# Patient Record
Sex: FEMALE | Race: WHITE | NOT HISPANIC OR LATINO | Employment: FULL TIME | URBAN - METROPOLITAN AREA
[De-identification: names, ages, dates, MRNs, and addresses within clinical notes are randomized per-mention and may not be internally consistent; named-entity substitution may affect disease eponyms.]

---

## 2017-01-03 ENCOUNTER — HOSPITAL ENCOUNTER (OUTPATIENT)
Dept: RADIOLOGY | Facility: HOSPITAL | Age: 62
Discharge: HOME | End: 2017-01-03
Attending: INTERNAL MEDICINE
Payer: COMMERCIAL

## 2017-01-03 DIAGNOSIS — R42 DIZZINESS: ICD-10-CM

## 2017-01-03 DIAGNOSIS — N95.1 POSTMENOPAUSAL DISORDER: ICD-10-CM

## 2017-01-03 PROCEDURE — 93880 EXTRACRANIAL BILAT STUDY: CPT | Mod: TC

## 2017-01-03 PROCEDURE — 76856 US EXAM PELVIC COMPLETE: CPT | Mod: 26,GC,, | Performed by: RADIOLOGY

## 2017-01-03 PROCEDURE — 76856 US EXAM PELVIC COMPLETE: CPT | Mod: TC

## 2017-01-03 PROCEDURE — 93880 EXTRACRANIAL BILAT STUDY: CPT | Mod: 26,,, | Performed by: RADIOLOGY

## 2017-01-03 PROCEDURE — 76830 TRANSVAGINAL US NON-OB: CPT | Mod: 26,GC,, | Performed by: RADIOLOGY

## 2017-01-04 ENCOUNTER — PATIENT MESSAGE (OUTPATIENT)
Dept: INTERNAL MEDICINE | Facility: CLINIC | Age: 62
End: 2017-01-04

## 2017-01-04 DIAGNOSIS — E78.49 OTHER HYPERLIPIDEMIA: Primary | ICD-10-CM

## 2017-01-04 RX ORDER — PRAVASTATIN SODIUM 20 MG/1
20 TABLET ORAL DAILY
Qty: 30 TABLET | Refills: 11 | Status: SHIPPED | OUTPATIENT
Start: 2017-01-04 | End: 2017-12-15 | Stop reason: SDUPTHER

## 2017-02-04 RX ORDER — ESTRADIOL 0.1 MG/G
CREAM VAGINAL
Qty: 42.5 G | Refills: 4 | Status: SHIPPED | OUTPATIENT
Start: 2017-02-04 | End: 2017-03-06 | Stop reason: SDUPTHER

## 2017-02-07 ENCOUNTER — PATIENT MESSAGE (OUTPATIENT)
Dept: INTERNAL MEDICINE | Facility: CLINIC | Age: 62
End: 2017-02-07

## 2017-02-09 ENCOUNTER — PATIENT MESSAGE (OUTPATIENT)
Dept: INTERNAL MEDICINE | Facility: CLINIC | Age: 62
End: 2017-02-09

## 2017-02-10 ENCOUNTER — OFFICE VISIT (OUTPATIENT)
Dept: INTERNAL MEDICINE | Facility: CLINIC | Age: 62
End: 2017-02-10
Payer: COMMERCIAL

## 2017-02-10 ENCOUNTER — PATIENT MESSAGE (OUTPATIENT)
Dept: INTERNAL MEDICINE | Facility: CLINIC | Age: 62
End: 2017-02-10

## 2017-02-10 VITALS
DIASTOLIC BLOOD PRESSURE: 74 MMHG | BODY MASS INDEX: 22.47 KG/M2 | HEART RATE: 78 BPM | WEIGHT: 131.63 LBS | HEIGHT: 64 IN | SYSTOLIC BLOOD PRESSURE: 123 MMHG

## 2017-02-10 DIAGNOSIS — B34.9 VIRAL ILLNESS: ICD-10-CM

## 2017-02-10 DIAGNOSIS — E86.0 DEHYDRATION: Primary | ICD-10-CM

## 2017-02-10 PROCEDURE — 96360 HYDRATION IV INFUSION INIT: CPT | Mod: S$GLB,,, | Performed by: INTERNAL MEDICINE

## 2017-02-10 PROCEDURE — 96361 HYDRATE IV INFUSION ADD-ON: CPT | Mod: S$GLB,,, | Performed by: INTERNAL MEDICINE

## 2017-02-10 PROCEDURE — 99999 PR PBB SHADOW E&M-EST. PATIENT-LVL IV: CPT | Mod: PBBFAC,,, | Performed by: INTERNAL MEDICINE

## 2017-02-10 PROCEDURE — 99214 OFFICE O/P EST MOD 30 MIN: CPT | Mod: 25,S$GLB,, | Performed by: INTERNAL MEDICINE

## 2017-02-10 RX ORDER — SODIUM CHLORIDE 9 MG/ML
INJECTION, SOLUTION INTRAVENOUS
Status: COMPLETED | OUTPATIENT
Start: 2017-02-10 | End: 2017-02-10

## 2017-02-10 RX ORDER — PREDNISONE 10 MG/1
TABLET ORAL
Qty: 20 TABLET | Refills: 0 | Status: SHIPPED | OUTPATIENT
Start: 2017-02-10 | End: 2017-08-29

## 2017-02-10 RX ADMIN — SODIUM CHLORIDE: 9 INJECTION, SOLUTION INTRAVENOUS at 03:02

## 2017-02-10 NOTE — MR AVS SNAPSHOT
[facility] - Internal Medicine  [address]  [facility] [address]  Phone: [phone]  Fax: [phone]                  Tj Hernandez   2/10/2017 4:00 PM   Office Visit    Description:  Female : 1955   Provider:  Bonnie Mathew MD   Department:  [facility] - Internal Medicine           Reason for Visit     Fatigue           Diagnoses this Visit        Comments    Dehydration    -  Primary            To Do List           Goals (5 Years of Data)     None       These Medications        Disp Refills Start End    predniSONE (DELTASONE) 10 MG tablet 20 tablet 0 2/10/2017     3 tablets daily for 2 days then 2 tablets daily for 2 days then one tablet daily for 2 days    Pharmacy: [facility] [place] [facility] [address] #: [phone]         [facility] On Call     [facility] On Call Nurse Care Line -  Assistance  Registered nurses in the [facility] On Call Center provide clinical advisement, health education, appointment booking, and other advisory services.  Call for this free service at [phone].             Medications           Message regarding Medications     Verify the changes and/or additions to your medication regime listed below are the same as discussed with your clinician today.  If any of these changes or additions are incorrect, please notify your healthcare provider.        START taking these NEW medications        Refills    predniSONE (DELTASONE) 10 MG tablet 0    Sig: 3 tablets daily for 2 days then 2 tablets daily for 2 days then one tablet daily for 2 days    Class: Normal      These medications were administered today        Dose Freq    0.9%  NaCl infusion  Clinic/HOD 1 time    Sig: Inject into the vein one time.    Class: Normal    Route: Intravenous           Verify that the below list of medications is an accurate representation of the medications you are currently taking.  If none reported, the list may be blank. If incorrect,  "please contact your healthcare provider. Carry this list with you in case of emergency.           Current Medications     alprazolam (XANAX) 0.25 MG tablet Take 1 tablet (0.25 mg total) by mouth 2 (two) times daily as needed for Anxiety.    biotin 1 mg tablet Take 1,000 mcg by mouth 3 (three) times daily.    calcium carbonate (OS-MOSHE) 600 mg (1,500 mg) Tab Take 600 mg by mouth 2 (two) times daily with meals.    cetirizine (ZYRTEC) 10 MG tablet Take 10 mg by mouth once daily.    chlorhexidine (PERIDEX) 0.12 % solution Use as directed 15 mLs in the mouth or throat 2 (two) times daily.    cholecalciferol, vitamin D3, 2,000 unit Cap Take 1 capsule by mouth once daily.    cholestyramine (QUESTRAN) 4 gram packet Take 1 packet (4 g total) by mouth 2 (two) times daily.    clonazepam (KLONOPIN) 0.5 MG disintegrating tablet Take 1 tablet (0.5 mg total) by mouth 3 (three) times daily.    ESTRACE 0.01 % (0.1 mg/gram) vaginal cream INSERT ONE gram VAGINALLY TWO TIMES A WEEK    ibuprofen (ADVIL,MOTRIN) 800 MG tablet     meloxicam (MOBIC) 15 MG tablet TAKE ONE TABLET BY MOUTH DAILY    multivitamin capsule Take 1 capsule by mouth once daily.    pravastatin (PRAVACHOL) 20 MG tablet Take 1 tablet (20 mg total) by mouth once daily.    predniSONE (DELTASONE) 10 MG tablet 3 tablets daily for 2 days then 2 tablets daily for 2 days then one tablet daily for 2 days    ranitidine (ZANTAC) 300 MG tablet TAKE ONE TABLET BY MOUTH DAILY    UBIDECARENONE (COENZYME Q10) 100 mg Tab Take 1 tablet (100 mg total) by mouth once daily.    ZETIA 10 mg tablet TAKE ONE TABLET BY MOUTH DAILY    zolpidem (AMBIEN) 10 mg Tab Take 1 tablet (10 mg total) by mouth nightly as needed.           Clinical Reference Information           Your Vitals Were     BP Pulse Height Weight BMI    123/74 (BP Location: Left arm, Patient Position: Sitting, BP Method: Manual) 78 5' 4" (1.626 m) 59.7 kg (131 lb 9.8 oz) 22.59 kg/m2      Blood Pressure          Most Recent Value    " BP  123/74      Allergies as of 2/10/2017     No Known Allergies      Immunizations Administered on Date of Encounter - 2/10/2017     None      Administrations This Visit     0.9%  NaCl infusion     Admin Date Action Dose Route Administered By             02/10/2017 New Bag   Intravenous Michelle Vargas LPN                      Language Assistance Services     ATTENTION: Language assistance services are available, free of charge. Please call [phone].      ATENCIÓN: Si habla español, tiene a cruz disposición servicios gratuitos de asistencia lingüística. Llame al [phone].     CHÚ Ý: N?u b?n nói Ti?ng Vi?t, có các d?ch v? h? tr? ngôn ng? mi?n phí dành cho b?n. G?i s? [phone].         [facility] complies with applicable Federal civil rights laws and does not discriminate on the basis of race, color, national origin, age, disability, or sex.

## 2017-02-10 NOTE — PROGRESS NOTES
"CHIEF COMPLAINT: Cough and diarrhea    HISTORY OF PRESENT ILLNESS: 61-year-old woman who presents due to a viral illness. She had chills and body aches that started on 2/6/17.  Symptoms continued on 2/7/17 and 2/8/17. She started to have diarrhea yesterday. She had watery diarrhea yesterday morning and several soft stools today. She has a dry cough with intermitent wheezing. She has left ear fullness.  She is trying to drink fluids but has no appetite. SHe is slightly lightheaded today.  No abdominal pain, nausea, vomiting, constipation, dysuria, hematuria.       PAST MEDICAL HISTORY:   1. Hyperlipidemia.   2. Sleep disorder.   3. Cervical spine arthritis.   4. Episode of hematuria, negative cystoscope with Dr. Roland.     PAST SURGICAL HISTORY:   1. D&C for a miscarriage.   2. Conization due to dysplasia 17 years ago.   3. Tonsillectomy and adenoidectomy at age [age].     SOCIAL HISTORY: She does not smoke. She drinks two alcoholic beverages   a month. 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 She owns a business.     FAMILY HISTORY: She is adopted.     REVIEW OF SYSTEMS: She denies fevers, chills, night sweats, hot flashes, visual change. She has some slight hearing loss. She denies sinus congestion, sore throat, chest pain, shortness of breath, palpitations, nausea, vomiting, constipation, diarrhea, dysuria, hematuria, joint pain, muscle pain, rashes, headaches, polydipsia,   polyuria.     SCREENING: Mammogram 910/16, bone density 1/13.Colonoscopy was due 2018    PHYSICAL EXAMINATION:    Visit Vitals    /74 (BP Location: Left arm, Patient Position: Sitting, BP Method: Manual)    Pulse 78    Ht 5' 4" (1.626 m)    Wt 59.7 kg (131 lb 9.8 oz)    BMI 22.59 kg/m2             General: Alert, oriented. No apparent distress. Affect within normal   limits.   Conjunctivae anicteric. PERRL. Tympanic membranes clear fluid bilaterally. Oropharynx   clear.   Neck supple. No cervical lymphadenopathy, no thyroid " enlargement.   Respiratory effort normal. Lungs clear to auscultation.   Heart: Regular rate and rhythm without murmurs, gallops or rubs.   No lower extremity edema.    ABDOMEN: soft, non distended, non tender, bowel sounds present, no hepatosplenomgaly       1 liter normal saline bolused in the office over 2 hours. Pt felt better after IV fluids      ASSESSMENT AND PLAN:    1.Viral illness with dehydration- continue loratadine 10 mg daily and robitussin CF 2 tsp in am and afternoon for cough. Prednisone taper for congestion. PUsh fluids for diarrhea. [place] diet and advance as tolerated.

## 2017-02-10 NOTE — PROGRESS NOTES
Verified patient's name, date of birth and allergies. IV started into rt AC with 20 on gauge catheter needle with NS infusing @ 1000 ml bolus patient tolerating well. No redness or swelling to IV site noted.

## 2017-03-06 RX ORDER — ESTRADIOL 0.1 MG/G
1 CREAM VAGINAL
Qty: 42.5 G | Refills: 4 | Status: SHIPPED | OUTPATIENT
Start: 2017-03-06 | End: 2020-04-09 | Stop reason: SDUPTHER

## 2017-04-05 ENCOUNTER — PATIENT MESSAGE (OUTPATIENT)
Dept: INTERNAL MEDICINE | Facility: CLINIC | Age: 62
End: 2017-04-05

## 2017-04-07 ENCOUNTER — LAB VISIT (OUTPATIENT)
Dept: LAB | Facility: HOSPITAL | Age: 62
End: 2017-04-07
Attending: INTERNAL MEDICINE
Payer: COMMERCIAL

## 2017-04-07 DIAGNOSIS — E78.5 HYPERLIPIDEMIA, UNSPECIFIED: ICD-10-CM

## 2017-04-07 LAB
ALBUMIN SERPL BCP-MCNC: 3.9 G/DL
ALP SERPL-CCNC: 57 U/L
ALT SERPL W/O P-5'-P-CCNC: 11 U/L
ANION GAP SERPL CALC-SCNC: 7 MMOL/L
AST SERPL-CCNC: 19 U/L
BILIRUB SERPL-MCNC: 0.6 MG/DL
BUN SERPL-MCNC: 18 MG/DL
CALCIUM SERPL-MCNC: 9.5 MG/DL
CHLORIDE SERPL-SCNC: 107 MMOL/L
CHOLEST/HDLC SERPL: 2.6 {RATIO}
CO2 SERPL-SCNC: 28 MMOL/L
CREAT SERPL-MCNC: 0.9 MG/DL
EST. GFR  (AFRICAN AMERICAN): >60 ML/MIN/1.73 M^2
EST. GFR  (NON AFRICAN AMERICAN): >60 ML/MIN/1.73 M^2
GLUCOSE SERPL-MCNC: 96 MG/DL
HDL/CHOLESTEROL RATIO: 39.1 %
HDLC SERPL-MCNC: 174 MG/DL
HDLC SERPL-MCNC: 68 MG/DL
LDLC SERPL CALC-MCNC: 90.8 MG/DL
NONHDLC SERPL-MCNC: 106 MG/DL
POTASSIUM SERPL-SCNC: 4.8 MMOL/L
PROT SERPL-MCNC: 6.7 G/DL
SODIUM SERPL-SCNC: 142 MMOL/L
TRIGL SERPL-MCNC: 76 MG/DL

## 2017-04-07 PROCEDURE — 80053 COMPREHEN METABOLIC PANEL: CPT

## 2017-04-07 PROCEDURE — 36415 COLL VENOUS BLD VENIPUNCTURE: CPT | Mod: PO

## 2017-04-07 PROCEDURE — 80061 LIPID PANEL: CPT

## 2017-04-10 ENCOUNTER — PATIENT MESSAGE (OUTPATIENT)
Dept: INTERNAL MEDICINE | Facility: CLINIC | Age: 62
End: 2017-04-10

## 2017-04-10 DIAGNOSIS — Z01.818 PRE-OPERATIVE CLEARANCE: Primary | ICD-10-CM

## 2017-06-19 ENCOUNTER — PATIENT MESSAGE (OUTPATIENT)
Dept: INTERNAL MEDICINE | Facility: CLINIC | Age: 62
End: 2017-06-19

## 2017-06-19 RX ORDER — MELOXICAM 15 MG/1
15 TABLET ORAL DAILY
Qty: 30 TABLET | Refills: 0 | Status: SHIPPED | OUTPATIENT
Start: 2017-06-19 | End: 2017-12-15 | Stop reason: SDUPTHER

## 2017-06-19 RX ORDER — EZETIMIBE 10 MG/1
10 TABLET ORAL DAILY
Qty: 30 TABLET | Refills: 11 | Status: SHIPPED | OUTPATIENT
Start: 2017-06-19 | End: 2017-12-15 | Stop reason: SDUPTHER

## 2017-06-20 ENCOUNTER — PATIENT MESSAGE (OUTPATIENT)
Dept: INTERNAL MEDICINE | Facility: CLINIC | Age: 62
End: 2017-06-20

## 2017-08-24 ENCOUNTER — PATIENT MESSAGE (OUTPATIENT)
Dept: INTERNAL MEDICINE | Facility: CLINIC | Age: 62
End: 2017-08-24

## 2017-08-29 ENCOUNTER — OFFICE VISIT (OUTPATIENT)
Dept: SURGERY | Facility: CLINIC | Age: 62
End: 2017-08-29
Payer: COMMERCIAL

## 2017-08-29 VITALS
HEIGHT: 64 IN | WEIGHT: 136.69 LBS | SYSTOLIC BLOOD PRESSURE: 142 MMHG | BODY MASS INDEX: 23.34 KG/M2 | DIASTOLIC BLOOD PRESSURE: 57 MMHG | HEART RATE: 57 BPM

## 2017-08-29 DIAGNOSIS — R15.9 FULL INCONTINENCE OF FECES: ICD-10-CM

## 2017-08-29 DIAGNOSIS — K64.8 BLEEDING INTERNAL HEMORRHOIDS: Primary | ICD-10-CM

## 2017-08-29 PROCEDURE — 3008F BODY MASS INDEX DOCD: CPT | Mod: S$GLB,,, | Performed by: COLON & RECTAL SURGERY

## 2017-08-29 PROCEDURE — 99213 OFFICE O/P EST LOW 20 MIN: CPT | Mod: S$GLB,,, | Performed by: COLON & RECTAL SURGERY

## 2017-08-29 PROCEDURE — 99999 PR PBB SHADOW E&M-EST. PATIENT-LVL III: CPT | Mod: PBBFAC,,, | Performed by: COLON & RECTAL SURGERY

## 2017-08-29 NOTE — PROGRESS NOTES
"Subjective:       Patient ID: Tj Hernandez is a 62 y.o. female.    Chief Complaint: Hemorrhoids    HPI   62 F who presents to clinic with complaints of hemorrhoids for "years". Recently, she has increased bleeding episodes, has to wear a pad daily because of the bleeding. Bleeding episodes not associated with bowel movements.  Some discomfort, not necessarily painful. Also complains of fecal seepage and increased gas/bloating. No episodes of incontinence. Has 2-3 soft bowel movements/day which is normal for her. Not on any anticoagulation. Last colonoscopy 02/2013, repeat in 5 years. Unsure of family history as patient was adopted. No previous rectal surgeries. Had two vaginal deliveries of babies over 8lbs.   Review of Systems   Constitutional: Negative for fatigue, fever and unexpected weight change.   Respiratory: Negative for cough and shortness of breath.    Cardiovascular: Negative for chest pain.   Gastrointestinal: Positive for anal bleeding and rectal pain. Negative for abdominal distention, abdominal pain, blood in stool, constipation, diarrhea, nausea and vomiting.       Objective:      Physical Exam   Constitutional: She is oriented to person, place, and time. She appears well-developed and well-nourished. No distress.   Eyes: Conjunctivae and EOM are normal.   Pulmonary/Chest: Effort normal. No respiratory distress.   Abdominal: Soft. She exhibits no distension. There is no tenderness.   Genitourinary:   Genitourinary Comments: Normal perianal skin. eversion of anus revealed no abnormality or fissure, ROMEO revealed no masses, blood or stool in vault, weak sphincter tone, weak squeeze, anoscopy grade II internal hemorrhoids with no bleeding or stigmata of same.     Musculoskeletal: Normal range of motion.   Neurological: She is alert and oriented to person, place, and time.   Skin: Skin is warm and dry.   Psychiatric: She has a normal mood and affect. Her behavior is normal.       Assessment:       1. " Bleeding internal hemorrhoids        Plan:   High Fiber Diet  Fiber Supplement   Colonoscopy 2018  RTC 4 weeks  Have seen and examined the patient with the fellow and agree with their plan.  ANKUR SANDRA

## 2017-09-10 ENCOUNTER — PATIENT MESSAGE (OUTPATIENT)
Dept: INTERNAL MEDICINE | Facility: CLINIC | Age: 62
End: 2017-09-10

## 2017-09-10 DIAGNOSIS — R07.81 RIB PAIN: Primary | ICD-10-CM

## 2017-09-11 ENCOUNTER — OFFICE VISIT (OUTPATIENT)
Dept: INTERNAL MEDICINE | Facility: CLINIC | Age: 62
End: 2017-09-11
Payer: COMMERCIAL

## 2017-09-11 ENCOUNTER — HOSPITAL ENCOUNTER (OUTPATIENT)
Dept: RADIOLOGY | Facility: HOSPITAL | Age: 62
Discharge: HOME | End: 2017-09-11
Attending: INTERNAL MEDICINE
Payer: COMMERCIAL

## 2017-09-11 VITALS
HEART RATE: 52 BPM | OXYGEN SATURATION: 99 % | TEMPERATURE: 99 F | SYSTOLIC BLOOD PRESSURE: 124 MMHG | WEIGHT: 138.44 LBS | HEIGHT: 64 IN | BODY MASS INDEX: 23.64 KG/M2 | DIASTOLIC BLOOD PRESSURE: 90 MMHG

## 2017-09-11 DIAGNOSIS — R07.81 RIB PAIN: ICD-10-CM

## 2017-09-11 DIAGNOSIS — S20.212A CONTUSION, CHEST WALL, LEFT, INITIAL ENCOUNTER: Primary | ICD-10-CM

## 2017-09-11 PROCEDURE — 99999 PR PBB SHADOW E&M-EST. PATIENT-LVL III: CPT | Mod: PBBFAC,,, | Performed by: INTERNAL MEDICINE

## 2017-09-11 PROCEDURE — 99213 OFFICE O/P EST LOW 20 MIN: CPT | Mod: S$GLB,,, | Performed by: INTERNAL MEDICINE

## 2017-09-11 PROCEDURE — 3008F BODY MASS INDEX DOCD: CPT | Mod: S$GLB,,, | Performed by: INTERNAL MEDICINE

## 2017-09-11 PROCEDURE — 71110 X-RAY EXAM RIBS BIL 3 VIEWS: CPT | Mod: 26,,, | Performed by: RADIOLOGY

## 2017-09-11 PROCEDURE — 71110 X-RAY EXAM RIBS BIL 3 VIEWS: CPT | Mod: TC

## 2017-09-11 NOTE — PROGRESS NOTES
Subjective:       Patient ID: Tj Hernandez is a 62 y.o. female.    Chief Complaint: Fall (pt fell on deck on saturday night. her ribs are hurting, no bruise)    Fall   The accident occurred 3 to 5 days ago. The fall occurred while walking. She landed on concrete. There was no blood loss. Point of impact: left chest and elbow. Pain location: left chest. The pain is at a severity of 3/10. The pain is mild. The symptoms are aggravated by movement (deep breath and cough). Pertinent negatives include no abdominal pain, fever, headaches, loss of consciousness, nausea, numbness, tingling, visual change or vomiting. She has tried ice and NSAID for the symptoms. The treatment provided mild relief.     Review of Systems   Constitutional: Negative for activity change, chills, fatigue and fever.   HENT: Negative for congestion, ear pain, nosebleeds, postnasal drip, sinus pressure and sore throat.    Eyes: Negative.  Negative for visual disturbance.   Respiratory: Negative for cough, chest tightness, shortness of breath and wheezing.    Cardiovascular: Negative for chest pain.   Gastrointestinal: Negative for abdominal pain, diarrhea, nausea and vomiting.   Genitourinary: Negative for difficulty urinating, dysuria, frequency and urgency.   Musculoskeletal: Negative for arthralgias and neck stiffness.   Skin: Negative for rash.   Neurological: Negative for dizziness, tingling, loss of consciousness, weakness, numbness and headaches.   Psychiatric/Behavioral: Negative for sleep disturbance. The patient is not nervous/anxious.        Objective:      Physical Exam   Constitutional: She is oriented to person, place, and time. She appears well-developed and well-nourished.  Non-toxic appearance. No distress.   HENT:   Head: Normocephalic and atraumatic.   Right Ear: Tympanic membrane, external ear and ear canal normal.   Left Ear: Tympanic membrane, external ear and ear canal normal.   Eyes: EOM are normal. Pupils are equal, round,  and reactive to light. No scleral icterus.   Neck: Normal range of motion. Neck supple. No thyromegaly present.   Cardiovascular: Normal rate, regular rhythm and normal heart sounds.    Pulmonary/Chest: Effort normal and breath sounds normal.       Abdominal: Soft. Bowel sounds are normal. She exhibits no mass. There is no tenderness. There is no rebound.   Musculoskeletal: Normal range of motion.   Lymphadenopathy:     She has no cervical adenopathy.   Neurological: She is alert and oriented to person, place, and time. She has normal reflexes. She displays normal reflexes. No cranial nerve deficit. She exhibits normal muscle tone. Coordination normal.   Skin: Skin is warm and dry.   Psychiatric: She has a normal mood and affect. Her behavior is normal.       Assessment:       1. Contusion, chest wall, left, initial encounter        Plan:   Tj was seen today for fall.    Diagnoses and all orders for this visit:    Contusion, chest wall, left, initial encounter

## 2017-09-22 ENCOUNTER — PATIENT MESSAGE (OUTPATIENT)
Dept: INTERNAL MEDICINE | Facility: CLINIC | Age: 62
End: 2017-09-22

## 2017-09-25 RX ORDER — CYCLOBENZAPRINE HCL 5 MG
5 TABLET ORAL NIGHTLY
Qty: 30 TABLET | Refills: 0 | Status: SHIPPED | OUTPATIENT
Start: 2017-09-25 | End: 2017-10-05

## 2017-09-28 ENCOUNTER — OFFICE VISIT (OUTPATIENT)
Dept: SURGERY | Facility: CLINIC | Age: 62
End: 2017-09-28
Payer: COMMERCIAL

## 2017-09-28 VITALS
DIASTOLIC BLOOD PRESSURE: 82 MMHG | HEART RATE: 59 BPM | SYSTOLIC BLOOD PRESSURE: 154 MMHG | HEIGHT: 64 IN | WEIGHT: 137.13 LBS | BODY MASS INDEX: 23.41 KG/M2

## 2017-09-28 DIAGNOSIS — K64.9 HEMORRHOIDS, UNSPECIFIED HEMORRHOID TYPE: Primary | ICD-10-CM

## 2017-09-28 PROCEDURE — 99999 PR PBB SHADOW E&M-EST. PATIENT-LVL III: CPT | Mod: PBBFAC,,, | Performed by: COLON & RECTAL SURGERY

## 2017-09-28 PROCEDURE — 3008F BODY MASS INDEX DOCD: CPT | Mod: S$GLB,,, | Performed by: COLON & RECTAL SURGERY

## 2017-09-28 PROCEDURE — 99214 OFFICE O/P EST MOD 30 MIN: CPT | Mod: S$GLB,,, | Performed by: COLON & RECTAL SURGERY

## 2017-09-28 NOTE — PROGRESS NOTES
"blanca Hernandez is a 62 y.o. female.    Chief Complaint: Hemorrhoids    HPI   62 F who presents to clinic with complaints of hemorrhoids for "years". Recently, she has increased bleeding episodes, has to wear a pad daily because of the bleeding. Bleeding episodes not associated with bowel movements.  Some discomfort, not necessarily painful. Also complains of fecal seepage and increased gas/bloating. No episodes of incontinence. Has 2-3 soft bowel movements/day which is normal for her. Not on any anticoagulation. Last colonoscopy 02/2013, repeat in 5 years. Unsure of family history as patient was adopted. No previous rectal surgeries. Had two vaginal deliveries of babies over 8lbs. Since taking fibercon the bloating and seepage has improved. She still c/o hemooridal discomfort  Review of Systems   Constitutional: Negative for fatigue, fever and unexpected weight change.   Respiratory: Negative for cough and shortness of breath.    Cardiovascular: Negative for chest pain.   Gastrointestinal: Positive for anal bleeding and rectal pain. Negative for abdominal distention, abdominal pain, blood in stool, constipation, diarrhea, nausea and vomiting.       Objective:      Physical Exam   Constitutional: She is oriented to person, place, and time. She appears well-developed and well-nourished. No distress.   Eyes: Conjunctivae and EOM are normal.   Pulmonary/Chest: Effort normal. No respiratory distress.   Abdominal: Soft. She exhibits no distension. There is no tenderness.   Genitourinary:   Genitourinary Comments: Mixxed hemorroidal disease. He hemorrhoids are worse than her previous vist   Musculoskeletal: Normal range of motion.   Neurological: She is alert and oriented to person, place, and time.   Skin: Skin is warm and dry.   Psychiatric: She has a normal mood and affect. Her behavior is normal.       Assessment:       1. Bleeding  hemorrhoids        Plan:   High Fiber Diet  Discussed hemorrhoid surgery. She will " contact office . Otherwise continue conservative treatment

## 2017-10-25 ENCOUNTER — PATIENT MESSAGE (OUTPATIENT)
Dept: INTERNAL MEDICINE | Facility: CLINIC | Age: 62
End: 2017-10-25

## 2017-10-25 DIAGNOSIS — Z12.31 SCREENING MAMMOGRAM, ENCOUNTER FOR: Primary | ICD-10-CM

## 2017-11-01 ENCOUNTER — HOSPITAL ENCOUNTER (OUTPATIENT)
Dept: RADIOLOGY | Facility: HOSPITAL | Age: 62
Discharge: HOME | End: 2017-11-01
Attending: INTERNAL MEDICINE
Payer: COMMERCIAL

## 2017-11-01 VITALS — WEIGHT: 137 LBS | BODY MASS INDEX: 23.39 KG/M2 | HEIGHT: 64 IN

## 2017-11-01 DIAGNOSIS — Z12.31 SCREENING MAMMOGRAM, ENCOUNTER FOR: ICD-10-CM

## 2017-11-01 PROCEDURE — 77067 SCR MAMMO BI INCL CAD: CPT | Mod: TC

## 2017-11-01 PROCEDURE — 77063 BREAST TOMOSYNTHESIS BI: CPT | Mod: 26,,, | Performed by: RADIOLOGY

## 2017-11-01 PROCEDURE — 77067 SCR MAMMO BI INCL CAD: CPT | Mod: 26,,, | Performed by: RADIOLOGY

## 2017-12-15 ENCOUNTER — OFFICE VISIT (OUTPATIENT)
Dept: INTERNAL MEDICINE | Facility: CLINIC | Age: 62
End: 2017-12-15
Payer: COMMERCIAL

## 2017-12-15 VITALS
HEIGHT: 64 IN | WEIGHT: 139.13 LBS | DIASTOLIC BLOOD PRESSURE: 70 MMHG | BODY MASS INDEX: 23.75 KG/M2 | HEART RATE: 66 BPM | TEMPERATURE: 99 F | SYSTOLIC BLOOD PRESSURE: 120 MMHG | OXYGEN SATURATION: 98 %

## 2017-12-15 DIAGNOSIS — E55.9 VITAMIN D DEFICIENCY DISEASE: ICD-10-CM

## 2017-12-15 DIAGNOSIS — G47.09 OTHER INSOMNIA: ICD-10-CM

## 2017-12-15 DIAGNOSIS — E56.9 VITAMIN DEFICIENCY: ICD-10-CM

## 2017-12-15 DIAGNOSIS — E78.5 HYPERLIPIDEMIA, UNSPECIFIED HYPERLIPIDEMIA TYPE: Primary | ICD-10-CM

## 2017-12-15 DIAGNOSIS — Z12.11 SCREENING FOR MALIGNANT NEOPLASM OF COLON: ICD-10-CM

## 2017-12-15 DIAGNOSIS — M85.80 OSTEOPENIA, UNSPECIFIED LOCATION: ICD-10-CM

## 2017-12-15 DIAGNOSIS — Z11.51 SCREENING FOR HPV (HUMAN PAPILLOMAVIRUS): ICD-10-CM

## 2017-12-15 DIAGNOSIS — Z00.00 ROUTINE GENERAL MEDICAL EXAMINATION AT A HEALTH CARE FACILITY: ICD-10-CM

## 2017-12-15 DIAGNOSIS — E53.8 VITAMIN B12 DEFICIENCY: ICD-10-CM

## 2017-12-15 DIAGNOSIS — N94.9 ADNEXAL FULLNESS: ICD-10-CM

## 2017-12-15 PROCEDURE — 88175 CYTOPATH C/V AUTO FLUID REDO: CPT

## 2017-12-15 PROCEDURE — 99999 PR PBB SHADOW E&M-EST. PATIENT-LVL V: CPT | Mod: PBBFAC,,, | Performed by: INTERNAL MEDICINE

## 2017-12-15 PROCEDURE — 99396 PREV VISIT EST AGE 40-64: CPT | Mod: S$GLB,,, | Performed by: INTERNAL MEDICINE

## 2017-12-15 RX ORDER — AMOXICILLIN 875 MG/1
875 TABLET, FILM COATED ORAL EVERY 12 HOURS
Qty: 20 TABLET | Refills: 0 | Status: SHIPPED | OUTPATIENT
Start: 2017-12-15 | End: 2017-12-25

## 2017-12-15 RX ORDER — MELOXICAM 15 MG/1
15 TABLET ORAL DAILY
Qty: 30 TABLET | Refills: 0 | Status: ON HOLD | OUTPATIENT
Start: 2017-12-15 | End: 2019-11-12

## 2017-12-15 RX ORDER — PRAVASTATIN SODIUM 20 MG/1
20 TABLET ORAL DAILY
Qty: 30 TABLET | Refills: 11 | Status: SHIPPED | OUTPATIENT
Start: 2017-12-15 | End: 2018-12-18 | Stop reason: SDUPTHER

## 2017-12-15 RX ORDER — EZETIMIBE 10 MG/1
10 TABLET ORAL DAILY
Qty: 30 TABLET | Refills: 11 | Status: SHIPPED | OUTPATIENT
Start: 2017-12-15 | End: 2018-12-18 | Stop reason: SDUPTHER

## 2017-12-15 RX ORDER — ZOLPIDEM TARTRATE 10 MG/1
10 TABLET ORAL NIGHTLY PRN
Qty: 30 TABLET | Refills: 1 | Status: SHIPPED | OUTPATIENT
Start: 2017-12-15 | End: 2019-01-08 | Stop reason: SDUPTHER

## 2017-12-15 RX ORDER — AZELASTINE 1 MG/ML
1 SPRAY, METERED NASAL 2 TIMES DAILY
Qty: 30 ML | Refills: 11 | Status: SHIPPED | OUTPATIENT
Start: 2017-12-15 | End: 2019-11-11

## 2017-12-15 NOTE — PROGRESS NOTES
"CHIEF COMPLAINT: Annual exam    HISTORY OF PRESENT ILLNESS: 61-year-old woman who presents for her annual exam.   She has been tired at times.  Sleep has been the same.  Mood is ok. due to a viral illness. She had chills and body aches that started on 2/6/17.  Symptoms continued on 2/7/17 and 2/8/17. She started to have diarrhea yesterday. She had watery diarrhea yesterday morning and several soft stools today. She has a dry cough with intermitent wheezing. She has left ear fullness.  She is trying to drink fluids but has no appetite. SHe is slightly lightheaded today.  No abdominal pain, nausea, vomiting, constipation, dysuria, hematuria.     Diarreha is better with 2 fiber con in the morning. Hemorroids come and go. She eneds to have hemorrhoid surgery.    Neck comes and goes. She takes meloxicam as needed which helps.       PAST MEDICAL HISTORY:   1. Hyperlipidemia.   2. Sleep disorder.   3. Cervical spine arthritis.   4. Episode of hematuria, negative cystoscope with Dr. Roland.     PAST SURGICAL HISTORY:   1. D&C for a miscarriage.   2. Conization due to dysplasia 18 years ago.   3. Tonsillectomy and adenoidectomy at age [age].     SOCIAL HISTORY: She does not smoke. She drinks two alcoholic beverages   a month. 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 She owns a business.     FAMILY HISTORY: She is adopted.     REVIEW OF SYSTEMS: She denies fevers, chills, night sweats, hot flashes, visual change. She has some slight hearing loss. She denies sinus congestion, sore throat, chest pain, shortness of breath, palpitations, nausea, vomiting, constipation, diarrhea, dysuria, hematuria, joint pain, muscle pain, rashes, headaches, polydipsia,   polyuria.     SCREENING: Mammogram 11/17, bone density 1/13.Colonoscopy was due 2018    PHYSICAL EXAMINATION:     /70 (BP Location: Left arm, Patient Position: Sitting, BP Method: Medium (Manual))   Pulse 66   Temp 98.5 °F (36.9 °C)   Ht 5' 4" (1.626 m)   Wt 63.1 kg (139 " lb 1.6 oz)   SpO2 98%   BMI 23.88 kg/m²   General: Alert, oriented. No apparent distress. Affect within normal   limits.   Conjunctivae anicteric. PERRL. Tympanic membranes clear fluid bilaterally. Oropharynx   clear.   Neck supple. No cervical lymphadenopathy, no thyroid enlargement.   Respiratory effort normal. Lungs clear to auscultation.   Heart: Regular rate and rhythm without murmurs, gallops or rubs.   No lower extremity edema.    ABDOMEN: soft, non distended, non tender, bowel sounds present, no hepatosplenomgaly  BREAST: no abn seen, no nodules palpated, no axillary lad    External genitalia wnl. Cervix wnl. Pap done. NO cervical motion tenderness. No uterine enlargement. Possible right adenexal fullness        ASSESSMENT AND PLAN:    1.Annual exam - discussed diet, exercise and safety issues.  2. Diarrhea  - better with fiber  3. Hemorrhoids - considering surgery  4. Neck pain - stable  5. Hyperlipidemia- labs today  6. ALlergic rhinitis - astelin nasal spray. If starts to have yellow mucous - amoxil  7. Pelvic fullness - US pelvis  8. Colonoscopy due 2/2018 - ordered  9. Osteopenia - BMD  10. Insomnia - ambien prn refilled  She is to return in 6 months, sooner if problems arise.

## 2017-12-19 ENCOUNTER — HOSPITAL ENCOUNTER (OUTPATIENT)
Dept: RADIOLOGY | Facility: HOSPITAL | Age: 62
Discharge: HOME | End: 2017-12-19
Attending: INTERNAL MEDICINE
Payer: COMMERCIAL

## 2017-12-19 DIAGNOSIS — N94.9 ADNEXAL FULLNESS: ICD-10-CM

## 2017-12-19 PROCEDURE — 76856 US EXAM PELVIC COMPLETE: CPT | Mod: TC

## 2017-12-19 PROCEDURE — 76830 TRANSVAGINAL US NON-OB: CPT | Mod: 26,,, | Performed by: RADIOLOGY

## 2017-12-19 PROCEDURE — 76856 US EXAM PELVIC COMPLETE: CPT | Mod: 26,,, | Performed by: RADIOLOGY

## 2017-12-20 ENCOUNTER — HOSPITAL ENCOUNTER (OUTPATIENT)
Dept: RADIOLOGY | Facility: CLINIC | Age: 62
Discharge: HOME | End: 2017-12-20
Attending: INTERNAL MEDICINE
Payer: COMMERCIAL

## 2017-12-20 DIAGNOSIS — M85.80 OSTEOPENIA, UNSPECIFIED LOCATION: ICD-10-CM

## 2017-12-20 PROCEDURE — 77080 DXA BONE DENSITY AXIAL: CPT | Mod: 26,,, | Performed by: INTERNAL MEDICINE

## 2017-12-20 PROCEDURE — 77080 DXA BONE DENSITY AXIAL: CPT | Mod: TC

## 2017-12-21 RX ORDER — IBANDRONATE SODIUM 150 MG/1
150 TABLET, FILM COATED ORAL
Qty: 3 TABLET | Refills: 11 | Status: SHIPPED | OUTPATIENT
Start: 2017-12-21 | End: 2019-01-08 | Stop reason: SDUPTHER

## 2018-01-05 DIAGNOSIS — Z12.11 SPECIAL SCREENING FOR MALIGNANT NEOPLASMS, COLON: Primary | ICD-10-CM

## 2018-01-05 RX ORDER — POLYETHYLENE GLYCOL 3350, SODIUM SULFATE ANHYDROUS, SODIUM BICARBONATE, SODIUM CHLORIDE, POTASSIUM CHLORIDE 236; 22.74; 6.74; 5.86; 2.97 G/4L; G/4L; G/4L; G/4L; G/4L
4 POWDER, FOR SOLUTION ORAL ONCE
Qty: 4000 ML | Refills: 0 | Status: SHIPPED | OUTPATIENT
Start: 2018-01-05 | End: 2018-01-05

## 2018-02-19 ENCOUNTER — HOSPITAL ENCOUNTER (OUTPATIENT)
Facility: HOSPITAL | Age: 63
Discharge: HOME | End: 2018-02-19
Attending: COLON & RECTAL SURGERY | Admitting: COLON & RECTAL SURGERY
Payer: COMMERCIAL

## 2018-02-19 ENCOUNTER — ANESTHESIA EVENT (OUTPATIENT)
Dept: ENDOSCOPY | Facility: HOSPITAL | Age: 63
End: 2018-02-19
Payer: COMMERCIAL

## 2018-02-19 ENCOUNTER — SURGERY (OUTPATIENT)
Age: 63
End: 2018-02-19

## 2018-02-19 ENCOUNTER — ANESTHESIA (OUTPATIENT)
Dept: ENDOSCOPY | Facility: HOSPITAL | Age: 63
End: 2018-02-19
Payer: COMMERCIAL

## 2018-02-19 VITALS
SYSTOLIC BLOOD PRESSURE: 140 MMHG | HEART RATE: 78 BPM | TEMPERATURE: 98 F | HEIGHT: 64 IN | RESPIRATION RATE: 18 BRPM | DIASTOLIC BLOOD PRESSURE: 72 MMHG | OXYGEN SATURATION: 100 % | WEIGHT: 135 LBS | BODY MASS INDEX: 23.05 KG/M2

## 2018-02-19 DIAGNOSIS — Z12.11 SPECIAL SCREENING FOR MALIGNANT NEOPLASMS, COLON: ICD-10-CM

## 2018-02-19 DIAGNOSIS — Z12.11 SCREENING FOR COLON CANCER: Primary | ICD-10-CM

## 2018-02-19 PROCEDURE — 37000009 HC ANESTHESIA EA ADD 15 MINS: Performed by: COLON & RECTAL SURGERY

## 2018-02-19 PROCEDURE — 25000003 PHARM REV CODE 250: Performed by: COLON & RECTAL SURGERY

## 2018-02-19 PROCEDURE — G0105 COLORECTAL SCRN; HI RISK IND: HCPCS | Performed by: COLON & RECTAL SURGERY

## 2018-02-19 PROCEDURE — G0105 COLORECTAL SCRN; HI RISK IND: HCPCS | Mod: ,,, | Performed by: COLON & RECTAL SURGERY

## 2018-02-19 PROCEDURE — 63600175 PHARM REV CODE 636 W HCPCS: Performed by: NURSE ANESTHETIST, CERTIFIED REGISTERED

## 2018-02-19 PROCEDURE — D9220A PRA ANESTHESIA: Mod: CRNA,,, | Performed by: NURSE ANESTHETIST, CERTIFIED REGISTERED

## 2018-02-19 PROCEDURE — S5010 5% DEXTROSE AND 0.45% SALINE: HCPCS | Performed by: COLON & RECTAL SURGERY

## 2018-02-19 PROCEDURE — G0121 COLON CA SCRN NOT HI RSK IND: HCPCS | Performed by: COLON & RECTAL SURGERY

## 2018-02-19 PROCEDURE — 37000008 HC ANESTHESIA 1ST 15 MINUTES: Performed by: COLON & RECTAL SURGERY

## 2018-02-19 PROCEDURE — D9220A PRA ANESTHESIA: Mod: ANES,,, | Performed by: ANESTHESIOLOGY

## 2018-02-19 RX ORDER — PROPOFOL 10 MG/ML
VIAL (ML) INTRAVENOUS
Status: DISCONTINUED | OUTPATIENT
Start: 2018-02-19 | End: 2018-02-19

## 2018-02-19 RX ORDER — LIDOCAINE HCL/PF 100 MG/5ML
SYRINGE (ML) INTRAVENOUS
Status: DISCONTINUED | OUTPATIENT
Start: 2018-02-19 | End: 2018-02-19

## 2018-02-19 RX ORDER — DEXTROSE MONOHYDRATE AND SODIUM CHLORIDE 5; .45 G/100ML; G/100ML
INJECTION, SOLUTION INTRAVENOUS CONTINUOUS
Status: DISCONTINUED | OUTPATIENT
Start: 2018-02-19 | End: 2018-02-19 | Stop reason: HOSPADM

## 2018-02-19 RX ORDER — PROPOFOL 10 MG/ML
VIAL (ML) INTRAVENOUS CONTINUOUS PRN
Status: DISCONTINUED | OUTPATIENT
Start: 2018-02-19 | End: 2018-02-19

## 2018-02-19 RX ADMIN — PROPOFOL 50 MG: 10 INJECTION, EMULSION INTRAVENOUS at 08:02

## 2018-02-19 RX ADMIN — LIDOCAINE HYDROCHLORIDE 60 MG: 20 INJECTION, SOLUTION INTRAVENOUS at 08:02

## 2018-02-19 RX ADMIN — DEXTROSE AND SODIUM CHLORIDE: 5; .45 INJECTION, SOLUTION INTRAVENOUS at 08:02

## 2018-02-19 RX ADMIN — PROPOFOL 150 MCG/KG/MIN: 10 INJECTION, EMULSION INTRAVENOUS at 08:02

## 2018-02-19 NOTE — ANESTHESIA POSTPROCEDURE EVALUATION
"Anesthesia Post Evaluation    Patient: Tj Hernandez    Procedure(s) Performed: Procedure(s) (LRB):  COLONOSCOPY (N/A)    Final Anesthesia Type: general  Patient location during evaluation: GI PACU  Patient participation: Yes- Able to Participate  Level of consciousness: awake and alert  Post-procedure vital signs: reviewed and stable  Pain management: adequate  Airway patency: patent  PONV status at discharge: No PONV  Anesthetic complications: no      Cardiovascular status: blood pressure returned to baseline and stable  Respiratory status: unassisted, spontaneous ventilation and room air  Hydration status: euvolemic  Follow-up not needed.        Visit Vitals  /64   Pulse 63   Temp 36.4 °C (97.6 °F) (Temporal)   Resp 18   Ht 5' 4" (1.626 m)   Wt 61.2 kg (135 lb)   SpO2 98%   Breastfeeding? No   BMI 23.17 kg/m²       Pain/Alissa Score: Pain Assessment Performed: Yes (2/19/2018  7:57 AM)  Presence of Pain: denies (2/19/2018  7:57 AM)  Pain Rating Prior to Med Admin: 0 (2/19/2018  7:57 AM)  Alissa Score: 10 (2/19/2018  9:09 AM)      "

## 2018-02-19 NOTE — H&P
Endoscopy H&P    Procedure : Colonoscopy      personal history of colon polyps and most recent endoscopic exam 5 years ago  No polyps on previous exam. Has been seen for mixed hemorrhoidal disease in the past with rectal bleeding, last apt 9/28/17     Past Medical History:   Diagnosis Date    Adjustment disorder     Colon polyp     benign    Hormone disorder     Hyperlipidemia     Kidney stone     Neck pain     Recurrent UTI 9/29/2014     Sedation Problems: NO  Family History   Problem Relation Age of Onset    Adopted: Yes     Fam Hx of Sedation Problems: NO  Social History     Social History    Marital status:      Spouse name: N/A    Number of children: N/A    Years of education: N/A     Occupational History    Not on file.     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 .        Review of Systems - Negative     Respiratory ROS: no cough, shortness of breath, or wheezing  Cardiovascular ROS: no chest pain or dyspnea on exertion  Gastrointestinal ROS: no abdominal pain, change in bowel habits, or black or bloody stools  Musculoskeletal ROS: negative  Neurological ROS: no TIA or stroke symptoms        Physical Exam:  General: no distress  Head: normocephalic  Airway:  normal oropharynx, airway normal  Neck: supple, symmetrical, trachea midline  Lungs:  clear to auscultation bilaterally and normal respiratory effort  Heart: regular rate and rhythm, S1, S2 normal, no murmur, rub or gallop  Abdomen: soft, non-tender non-distented; bowel sounds normal; no masses,  no organomegaly  Extremities: no cyanosis or edema, or clubbing       Deep Sedation: Mallampati Score  per anesthesia     SedationPlan :Gen     ASA : II  Have seen and examined the patient with the fellow and agree with their plan.  ANKUR SANDRA

## 2018-02-19 NOTE — PROVATION PATIENT INSTRUCTIONS
Discharge Summary/Instructions after an Endoscopic Procedure  Patient Name: Tj Hernandez  Patient MRN: [id number]  Patient YOB: 1955 Monday, February 19, 2018  Naveen Curry MD  RESTRICTIONS:  During your procedure today, you received medications for sedation.  These   medications may affect your judgment, balance and coordination.  Therefore,   for 24 hours, you have the following restrictions:   - DO NOT drive a car, operate machinery, make legal/financial decisions,   sign important papers or drink alcohol.    ACTIVITY:  The following day: return to full activity including work, except no heavy   lifting, straining or running for 3 days if polyps were removed.  DIET:  Eat and drink normally unless instructed otherwise.     TREATMENT FOR COMMON SIDE EFFECTS:  - Mild abdominal pain, belching, bloating or excessive gas: rest, eat   lightly and use a heating pad.  - Sore Throat: treat with throat lozenges and/or gargle with warm salt   water.  SYMPTOMS TO WATCH FOR AND REPORT TO YOUR PHYSICIAN:  1. Abdominal pain or bloating, other than gas cramps.  2. Chest pain.  3. Back pain.  4. Chills or fever occurring within 24 hours after the procedure.  5. Rectal bleeding, which would show as bright red, maroon, or black stools.   (A tablespoon of blood from the rectum is not serious, especially if   hemorrhoids are present.)  6. Vomiting.  7. Weakness or dizziness.  8. Because air was used during the procedure, expelling large amounts of air   from your rectum or belching is normal.  9. If a bowel prep was taken, you may not have a bowel movement for 1-3   days.  This is normal.  GO DIRECTLY TO THE NEAREST EMERGENCY ROOM IF YOU HAVE ANY OF THE FOLLOWING:      Difficulty breathing  Chills and/or fever over 101 F   Persistent vomiting and/or vomiting blood   Severe abdominal pain   Severe chest pain   Black, tarry stools   Bleeding- more than one tablespoon   Any other symptom or condition that you may feel needs  urgent attention  Your doctor recommends these additional instructions:  If any biopsies were taken, your doctor s clinic will contact you in 1 to 2   weeks with any results.  You are being discharged to home.   Your physician has recommended a repeat colonoscopy in 10 years for   screening purposes.  For questions, problems or results please call your physician - Naveen Curry MD at Work:  [phone].  [facility], EMERGENCY ROOM PHONE NUMBER: [phone]  IF A COMPLICATION OR EMERGENCY SITUATION ARISES AND YOU ARE UNABLE TO REACH   YOUR PHYSICIAN - GO DIRECTLY TO THE EMERGENCY ROOM.  Naveen Curry MD  2/19/2018 8:53:19 AM  This report has been verified and signed electronically.

## 2018-02-19 NOTE — ANESTHESIA PREPROCEDURE EVALUATION
02/19/2018  Tj Hernandez is a 62 y.o., female.    Anesthesia Evaluation    I have reviewed the Patient Summary Reports.    I have reviewed the Nursing Notes.   I have reviewed the Medications.     Review of Systems  Anesthesia Hx:  Personal Hx of Anesthesia complications, Post-Operative Nausea/Vomiting, in the past, but not with recent anesthetics / prophylaxis   EENT/Dental:   chronic allergic rhinitis   Cardiovascular:   hyperlipidemia    Hepatic/GI:   GERD        Physical Exam  General:  Well nourished    Airway/Jaw/Neck:  Airway Findings: Mouth Opening: Normal Tongue: Normal  General Airway Assessment: Adult  Mallampati: III      Dental:  Dental Findings: In tact   Chest/Lungs:  Chest/Lungs Findings: Clear to auscultation, Normal Respiratory Rate     Heart/Vascular:  Heart Findings: Rate: Normal  Rhythm: Regular Rhythm  Sounds: Normal        Mental Status:  Mental Status Findings:  Cooperative, Alert and Oriented         Anesthesia Plan  Type of Anesthesia, risks & benefits discussed:  Anesthesia Type:  general  Patient's Preference: ga  Intra-op Monitoring Plan: standard ASA monitors  Intra-op Monitoring Plan Comments:   Post Op Pain Control Plan:   Post Op Pain Control Plan Comments:   Induction:   IV  Beta Blocker:  Patient is not currently on a Beta-Blocker (No further documentation required).       Informed Consent: Patient understands risks and agrees with Anesthesia plan.  Questions answered. Anesthesia consent signed with patient.  ASA Score: 2     Day of Surgery Review of History & Physical:    H&P update referred to the surgeon.         Ready For Surgery From Anesthesia Perspective.

## 2018-02-19 NOTE — TRANSFER OF CARE
"Anesthesia Transfer of Care Note    Patient: Tj Hernandez    Procedure(s) Performed: Procedure(s) (LRB):  COLONOSCOPY (N/A)    Patient location: GI    Anesthesia Type: general    Transport from OR: Transported from OR on room air with adequate spontaneous ventilation    Post pain: adequate analgesia    Post assessment: no apparent anesthetic complications and tolerated procedure well    Post vital signs: stable    Level of consciousness: sedated    Nausea/Vomiting: no nausea/vomiting    Complications: none    Transfer of care protocol was followed      Last vitals:   Visit Vitals  BP (!) 148/73 (BP Location: Left arm, Patient Position: Lying)   Pulse 64   Temp 36.2 °C (97.2 °F) (Oral)   Resp 18   Ht 5' 4" (1.626 m)   Wt 61.2 kg (135 lb)   SpO2 100%   Breastfeeding? No   BMI 23.17 kg/m²     "

## 2018-02-19 NOTE — ANESTHESIA RELEASE NOTE
"Anesthesia Release from PACU Note    Patient: Tj Hernandez    Procedure(s) Performed: Procedure(s) (LRB):  COLONOSCOPY (N/A)    Anesthesia type: general    Post pain: Adequate analgesia    Post assessment: no apparent anesthetic complications and tolerated procedure well    Last Vitals:   Visit Vitals  /64   Pulse 63   Temp 36.4 °C (97.6 °F) (Temporal)   Resp 18   Ht 5' 4" (1.626 m)   Wt 61.2 kg (135 lb)   SpO2 98%   Breastfeeding? No   BMI 23.17 kg/m²       Post vital signs: stable    Level of consciousness: awake, alert  and oriented    Nausea/Vomiting: no nausea/no vomiting    Complications: none    Airway Patency: patent    Respiratory: unassisted, spontaneous ventilation, room air    Cardiovascular: stable and blood pressure at baseline    Hydration: euvolemic  "

## 2018-02-26 ENCOUNTER — TELEPHONE (OUTPATIENT)
Dept: ENDOSCOPY | Facility: HOSPITAL | Age: 63
End: 2018-02-26

## 2018-04-23 ENCOUNTER — HOSPITAL ENCOUNTER (EMERGENCY)
Facility: HOSPITAL | Age: 63
Discharge: HOME | End: 2018-04-23
Attending: EMERGENCY MEDICINE
Payer: COMMERCIAL

## 2018-04-23 VITALS
RESPIRATION RATE: 18 BRPM | SYSTOLIC BLOOD PRESSURE: 158 MMHG | BODY MASS INDEX: 22.2 KG/M2 | OXYGEN SATURATION: 99 % | HEIGHT: 64 IN | WEIGHT: 130 LBS | TEMPERATURE: 99 F | DIASTOLIC BLOOD PRESSURE: 88 MMHG | HEART RATE: 80 BPM
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
## 2018-04-23 DIAGNOSIS — S52.125A CLOSED NONDISPLACED FRACTURE OF HEAD OF LEFT RADIUS, INITIAL ENCOUNTER: ICD-10-CM

## 2018-04-23 DIAGNOSIS — S00.81XA ABRASION OF PERIORBITAL REGION OF FACE, INITIAL ENCOUNTER: ICD-10-CM

## 2018-04-23 DIAGNOSIS — S00.83XA CONTUSION OF FACE, INITIAL ENCOUNTER: Primary | ICD-10-CM

## 2018-04-23 PROCEDURE — 25000003 PHARM REV CODE 250: Performed by: EMERGENCY MEDICINE

## 2018-04-23 PROCEDURE — 63600175 PHARM REV CODE 636 W HCPCS: Performed by: EMERGENCY MEDICINE

## 2018-04-23 PROCEDURE — 99284 EMERGENCY DEPT VISIT MOD MDM: CPT | Mod: ,,, | Performed by: EMERGENCY MEDICINE

## 2018-04-23 PROCEDURE — 99284 EMERGENCY DEPT VISIT MOD MDM: CPT

## 2018-04-23 PROCEDURE — 90715 TDAP VACCINE 7 YRS/> IM: CPT | Performed by: EMERGENCY MEDICINE

## 2018-04-23 PROCEDURE — 90471 IMMUNIZATION ADMIN: CPT | Performed by: EMERGENCY MEDICINE

## 2018-04-23 RX ORDER — ONDANSETRON 4 MG/1
4 TABLET, FILM COATED ORAL EVERY 6 HOURS PRN
Qty: 12 TABLET | Refills: 0 | Status: ON HOLD | OUTPATIENT
Start: 2018-04-23 | End: 2019-11-12

## 2018-04-23 RX ORDER — BACITRACIN ZINC 500 UNIT/G
1 OINTMENT IN PACKET (EA) TOPICAL
Status: COMPLETED | OUTPATIENT
Start: 2018-04-23 | End: 2018-04-23

## 2018-04-23 RX ORDER — HYDROCODONE BITARTRATE AND ACETAMINOPHEN 5; 325 MG/1; MG/1
1 TABLET ORAL
Status: COMPLETED | OUTPATIENT
Start: 2018-04-23 | End: 2018-04-23

## 2018-04-23 RX ORDER — HYDROCODONE BITARTRATE AND ACETAMINOPHEN 5; 325 MG/1; MG/1
1 TABLET ORAL EVERY 6 HOURS PRN
Qty: 24 TABLET | Refills: 0 | Status: SHIPPED | OUTPATIENT
Start: 2018-04-23 | End: 2018-04-24 | Stop reason: SDUPTHER

## 2018-04-23 RX ORDER — BACITRACIN 500 [USP'U]/G
OINTMENT TOPICAL
Status: COMPLETED | OUTPATIENT
Start: 2018-04-23 | End: 2018-04-23

## 2018-04-23 RX ADMIN — BACITRACIN: 500 OINTMENT TOPICAL at 10:04

## 2018-04-23 RX ADMIN — BACITRACIN 1 EACH: 500 OINTMENT TOPICAL at 09:04

## 2018-04-23 RX ADMIN — CLOSTRIDIUM TETANI TOXOID ANTIGEN (FORMALDEHYDE INACTIVATED), CORYNEBACTERIUM DIPHTHERIAE TOXOID ANTIGEN (FORMALDEHYDE INACTIVATED), BORDETELLA PERTUSSIS TOXOID ANTIGEN (GLUTARALDEHYDE INACTIVATED), BORDETELLA PERTUSSIS FILAMENTOUS HEMAGGLUTININ ANTIGEN (FORMALDEHYDE INACTIVATED), BORDETELLA PERTUSSIS PERTACTIN ANTIGEN, AND BORDETELLA PERTUSSIS FIMBRIAE 2/3 ANTIGEN 0.5 ML: 5; 2; 2.5; 5; 3; 5 INJECTION, SUSPENSION INTRAMUSCULAR at 09:04

## 2018-04-23 RX ADMIN — HYDROCODONE BITARTRATE AND ACETAMINOPHEN 1 TABLET: 5; 325 TABLET ORAL at 10:04

## 2018-04-24 ENCOUNTER — OFFICE VISIT (OUTPATIENT)
Dept: ORTHOPEDICS | Facility: CLINIC | Age: 63
End: 2018-04-24
Payer: COMMERCIAL

## 2018-04-24 ENCOUNTER — HOSPITAL ENCOUNTER (OUTPATIENT)
Dept: RADIOLOGY | Facility: OTHER | Age: 63
Discharge: HOME | End: 2018-04-24
Attending: PHYSICIAN ASSISTANT
Payer: COMMERCIAL

## 2018-04-24 ENCOUNTER — TELEPHONE (OUTPATIENT)
Dept: ORTHOPEDICS | Facility: CLINIC | Age: 63
End: 2018-04-24

## 2018-04-24 VITALS
DIASTOLIC BLOOD PRESSURE: 77 MMHG | SYSTOLIC BLOOD PRESSURE: 151 MMHG | RESPIRATION RATE: 16 BRPM | HEART RATE: 71 BPM | BODY MASS INDEX: 22.2 KG/M2 | HEIGHT: 64 IN | WEIGHT: 130 LBS

## 2018-04-24 DIAGNOSIS — M25.532 LEFT WRIST PAIN: ICD-10-CM

## 2018-04-24 DIAGNOSIS — M25.532 LEFT WRIST PAIN: Primary | ICD-10-CM

## 2018-04-24 DIAGNOSIS — S52.125A CLOSED NONDISPLACED FRACTURE OF HEAD OF LEFT RADIUS, INITIAL ENCOUNTER: Primary | ICD-10-CM

## 2018-04-24 PROCEDURE — 99203 OFFICE O/P NEW LOW 30 MIN: CPT | Mod: 25,S$GLB,, | Performed by: PHYSICIAN ASSISTANT

## 2018-04-24 PROCEDURE — 73110 X-RAY EXAM OF WRIST: CPT | Mod: TC,FY,LT

## 2018-04-24 PROCEDURE — 99999 PR PBB SHADOW E&M-EST. PATIENT-LVL V: CPT | Mod: PBBFAC,,, | Performed by: PHYSICIAN ASSISTANT

## 2018-04-24 PROCEDURE — 29105 APPLICATION LONG ARM SPLINT: CPT | Mod: LT,S$GLB,, | Performed by: PHYSICIAN ASSISTANT

## 2018-04-24 PROCEDURE — 73110 X-RAY EXAM OF WRIST: CPT | Mod: 26,LT,, | Performed by: RADIOLOGY

## 2018-04-24 RX ORDER — HYDROCODONE BITARTRATE AND ACETAMINOPHEN 5; 325 MG/1; MG/1
1 TABLET ORAL EVERY 6 HOURS PRN
Qty: 28 TABLET | Refills: 0 | Status: ON HOLD | OUTPATIENT
Start: 2018-04-24 | End: 2019-11-12

## 2018-04-24 NOTE — TELEPHONE ENCOUNTER
Phone call to patient - scheduled with MONSERRAT Kaplan today at 330pm. Gave location to patient, but she was very rushed on phone. After I hung up with her, I noticed her message stated that she was having Left wrist pain, as well. Called patient back and left voicemail for patient to arrive on 1st floor of [address] at 230pm for xrays of wrist.
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
## 2018-04-24 NOTE — ED NOTES
LOC: The patient is awake, alert, and oriented to place, time, situation. Affect is appropriated.  Speech is appropriate and clear.     APPEARANCE: Patient resting comfortably in no acute distress.  Patient is clean and well groomed.    SKIN: The skin is warm and dry; color consistent with ethnicity.  Patient has normal skin turgor and moist mucus membranes.  Abrasions and bruising noted to L side of face    MUSCULOSKELETAL: Patient moving upper and lower extremities without difficulty.  Denies weakness. C/o L elbow pain    RESPIRATORY: Airway is open and patent. Lungs clear to auscultation in all lobes. Respirations spontaneous, even, easy, and non-labored.  Patient has a normal effort and rate.  No accessory muscle use noted. Denies cough.     CARDIAC:  Normal rhythm and rate noted.  No peripheral edema noted.  Capillary refill < 3 seconds. No complaints of chest pain      ABDOMEN: Soft and non tender to palpation.  No distention noted. Bowel sounds present x 4.    NEUROLOGIC: PERRLA;  eyes open spontaneously.  Behavior appropriate to situation.  Follows commands; facial expression symmetrical; equal hand grasps bilaterally.  Purposeful motor response noted; normal sensation in all extremities.

## 2018-04-24 NOTE — DISCHARGE INSTRUCTIONS
Ice to face and elbow  Neosporin ointment to your cheek  Follow up with Orthopedics in one week (call and make an appointment)

## 2018-04-24 NOTE — PATIENT INSTRUCTIONS
Maximum dose of Acetaminophen (Tylenol) in 24 hours is 3000 mg.    Start taking Vitamin C 500 mg daily
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
## 2018-04-25 ENCOUNTER — TELEPHONE (OUTPATIENT)
Dept: INTERNAL MEDICINE | Facility: CLINIC | Age: 63
End: 2018-04-25
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
## 2018-04-30 ENCOUNTER — TELEPHONE (OUTPATIENT)
Dept: ORTHOPEDICS | Facility: CLINIC | Age: 63
End: 2018-04-30

## 2018-04-30 NOTE — TELEPHONE ENCOUNTER
----- Message from Shonda Raymond sent at 4/30/2018  8:20 AM CDT -----  Contact: MAGGIE HUTSON [[id number]]        Name of Who is Calling: MAGGIE HUTSON [[id number]]    What is the request in detail: Patient would like a call back says the part of her arm that's not in a splint is hurting and would like to speak with staff regarding. Please call to discuss    Can the clinic reply by [facility]: no    [facility] [phone]

## 2018-04-30 NOTE — TELEPHONE ENCOUNTER
Suad spoke to patient - she has appt tomorrow and is ok to wait and discuss with Rosaura at that time.

## 2018-05-01 ENCOUNTER — OFFICE VISIT (OUTPATIENT)
Dept: ORTHOPEDICS | Facility: CLINIC | Age: 63
End: 2018-05-01
Payer: COMMERCIAL

## 2018-05-01 ENCOUNTER — CLINICAL SUPPORT (OUTPATIENT)
Dept: REHABILITATION | Facility: HOSPITAL | Age: 63
End: 2018-05-01
Payer: COMMERCIAL

## 2018-05-01 ENCOUNTER — HOSPITAL ENCOUNTER (OUTPATIENT)
Dept: RADIOLOGY | Facility: OTHER | Age: 63
Discharge: HOME | End: 2018-05-01
Attending: PHYSICIAN ASSISTANT
Payer: COMMERCIAL

## 2018-05-01 VITALS
HEART RATE: 62 BPM | SYSTOLIC BLOOD PRESSURE: 163 MMHG | DIASTOLIC BLOOD PRESSURE: 77 MMHG | RESPIRATION RATE: 18 BRPM | BODY MASS INDEX: 22.2 KG/M2 | HEIGHT: 64 IN | WEIGHT: 130 LBS

## 2018-05-01 DIAGNOSIS — M25.512 ACUTE PAIN OF LEFT SHOULDER: ICD-10-CM

## 2018-05-01 DIAGNOSIS — R29.898 LEFT ARM WEAKNESS: ICD-10-CM

## 2018-05-01 DIAGNOSIS — S52.125A CLOSED NONDISPLACED FRACTURE OF HEAD OF LEFT RADIUS, INITIAL ENCOUNTER: ICD-10-CM

## 2018-05-01 DIAGNOSIS — S52.125A CLOSED NONDISPLACED FRACTURE OF HEAD OF LEFT RADIUS, INITIAL ENCOUNTER: Primary | ICD-10-CM

## 2018-05-01 DIAGNOSIS — Z78.9 DECREASED INDEPENDENCE WITH ACTIVITIES OF DAILY LIVING: ICD-10-CM

## 2018-05-01 DIAGNOSIS — M79.89 SWELLING OF ARM: ICD-10-CM

## 2018-05-01 DIAGNOSIS — M79.602 PAIN IN LEFT ARM: ICD-10-CM

## 2018-05-01 DIAGNOSIS — M25.622 DECREASED RANGE OF MOTION OF LEFT ELBOW: ICD-10-CM

## 2018-05-01 PROCEDURE — 73030 X-RAY EXAM OF SHOULDER: CPT | Mod: TC,FY,LT

## 2018-05-01 PROCEDURE — 73080 X-RAY EXAM OF ELBOW: CPT | Mod: 26,LT,, | Performed by: RADIOLOGY

## 2018-05-01 PROCEDURE — 99213 OFFICE O/P EST LOW 20 MIN: CPT | Mod: S$GLB,,, | Performed by: PHYSICIAN ASSISTANT

## 2018-05-01 PROCEDURE — 97165 OT EVAL LOW COMPLEX 30 MIN: CPT | Mod: PN

## 2018-05-01 PROCEDURE — 97110 THERAPEUTIC EXERCISES: CPT | Mod: PN

## 2018-05-01 PROCEDURE — G8988 SELF CARE GOAL STATUS: HCPCS | Mod: CK,PN

## 2018-05-01 PROCEDURE — G8987 SELF CARE CURRENT STATUS: HCPCS | Mod: CM,PN

## 2018-05-01 PROCEDURE — 73030 X-RAY EXAM OF SHOULDER: CPT | Mod: 26,LT,, | Performed by: RADIOLOGY

## 2018-05-01 PROCEDURE — 73080 X-RAY EXAM OF ELBOW: CPT | Mod: TC,FY,LT

## 2018-05-01 PROCEDURE — 99999 PR PBB SHADOW E&M-EST. PATIENT-LVL V: CPT | Mod: PBBFAC,,, | Performed by: PHYSICIAN ASSISTANT
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
## 2018-05-02 ENCOUNTER — PATIENT MESSAGE (OUTPATIENT)
Dept: INTERNAL MEDICINE | Facility: CLINIC | Age: 63
End: 2018-05-02
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
## 2018-05-03 ENCOUNTER — OFFICE VISIT (OUTPATIENT)
Dept: INTERNAL MEDICINE | Facility: CLINIC | Age: 63
End: 2018-05-03
Payer: COMMERCIAL

## 2018-05-03 VITALS
OXYGEN SATURATION: 98 % | SYSTOLIC BLOOD PRESSURE: 134 MMHG | HEIGHT: 64 IN | DIASTOLIC BLOOD PRESSURE: 78 MMHG | HEART RATE: 63 BPM | BODY MASS INDEX: 23.14 KG/M2 | WEIGHT: 135.56 LBS

## 2018-05-03 DIAGNOSIS — T14.8XXA HEMATOMA: Primary | ICD-10-CM

## 2018-05-03 PROCEDURE — 99999 PR PBB SHADOW E&M-EST. PATIENT-LVL III: CPT | Mod: PBBFAC,,, | Performed by: INTERNAL MEDICINE

## 2018-05-03 PROCEDURE — 99213 OFFICE O/P EST LOW 20 MIN: CPT | Mod: S$GLB,,, | Performed by: INTERNAL MEDICINE

## 2018-05-03 PROCEDURE — 3008F BODY MASS INDEX DOCD: CPT | Mod: CPTII,S$GLB,, | Performed by: INTERNAL MEDICINE
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
## 2018-05-08 ENCOUNTER — TELEPHONE (OUTPATIENT)
Dept: ORTHOPEDICS | Facility: CLINIC | Age: 63
End: 2018-05-08

## 2018-05-08 ENCOUNTER — CLINICAL SUPPORT (OUTPATIENT)
Dept: REHABILITATION | Facility: HOSPITAL | Age: 63
End: 2018-05-08
Payer: COMMERCIAL

## 2018-05-08 DIAGNOSIS — Z78.9 DECREASED INDEPENDENCE WITH ACTIVITIES OF DAILY LIVING: ICD-10-CM

## 2018-05-08 DIAGNOSIS — M79.89 SWELLING OF ARM: ICD-10-CM

## 2018-05-08 DIAGNOSIS — M79.602 PAIN IN LEFT ARM: ICD-10-CM

## 2018-05-08 DIAGNOSIS — M25.622 DECREASED RANGE OF MOTION OF LEFT ELBOW: ICD-10-CM

## 2018-05-08 PROCEDURE — 97110 THERAPEUTIC EXERCISES: CPT | Mod: PN

## 2018-05-08 NOTE — PROGRESS NOTES
Occupational Therapy Daily Note     Name: Tj Hernandez  Clinic Number: [id number]  Diagnosis: L radial Head Fx  Encounter Diagnoses   Name Primary?    Swelling of arm     Decreased range of motion of left elbow     Decreased independence with activities of daily living     Pain in left arm      Physician: Fouzia Chapman PA  Precautions: No lifting or weightbearing L arm    Visit #: 2 of 15 12/31/18  Time In: 925 am  Time Out: 950 am  Total Treatment Time 1:1: 25  TE 2  Evaluation Date: 5/1/18  Plan of care Expiration: 7/20/18  MD referral: Eval and treat    Subjective     Pt reports: I feel like I am doing better. I have been doing my exercises, but I am still having trouble sleeping.  Pain Scale: Patient reports their pain to be 3/10 on a 0-10 scale with 0 being no pain and 10 being the worst pain imaginable.  Pain location: L elbow and I am still sore in my shoulder.  Objective     Pt presented with sling on and bruising on face much better.   She removed sling to lay supine on mat for ROM ex  Shoulder flexion ABD and ER 10x AAROM is WNL  Elbow Low load static progressive stretch in extension and gentle active flexion within tolerance.  Extension -30 and flex 130  Supination 75 and pronation  85 10x  Wrist and hand AROM is WNL. 10x      Written Home Exercises Provided: Reviewed HEP and patient did not have any questions. Encouraged supporting L UE on pillows in supine for low load static progressive stretch.  Pt demo good understanding of the education provided. Tj demonstrated good return demonstration of activities.     Education provided re: Cont with hep let pain be guide cont with sling wear.  Tj verbalized good understanding of education provided.   No spiritual or educational barriers to learning provided    Assessment   Patient is making good and progress towards established goals. Pain has decreased, elbow extension has improved and supination  has improved.  Pt is motivated to continue, and anxious to get report from MD. Still having difficulty getting comfortable sleeping.    This is a 62 y.o. female referred to outpatient occupational therapy and presents with a medical diagnosis of Radial head fx and demonstrates limitations as described in the problem list. Pt prognosis is Excellent. Pt will continue to benefit from skilled outpatient occupational therapy to address the deficits listed in the problem list, provide pt/family education and to maximize pt's level of independence in the home and community environment.     Barriers to therapy: non identified at this time  Goals as follows - patient in agreement with goals set       Plan     Continue with established Plan of Care towards OT goals. Pt to return to MD tomorrow for follow up X ray. Will continue as directed after visit.    Therapist: ALICJA Rodriguez  5/8/2018

## 2018-05-09 ENCOUNTER — HOSPITAL ENCOUNTER (OUTPATIENT)
Dept: RADIOLOGY | Facility: OTHER | Age: 63
Discharge: HOME | End: 2018-05-09
Attending: PHYSICIAN ASSISTANT
Payer: COMMERCIAL

## 2018-05-09 ENCOUNTER — OFFICE VISIT (OUTPATIENT)
Dept: ORTHOPEDICS | Facility: CLINIC | Age: 63
End: 2018-05-09
Payer: COMMERCIAL

## 2018-05-09 VITALS
WEIGHT: 135 LBS | BODY MASS INDEX: 23.05 KG/M2 | DIASTOLIC BLOOD PRESSURE: 74 MMHG | HEIGHT: 64 IN | HEART RATE: 67 BPM | SYSTOLIC BLOOD PRESSURE: 166 MMHG

## 2018-05-09 DIAGNOSIS — M25.622 DECREASED RANGE OF MOTION OF LEFT ELBOW: ICD-10-CM

## 2018-05-09 DIAGNOSIS — S52.125A CLOSED NONDISPLACED FRACTURE OF HEAD OF LEFT RADIUS, INITIAL ENCOUNTER: Primary | ICD-10-CM

## 2018-05-09 PROCEDURE — 73080 X-RAY EXAM OF ELBOW: CPT | Mod: 26,LT,, | Performed by: RADIOLOGY

## 2018-05-09 PROCEDURE — 99213 OFFICE O/P EST LOW 20 MIN: CPT | Mod: S$GLB,,, | Performed by: PHYSICIAN ASSISTANT

## 2018-05-09 PROCEDURE — 99999 PR PBB SHADOW E&M-EST. PATIENT-LVL IV: CPT | Mod: PBBFAC,,, | Performed by: PHYSICIAN ASSISTANT

## 2018-05-09 PROCEDURE — 73080 X-RAY EXAM OF ELBOW: CPT | Mod: TC,FY,LT

## 2018-05-09 PROCEDURE — 3008F BODY MASS INDEX DOCD: CPT | Mod: CPTII,S$GLB,, | Performed by: PHYSICIAN ASSISTANT
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
## 2018-05-16 ENCOUNTER — CLINICAL SUPPORT (OUTPATIENT)
Dept: REHABILITATION | Facility: HOSPITAL | Age: 63
End: 2018-05-16
Payer: COMMERCIAL

## 2018-05-16 DIAGNOSIS — M79.602 PAIN IN LEFT ARM: ICD-10-CM

## 2018-05-16 DIAGNOSIS — M25.622 DECREASED RANGE OF MOTION OF LEFT ELBOW: ICD-10-CM

## 2018-05-16 DIAGNOSIS — M79.89 SWELLING OF ARM: ICD-10-CM

## 2018-05-16 DIAGNOSIS — Z78.9 DECREASED INDEPENDENCE WITH ACTIVITIES OF DAILY LIVING: ICD-10-CM

## 2018-05-16 PROCEDURE — 97110 THERAPEUTIC EXERCISES: CPT | Mod: PN

## 2018-05-16 NOTE — PROGRESS NOTES
Occupational Therapy Daily Note     Name: Tj Hernandez  Clinic Number: [id number]  Diagnosis: L radial Head Fx  Encounter Diagnoses   Name Primary?    Swelling of arm     Decreased range of motion of left elbow     Decreased independence with activities of daily living     Pain in left arm      Physician: Fouzia Chapman PA  Precautions: No lifting or weightbearing L arm    Visit #: 3 of 15 12/31/18  Time In: 900 am  Time Out: 932 am  Total Treatment Time 1:1: 25  TE 2  Evaluation Date: 5/1/18  Plan of care Expiration: 7/20/18  MD referral: Eval and treat    Subjective     Pt reports: I am feeling much better. Pain is minimal  Pain Scale: Patient reports their pain to be 2/10 on a 0-10 scale with 0 being no pain and 10 being the worst pain imaginable.  Pain location: L elbow and I am still sore in my shoulder.  Objective     Pt presented with sling on and bruising on face continues to improve.   She removed sling to lay supine on mat for ROM ex  Shoulder Flexion, ABD and ER 10x AROM is WNL  Elbow Low load static progressive stretch in extension and gentle active flexion within tolerance.  Extension -18 and flex 136  Supination 80 deg  and pronation  85 deg 10x  Wrist and hand AROM is WNL. 10x   strenth 20# improved from  5# on initial eval.    Written Home Exercises Provided: Reviewed HEP and patient did not have any questions. Encouraged supporting L UE on pillows in supine for low load static progressive stretch.  Pt demo good understanding of the education provided. Tj demonstrated good return demonstration of activities.     Education provided re: Cont with hep let pain be guide cont with sling wear.  Tj verbalized good understanding of education provided.   No spiritual or educational barriers to learning provided    Assessment   Patient is making good and progress towards established goals. Pain has decreased, elbow extension has improved and  supination has improved. She has also had an increase in her  strength.  Pt is motivated to continue, and anxious to get report from MD. Still having difficulty getting comfortable sleeping.    This is a 62 y.o. female referred to outpatient occupational therapy and presents with a medical diagnosis of Radial head fx and demonstrates limitations as described in the problem list. Pt prognosis is Excellent. Pt will continue to benefit from skilled outpatient occupational therapy to address the deficits listed in the problem list, provide pt/family education and to maximize pt's level of independence in the home and community environment.     Barriers to therapy: non identified at this time  Goals as follows - patient in agreement with goals set       Plan     Continue with established Plan of Care towards OT goals. Pt to continue with OT in one week following her visit with PA. She understands her HEP and is making steady progress. Plan to increase frequency once she is cleared by MD.    Therapist: ALICJA Rodriguez  5/16/2018

## 2018-05-28 ENCOUNTER — OFFICE VISIT (OUTPATIENT)
Dept: ORTHOPEDICS | Facility: CLINIC | Age: 63
End: 2018-05-28
Payer: COMMERCIAL

## 2018-05-28 ENCOUNTER — HOSPITAL ENCOUNTER (OUTPATIENT)
Dept: RADIOLOGY | Facility: OTHER | Age: 63
Discharge: HOME | End: 2018-05-28
Attending: PHYSICIAN ASSISTANT
Payer: COMMERCIAL

## 2018-05-28 VITALS
HEART RATE: 60 BPM | HEIGHT: 64 IN | WEIGHT: 135 LBS | SYSTOLIC BLOOD PRESSURE: 141 MMHG | DIASTOLIC BLOOD PRESSURE: 82 MMHG | BODY MASS INDEX: 23.05 KG/M2

## 2018-05-28 DIAGNOSIS — S52.125A CLOSED NONDISPLACED FRACTURE OF HEAD OF LEFT RADIUS, INITIAL ENCOUNTER: ICD-10-CM

## 2018-05-28 DIAGNOSIS — S52.125A CLOSED NONDISPLACED FRACTURE OF HEAD OF LEFT RADIUS, INITIAL ENCOUNTER: Primary | ICD-10-CM

## 2018-05-28 PROCEDURE — 99999 PR PBB SHADOW E&M-EST. PATIENT-LVL IV: CPT | Mod: PBBFAC,,, | Performed by: PHYSICIAN ASSISTANT

## 2018-05-28 PROCEDURE — 73080 X-RAY EXAM OF ELBOW: CPT | Mod: TC,FY,LT

## 2018-05-28 PROCEDURE — 73080 X-RAY EXAM OF ELBOW: CPT | Mod: 26,LT,, | Performed by: RADIOLOGY

## 2018-05-28 PROCEDURE — 99024 POSTOP FOLLOW-UP VISIT: CPT | Mod: S$GLB,,, | Performed by: PHYSICIAN ASSISTANT

## 2018-05-28 NOTE — Clinical Note
Fracture is healing, still no weight bearing at this time.  Currently 5 weeks s/p injury. Continue OT for elbow and shoulder.
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
## 2018-05-29 ENCOUNTER — CLINICAL SUPPORT (OUTPATIENT)
Dept: REHABILITATION | Facility: HOSPITAL | Age: 63
End: 2018-05-29
Payer: COMMERCIAL

## 2018-05-29 DIAGNOSIS — M79.89 SWELLING OF ARM: ICD-10-CM

## 2018-05-29 DIAGNOSIS — M25.622 DECREASED RANGE OF MOTION OF LEFT ELBOW: ICD-10-CM

## 2018-05-29 DIAGNOSIS — M79.602 PAIN IN LEFT ARM: ICD-10-CM

## 2018-05-29 DIAGNOSIS — Z78.9 DECREASED INDEPENDENCE WITH ACTIVITIES OF DAILY LIVING: ICD-10-CM

## 2018-05-29 PROCEDURE — 97140 MANUAL THERAPY 1/> REGIONS: CPT | Mod: PN

## 2018-05-29 PROCEDURE — 97110 THERAPEUTIC EXERCISES: CPT | Mod: PN

## 2018-05-29 NOTE — PROGRESS NOTES
Occupational Therapy Daily Note     Name: Tj Hernandez  Clinic Number: [id number]  Diagnosis: L radial Head Fx  Encounter Diagnoses   Name Primary?    Swelling of arm     Decreased range of motion of left elbow     Decreased independence with activities of daily living     Pain in left arm      Physician: Fouzia Chapman PA  Precautions: No lifting or weightbearing L arm    Visit #: 4  of 15 12/31/18  Time In: 910 am  Time Out: 950 am  Total Treatment Time 1:1: 40  TE 2 manual 1  Evaluation Date: 5/1/18  Plan of care Expiration: 7/20/18  MD referral: Eval and treat    Subjective     Pt reports: I went to the doctor yesterday and she said I still need to wear the sling. The fracture is still evident.  Pain Scale: Patient reports their pain to be 2/10 on a 0-10 scale with 0 being no pain and 10 being the worst pain imaginable.  Pain location: I still feel it more in my shoulder than  In my elbow.  Objective     Pt presented with sling on and bruising on face continues to improve.   She removed sling to lay supine on mat for ROM ex and manual therapy to perscapular muscles and upper traps. AC joint mobs and posterior capsule stretch.   Shoulder Flexion, ABD and ER 20 x AROM is WNL  Elbow Low load static progressive stretch in extension and gentle active flexion within tolerance.  Extension -7 and flex 136  Supination 85  deg  and pronation 80  Wrist and hand AROM is WNL. 10x  Pt also performed standing rows flexed at waist and scapular adduction as well as pendulms.20x each  Pt donned sling to leave.    Written Home Exercises Provided: Reviewed HEP and patient did not have any questions. Encouraged supporting L UE on pillows in supine for low load static progressive stretch. Utilize ball squeezing for hand strengthening.  Pt demo good understanding of the education provided. Tj demonstrated good return demonstration of activities.     Education provided re:  Cont with hep let pain be guide cont with sling wear.  Tj verbalized good understanding of education provided.   No spiritual or educational barriers to learning provided    Assessment   Patient continues to make progress with Range of motion. Noted decrased scapular mobility and trigger karla probably from sling wear. Pr stated that shoulder felt better and arm moved more freely following manual therapy. C/O pain remains low. Pt is anxious to get healed.   Still having difficulty getting comfortable sleeping, but improving.    This is a 62 y.o. female referred to outpatient occupational therapy and presents with a medical diagnosis of Radial head fx and demonstrates limitations as described in the problem list. Pt prognosis is Excellent. Pt will continue to benefit from skilled outpatient occupational therapy to address the deficits listed in the problem list, provide pt/family education and to maximize pt's level of independence in the home and community environment.     Barriers to therapy: non identified at this time  Goals as follows - patient in agreement with goals set       Plan      Continue with established Plan of Care towards OT goals.Pt states that she is going back to MD in 3 weeks for a follow up X ray with MD. Pt felt comfortable continuing with HEP and she has been making steady progress. Pt scheduled to come back to OT follwing her next MD visit. She was also instructed to call and schedule if she felt like she needed to come in sooner. Pt agreeable to the plan.    Therapist: ALICJA Rodriguez  5/29/2018

## 2018-06-05 ENCOUNTER — OFFICE VISIT (OUTPATIENT)
Dept: INTERNAL MEDICINE | Facility: CLINIC | Age: 63
End: 2018-06-05
Payer: COMMERCIAL

## 2018-06-05 ENCOUNTER — PATIENT MESSAGE (OUTPATIENT)
Dept: INTERNAL MEDICINE | Facility: CLINIC | Age: 63
End: 2018-06-05

## 2018-06-05 VITALS
WEIGHT: 136.69 LBS | SYSTOLIC BLOOD PRESSURE: 126 MMHG | OXYGEN SATURATION: 99 % | TEMPERATURE: 98 F | HEART RATE: 70 BPM | BODY MASS INDEX: 23.46 KG/M2 | DIASTOLIC BLOOD PRESSURE: 62 MMHG

## 2018-06-05 DIAGNOSIS — T14.8XXA HEMATOMA: Primary | ICD-10-CM

## 2018-06-05 PROCEDURE — 3008F BODY MASS INDEX DOCD: CPT | Mod: CPTII,S$GLB,, | Performed by: NURSE PRACTITIONER

## 2018-06-05 PROCEDURE — 99212 OFFICE O/P EST SF 10 MIN: CPT | Mod: S$GLB,,, | Performed by: NURSE PRACTITIONER

## 2018-06-05 PROCEDURE — 99999 PR PBB SHADOW E&M-EST. PATIENT-LVL IV: CPT | Mod: PBBFAC,,, | Performed by: NURSE PRACTITIONER
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
## 2018-06-13 ENCOUNTER — PATIENT MESSAGE (OUTPATIENT)
Dept: INTERNAL MEDICINE | Facility: CLINIC | Age: 63
End: 2018-06-13

## 2018-06-19 ENCOUNTER — HOSPITAL ENCOUNTER (OUTPATIENT)
Dept: RADIOLOGY | Facility: OTHER | Age: 63
Discharge: HOME | End: 2018-06-19
Attending: PHYSICIAN ASSISTANT
Payer: COMMERCIAL

## 2018-06-19 ENCOUNTER — OFFICE VISIT (OUTPATIENT)
Dept: ORTHOPEDICS | Facility: CLINIC | Age: 63
End: 2018-06-19
Payer: COMMERCIAL

## 2018-06-19 VITALS
SYSTOLIC BLOOD PRESSURE: 156 MMHG | HEART RATE: 60 BPM | WEIGHT: 136 LBS | HEIGHT: 64 IN | BODY MASS INDEX: 23.22 KG/M2 | DIASTOLIC BLOOD PRESSURE: 88 MMHG

## 2018-06-19 DIAGNOSIS — S52.125A CLOSED NONDISPLACED FRACTURE OF HEAD OF LEFT RADIUS, INITIAL ENCOUNTER: Primary | ICD-10-CM

## 2018-06-19 DIAGNOSIS — M25.622 DECREASED RANGE OF MOTION OF LEFT ELBOW: ICD-10-CM

## 2018-06-19 DIAGNOSIS — M25.512 ACUTE PAIN OF LEFT SHOULDER: ICD-10-CM

## 2018-06-19 DIAGNOSIS — S52.125A CLOSED NONDISPLACED FRACTURE OF HEAD OF LEFT RADIUS, INITIAL ENCOUNTER: ICD-10-CM

## 2018-06-19 PROCEDURE — 73080 X-RAY EXAM OF ELBOW: CPT | Mod: TC,FY,LT

## 2018-06-19 PROCEDURE — 99213 OFFICE O/P EST LOW 20 MIN: CPT | Mod: S$GLB,,, | Performed by: PHYSICIAN ASSISTANT

## 2018-06-19 PROCEDURE — 73080 X-RAY EXAM OF ELBOW: CPT | Mod: 26,LT,, | Performed by: RADIOLOGY

## 2018-06-19 PROCEDURE — 99999 PR PBB SHADOW E&M-EST. PATIENT-LVL IV: CPT | Mod: PBBFAC,,, | Performed by: PHYSICIAN ASSISTANT

## 2018-06-19 PROCEDURE — 3008F BODY MASS INDEX DOCD: CPT | Mod: CPTII,S$GLB,, | Performed by: PHYSICIAN ASSISTANT

## 2018-06-19 RX ORDER — DICLOFENAC SODIUM 10 MG/G
2 GEL TOPICAL 2 TIMES DAILY
Qty: 100 G | Refills: 1 | Status: SHIPPED | OUTPATIENT
Start: 2018-06-19 | End: 2019-11-11
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
## 2018-06-25 ENCOUNTER — CLINICAL SUPPORT (OUTPATIENT)
Dept: REHABILITATION | Facility: HOSPITAL | Age: 63
End: 2018-06-25
Payer: COMMERCIAL

## 2018-06-25 DIAGNOSIS — M25.622 DECREASED RANGE OF MOTION OF LEFT ELBOW: ICD-10-CM

## 2018-06-25 DIAGNOSIS — Z78.9 DECREASED INDEPENDENCE WITH ACTIVITIES OF DAILY LIVING: ICD-10-CM

## 2018-06-25 DIAGNOSIS — M79.89 SWELLING OF ARM: ICD-10-CM

## 2018-06-25 DIAGNOSIS — M79.602 PAIN IN LEFT ARM: ICD-10-CM

## 2018-06-25 PROCEDURE — 97140 MANUAL THERAPY 1/> REGIONS: CPT | Mod: PN

## 2018-06-25 PROCEDURE — 97110 THERAPEUTIC EXERCISES: CPT | Mod: PN

## 2018-06-26 NOTE — PROGRESS NOTES
Occupational Therapy Daily Note     Name: Tj Hernandez  Clinic Number: [id number]  Diagnosis: L radial Head Fx  Encounter Diagnoses   Name Primary?    Swelling of arm     Decreased range of motion of left elbow     Decreased independence with activities of daily living     Pain in left arm      Physician: Fouzia Chapman PA  Precautions: No lifting or weightbearing L arm    Visit #: 5  of 15 12/31/18  Time In: 1100 am  Time Out: 1150 am  Total Treatment Time 1:1: 50  TE 2 manual 1  Evaluation Date: 5/1/18  Plan of care Expiration: 7/20/18  MD referral: Eval and treat    Subjective     Pt reports: I went to the doctor last week and she said the fracture line is still evident. No weightbearing. I also told her that my shoulder is hurting more than my elbow. She wants to try therapy for my shoulder pain.  Pain Scale: Patient reports their pain to be 5/10 on a 0-10 scale with 0 being no pain and 10 being the worst pain imaginable.  Pain location: I still feel it more in my shoulder than  In my elbow.  Objective     Pt presented without sling on and bruising on face continues to improve.  supine on mat for ROM ex and manual therapy to perscapular muscles and upper traps. AC joint mobs and posterior capsule stretch.   Shoulder Flexion, ABD and ER 20 x  Elbow Low load static progressive stretch in extension and gentle active flexion within tolerance.  Extension   Pt also performed prone rows and prone extension.  Pulleys for shoulder ROM 4 min  Pt noted to have clicking in her L shoulder. She also appears to have a positive o'briens test. Shoulder pain relieved with ER and when compression ans stabilization was provided at .  Provided with ice to use at work.    Written Home Exercises Provide Encouraged supporting L UE on pillows in supine for low load static progressive stretch. Utilize ball squeezing for hand strengthening. Shoulder ROM ex in pain free ROM  Pt  almita good understanding of the education provided. Tj demonstrated good return demonstration of activities.     Education provided re: Cont with hep let pain be guide cont with sling wear when out in public  Tj verbalized good understanding of education provided.   No spiritual or educational barriers to learning provided    Assessment   Patient noted to have increased c/o shoulder pain today. She is having significant pain with IR and flexion. States that her pain in her shoulder is waking her up several times a night. Clinical findings indicates possible labrum involvement.    This is a 63 y.o. female referred to outpatient occupational therapy and presents with a medical diagnosis of Radial head fx and demonstrates limitations as described in the problem list. Pt prognosis is Excellent. Pt will continue to benefit from skilled outpatient occupational therapy to address the deficits listed in the problem list, provide pt/family education and to maximize pt's level of independence in the home and community environment.     Barriers to therapy: non identified at this time  Goals as follows - patient in agreement with goals set       Plan    communicated with PA and plan to increase therapy to 2x a week to address shoulder pain. If no improvement with conserrvative treatment will return for further diagnostic testing    Therapist: ALICJA Rodriguez  6/25/2018

## 2018-06-29 ENCOUNTER — CLINICAL SUPPORT (OUTPATIENT)
Dept: REHABILITATION | Facility: HOSPITAL | Age: 63
End: 2018-06-29
Payer: COMMERCIAL

## 2018-06-29 DIAGNOSIS — M25.512 ACUTE PAIN OF LEFT SHOULDER: ICD-10-CM

## 2018-06-29 DIAGNOSIS — R29.898 LEFT ARM WEAKNESS: ICD-10-CM

## 2018-06-29 DIAGNOSIS — M25.622 DECREASED RANGE OF MOTION OF LEFT ELBOW: ICD-10-CM

## 2018-06-29 DIAGNOSIS — Z78.9 DECREASED INDEPENDENCE WITH ACTIVITIES OF DAILY LIVING: ICD-10-CM

## 2018-06-29 PROCEDURE — 97110 THERAPEUTIC EXERCISES: CPT | Mod: PN

## 2018-06-29 PROCEDURE — 97140 MANUAL THERAPY 1/> REGIONS: CPT | Mod: PN

## 2018-06-29 NOTE — PROGRESS NOTES
"                                                    Occupational Therapy Daily Note     Name: Tj Hernandez  Clinic Number: [id number]  Diagnosis: L radial Head Fx  Physician: Fouzia Chapman PA  Precautions: No lifting or weightbearing L arm    Visit #: 6 of 15 12/31/18  FOTO due next visit  Time In: 1105 am  Time Out: 1155 am  Total Treatment Time 1:1: 50  TE 2 manual 1  Evaluation Date: 5/1/18  Plan of care Expiration: 7/20/18  MD referral: Eval and treat    Subjective     Pt reports: My shoulder does not really feel much different. I am still having trouble sleeping laying down.   Pain Scale: Patient reports their pain to be 5/10 on a 0-10 scale with 0 being no pain and 10 being the worst pain imaginable.  Pain location: I still feel it more in my shoulder than  In my elbow.  Objective     Pt presented without sling on supine on mat for ROM ex and manual therapy to perscapular muscles and upper traps and levator scapulae   Shoulder Flexion, ABD and ER 20 x  Elbow Low load static progressive stretch in extension and gentle active flexion within tolerance.    side lying abduction 2x10  gators 2x10  Pt also performed standing  rows and prone extension flexed at waist 2x10.  Isometrics low load for ER ABD and Flexion for shoulder hold 3" 10 x each  Wall slides 10x  Taped shoulder for support deltoid ant and post as well as scapular retraction. Applied bio freeze      Written Home Exercises Provide Encouraged supporting L UE on pillows in supine for low load static progressive stretch. Utilize ball squeezing for hand strengthening. Shoulder ROM ex in pain free ROM  Pt demo good understanding of the education provided. Tj demonstrated good return demonstration of activities.     Education provided re: Cont with hep let pain be guide cont with sling wear when out in public. Cont with isometrics as well  Tj verbalized good understanding of education provided.   Pt instructed to remove tape if it is not comfortable " or causing more pain / discomfort. Pt agreeable.  No spiritual or educational barriers to learning provided    Assessment   Patient continues to have shoulder pain. Stated that tape felt better and made her feel supported. Stated that she did not have any increase in pain following therapy, felt a little better. She has tight upper traps and levator. Cont to be painful with ER IR and flexion. Creptitus noted. States that pain did get better with movement today.  This is a 63 y.o. female referred to outpatient occupational therapy and presents with a medical diagnosis of Radial head fx and demonstrates limitations as described in the problem list. Pt prognosis is Excellent. Pt will continue to benefit from skilled outpatient occupational therapy to address the deficits listed in the problem list, provide pt/family education and to maximize pt's level of independence in the home and community environment.     Barriers to therapy: non identified at this time  Goals as follows - patient in agreement with goals set       Plan   Cont with OT focus on manual therapy, therex and taping as needed. Cont OT 2x a week.    Therapist: ALICJA Rodriguez  6/29/2018

## 2018-07-03 ENCOUNTER — CLINICAL SUPPORT (OUTPATIENT)
Dept: REHABILITATION | Facility: HOSPITAL | Age: 63
End: 2018-07-03
Payer: COMMERCIAL

## 2018-07-03 DIAGNOSIS — M79.602 PAIN IN LEFT ARM: ICD-10-CM

## 2018-07-03 DIAGNOSIS — M79.89 SWELLING OF ARM: ICD-10-CM

## 2018-07-03 DIAGNOSIS — R29.898 LEFT ARM WEAKNESS: ICD-10-CM

## 2018-07-03 PROCEDURE — 97140 MANUAL THERAPY 1/> REGIONS: CPT | Mod: PN

## 2018-07-03 PROCEDURE — 97110 THERAPEUTIC EXERCISES: CPT | Mod: PN

## 2018-07-03 NOTE — PROGRESS NOTES
"                                                    Occupational Therapy Daily Note     Name: Tj Hernandez  Clinic Number: [id number]  Diagnosis: L radial Head Fx  Physician: Fouzia Chapman PA  Precautions: No lifting or weightbearing L arm    Visit #: 7 of 15 12/31/18  FOTO due next visit  Time In: 810 am  Time Out: 900 am  Total Treatment Time 1:1: 50  TE 2 manual 1  Evaluation Date: 5/1/18  Plan of care Expiration: 7/20/18  MD referral: Eval and treat    Subjective     Pt reports: My shoulder really hurt Friday night. So bad that I had to take a pain pill for the first time in weeks. I starting feeling better Sunday and yesterday.  Pain Scale: Patient reports their pain to be 2/10 at rest and 4/10 with movement on a 0-10 scale with 0 being no pain and 10 being the worst pain imaginable.  Pain location: I still feel it more in my (anterior) shoulder than  In my elbow.  Objective     Pt presented without sling on and she had removed the tape from her shoulder.  Supine on mat for ROM ex and manual therapy to perscapular muscles and upper traps and levator scapulae   PROM Shoulder Flexion, ABD and ER  20   Elbow Low load static progressive stretch in extension and gentle active flexion within tolerance.    side lying abduction 2x10 in minimal pain range  Sidelying ER(with towel under arm) 2x10    Sidelying gators 2x10  Supine punches 2x10   Prone extension and rows 2x10  Yellow T band for lats and rows with focus on scapular retraction  Flexed at waist hand on swiss ball for circles 1 min x 2  Pt also performed standing  rows and prone extension flexed at waist 2x10.  Isometrics low load for ER ABD and Flexion for shoulder hold 3" 10 x each     Home Exercises Provide Shoulder ROM ex in pain free ROM  Pt demo good understanding of the education provided. Tj demonstrated good return demonstration of activities.     Education provided re: Cont withHEP let pain be guide cont with sling wear when out in public. Cont " with isometrics as well  Tj verbalized good understanding of education provided.    No spiritual or educational barriers to learning provided    Assessment   Patient continues to have shoulder pain states that it has decreased a little. She is also using her arm a little more, but not ding any lifting. She removed the tape and was not sure if it had contributed to the increased pain. Initiates some light theraband ex today in pain free range and stated that she did not have any increase in pain following therapy.   This is a 63 y.o. female referred to outpatient occupational therapy and presents with a medical diagnosis of Radial head fx and demonstrates limitations as described in the problem list. Pt prognosis is Excellent. Pt will continue to benefit from skilled outpatient occupational therapy to address the deficits listed in the problem list, provide pt/family education and to maximize pt's level of independence in the home and community environment.     Barriers to therapy: non identified at this time  Goals as follows - patient in agreement with goals set       Plan   Cont with OT focus on manual therapy, therex. Cont OT 2x a week.    Therapist: ALICJA Rodriguez  7/3/2018

## 2018-07-06 ENCOUNTER — CLINICAL SUPPORT (OUTPATIENT)
Dept: REHABILITATION | Facility: HOSPITAL | Age: 63
End: 2018-07-06
Payer: COMMERCIAL

## 2018-07-06 DIAGNOSIS — M79.89 SWELLING OF ARM: ICD-10-CM

## 2018-07-06 DIAGNOSIS — M79.602 PAIN IN LEFT ARM: ICD-10-CM

## 2018-07-06 DIAGNOSIS — M25.622 DECREASED RANGE OF MOTION OF LEFT ELBOW: ICD-10-CM

## 2018-07-06 DIAGNOSIS — Z78.9 DECREASED INDEPENDENCE WITH ACTIVITIES OF DAILY LIVING: ICD-10-CM

## 2018-07-06 PROCEDURE — 97140 MANUAL THERAPY 1/> REGIONS: CPT | Mod: PN

## 2018-07-06 PROCEDURE — 97110 THERAPEUTIC EXERCISES: CPT | Mod: PN

## 2018-07-06 NOTE — PROGRESS NOTES
"                                                    Occupational Therapy Daily Note     Name: Tj Hernandez  Clinic Number: [id number]  Diagnosis: L radial Head Fx  Physician: Fouzia Chapman PA  Precautions: No lifting or weightbearing L arm    Visit #: 8 of 15 12/31/18  FOTO due   Time In: 1207 pm  Time Out: 100 pm   Total Treatment Time 1:1: 53  TE 2 manual 1  Evaluation Date: 5/1/18  Plan of care Expiration: 7/20/18  MD referral: Eval and treat    Subjective     Pt reports: I am feeling OK today. I thing that I am a little better, but definitely not worse.  Pain Scale: Patient continues reports their pain to be 2/10 at rest and 4/10 with movement on a 0-10 scale with 0 being no pain and 10 being the worst pain imaginable.  Pain location: I still feel it more in my shoulder, but it depends on the movement.  Objective     Pt presented without sling on.  Supine and side lying on mat for ROM ex and manual therapy to perscapular muscles and upper traps and levator scapulae (feel much looser today)  PROM Shoulder Flexion, ABD and ER  20x   Elbow Low load static progressive stretch in extension and gentle active flexion within tolerance.    side lying abduction 2x10 in minimal pain range  Sidelying ER(with towel under arm) 2x10    Supine punches 2x10   Prone extension and rows 2x10  Yellow T band for lats, ER (with towel under arm) and rows with focus on scapular retraction 2x 15 each  Towel slides on wall 10x  Isometrics low load for ER ABD and Flexion for shoulder hold 3" 10 x each  Attempted UBE in forward and reverse with lowest resistance, Pt c/o discomfort so discontinued this ex.  Provided pt with ice bag to take to work for icing as needed.  AROM shoulder flecion R 160 L 150  ER R 70 L 45   Home Exercises Provide Shoulder ROM ex in pain free ROM  Pt demo good understanding of the education provided. Tj demonstrated good return demonstration of activities.     Education provided re: Cont withHEP let pain be " guide cont with sling wear when out in public. Cont with isometrics as well  Tj verbalized good understanding of education provided.    No spiritual or educational barriers to learning provided    Assessment   Patient continues to have shoulder pain states that it has decreased a little. She participated well today and stated that she did not have any increase in pain following therapy. She appears to be making a little progress..   This is a 63 y.o. female referred to outpatient occupational therapy and presents with a medical diagnosis of Radial head fx and demonstrates limitations as described in the problem list. Pt prognosis is Excellent. Pt will continue to benefit from skilled outpatient occupational therapy to address the deficits listed in the problem list, provide pt/family education and to maximize pt's level of independence in the home and community environment.     Barriers to therapy: non identified at this time  Goals as follows - patient in agreement with goals set       Plan   Cont with OT focus on manual therapy, therex. Cont OT 2x a week.    Therapist: ALICJA Rodriguez  7/6/2018

## 2018-07-10 ENCOUNTER — CLINICAL SUPPORT (OUTPATIENT)
Dept: REHABILITATION | Facility: HOSPITAL | Age: 63
End: 2018-07-10
Payer: COMMERCIAL

## 2018-07-10 DIAGNOSIS — M25.622 DECREASED RANGE OF MOTION OF LEFT ELBOW: ICD-10-CM

## 2018-07-10 DIAGNOSIS — M79.602 PAIN IN LEFT ARM: ICD-10-CM

## 2018-07-10 DIAGNOSIS — M25.512 ACUTE PAIN OF LEFT SHOULDER: ICD-10-CM

## 2018-07-10 DIAGNOSIS — Z78.9 DECREASED INDEPENDENCE WITH ACTIVITIES OF DAILY LIVING: ICD-10-CM

## 2018-07-10 DIAGNOSIS — R29.898 LEFT ARM WEAKNESS: ICD-10-CM

## 2018-07-10 PROCEDURE — 97140 MANUAL THERAPY 1/> REGIONS: CPT | Mod: PN

## 2018-07-10 PROCEDURE — 97110 THERAPEUTIC EXERCISES: CPT | Mod: PN

## 2018-07-11 ENCOUNTER — DOCUMENTATION ONLY (OUTPATIENT)
Dept: REHABILITATION | Facility: HOSPITAL | Age: 63
End: 2018-07-11

## 2018-07-11 ENCOUNTER — PATIENT MESSAGE (OUTPATIENT)
Dept: INTERNAL MEDICINE | Facility: CLINIC | Age: 63
End: 2018-07-11

## 2018-07-11 ENCOUNTER — PATIENT MESSAGE (OUTPATIENT)
Dept: ORTHOPEDICS | Facility: CLINIC | Age: 63
End: 2018-07-11

## 2018-07-12 NOTE — PROGRESS NOTES
Pt called this morning to cancel her appt, She stated that she had a bad night and pain was up to a 7/10. She had to take a pain pill and still did not sleep well. She stated that she wanted to cancel her appt for Thursday. I agree with this due to her lack of progress with therapy. Will send update to MD office and plan to continue as indicated. PT will continue with ROM as tolerated.  ALICJA Rodriguez

## 2018-08-13 ENCOUNTER — DOCUMENTATION ONLY (OUTPATIENT)
Dept: REHABILITATION | Facility: HOSPITAL | Age: 63
End: 2018-08-13

## 2018-08-13 PROBLEM — M79.89 SWELLING OF ARM: Status: RESOLVED | Noted: 2018-05-01 | Resolved: 2018-08-13

## 2018-08-13 PROBLEM — M25.622 DECREASED RANGE OF MOTION OF LEFT ELBOW: Status: RESOLVED | Noted: 2018-05-01 | Resolved: 2018-08-13

## 2018-08-13 PROBLEM — M79.602 PAIN IN LEFT ARM: Status: RESOLVED | Noted: 2018-05-01 | Resolved: 2018-08-13

## 2018-08-13 PROBLEM — Z78.9 DECREASED INDEPENDENCE WITH ACTIVITIES OF DAILY LIVING: Status: RESOLVED | Noted: 2018-05-01 | Resolved: 2018-08-13

## 2018-08-13 NOTE — PROGRESS NOTES
Pt contacted; Stated that she went to an MD due to continued shoulder pain outside of [facility] and she has a rotator cuff tear and a labral tear. She has also decided to change the location of therapy as well.  Discharge Pt from OT at this time.  ALICJA Rodriguez

## 2018-09-08 RX ORDER — ESTRADIOL 0.1 MG/G
CREAM VAGINAL
Qty: 42.5 G | Refills: 4 | Status: SHIPPED | OUTPATIENT
Start: 2018-09-08 | End: 2020-02-07

## 2018-11-26 ENCOUNTER — PATIENT MESSAGE (OUTPATIENT)
Dept: INTERNAL MEDICINE | Facility: CLINIC | Age: 63
End: 2018-11-26

## 2018-11-26 ENCOUNTER — TELEPHONE (OUTPATIENT)
Dept: INTERNAL MEDICINE | Facility: CLINIC | Age: 63
End: 2018-11-26

## 2018-11-26 DIAGNOSIS — Z12.39 SCREENING FOR BREAST CANCER: Primary | ICD-10-CM

## 2018-11-28 ENCOUNTER — PATIENT MESSAGE (OUTPATIENT)
Dept: SPORTS MEDICINE | Facility: CLINIC | Age: 63
End: 2018-11-28

## 2018-11-30 ENCOUNTER — HOSPITAL ENCOUNTER (OUTPATIENT)
Dept: RADIOLOGY | Facility: HOSPITAL | Age: 63
Discharge: HOME | End: 2018-11-30
Attending: INTERNAL MEDICINE
Payer: COMMERCIAL

## 2018-11-30 DIAGNOSIS — Z12.39 SCREENING FOR BREAST CANCER: ICD-10-CM

## 2018-11-30 PROCEDURE — 77063 BREAST TOMOSYNTHESIS BI: CPT | Mod: 26,,, | Performed by: RADIOLOGY

## 2018-11-30 PROCEDURE — 77063 BREAST TOMOSYNTHESIS BI: CPT | Mod: TC

## 2018-11-30 PROCEDURE — 77067 SCR MAMMO BI INCL CAD: CPT | Mod: TC

## 2018-11-30 PROCEDURE — 77067 SCR MAMMO BI INCL CAD: CPT | Mod: 26,,, | Performed by: RADIOLOGY

## 2018-12-12 ENCOUNTER — OFFICE VISIT (OUTPATIENT)
Dept: SPORTS MEDICINE | Facility: CLINIC | Age: 63
End: 2018-12-12
Payer: COMMERCIAL

## 2018-12-12 VITALS
SYSTOLIC BLOOD PRESSURE: 154 MMHG | HEIGHT: 64 IN | WEIGHT: 136 LBS | DIASTOLIC BLOOD PRESSURE: 86 MMHG | BODY MASS INDEX: 23.22 KG/M2 | HEART RATE: 67 BPM

## 2018-12-12 DIAGNOSIS — M75.22 BICEPS TENDONITIS ON LEFT: ICD-10-CM

## 2018-12-12 DIAGNOSIS — S43.432A SUPERIOR GLENOID LABRUM LESION OF LEFT SHOULDER, INITIAL ENCOUNTER: ICD-10-CM

## 2018-12-12 DIAGNOSIS — M25.512 ACUTE PAIN OF LEFT SHOULDER: Primary | ICD-10-CM

## 2018-12-12 PROCEDURE — 3288F FALL RISK ASSESSMENT DOCD: CPT | Mod: CPTII,S$GLB,, | Performed by: ORTHOPAEDIC SURGERY

## 2018-12-12 PROCEDURE — 3008F BODY MASS INDEX DOCD: CPT | Mod: CPTII,S$GLB,, | Performed by: ORTHOPAEDIC SURGERY

## 2018-12-12 PROCEDURE — 99214 OFFICE O/P EST MOD 30 MIN: CPT | Mod: 25,S$GLB,, | Performed by: ORTHOPAEDIC SURGERY

## 2018-12-12 PROCEDURE — 20610 DRAIN/INJ JOINT/BURSA W/O US: CPT | Mod: LT,S$GLB,, | Performed by: ORTHOPAEDIC SURGERY

## 2018-12-12 PROCEDURE — 99999 PR PBB SHADOW E&M-EST. PATIENT-LVL IV: CPT | Mod: PBBFAC,,, | Performed by: ORTHOPAEDIC SURGERY

## 2018-12-12 PROCEDURE — 1100F PTFALLS ASSESS-DOCD GE2>/YR: CPT | Mod: CPTII,S$GLB,, | Performed by: ORTHOPAEDIC SURGERY

## 2018-12-12 RX ORDER — TRIAMCINOLONE ACETONIDE 40 MG/ML
80 INJECTION, SUSPENSION INTRA-ARTICULAR; INTRAMUSCULAR
Status: DISCONTINUED | OUTPATIENT
Start: 2018-12-12 | End: 2018-12-12 | Stop reason: HOSPADM

## 2018-12-12 RX ADMIN — TRIAMCINOLONE ACETONIDE 80 MG: 40 INJECTION, SUSPENSION INTRA-ARTICULAR; INTRAMUSCULAR at 10:12
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
## 2018-12-12 NOTE — PROCEDURES
Large Joint Aspiration/Injection  Date/Time: 12/12/2018 10:09 AM  Performed by: Lety Pollard MD  Authorized by: Lety Pollard MD     Consent Done?:  Yes (Verbal)  Indications:  Pain  Procedure site marked: Yes    Timeout: Prior to procedure the correct patient, procedure, and site was verified      Location:  Shoulder  Shoulder joint: left biceps.  Prep: Patient was prepped and draped in usual sterile fashion    Needle size:  22 G  Approach:  Posterior  Medications:  80 mg triamcinolone acetonide 40 mg/mL  Patient tolerance:  Patient tolerated the procedure well with no immediate complications

## 2018-12-18 RX ORDER — PRAVASTATIN SODIUM 20 MG/1
TABLET ORAL
Qty: 30 TABLET | Refills: 11 | Status: SHIPPED | OUTPATIENT
Start: 2018-12-18 | End: 2019-12-26

## 2018-12-18 RX ORDER — EZETIMIBE 10 MG/1
TABLET ORAL
Qty: 30 TABLET | Refills: 11 | Status: SHIPPED | OUTPATIENT
Start: 2018-12-18 | End: 2019-12-26

## 2018-12-27 ENCOUNTER — PATIENT OUTREACH (OUTPATIENT)
Dept: ADMINISTRATIVE | Facility: HOSPITAL | Age: 63
End: 2018-12-27

## 2018-12-27 ENCOUNTER — TELEPHONE (OUTPATIENT)
Dept: ADMINISTRATIVE | Facility: HOSPITAL | Age: 63
End: 2018-12-27

## 2018-12-27 DIAGNOSIS — Z00.00 ANNUAL PHYSICAL EXAM: Primary | ICD-10-CM

## 2018-12-27 DIAGNOSIS — E78.5 HYPERLIPIDEMIA, UNSPECIFIED HYPERLIPIDEMIA TYPE: Primary | ICD-10-CM

## 2018-12-28 ENCOUNTER — PATIENT MESSAGE (OUTPATIENT)
Dept: INTERNAL MEDICINE | Facility: CLINIC | Age: 63
End: 2018-12-28

## 2019-01-02 ENCOUNTER — LAB VISIT (OUTPATIENT)
Dept: LAB | Facility: HOSPITAL | Age: 64
End: 2019-01-02
Attending: INTERNAL MEDICINE
Payer: COMMERCIAL

## 2019-01-02 DIAGNOSIS — E78.5 HYPERLIPIDEMIA, UNSPECIFIED HYPERLIPIDEMIA TYPE: ICD-10-CM

## 2019-01-02 DIAGNOSIS — Z00.00 ANNUAL PHYSICAL EXAM: ICD-10-CM

## 2019-01-02 LAB
25(OH)D3+25(OH)D2 SERPL-MCNC: 19 NG/ML
ALBUMIN SERPL BCP-MCNC: 3.7 G/DL
ALP SERPL-CCNC: 41 U/L
ALT SERPL W/O P-5'-P-CCNC: 14 U/L
ANION GAP SERPL CALC-SCNC: 6 MMOL/L
AST SERPL-CCNC: 27 U/L
BASOPHILS # BLD AUTO: 0.03 K/UL
BASOPHILS NFR BLD: 0.4 %
BILIRUB SERPL-MCNC: 0.9 MG/DL
BUN SERPL-MCNC: 18 MG/DL
CALCIUM SERPL-MCNC: 9.3 MG/DL
CHLORIDE SERPL-SCNC: 107 MMOL/L
CHOLEST SERPL-MCNC: 185 MG/DL
CHOLEST/HDLC SERPL: 2.2 {RATIO}
CO2 SERPL-SCNC: 29 MMOL/L
CREAT SERPL-MCNC: 1 MG/DL
DIFFERENTIAL METHOD: ABNORMAL
EOSINOPHIL # BLD AUTO: 0.1 K/UL
EOSINOPHIL NFR BLD: 1.3 %
ERYTHROCYTE [DISTWIDTH] IN BLOOD BY AUTOMATED COUNT: 14.6 %
EST. GFR  (AFRICAN AMERICAN): >60 ML/MIN/1.73 M^2
EST. GFR  (NON AFRICAN AMERICAN): 59.3 ML/MIN/1.73 M^2
GLUCOSE SERPL-MCNC: 96 MG/DL
HCT VFR BLD AUTO: 41.9 %
HDLC SERPL-MCNC: 84 MG/DL
HDLC SERPL: 45.4 %
HGB BLD-MCNC: 13.1 G/DL
IMM GRANULOCYTES # BLD AUTO: 0.02 K/UL
IMM GRANULOCYTES NFR BLD AUTO: 0.3 %
LDLC SERPL CALC-MCNC: 85.4 MG/DL
LYMPHOCYTES # BLD AUTO: 1.8 K/UL
LYMPHOCYTES NFR BLD: 25.8 %
MCH RBC QN AUTO: 25.7 PG
MCHC RBC AUTO-ENTMCNC: 31.3 G/DL
MCV RBC AUTO: 82 FL
MONOCYTES # BLD AUTO: 0.7 K/UL
MONOCYTES NFR BLD: 9.5 %
NEUTROPHILS # BLD AUTO: 4.3 K/UL
NEUTROPHILS NFR BLD: 62.7 %
NONHDLC SERPL-MCNC: 101 MG/DL
NRBC BLD-RTO: 0 /100 WBC
PLATELET # BLD AUTO: 238 K/UL
PMV BLD AUTO: 10.4 FL
POTASSIUM SERPL-SCNC: 4 MMOL/L
PROT SERPL-MCNC: 6.5 G/DL
RBC # BLD AUTO: 5.09 M/UL
SODIUM SERPL-SCNC: 142 MMOL/L
TRIGL SERPL-MCNC: 78 MG/DL
TSH SERPL DL<=0.005 MIU/L-ACNC: 1.51 UIU/ML
TSH SERPL DL<=0.005 MIU/L-ACNC: 1.51 UIU/ML
VIT B12 SERPL-MCNC: 1002 PG/ML
WBC # BLD AUTO: 6.85 K/UL

## 2019-01-02 PROCEDURE — 80053 COMPREHEN METABOLIC PANEL: CPT

## 2019-01-02 PROCEDURE — 84443 ASSAY THYROID STIM HORMONE: CPT

## 2019-01-02 PROCEDURE — 85025 COMPLETE CBC W/AUTO DIFF WBC: CPT

## 2019-01-02 PROCEDURE — 36415 COLL VENOUS BLD VENIPUNCTURE: CPT | Mod: PO

## 2019-01-02 PROCEDURE — 82607 VITAMIN B-12: CPT

## 2019-01-02 PROCEDURE — 80061 LIPID PANEL: CPT

## 2019-01-02 PROCEDURE — 82306 VITAMIN D 25 HYDROXY: CPT

## 2019-01-07 ENCOUNTER — OFFICE VISIT (OUTPATIENT)
Dept: OBSTETRICS AND GYNECOLOGY | Facility: CLINIC | Age: 64
End: 2019-01-07
Attending: OBSTETRICS & GYNECOLOGY
Payer: COMMERCIAL

## 2019-01-07 VITALS
BODY MASS INDEX: 23.34 KG/M2 | WEIGHT: 136.69 LBS | HEIGHT: 64 IN | SYSTOLIC BLOOD PRESSURE: 112 MMHG | DIASTOLIC BLOOD PRESSURE: 74 MMHG

## 2019-01-07 DIAGNOSIS — N95.0 POSTMENOPAUSAL BLEEDING: ICD-10-CM

## 2019-01-07 DIAGNOSIS — Z01.419 WELL WOMAN EXAM: Primary | ICD-10-CM

## 2019-01-07 PROCEDURE — 99999 PR PBB SHADOW E&M-EST. PATIENT-LVL III: ICD-10-PCS | Mod: PBBFAC,,, | Performed by: OBSTETRICS & GYNECOLOGY

## 2019-01-07 PROCEDURE — 1100F PR PT FALLS ASSESS DOC 2+ FALLS/FALL W/INJURY/YR: ICD-10-PCS | Mod: CPTII,S$GLB,, | Performed by: OBSTETRICS & GYNECOLOGY

## 2019-01-07 PROCEDURE — 99387 INIT PM E/M NEW PAT 65+ YRS: CPT | Mod: S$GLB,,, | Performed by: OBSTETRICS & GYNECOLOGY

## 2019-01-07 PROCEDURE — 3288F PR FALLS RISK ASSESSMENT DOCUMENTED: ICD-10-PCS | Mod: CPTII,S$GLB,, | Performed by: OBSTETRICS & GYNECOLOGY

## 2019-01-07 PROCEDURE — 3008F PR BODY MASS INDEX (BMI) DOCUMENTED: ICD-10-PCS | Mod: CPTII,S$GLB,, | Performed by: OBSTETRICS & GYNECOLOGY

## 2019-01-07 PROCEDURE — 3288F FALL RISK ASSESSMENT DOCD: CPT | Mod: CPTII,S$GLB,, | Performed by: OBSTETRICS & GYNECOLOGY

## 2019-01-07 PROCEDURE — 1100F PTFALLS ASSESS-DOCD GE2>/YR: CPT | Mod: CPTII,S$GLB,, | Performed by: OBSTETRICS & GYNECOLOGY

## 2019-01-07 PROCEDURE — 99387 PR PREVENTIVE VISIT,NEW,65 & OVER: ICD-10-PCS | Mod: S$GLB,,, | Performed by: OBSTETRICS & GYNECOLOGY

## 2019-01-07 PROCEDURE — 3008F BODY MASS INDEX DOCD: CPT | Mod: CPTII,S$GLB,, | Performed by: OBSTETRICS & GYNECOLOGY

## 2019-01-07 PROCEDURE — 99999 PR PBB SHADOW E&M-EST. PATIENT-LVL III: CPT | Mod: PBBFAC,,, | Performed by: OBSTETRICS & GYNECOLOGY

## 2019-01-07 RX ORDER — DICLOFENAC SODIUM AND MISOPROSTOL 75; 200 MG/1; UG/1
1 TABLET, DELAYED RELEASE ORAL DAILY
Qty: 2 TABLET | Refills: 0 | Status: SHIPPED | OUTPATIENT
Start: 2019-01-07 | End: 2019-11-11 | Stop reason: CLARIF

## 2019-01-07 NOTE — PROGRESS NOTES
HISTORY OF PRESENT ILLNESS:    Tj Hernandez is a 63 y.o. female, , No LMP recorded. Patient is postmenopausal.,  presents for a annual pap and a problem visit, complaining of spotting for 6 days last month.       age 48.  No HRT.  One episode of  bleeding a few years ago - unsure what evaluation was done.    Last gyn exam about 1 year ago  - has yearly paps because has history of severe dysplasia s/p CKC.  Paps all normal since the cone.    Past Medical History:   Diagnosis Date    Adjustment disorder     Colon polyp     benign    Hormone disorder     Hyperlipidemia     Kidney stone     Neck pain     Recurrent UTI 2014       Past Surgical History:   Procedure Laterality Date    COLON SURGERY      COLONOSCOPY N/A 2018    Performed [facility]aveen [facility] at [facility] (4[facility])    COLONOSCOPY N/A 2013    Performed by Jaren De Santiago MD at [facility] (4TH FLR)    CYSTOSCOPY      DILATION AND CURETTAGE OF UTERUS      TONSILLECTOMY         MEDICATIONS AND ALLERGIES:      Current Outpatient Medications:     azelastine (ASTELIN) 137 mcg (0.1 %) nasal spray, 1 spray (137 mcg total) by Nasal route 2 (two) times daily., Disp: 30 mL, Rfl: 11    biotin 1 mg tablet, Take 1,000 mcg by mouth 3 (three) times daily., Disp: , Rfl:     calcium carbonate (OS-MOSHE) 600 mg (1,500 mg) Tab, Take 600 mg by mouth once daily. , Disp: , Rfl:     cetirizine (ZYRTEC) 10 MG tablet, Take 10 mg by mouth once daily., Disp: , Rfl:     cholecalciferol, vitamin D3, 2,000 unit Cap, Take 1 capsule by mouth once daily., Disp: , Rfl:     diclofenac sodium (VOLTAREN) 1 % Gel, Apply 2 g topically 2 (two) times daily. Apply to shoulder, Disp: 100 g, Rfl: 1    ESTRACE 0.01 % (0.1 mg/gram) vaginal cream, INSERT ONE gram VAGINALLY TWO TIMES A WEEK, Disp: 42.5 g, Rfl: 4    estradiol (ESTRACE) 0.01 % (0.1 mg/gram) vaginal cream, Place 1 g vaginally twice a week., Disp: 42.5 g, Rfl: 4    ezetimibe (ZETIA) 10  mg tablet, TAKE ONE TABLET BY MOUTH DAILY, Disp: 30 tablet, Rfl: 11    hydrocodone-acetaminophen 5-325mg (NORCO) 5-325 mg per tablet, Take 1 tablet by mouth every 6 (six) hours as needed for Pain (moderate to severe)., Disp: 28 tablet, Rfl: 0    ibuprofen (ADVIL,MOTRIN) 800 MG tablet, , Disp: , Rfl: 0    meloxicam (MOBIC) 15 MG tablet, Take 1 tablet (15 mg total) by mouth once daily., Disp: 30 tablet, Rfl: 0    multivitamin capsule, Take 1 capsule by mouth once daily., Disp: , Rfl:     ondansetron (ZOFRAN) 4 MG tablet, Take 1 tablet (4 mg total) by mouth every 6 (six) hours as needed for Nausea., Disp: 12 tablet, Rfl: 0    pravastatin (PRAVACHOL) 20 MG tablet, TAKE 1 TABLET BY MOUTH ONCE A DAY, Disp: 30 tablet, Rfl: 11    ranitidine (ZANTAC) 300 MG tablet, Take 1 tablet (300 mg total) by mouth once daily., Disp: 30 tablet, Rfl: 6    UBIDECARENONE (COENZYME Q10) 100 mg Tab, Take 1 tablet (100 mg total) by mouth once daily., Disp: , Rfl:     zolpidem (AMBIEN) 10 mg Tab, Take 1 tablet (10 mg total) by mouth nightly as needed., Disp: 30 tablet, Rfl: 1    diclofenac-misoprostol  mg-mcg (ARTHROTEC 75)  mg-mcg per tablet, Take 1 tablet by mouth once daily. Take 1st dose at 8pm the night before the procedure, 2nd dose the morning of the procedure for 2 days, Disp: 2 tablet, Rfl: 0    ibandronate (BONIVA) 150 mg tablet, Take 1 tablet (150 mg total) by mouth every 30 days., Disp: 3 tablet, Rfl: 11    Review of patient's allergies indicates:  No Known Allergies    Family History   Adopted: Yes       Social History     Socioeconomic History    Marital status:      Spouse name: Not on file    Number of children: Not on file    Years of education: Not on file    Highest education level: Not on file   Social Needs    Financial resource strain: Not on file    Food insecurity - worry: Not on file    Food insecurity - inability: Not on file    Transportation needs - medical: Not on file     "Transportation needs - non-medical: Not on file   Occupational History    Not on file   Tobacco Use    Smoking status: Never Smoker    Smokeless tobacco: Never Used   Substance and Sexual Activity    Alcohol use: Yes     Alcohol/week: 1.2 oz     Types: 2 Shots of liquor per week     Comment: rare    Drug use: No    Sexual activity: No     Partners: Male     Birth control/protection: Other-see comments   Other Topics Concern    Not on file   Social History Narrative    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 .        ROS:  GENERAL: No weight changes. No swelling. No fatigue. No fever.  CARDIOVASCULAR: No chest pain. No shortness of breath. No leg cramps.   NEUROLOGICAL: No headaches. No vision changes.  BREASTS: No pain. No lumps. No discharge.  ABDOMEN: No pain. No nausea. No vomiting. No diarrhea. No constipation.  REPRODUCTIVE: see above   VULVA: No pain. No lesions. No itching.  VAGINA: No relaxation. No itching. No odor. No discharge. No lesions.  URINARY: No incontinence. No nocturia. No frequency. No dysuria.    /74   Ht 5' 4" (1.626 m)   Wt 62 kg (136 lb 11 oz)   BMI 23.46 kg/m²     PE:  APPEARANCE: Well nourished, well developed, in no acute distress.  ABDOMEN: Soft. No tenderness or masses. No hepatosplenomegaly. No hernias.  BREASTS, FUNDOSCOPIC, RECTAL DEFERRED  PELVIC: External female genitalia without lesions.  Female hair distribution. Adequate perineal body, Normal urethral meatus. Vagina moist and well rugated without lesions or discharge.  No significant cystocele or rectocele present. Cervix pink without lesions, discharge or tenderness.  Markedly stenotic cervix - unable to dilate for endocervical swab or EMB. Uterus is normal size, regular, mobile and nontender. Adnexa without masses or tenderness.  EXTREMITIES: No edema      DIAGNOSIS & PLAN  1. Well woman exam  CANCELED: Liquid-based pap smear, screening   2. Postmenopausal bleeding  US Pelvis Complete " Non OB    CANCELED: Tissue Specimen To Pathology, Obstetrics/Gynecology       COUNSELING:  Rx Arthrotec.  Follow up after ultrasound for repeat EMB attempt in the office.

## 2019-01-08 ENCOUNTER — IMMUNIZATION (OUTPATIENT)
Dept: PHARMACY | Facility: CLINIC | Age: 64
End: 2019-01-08
Payer: COMMERCIAL

## 2019-01-08 ENCOUNTER — OFFICE VISIT (OUTPATIENT)
Dept: INTERNAL MEDICINE | Facility: CLINIC | Age: 64
End: 2019-01-08
Payer: COMMERCIAL

## 2019-01-08 VITALS
BODY MASS INDEX: 23.21 KG/M2 | DIASTOLIC BLOOD PRESSURE: 80 MMHG | SYSTOLIC BLOOD PRESSURE: 136 MMHG | OXYGEN SATURATION: 98 % | HEART RATE: 76 BPM | WEIGHT: 135.94 LBS | HEIGHT: 64 IN

## 2019-01-08 DIAGNOSIS — G47.09 OTHER INSOMNIA: ICD-10-CM

## 2019-01-08 DIAGNOSIS — Z00.00 ROUTINE GENERAL MEDICAL EXAMINATION AT A HEALTH CARE FACILITY: Primary | ICD-10-CM

## 2019-01-08 DIAGNOSIS — Z23 NEED FOR VACCINATION AGAINST STREPTOCOCCUS PNEUMONIAE: ICD-10-CM

## 2019-01-08 PROCEDURE — 99999 PR PBB SHADOW E&M-EST. PATIENT-LVL III: ICD-10-PCS | Mod: PBBFAC,,, | Performed by: INTERNAL MEDICINE

## 2019-01-08 PROCEDURE — 99397 PR PREVENTIVE VISIT,EST,65 & OVER: ICD-10-PCS | Mod: S$GLB,,, | Performed by: INTERNAL MEDICINE

## 2019-01-08 PROCEDURE — 99999 PR PBB SHADOW E&M-EST. PATIENT-LVL III: CPT | Mod: PBBFAC,,, | Performed by: INTERNAL MEDICINE

## 2019-01-08 PROCEDURE — 99397 PER PM REEVAL EST PAT 65+ YR: CPT | Mod: S$GLB,,, | Performed by: INTERNAL MEDICINE

## 2019-01-08 RX ORDER — ALPRAZOLAM 0.25 MG/1
0.25 TABLET ORAL 2 TIMES DAILY PRN
Qty: 30 TABLET | Refills: 0 | Status: SHIPPED | OUTPATIENT
Start: 2019-01-08 | End: 2019-11-11 | Stop reason: CLARIF

## 2019-01-08 RX ORDER — IBANDRONATE SODIUM 150 MG/1
150 TABLET, FILM COATED ORAL
Qty: 3 TABLET | Refills: 11 | Status: SHIPPED | OUTPATIENT
Start: 2019-01-08 | End: 2020-01-24

## 2019-01-08 RX ORDER — ZOLPIDEM TARTRATE 10 MG/1
10 TABLET ORAL NIGHTLY PRN
Qty: 30 TABLET | Refills: 1 | Status: SHIPPED | OUTPATIENT
Start: 2019-01-08 | End: 2020-02-12 | Stop reason: SDUPTHER
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
## 2019-01-10 ENCOUNTER — TELEPHONE (OUTPATIENT)
Dept: OBSTETRICS AND GYNECOLOGY | Facility: CLINIC | Age: 64
End: 2019-01-10

## 2019-01-10 ENCOUNTER — HOSPITAL ENCOUNTER (OUTPATIENT)
Dept: RADIOLOGY | Facility: HOSPITAL | Age: 64
Discharge: HOME | End: 2019-01-10
Attending: OBSTETRICS & GYNECOLOGY
Payer: COMMERCIAL

## 2019-01-10 DIAGNOSIS — N95.0 POSTMENOPAUSAL BLEEDING: ICD-10-CM

## 2019-01-10 PROCEDURE — 76856 US EXAM PELVIC COMPLETE: CPT | Mod: 26,,, | Performed by: RADIOLOGY

## 2019-01-10 PROCEDURE — 76830 TRANSVAGINAL US NON-OB: CPT | Mod: 26,,, | Performed by: RADIOLOGY

## 2019-01-10 PROCEDURE — 76830 US PELVIS COMP WITH TRANSVAG NON-OB (XPD): ICD-10-PCS | Mod: 26,,, | Performed by: RADIOLOGY

## 2019-01-10 PROCEDURE — 76856 US PELVIS COMP WITH TRANSVAG NON-OB (XPD): ICD-10-PCS | Mod: 26,,, | Performed by: RADIOLOGY

## 2019-01-10 PROCEDURE — 76830 TRANSVAGINAL US NON-OB: CPT | Mod: TC

## 2019-01-10 NOTE — TELEPHONE ENCOUNTER
----- Message from Daja Rothman sent at 1/10/2019 10:01 AM CST -----  Contact: May calzada/Prescription Dheeraj  Name of Who is Calling: May calzada/Rodney Esqueda    What is the request in detail: May calzada/Prescription Dheeraj want to know was the pt suppose to take this medication before the procedure? States it is not covered by her insurance. Please call to further discuss and advise.     Can the clinic reply by [facility]: No     [facility]: May calzada/Prescription Pad # [phone]

## 2019-01-11 RX ORDER — MISOPROSTOL 200 UG/1
200 TABLET ORAL 4 TIMES DAILY
Qty: 4 TABLET | Refills: 0 | Status: ON HOLD | OUTPATIENT
Start: 2019-01-11 | End: 2019-11-12

## 2019-01-11 NOTE — TELEPHONE ENCOUNTER
pls notify -Instructions on how to take are on the prescription.  One dose evening before, and one dose morning of procedure.    Is cytotec covered?

## 2019-01-14 ENCOUNTER — PROCEDURE VISIT (OUTPATIENT)
Dept: OBSTETRICS AND GYNECOLOGY | Facility: CLINIC | Age: 64
End: 2019-01-14
Attending: OBSTETRICS & GYNECOLOGY
Payer: COMMERCIAL

## 2019-01-14 VITALS — WEIGHT: 140 LBS | DIASTOLIC BLOOD PRESSURE: 88 MMHG | BODY MASS INDEX: 24.03 KG/M2 | SYSTOLIC BLOOD PRESSURE: 138 MMHG

## 2019-01-14 DIAGNOSIS — Z01.419 WELL WOMAN EXAM: ICD-10-CM

## 2019-01-14 DIAGNOSIS — R87.615 ENCOUNTER FOR REPEAT PAP SMEAR DUE TO PREVIOUS INSUFF CERVICAL CELLS: Primary | ICD-10-CM

## 2019-01-14 PROCEDURE — 88175 CYTOPATH C/V AUTO FLUID REDO: CPT

## 2019-01-14 PROCEDURE — 87624 HPV HI-RISK TYP POOLED RSLT: CPT

## 2019-01-14 NOTE — PROCEDURES
Procedures   HISTORY OF PRESENT ILLNESS:    Tj Hernandez is a 63 y.o. female, , No LMP recorded. Patient is postmenopausal.,  presents for repeat pap and possible EMB.  She has taken cytotec prior to the procedure today.    U/S normal;  Endometrium 1 mm    /88   Wt 63.5 kg (139 lb 15.9 oz)   BMI 24.03 kg/m²     PE:  APPEARANCE: Well nourished, well developed, in no acute distress.  APELVIC: External female genitalia without lesions.  Female hair distribution. Adequate perineal body, Normal urethral meatus. Vagina moist and well rugated without lesions or discharge.  No significant cystocele or rectocele present. Cervix pink without lesions, discharge or tenderness.  Cervix still markedly stenotic  EXTREMITIES: No edema      DIAGNOSIS & PLAN  1. Encounter for repeat Pap smear due to previous insuff cervical cells  HPV High Risk Genotypes, PCR    Liquid-based pap smear, screening   2. Well woman exam  HPV High Risk Genotypes, PCR    Liquid-based pap smear, screening       COUNSELING:  Discussed with patient - since EM 1 mm will plan D&C only if she bleeds again.

## 2019-01-21 LAB
HPV HR 12 DNA CVX QL NAA+PROBE: NEGATIVE
HPV16 AG SPEC QL: NEGATIVE
HPV18 DNA SPEC QL NAA+PROBE: NEGATIVE

## 2019-02-13 ENCOUNTER — OFFICE VISIT (OUTPATIENT)
Dept: SPORTS MEDICINE | Facility: CLINIC | Age: 64
End: 2019-02-13
Payer: COMMERCIAL

## 2019-02-13 VITALS
HEIGHT: 64 IN | SYSTOLIC BLOOD PRESSURE: 141 MMHG | HEART RATE: 63 BPM | WEIGHT: 139 LBS | DIASTOLIC BLOOD PRESSURE: 84 MMHG | BODY MASS INDEX: 23.73 KG/M2

## 2019-02-13 DIAGNOSIS — M25.512 CHRONIC LEFT SHOULDER PAIN: Primary | ICD-10-CM

## 2019-02-13 DIAGNOSIS — G89.29 CHRONIC LEFT SHOULDER PAIN: Primary | ICD-10-CM

## 2019-02-13 PROCEDURE — 99214 OFFICE O/P EST MOD 30 MIN: CPT | Mod: S$GLB,,, | Performed by: ORTHOPAEDIC SURGERY

## 2019-02-13 PROCEDURE — 1101F PR PT FALLS ASSESS DOC 0-1 FALLS W/OUT INJ PAST YR: ICD-10-PCS | Mod: CPTII,S$GLB,, | Performed by: ORTHOPAEDIC SURGERY

## 2019-02-13 PROCEDURE — 3008F PR BODY MASS INDEX (BMI) DOCUMENTED: ICD-10-PCS | Mod: CPTII,S$GLB,, | Performed by: ORTHOPAEDIC SURGERY

## 2019-02-13 PROCEDURE — 99214 PR OFFICE/OUTPT VISIT, EST, LEVL IV, 30-39 MIN: ICD-10-PCS | Mod: S$GLB,,, | Performed by: ORTHOPAEDIC SURGERY

## 2019-02-13 PROCEDURE — 3008F BODY MASS INDEX DOCD: CPT | Mod: CPTII,S$GLB,, | Performed by: ORTHOPAEDIC SURGERY

## 2019-02-13 PROCEDURE — 99999 PR PBB SHADOW E&M-EST. PATIENT-LVL IV: ICD-10-PCS | Mod: PBBFAC,,, | Performed by: ORTHOPAEDIC SURGERY

## 2019-02-13 PROCEDURE — 1101F PT FALLS ASSESS-DOCD LE1/YR: CPT | Mod: CPTII,S$GLB,, | Performed by: ORTHOPAEDIC SURGERY

## 2019-02-13 PROCEDURE — 99999 PR PBB SHADOW E&M-EST. PATIENT-LVL IV: CPT | Mod: PBBFAC,,, | Performed by: ORTHOPAEDIC SURGERY
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
## 2019-02-20 ENCOUNTER — CLINICAL SUPPORT (OUTPATIENT)
Dept: REHABILITATION | Facility: HOSPITAL | Age: 64
End: 2019-02-20
Payer: COMMERCIAL

## 2019-02-20 DIAGNOSIS — M25.512 LEFT SHOULDER PAIN, UNSPECIFIED CHRONICITY: ICD-10-CM

## 2019-02-20 PROCEDURE — 97161 PT EVAL LOW COMPLEX 20 MIN: CPT

## 2019-02-20 PROCEDURE — 97110 THERAPEUTIC EXERCISES: CPT
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
## 2019-02-25 NOTE — PROGRESS NOTES
Physical Therapy Daily Treatment Note     Name: Tj Hernandez  Clinic Number: [id number]  Therapy Diagnosis:        Encounter Diagnosis   Name Primary?    Left shoulder pain, unspecified chronicity        Physician: Casale, Michael Joseph,*     Physician Orders: PT Eval and Treat : Biceps tendonitis (s/p injection)   Scapular dyskinesia  Scapular stabilization - [place] protocol     Medical Diagnosis: M25.512,G89.29 (ICD-10-CM) - Chronic left shoulder pain  Date of Surgery: NA     Evaluation Date: 2/20/2019  Authorization Period Expiration: 12/31/2019  Plan of Care Certification Period: 6/30/2019  Visit # / Visits authorized: 2/ 20     Time In: 1300  Time Out: 1400  Total Billable Time: 40 minutes     Precautions: Standard    Subjective     Pt reports: min pain in L shld when arrived for tx  She was compliant with home exercise program.  Response to previous treatment: no adverse effects   Functional change: patient is working out approx 5 days     Pain: 3/10  Location: left shoulder      Objective     L shld PROM WNL with min pain end range ER    Tj received therapeutic exercises to develop strength, endurance, ROM, flexibility and posture for 35 minutes including:  UBE 3'/3' for protraction/retraction   OTB IR/ER step outs 20x ea   OTB shld ex 20x   Prone L UE rows 1# 20x  Prone L UE ext 1# 20x  Prone L UE HABD 20x  SL L ER 1# 20x  YTB L wall clocks     Tj received the following manual therapy techniques: Joint mobilizations and Soft tissue Mobilization were applied to the: L shld  for 5 'minutes, including:  AAROM w/stretching     Tj received hot pack for 10 minutes to increase circulation and promote tissue healing.  Cold pack L shld for 10' for decreased circulation, edema and pain.       Home Exercises Provided and Patient Education Provided     Education provided:   Posture awareness    Written Home Exercises Provided: yes.  Exercises were reviewed and Tj was able to demonstrate them prior to the end  of the session.  Tj demonstrated good  understanding of the education provided.     Assessment     Pt tolerating tx well. Continue with L shld stabilization and strengthening activity. Progress as tolerated.   Tj is progressing well towards her goals.   Pt prognosis is Good.     Pt will continue to benefit from skilled outpatient physical therapy to address the deficits listed in the problem list box on initial evaluation, provide pt/family education and to maximize pt's level of independence in the home and community environment.     Pt's spiritual, cultural and educational needs considered and pt agreeable to plan of care and goals.    Anticipated barriers to physical therapy: none  Goals: Goals:     Short Term Goals (4-6 Weeks):  - Pt will increase LUE ROM to match the contralateral side for ease with daily activity involving overhead reaching (not met, progressing)  - Pt will increase LUE strength to > 4/5 in all planes for ease with overhead reaching/lifting light weight (not met, progressing)  - Decrease gross Pain to 0-4/10 with all routine daily activity and light gym activity  (not met, progressing)  - Pt independent with HEP to improve tolerance to exercise progressions.  (not met, progressing)     Long Term Goals (8-12 Weeks):  - Pt will increase LUE strength to 5/5 in all planes for safe return to gym activity (not met, progressing)  - Decrease gross Pain to 0/10 with return to prior activity level  (not met, progressing)  - Pt will report improvement in overall functional abilities, evidenced by improved score on  FOTO to 30% limitation or better (not met, progressing)      Plan     Continue to Progress per POC and as tolerated.     Maximiliano Schaefer, PTA, STS

## 2019-02-26 ENCOUNTER — CLINICAL SUPPORT (OUTPATIENT)
Dept: REHABILITATION | Facility: HOSPITAL | Age: 64
End: 2019-02-26
Payer: COMMERCIAL

## 2019-02-26 DIAGNOSIS — M25.512 ACUTE PAIN OF LEFT SHOULDER: ICD-10-CM

## 2019-02-26 PROCEDURE — 97110 THERAPEUTIC EXERCISES: CPT

## 2019-02-28 ENCOUNTER — CLINICAL SUPPORT (OUTPATIENT)
Dept: REHABILITATION | Facility: HOSPITAL | Age: 64
End: 2019-02-28
Payer: COMMERCIAL

## 2019-02-28 DIAGNOSIS — M25.512 ACUTE PAIN OF LEFT SHOULDER: ICD-10-CM

## 2019-02-28 PROCEDURE — 97110 THERAPEUTIC EXERCISES: CPT

## 2019-02-28 NOTE — PROGRESS NOTES
Physical Therapy Daily Treatment Note     Name: Tj Hernandez  Clinic Number: [id number]  Therapy Diagnosis:        Encounter Diagnosis   Name Primary?    Left shoulder pain, unspecified chronicity        Physician: Casale, Michael Joseph,*     Physician Orders: PT Eval and Treat : Biceps tendonitis (s/p injection)   Scapular dyskinesia  Scapular stabilization - [place] protocol     Medical Diagnosis: M25.512,G89.29 (ICD-10-CM) - Chronic left shoulder pain  Date of Surgery: NA     Evaluation Date: 2/20/2019  Authorization Period Expiration: 12/31/2019  Plan of Care Certification Period: 6/30/2019  Visit # / Visits authorized: 3/ 20     Time In: 1230  Time Out: 1130  Total Billable Time: 40 minutes     Precautions: Standard    Subjective     Pt reports: min pain in L shld when arrived for tx  She was compliant with home exercise program.  Response to previous treatment: no adverse effects   Functional change: patient is working out approx 5 days     Pain: 2/10  Location: left shoulder      Objective     L shld PROM WNL with min pain end range ER    Tj received therapeutic exercises to develop strength, endurance, ROM, flexibility and posture for 40 minutes including:  UBE 4'/4' for protraction/retraction lv4  OTB IR/ER step outs 30x ea   OTB IR/ER 30x ea   OTB shld ex 30x   Prone L UE rows 1# 20x  Prone L UE ext 1# 30x  Prone L UE HABD 30x  SL L ER 1# 30x  YTB L wall clocks np      Tj received hot pack for 10 minutes to increase circulation and promote tissue healing.  Cold pack L shld for 10' for decreased circulation, edema and pain.       Home Exercises Provided and Patient Education Provided     Education provided:   Posture awareness    Written Home Exercises Provided: yes.  Exercises were reviewed and Tj was able to demonstrate them prior to the end of the session.  Tj demonstrated good  understanding of the education provided.     Assessment     Pt tolerating tx well. Continue with L shld stabilization  and strengthening activity. Progress as tolerated.   Tj is progressing well towards her goals.   Pt prognosis is Good.     Pt will continue to benefit from skilled outpatient physical therapy to address the deficits listed in the problem list box on initial evaluation, provide pt/family education and to maximize pt's level of independence in the home and community environment.     Pt's spiritual, cultural and educational needs considered and pt agreeable to plan of care and goals.    Anticipated barriers to physical therapy: none  Goals: Goals:     Short Term Goals (4-6 Weeks):  - Pt will increase LUE ROM to match the contralateral side for ease with daily activity involving overhead reaching (not met, progressing)  - Pt will increase LUE strength to > 4/5 in all planes for ease with overhead reaching/lifting light weight (not met, progressing)  - Decrease gross Pain to 0-4/10 with all routine daily activity and light gym activity  (not met, progressing)  - Pt independent with HEP to improve tolerance to exercise progressions.  (not met, progressing)     Long Term Goals (8-12 Weeks):  - Pt will increase LUE strength to 5/5 in all planes for safe return to gym activity (not met, progressing)  - Decrease gross Pain to 0/10 with return to prior activity level  (not met, progressing)  - Pt will report improvement in overall functional abilities, evidenced by improved score on  FOTO to 30% limitation or better (not met, progressing)      Plan     Continue to Progress per POC and as tolerated.     Maximiliano Schaefer, PTA, STS

## 2019-03-06 ENCOUNTER — CLINICAL SUPPORT (OUTPATIENT)
Dept: REHABILITATION | Facility: HOSPITAL | Age: 64
End: 2019-03-06
Payer: COMMERCIAL

## 2019-03-06 DIAGNOSIS — M25.512 ACUTE PAIN OF LEFT SHOULDER: ICD-10-CM

## 2019-03-06 PROCEDURE — 97140 MANUAL THERAPY 1/> REGIONS: CPT

## 2019-03-06 PROCEDURE — 97110 THERAPEUTIC EXERCISES: CPT

## 2019-03-06 NOTE — PROGRESS NOTES
Physical Therapy Daily Treatment Note     Name: Tj Hernandez  Clinic Number: [id number]  Therapy Diagnosis:        Encounter Diagnosis   Name Primary?    Left shoulder pain, unspecified chronicity        Physician: Casale, Michael Joseph,*     Physician Orders: PT Eval and Treat : Biceps tendonitis (s/p injection)   Scapular dyskinesia  Scapular stabilization - [place] protocol     Medical Diagnosis: M25.512,G89.29 (ICD-10-CM) - Chronic left shoulder pain  Date of Surgery: NA     Evaluation Date: 2/20/2019  Authorization Period Expiration: 12/31/2019  Plan of Care Certification Period: 6/30/2019  Visit # / Visits authorized: 4/ 20     Time In: 1100  Time Out: 1200  Total Billable Time: 55 minutes     Precautions: Standard    Subjective     Pt reports no pain in the (L) shoulder upon arrival. Notes that she has continued to participate in light weight lifting activity to tolerance.  She was compliant with home exercise program.  Response to previous treatment: no adverse effects   Functional change: patient is working out approx 5 days     Pain: 2/10  Location: left shoulder      Objective     L shld PROM WNL with min pain end range ER    Tj received therapeutic exercises to develop strength, endurance, ROM, flexibility and posture for 40 minutes including:  UBE 4'/4' for protraction/retraction lv4  OTB IR/ER step outs 30x ea - NT  OTB IR/ER 3x15 ea OTB in tandem  BTB shld ex 3x15 in tandem  BTB Shoulder Row 3x15  In tandem  Med ball chops bilateral x 30  Triceps press #7 3x15  Prone L UE rows 1# 20x  Prone L UE ext 1# 30x  Prone L UE HABD 30x  SL L ER 1# 30x  YTB L wall clocks np    Manual:  Rhythmic stabilization in supine  Neurolymphatic release shoulder region  MWM shoulder abduction      Tj received hot pack for 10 minutes to increase circulation and promote tissue healing.- NT  Cold pack L shld for 10' for decreased circulation, edema and pain. - NT      Home Exercises Provided and Patient Education  Provided     Education provided:   Posture awareness    Written Home Exercises Provided: yes.  Exercises were reviewed and Tj was able to demonstrate them prior to the end of the session.  Tj demonstrated good  understanding of the education provided.     Assessment     Pt presented with fatigue throughout today's tx session. Noted weakness as opposed to pain. Appropriate to continue to progress as tolerated.  Tj is progressing well towards her goals.   Pt prognosis is Good.     Pt will continue to benefit from skilled outpatient physical therapy to address the deficits listed in the problem list box on initial evaluation, provide pt/family education and to maximize pt's level of independence in the home and community environment.     Pt's spiritual, cultural and educational needs considered and pt agreeable to plan of care and goals.    Anticipated barriers to physical therapy: none  Goals: Goals:     Short Term Goals (4-6 Weeks):  - Pt will increase LUE ROM to match the contralateral side for ease with daily activity involving overhead reaching (not met, progressing)  - Pt will increase LUE strength to > 4/5 in all planes for ease with overhead reaching/lifting light weight (not met, progressing)  - Decrease gross Pain to 0-4/10 with all routine daily activity and light gym activity  (not met, progressing)  - Pt independent with HEP to improve tolerance to exercise progressions.  (not met, progressing)     Long Term Goals (8-12 Weeks):  - Pt will increase LUE strength to 5/5 in all planes for safe return to gym activity (not met, progressing)  - Decrease gross Pain to 0/10 with return to prior activity level  (not met, progressing)  - Pt will report improvement in overall functional abilities, evidenced by improved score on  FOTO to 30% limitation or better (not met, progressing)      Plan     Continue to Progress per POC and as tolerated.     Raymundo Mcdonough, PT, DPT, OCS

## 2019-03-12 ENCOUNTER — CLINICAL SUPPORT (OUTPATIENT)
Dept: REHABILITATION | Facility: HOSPITAL | Age: 64
End: 2019-03-12
Payer: COMMERCIAL

## 2019-03-12 DIAGNOSIS — M25.512 ACUTE PAIN OF LEFT SHOULDER: ICD-10-CM

## 2019-03-12 PROCEDURE — 97140 MANUAL THERAPY 1/> REGIONS: CPT

## 2019-03-12 PROCEDURE — 97110 THERAPEUTIC EXERCISES: CPT

## 2019-03-12 NOTE — PROGRESS NOTES
Physical Therapy Daily Treatment Note     Name: Tj Hernandez  Clinic Number: [id number]  Therapy Diagnosis:        Encounter Diagnosis   Name Primary?    Left shoulder pain, unspecified chronicity        Physician: Casale, Michael Joseph,*     Physician Orders: PT Eval and Treat : Biceps tendonitis (s/p injection)   Scapular dyskinesia  Scapular stabilization - [place] protocol     Medical Diagnosis: M25.512,G89.29 (ICD-10-CM) - Chronic left shoulder pain  Date of Surgery: NA     Evaluation Date: 2/20/2019  Authorization Period Expiration: 12/31/2019  Plan of Care Certification Period: 6/30/2019  Visit # / Visits authorized: 5/ 20     Time In: 1100  Time Out: 1200  Total Billable Time: 55 minutes     Precautions: Standard    Subjective     Pt states that she has been improving but was sore after the last PT session. Continues to feel weak but is not hurting as bad.  She was compliant with home exercise program.  Response to previous treatment: no adverse effects   Functional change: patient is working out approx 5 days     Pain: 2/10  Location: left shoulder      Objective     L shld PROM WNL with min pain end range ER    Tj received therapeutic exercises to develop strength, endurance, ROM, flexibility and posture for 45 minutes including:  UBE 4'/4' for protraction/retraction lv4  OTB IR/ER 3x15 ea OTB in tandem  BTB shld ex 3x15 in tandem  BTB Shoulder Row 3x15  In tandem  Med ball chops bilateral x 30  Biceps Curl 15# 2x20  Triceps press #7 3x15  Prone L UE rows 1# 20x  Prone L UE ext 1# 30x  Prone L UE HABD 30x  SL L ER 1# 30x  Scapula Stars x 10  Wall V with cervical flexion/extension 2x10    Manual: 10  Rhythmic stabilization in supine  Neurolymphatic release shoulder region  MWM shoulder abduction  PDTR: (L) UT Chain/(L) SCM Bag      Tj received hot pack for 10 minutes to increase circulation and promote tissue healing.- NT  Cold pack L shld for 10' for decreased circulation, edema and pain. -  NT      Home Exercises Provided and Patient Education Provided     Education provided:   Posture awareness    Written Home Exercises Provided: yes.  Exercises were reviewed and Tj was able to demonstrate them prior to the end of the session.  Tj demonstrated good  understanding of the education provided.     Assessment     Pt tolerated all activity well but noted fatigue without pain. Continue to promote scapular stability and gross RC strengthening.  Tj is progressing well towards her goals.   Pt prognosis is Good.     Pt will continue to benefit from skilled outpatient physical therapy to address the deficits listed in the problem list box on initial evaluation, provide pt/family education and to maximize pt's level of independence in the home and community environment.     Pt's spiritual, cultural and educational needs considered and pt agreeable to plan of care and goals.    Anticipated barriers to physical therapy: none  Goals: Goals:     Short Term Goals (4-6 Weeks):  - Pt will increase LUE ROM to match the contralateral side for ease with daily activity involving overhead reaching (not met, progressing)  - Pt will increase LUE strength to > 4/5 in all planes for ease with overhead reaching/lifting light weight (not met, progressing)  - Decrease gross Pain to 0-4/10 with all routine daily activity and light gym activity  (not met, progressing)  - Pt independent with HEP to improve tolerance to exercise progressions.  (not met, progressing)     Long Term Goals (8-12 Weeks):  - Pt will increase LUE strength to 5/5 in all planes for safe return to gym activity (not met, progressing)  - Decrease gross Pain to 0/10 with return to prior activity level  (not met, progressing)  - Pt will report improvement in overall functional abilities, evidenced by improved score on  FOTO to 30% limitation or better (not met, progressing)      Plan     Continue to Progress per POC and as tolerated.     Raymundo Mcdonough, PT, DPT,  OCS

## 2019-03-14 ENCOUNTER — CLINICAL SUPPORT (OUTPATIENT)
Dept: REHABILITATION | Facility: HOSPITAL | Age: 64
End: 2019-03-14
Payer: COMMERCIAL

## 2019-03-14 DIAGNOSIS — M25.512 ACUTE PAIN OF LEFT SHOULDER: ICD-10-CM

## 2019-03-14 PROCEDURE — 97110 THERAPEUTIC EXERCISES: CPT

## 2019-03-14 NOTE — PROGRESS NOTES
Physical Therapy Daily Treatment Note     Name: Tj Hernandez  Clinic Number: [id number]  Therapy Diagnosis:        Encounter Diagnosis   Name Primary?    Left shoulder pain, unspecified chronicity        Physician: Casale, Michael Joseph,*     Physician Orders: PT Eval and Treat : Biceps tendonitis (s/p injection)   Scapular dyskinesia  Scapular stabilization - [place] protocol     Medical Diagnosis: M25.512,G89.29 (ICD-10-CM) - Chronic left shoulder pain  Date of Surgery: NA     Evaluation Date: 2/20/2019  Authorization Period Expiration: 12/31/2019  Plan of Care Certification Period: 6/30/2019  Visit # / Visits authorized: 6/ 20     Time In: 1225  Time Out: 1325  Total Billable Time: 55 minutes     Precautions: Standard    Subjective     Pt states minimal pain in L shld today by reports.  She was compliant with home exercise program.  Response to previous treatment: mm aggravation R shld    Functional change: patient is working out approx 5 days      Pain: 1/10  Location: left shoulder      Objective     L shld PROM WNL with min pain end range ER    Tj received therapeutic exercises to develop strength, endurance, ROM, flexibility and posture for 50 minutes including:  UBE 4'/4' for protraction/retraction lv4  OTB IR/ER 30 ea   BTB shld ex 30x  BTB Shoulder Row 30x  BTB B chops 30x ex  Biceps Curl 15# 2x20  Triceps press #7 3x15  Prone L UE rows 1# 20x  Prone L UE ext 1# 30x  Prone L UE HABD 30x  SL L ER 1# 30x  Scapula Stars x 10   Wall V with cervical flexion/extension 2x10        Cold pack L shld for 10' for decreased circulation, edema and pain. - NT      Home Exercises Provided and Patient Education Provided     Education provided:   Posture awareness    Written Home Exercises Provided: yes.  Exercises were reviewed and Tj was able to demonstrate them prior to the end of the session.  Tj demonstrated good  understanding of the education provided.     Assessment     Pt tolerated all activity well min  fatigue. Continue to promote scapular stability and gross RC strengthening.  Tj is progressing well towards her goals.   Pt prognosis is Good.     Pt will continue to benefit from skilled outpatient physical therapy to address the deficits listed in the problem list box on initial evaluation, provide pt/family education and to maximize pt's level of independence in the home and community environment.     Pt's spiritual, cultural and educational needs considered and pt agreeable to plan of care and goals.    Anticipated barriers to physical therapy: none  Goals: Goals:     Short Term Goals (4-6 Weeks):  - Pt will increase LUE ROM to match the contralateral side for ease with daily activity involving overhead reaching (not met, progressing)  - Pt will increase LUE strength to > 4/5 in all planes for ease with overhead reaching/lifting light weight (not met, progressing)  - Decrease gross Pain to 0-4/10 with all routine daily activity and light gym activity  (not met, progressing)  - Pt independent with HEP to improve tolerance to exercise progressions.  (not met, progressing)     Long Term Goals (8-12 Weeks):  - Pt will increase LUE strength to 5/5 in all planes for safe return to gym activity (not met, progressing)  - Decrease gross Pain to 0/10 with return to prior activity level  (not met, progressing)  - Pt will report improvement in overall functional abilities, evidenced by improved score on  FOTO to 30% limitation or better (not met, progressing)      Plan     Continue to Progress per POC and as tolerated.     Maximiliano Schaefer, PTA, STS

## 2019-03-19 ENCOUNTER — CLINICAL SUPPORT (OUTPATIENT)
Dept: REHABILITATION | Facility: HOSPITAL | Age: 64
End: 2019-03-19
Payer: COMMERCIAL

## 2019-03-19 DIAGNOSIS — M25.512 ACUTE PAIN OF LEFT SHOULDER: ICD-10-CM

## 2019-03-19 PROCEDURE — 97110 THERAPEUTIC EXERCISES: CPT

## 2019-03-19 NOTE — PROGRESS NOTES
Physical Therapy Daily Treatment Note     Name: Tj Hernandez  Clinic Number: [id number]  Therapy Diagnosis:        Encounter Diagnosis   Name Primary?    Left shoulder pain, unspecified chronicity        Physician: Casale, Michael Joseph,*     Physician Orders: PT Eval and Treat : Biceps tendonitis (s/p injection)   Scapular dyskinesia  Scapular stabilization - [place] protocol     Medical Diagnosis: M25.512,G89.29 (ICD-10-CM) - Chronic left shoulder pain  Date of Surgery: NA     Evaluation Date: 2/20/2019  Authorization Period Expiration: 12/31/2019  Plan of Care Certification Period: 6/30/2019  Visit # / Visits authorized: 6/ 20     Time In: 1100  Time Out: 1200  Total Billable Time: 55 minutes     Precautions: Standard    Subjective     Pt states minimal pain in L shld today by reports. Notes that she was slightly sore after our last x session. She continues to participate in aerobics classes outside of therapy.  She was compliant with home exercise program.  Response to previous treatment: mm aggravation R shld    Functional change: patient is working out approx 5 days      Pain: 1/10  Location: left shoulder      Objective     L shld PROM WNL with min pain end range ER    Tj received therapeutic exercises to develop strength, endurance, ROM, flexibility and posture for 50 minutes including:  UBE 4'/4' for protraction/retraction lv4  OTB IR/ER 30 ea   BTB shld ex 30x  BTB Shoulder Row 30x  BTB B chops 30x ex  Biceps Curl 15# 2x20  Triceps press #7 3x15  Prone L UE rows 1# 20x  Prone L UE ext 1# 30x  Prone L UE HABD 30x  SL L ER 1# 30x  Scapula Stars x 10   Wall V with cervical flexion/extension 2x10        Cold pack L shld for 10' for decreased circulation, edema and pain. - NT      Home Exercises Provided and Patient Education Provided     Education provided:   Posture awareness    Written Home Exercises Provided: yes.  Exercises were reviewed and Tj was able to demonstrate them prior to the end of the  session.  Tj demonstrated good  understanding of the education provided.     Assessment     No adverse events with tx session. Continue to progress as tolerated.  Tj is progressing well towards her goals.   Pt prognosis is Good.     Pt will continue to benefit from skilled outpatient physical therapy to address the deficits listed in the problem list box on initial evaluation, provide pt/family education and to maximize pt's level of independence in the home and community environment.     Pt's spiritual, cultural and educational needs considered and pt agreeable to plan of care and goals.    Anticipated barriers to physical therapy: none  Goals: Goals:     Short Term Goals (4-6 Weeks):  - Pt will increase LUE ROM to match the contralateral side for ease with daily activity involving overhead reaching (not met, progressing)  - Pt will increase LUE strength to > 4/5 in all planes for ease with overhead reaching/lifting light weight (not met, progressing)  - Decrease gross Pain to 0-4/10 with all routine daily activity and light gym activity  (not met, progressing)  - Pt independent with HEP to improve tolerance to exercise progressions.  (not met, progressing)     Long Term Goals (8-12 Weeks):  - Pt will increase LUE strength to 5/5 in all planes for safe return to gym activity (not met, progressing)  - Decrease gross Pain to 0/10 with return to prior activity level  (not met, progressing)  - Pt will report improvement in overall functional abilities, evidenced by improved score on  FOTO to 30% limitation or better (not met, progressing)      Plan     Continue to Progress per POC and as tolerated.     Raymundo Mcdonough, PT, STS

## 2019-03-21 ENCOUNTER — CLINICAL SUPPORT (OUTPATIENT)
Dept: REHABILITATION | Facility: HOSPITAL | Age: 64
End: 2019-03-21
Payer: COMMERCIAL

## 2019-03-21 DIAGNOSIS — M25.512 ACUTE PAIN OF LEFT SHOULDER: ICD-10-CM

## 2019-03-21 PROCEDURE — 97110 THERAPEUTIC EXERCISES: CPT

## 2019-03-21 NOTE — PROGRESS NOTES
Physical Therapy Daily Treatment Note     Name: Tj Hernandez  Clinic Number: [id number]  Therapy Diagnosis:        Encounter Diagnosis   Name Primary?    Left shoulder pain, unspecified chronicity        Physician: Casale, Michael Joseph,*     Physician Orders: PT Eval and Treat : Biceps tendonitis (s/p injection)   Scapular dyskinesia  Scapular stabilization - [place] protocol     Medical Diagnosis: M25.512,G89.29 (ICD-10-CM) - Chronic left shoulder pain  Date of Surgery: NA     Evaluation Date: 2/20/2019  Authorization Period Expiration: 12/31/2019  Plan of Care Certification Period: 6/30/2019  Visit # / Visits authorized: 7/ 20     Time In: 1230  Time Out: 1330  Total Billable Time: 55 minutes     Precautions: Standard    Subjective     Pt states no pain at present time   She was compliant with home exercise program.  Response to previous treatment: mm soreness   Functional change: patient is working out approx 5 days      Pain: 0/10  Location: left shoulder      Objective     L shld PROM WNL with min pain end range ER    Tj received therapeutic exercises to develop strength, endurance, ROM, flexibility and posture for 50 minutes including:    UBE 4'/4' for protraction/retraction lv4  OTB IR/ER 30 ea   OTB IR/ER side steps 30x   BTB shld ex 30x  BTB Shoulder Row 30x  BTB B chops 30x ex  Dumbbell Biceps Curl 5# 3x10   Triceps with rope  #13 3x10    Not today  Prone L UE rows 1# 20x  Prone L UE ext 1# 30x  Prone L UE HABD 30x  SL L ER 1# 30x  Scapula Stars x 10   Wall V with cervical flexion/extension 2x10        Cold pack L shld for 10' for decreased circulation, edema and pain. - NT      Home Exercises Provided and Patient Education Provided     Education provided:   Posture awareness    Written Home Exercises Provided: yes.  Exercises were reviewed and Tj was able to demonstrate them prior to the end of the session.  Tj demonstrated good  understanding of the education provided.     Assessment   Pt  tolerating tx well. Continue to progress as tolerated.  Tj is progressing well towards her goals.   Pt prognosis is Good.     Pt will continue to benefit from skilled outpatient physical therapy to address the deficits listed in the problem list box on initial evaluation, provide pt/family education and to maximize pt's level of independence in the home and community environment.     Pt's spiritual, cultural and educational needs considered and pt agreeable to plan of care and goals.    Anticipated barriers to physical therapy: none  Goals: Goals:     Short Term Goals (4-6 Weeks):  - Pt will increase LUE ROM to match the contralateral side for ease with daily activity involving overhead reaching (not met, progressing)  - Pt will increase LUE strength to > 4/5 in all planes for ease with overhead reaching/lifting light weight (not met, progressing)  - Decrease gross Pain to 0-4/10 with all routine daily activity and light gym activity  (not met, progressing)  - Pt independent with HEP to improve tolerance to exercise progressions.  (not met, progressing)     Long Term Goals (8-12 Weeks):  - Pt will increase LUE strength to 5/5 in all planes for safe return to gym activity (not met, progressing)  - Decrease gross Pain to 0/10 with return to prior activity level  (not met, progressing)  - Pt will report improvement in overall functional abilities, evidenced by improved score on  FOTO to 30% limitation or better (not met, progressing)      Plan     Continue to Progress per POC and as tolerated.     Maximiliano Schaefer, PTA, STS

## 2019-05-13 ENCOUNTER — OFFICE VISIT (OUTPATIENT)
Dept: SPORTS MEDICINE | Facility: CLINIC | Age: 64
End: 2019-05-13
Payer: COMMERCIAL

## 2019-05-13 ENCOUNTER — PATIENT MESSAGE (OUTPATIENT)
Dept: INTERNAL MEDICINE | Facility: CLINIC | Age: 64
End: 2019-05-13

## 2019-05-13 VITALS
DIASTOLIC BLOOD PRESSURE: 85 MMHG | HEART RATE: 62 BPM | SYSTOLIC BLOOD PRESSURE: 143 MMHG | BODY MASS INDEX: 23.73 KG/M2 | WEIGHT: 139 LBS | HEIGHT: 64 IN

## 2019-05-13 DIAGNOSIS — M75.22 BICEPS TENDINITIS OF LEFT SHOULDER: ICD-10-CM

## 2019-05-13 PROCEDURE — 99999 PR PBB SHADOW E&M-EST. PATIENT-LVL IV: ICD-10-PCS | Mod: PBBFAC,,, | Performed by: ORTHOPAEDIC SURGERY

## 2019-05-13 PROCEDURE — 99214 PR OFFICE/OUTPT VISIT, EST, LEVL IV, 30-39 MIN: ICD-10-PCS | Mod: S$GLB,,, | Performed by: ORTHOPAEDIC SURGERY

## 2019-05-13 PROCEDURE — 99999 PR PBB SHADOW E&M-EST. PATIENT-LVL IV: CPT | Mod: PBBFAC,,, | Performed by: ORTHOPAEDIC SURGERY

## 2019-05-13 PROCEDURE — 1101F PR PT FALLS ASSESS DOC 0-1 FALLS W/OUT INJ PAST YR: ICD-10-PCS | Mod: CPTII,S$GLB,, | Performed by: ORTHOPAEDIC SURGERY

## 2019-05-13 PROCEDURE — 1101F PT FALLS ASSESS-DOCD LE1/YR: CPT | Mod: CPTII,S$GLB,, | Performed by: ORTHOPAEDIC SURGERY

## 2019-05-13 PROCEDURE — 3008F PR BODY MASS INDEX (BMI) DOCUMENTED: ICD-10-PCS | Mod: CPTII,S$GLB,, | Performed by: ORTHOPAEDIC SURGERY

## 2019-05-13 PROCEDURE — 99214 OFFICE O/P EST MOD 30 MIN: CPT | Mod: S$GLB,,, | Performed by: ORTHOPAEDIC SURGERY

## 2019-05-13 PROCEDURE — 3008F BODY MASS INDEX DOCD: CPT | Mod: CPTII,S$GLB,, | Performed by: ORTHOPAEDIC SURGERY
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
## 2019-05-31 ENCOUNTER — PATIENT MESSAGE (OUTPATIENT)
Dept: INTERNAL MEDICINE | Facility: CLINIC | Age: 64
End: 2019-05-31

## 2019-09-16 ENCOUNTER — PATIENT MESSAGE (OUTPATIENT)
Dept: INTERNAL MEDICINE | Facility: CLINIC | Age: 64
End: 2019-09-16

## 2019-10-14 ENCOUNTER — OFFICE VISIT (OUTPATIENT)
Dept: URGENT CARE | Facility: CLINIC | Age: 64
End: 2019-10-14
Payer: COMMERCIAL

## 2019-10-14 VITALS
SYSTOLIC BLOOD PRESSURE: 132 MMHG | RESPIRATION RATE: 15 BRPM | BODY MASS INDEX: 23.73 KG/M2 | HEIGHT: 64 IN | OXYGEN SATURATION: 100 % | WEIGHT: 139 LBS | TEMPERATURE: 97 F | HEART RATE: 63 BPM | DIASTOLIC BLOOD PRESSURE: 77 MMHG

## 2019-10-14 DIAGNOSIS — B96.89 BACTERIAL SINUSITIS: Primary | ICD-10-CM

## 2019-10-14 DIAGNOSIS — J32.9 BACTERIAL SINUSITIS: Primary | ICD-10-CM

## 2019-10-14 DIAGNOSIS — J02.9 SORE THROAT: ICD-10-CM

## 2019-10-14 LAB
CTP QC/QA: YES
CTP QC/QA: YES
FLUAV AG NPH QL: NEGATIVE
FLUBV AG NPH QL: NEGATIVE
S PYO RRNA THROAT QL PROBE: NEGATIVE

## 2019-10-14 PROCEDURE — 1101F PR PT FALLS ASSESS DOC 0-1 FALLS W/OUT INJ PAST YR: ICD-10-PCS | Mod: CPTII,S$GLB,, | Performed by: PHYSICIAN ASSISTANT

## 2019-10-14 PROCEDURE — 87880 POCT RAPID STREP A: ICD-10-PCS | Mod: QW,S$GLB,, | Performed by: PHYSICIAN ASSISTANT

## 2019-10-14 PROCEDURE — 1101F PT FALLS ASSESS-DOCD LE1/YR: CPT | Mod: CPTII,S$GLB,, | Performed by: PHYSICIAN ASSISTANT

## 2019-10-14 PROCEDURE — 87804 INFLUENZA ASSAY W/OPTIC: CPT | Mod: QW,S$GLB,, | Performed by: PHYSICIAN ASSISTANT

## 2019-10-14 PROCEDURE — 99214 OFFICE O/P EST MOD 30 MIN: CPT | Mod: S$GLB,,, | Performed by: PHYSICIAN ASSISTANT

## 2019-10-14 PROCEDURE — 87880 STREP A ASSAY W/OPTIC: CPT | Mod: QW,S$GLB,, | Performed by: PHYSICIAN ASSISTANT

## 2019-10-14 PROCEDURE — 87804 POCT INFLUENZA A/B: ICD-10-PCS | Mod: 59,QW,S$GLB, | Performed by: PHYSICIAN ASSISTANT

## 2019-10-14 PROCEDURE — 99214 PR OFFICE/OUTPT VISIT, EST, LEVL IV, 30-39 MIN: ICD-10-PCS | Mod: S$GLB,,, | Performed by: PHYSICIAN ASSISTANT

## 2019-10-14 RX ORDER — AMOXICILLIN 875 MG/1
875 TABLET, FILM COATED ORAL 2 TIMES DAILY
Qty: 20 TABLET | Refills: 0 | Status: SHIPPED | OUTPATIENT
Start: 2019-10-14 | End: 2019-10-24

## 2019-10-14 NOTE — PROGRESS NOTES
"Subjective:       Patient ID: Tj Hernandez is a 64 y.o. female.    Vitals:  height is 5' 4" (1.626 m) and weight is 63 kg (139 lb). Her temperature is 97.1 °F (36.2 °C). Her blood pressure is 132/77 and her pulse is 63. Her respiration is 15 and oxygen saturation is 100%.     Chief Complaint: Sinus Problem    Pt. States sore throat, cough, congestion, constant over past 4-5 days.  Denies any fever, coughing up sputum, chest pain, shortness of breath.    Sinus Problem   This is a new problem. The current episode started in the past 7 days. The problem is unchanged. There has been no fever. Her pain is at a severity of 3/10. The pain is mild. Associated symptoms include congestion, coughing, ear pain, headaches, sinus pressure and a sore throat. Pertinent negatives include no chills, diaphoresis or shortness of breath. Treatments tried: anti-histamine. The treatment provided no relief.       Constitution: Negative for chills, sweating, fatigue and fever.   HENT: Positive for ear pain, congestion, postnasal drip, sinus pain, sinus pressure and sore throat. Negative for voice change.    Neck: Positive for painful lymph nodes.   Eyes: Negative for eye redness.   Respiratory: Positive for cough. Negative for chest tightness, sputum production, bloody sputum, COPD, shortness of breath, stridor, wheezing and asthma.    Gastrointestinal: Negative for nausea and vomiting.   Musculoskeletal: Negative for muscle ache.   Skin: Negative for rash.   Allergic/Immunologic: Negative for seasonal allergies and asthma.   Neurological: Positive for headaches.   Hematologic/Lymphatic: Positive for swollen lymph nodes.       Objective:      Physical Exam   Constitutional: She is oriented to person, place, and time. She appears well-developed and well-nourished. She is cooperative.  Non-toxic appearance. She does not have a sickly appearance. She does not appear ill. No distress.   HENT:   Head: Normocephalic and atraumatic.   Right " Ear: Hearing, tympanic membrane, external ear and ear canal normal.   Left Ear: Hearing, tympanic membrane, external ear and ear canal normal.   Nose: Mucosal edema and rhinorrhea present. No nasal deformity. No epistaxis. Right sinus exhibits no maxillary sinus tenderness and no frontal sinus tenderness. Left sinus exhibits no maxillary sinus tenderness and no frontal sinus tenderness.   Mouth/Throat: Uvula is midline, oropharynx is clear and moist and mucous membranes are normal. No trismus in the jaw. Normal dentition. No uvula swelling. No oropharyngeal exudate, posterior oropharyngeal edema or posterior oropharyngeal erythema.   Eyes: Conjunctivae and lids are normal. No scleral icterus.   Neck: Trachea normal, full passive range of motion without pain and phonation normal. Neck supple. No neck rigidity. No edema and no erythema present.   Cardiovascular: Normal rate, regular rhythm, normal heart sounds, intact distal pulses and normal pulses.   Pulmonary/Chest: Effort normal and breath sounds normal. No respiratory distress. She has no decreased breath sounds. She has no rhonchi.   Abdominal: Normal appearance.   Musculoskeletal: Normal range of motion. She exhibits no edema or deformity.   Neurological: She is alert and oriented to person, place, and time. She exhibits normal muscle tone. Coordination normal.   Skin: Skin is warm, dry, intact, not diaphoretic and not pale.   Psychiatric: She has a normal mood and affect. Her speech is normal and behavior is normal. Judgment and thought content normal. Cognition and memory are normal.   Nursing note and vitals reviewed.        Assessment:       1. Bacterial sinusitis    2. Sore throat        Plan:       Most likely viral.  Follow-up in 1 week with primary care.  Coricidin HBP for symptoms. Wait and see antibiotics. Start these if symptoms do not improve in 2-3 days or if symptoms worsen or if you develop any fever.    Bacterial sinusitis  -     amoxicillin  (AMOXIL) 875 MG tablet; Take 1 tablet (875 mg total) by mouth 2 (two) times daily. for 10 days  Dispense: 20 tablet; Refill: 0    Sore throat  -     POCT rapid strep A  -     POCT Influenza A/B          Patient Instructions   corcidin HBP for symptoms  Wait and see antibiotics  Follow up with primary care in 1 week regarding blood pressure and viral sinusitis      Viral Upper Respiratory Illness (Adult)  You have a viral upper respiratory illness (URI), which is another term for the common cold. This illness is contagious during the first few days. It is spread through the air by coughing and sneezing. It may also be spread by direct contact (touching the sick person and then touching your own eyes, nose, or mouth). Frequent handwashing will decrease risk of spread. Most viral illnesses go away within 7 to 10 days with rest and simple home remedies. Sometimes the illness may last for several weeks. Antibiotics will not kill a virus, and they are generally not prescribed for this condition.    Home care  · If symptoms are severe, rest at home for the first 2 to 3 days. When you resume activity, don't let yourself get too tired.  · Avoid being exposed to cigarette smoke (yours or others).  · You may use acetaminophen or ibuprofen to control pain and fever, unless another medicine was prescribed. (Note: If you have chronic liver or kidney disease, have ever had a stomach ulcer or gastrointestinal bleeding, or are taking blood-thinning medicines, talk with your healthcare provider before using these medicines.) Aspirin should never be given to anyone under 18 years of age who is ill with a viral infection or fever. It may cause severe liver or brain damage.  · Your appetite may be poor, so a light diet is fine. Avoid dehydration by drinking 6 to 8 glasses of fluids per day (water, soft drinks, juices, tea, or soup). Extra fluids will help loosen secretions in the nose and lungs.  · Over-the-counter cold medicines will  not shorten the length of time youre sick, but they may be helpful for the following symptoms: cough, sore throat, and nasal and sinus congestion. (Note: Do not use decongestants if you have high blood pressure.)  Follow-up care  Follow up with your healthcare provider, or as advised.  When to seek medical advice  Call your healthcare provider right away if any of these occur:  · Cough with lots of colored sputum (mucus)  · Severe headache; face, neck, or ear pain  · Difficulty swallowing due to throat pain  · Fever of 100.4°F (38°C)  Call 911, or get immediate medical care  Call emergency services right away if any of these occur:  · Chest pain, shortness of breath, wheezing, or difficulty breathing  · Coughing up blood  · Inability to swallow due to throat pain  Date Last Reviewed: 9/13/2015  © 7885-2403 [employer]. [address] [place] [address], [place] [address]. All rights reserved. This information is not intended as a substitute for professional medical care. Always follow your healthcare professional's instructions.

## 2019-10-14 NOTE — PATIENT INSTRUCTIONS
joe HBP for symptoms  Wait and see antibiotics  Follow up with primary care in 1 week regarding blood pressure and viral sinusitis      Viral Upper Respiratory Illness (Adult)  You have a viral upper respiratory illness (URI), which is another term for the common cold. This illness is contagious during the first few days. It is spread through the air by coughing and sneezing. It may also be spread by direct contact (touching the sick person and then touching your own eyes, nose, or mouth). Frequent handwashing will decrease risk of spread. Most viral illnesses go away within 7 to 10 days with rest and simple home remedies. Sometimes the illness may last for several weeks. Antibiotics will not kill a virus, and they are generally not prescribed for this condition.    Home care  · If symptoms are severe, rest at home for the first 2 to 3 days. When you resume activity, don't let yourself get too tired.  · Avoid being exposed to cigarette smoke (yours or others).  · You may use acetaminophen or ibuprofen to control pain and fever, unless another medicine was prescribed. (Note: If you have chronic liver or kidney disease, have ever had a stomach ulcer or gastrointestinal bleeding, or are taking blood-thinning medicines, talk with your healthcare provider before using these medicines.) Aspirin should never be given to anyone under 18 years of age who is ill with a viral infection or fever. It may cause severe liver or brain damage.  · Your appetite may be poor, so a light diet is fine. Avoid dehydration by drinking 6 to 8 glasses of fluids per day (water, soft drinks, juices, tea, or soup). Extra fluids will help loosen secretions in the nose and lungs.  · Over-the-counter cold medicines will not shorten the length of time youre sick, but they may be helpful for the following symptoms: cough, sore throat, and nasal and sinus congestion. (Note: Do not use decongestants if you have high blood pressure.)  Follow-up  care  Follow up with your healthcare provider, or as advised.  When to seek medical advice  Call your healthcare provider right away if any of these occur:  · Cough with lots of colored sputum (mucus)  · Severe headache; face, neck, or ear pain  · Difficulty swallowing due to throat pain  · Fever of 100.4°F (38°C)  Call 911, or get immediate medical care  Call emergency services right away if any of these occur:  · Chest pain, shortness of breath, wheezing, or difficulty breathing  · Coughing up blood  · Inability to swallow due to throat pain  Date Last Reviewed: 9/13/2015  © 5214-3391 [employer]. [address] [place] [address], [place] [address]. All rights reserved. This information is not intended as a substitute for professional medical care. Always follow your healthcare professional's instructions.

## 2019-10-16 ENCOUNTER — TELEPHONE (OUTPATIENT)
Dept: INTERNAL MEDICINE | Facility: CLINIC | Age: 64
End: 2019-10-16

## 2019-10-16 NOTE — TELEPHONE ENCOUNTER
----- Message from Mary Rodriguez sent at 10/16/2019  8:39 AM CDT -----  Contact: Pt Portal  Appointment Request From: Tj Hernandez    With Provider: Bonnie Mathew MD [[facility]]    Preferred Date Range: 10/23/2019 - 10/25/2019    Preferred Times: Any time    Reason for visit: Follow up    Comments:  Blood pressure and sinus infection

## 2019-10-18 ENCOUNTER — TELEPHONE (OUTPATIENT)
Dept: INTERNAL MEDICINE | Facility: CLINIC | Age: 64
End: 2019-10-18

## 2019-10-23 ENCOUNTER — OFFICE VISIT (OUTPATIENT)
Dept: INTERNAL MEDICINE | Facility: CLINIC | Age: 64
End: 2019-10-23
Payer: COMMERCIAL

## 2019-10-23 VITALS
OXYGEN SATURATION: 99 % | TEMPERATURE: 98 F | HEART RATE: 62 BPM | SYSTOLIC BLOOD PRESSURE: 150 MMHG | HEIGHT: 64 IN | BODY MASS INDEX: 23.46 KG/M2 | DIASTOLIC BLOOD PRESSURE: 90 MMHG | WEIGHT: 137.44 LBS

## 2019-10-23 DIAGNOSIS — M47.812 OSTEOARTHRITIS OF CERVICAL SPINE, UNSPECIFIED SPINAL OSTEOARTHRITIS COMPLICATION STATUS: ICD-10-CM

## 2019-10-23 DIAGNOSIS — Z12.31 SCREENING MAMMOGRAM FOR HIGH-RISK PATIENT: ICD-10-CM

## 2019-10-23 DIAGNOSIS — E78.5 HYPERLIPIDEMIA, UNSPECIFIED HYPERLIPIDEMIA TYPE: Primary | ICD-10-CM

## 2019-10-23 DIAGNOSIS — E55.9 VITAMIN D DEFICIENCY DISEASE: ICD-10-CM

## 2019-10-23 DIAGNOSIS — K21.9 GASTROESOPHAGEAL REFLUX DISEASE WITHOUT ESOPHAGITIS: ICD-10-CM

## 2019-10-23 DIAGNOSIS — J30.9 ALLERGIC RHINITIS, UNSPECIFIED SEASONALITY, UNSPECIFIED TRIGGER: ICD-10-CM

## 2019-10-23 DIAGNOSIS — Z12.31 SCREENING MAMMOGRAM, ENCOUNTER FOR: ICD-10-CM

## 2019-10-23 PROCEDURE — 99214 PR OFFICE/OUTPT VISIT, EST, LEVL IV, 30-39 MIN: ICD-10-PCS | Mod: S$GLB,,, | Performed by: INTERNAL MEDICINE

## 2019-10-23 PROCEDURE — 99214 OFFICE O/P EST MOD 30 MIN: CPT | Mod: S$GLB,,, | Performed by: INTERNAL MEDICINE

## 2019-10-23 PROCEDURE — 99999 PR PBB SHADOW E&M-EST. PATIENT-LVL V: CPT | Mod: PBBFAC,,, | Performed by: INTERNAL MEDICINE

## 2019-10-23 PROCEDURE — 99999 PR PBB SHADOW E&M-EST. PATIENT-LVL V: ICD-10-PCS | Mod: PBBFAC,,, | Performed by: INTERNAL MEDICINE

## 2019-10-23 PROCEDURE — 1101F PT FALLS ASSESS-DOCD LE1/YR: CPT | Mod: CPTII,S$GLB,, | Performed by: INTERNAL MEDICINE

## 2019-10-23 PROCEDURE — 1101F PR PT FALLS ASSESS DOC 0-1 FALLS W/OUT INJ PAST YR: ICD-10-PCS | Mod: CPTII,S$GLB,, | Performed by: INTERNAL MEDICINE

## 2019-10-23 RX ORDER — OMEPRAZOLE 20 MG/1
20 CAPSULE, DELAYED RELEASE ORAL DAILY
COMMUNITY

## 2019-10-23 RX ORDER — PROPRANOLOL HYDROCHLORIDE 20 MG/1
TABLET ORAL
Qty: 90 TABLET | Refills: 11 | Status: SHIPPED | OUTPATIENT
Start: 2019-10-23 | End: 2020-04-09

## 2019-10-31 ENCOUNTER — PATIENT MESSAGE (OUTPATIENT)
Dept: OBSTETRICS AND GYNECOLOGY | Facility: CLINIC | Age: 64
End: 2019-10-31

## 2019-10-31 DIAGNOSIS — N95.0 POSTMENOPAUSAL BLEEDING: Primary | ICD-10-CM

## 2019-11-04 ENCOUNTER — PATIENT MESSAGE (OUTPATIENT)
Dept: INTERNAL MEDICINE | Facility: CLINIC | Age: 64
End: 2019-11-04

## 2019-11-05 ENCOUNTER — PATIENT MESSAGE (OUTPATIENT)
Dept: INTERNAL MEDICINE | Facility: CLINIC | Age: 64
End: 2019-11-05

## 2019-11-08 ENCOUNTER — PATIENT OUTREACH (OUTPATIENT)
Dept: ADMINISTRATIVE | Facility: OTHER | Age: 64
End: 2019-11-08

## 2019-11-11 ENCOUNTER — ANESTHESIA EVENT (OUTPATIENT)
Dept: SURGERY | Facility: OTHER | Age: 64
End: 2019-11-11
Payer: COMMERCIAL

## 2019-11-11 ENCOUNTER — TELEPHONE (OUTPATIENT)
Dept: OBSTETRICS AND GYNECOLOGY | Facility: CLINIC | Age: 64
End: 2019-11-11

## 2019-11-11 ENCOUNTER — OFFICE VISIT (OUTPATIENT)
Dept: OBSTETRICS AND GYNECOLOGY | Facility: CLINIC | Age: 64
End: 2019-11-11
Attending: OBSTETRICS & GYNECOLOGY
Payer: COMMERCIAL

## 2019-11-11 ENCOUNTER — HOSPITAL ENCOUNTER (OUTPATIENT)
Dept: PREADMISSION TESTING | Facility: OTHER | Age: 64
Discharge: HOME | End: 2019-11-11
Attending: OBSTETRICS & GYNECOLOGY
Payer: COMMERCIAL

## 2019-11-11 VITALS
DIASTOLIC BLOOD PRESSURE: 85 MMHG | TEMPERATURE: 98 F | WEIGHT: 140.88 LBS | BODY MASS INDEX: 24.05 KG/M2 | RESPIRATION RATE: 16 BRPM | SYSTOLIC BLOOD PRESSURE: 145 MMHG | HEART RATE: 70 BPM | HEIGHT: 64 IN | OXYGEN SATURATION: 98 %

## 2019-11-11 VITALS
WEIGHT: 140.88 LBS | SYSTOLIC BLOOD PRESSURE: 156 MMHG | HEIGHT: 64 IN | BODY MASS INDEX: 24.05 KG/M2 | DIASTOLIC BLOOD PRESSURE: 94 MMHG

## 2019-11-11 DIAGNOSIS — N95.0 POSTMENOPAUSAL BLEEDING: Primary | ICD-10-CM

## 2019-11-11 PROCEDURE — 99999 PR PBB SHADOW E&M-EST. PATIENT-LVL III: ICD-10-PCS | Mod: PBBFAC,,, | Performed by: OBSTETRICS & GYNECOLOGY

## 2019-11-11 PROCEDURE — 99499 UNLISTED E&M SERVICE: CPT | Mod: S$GLB,,, | Performed by: OBSTETRICS & GYNECOLOGY

## 2019-11-11 PROCEDURE — 99999 PR PBB SHADOW E&M-EST. PATIENT-LVL III: CPT | Mod: PBBFAC,,, | Performed by: OBSTETRICS & GYNECOLOGY

## 2019-11-11 PROCEDURE — 99499 NO LOS: ICD-10-PCS | Mod: S$GLB,,, | Performed by: OBSTETRICS & GYNECOLOGY

## 2019-11-11 RX ORDER — SODIUM CHLORIDE, SODIUM LACTATE, POTASSIUM CHLORIDE, CALCIUM CHLORIDE 600; 310; 30; 20 MG/100ML; MG/100ML; MG/100ML; MG/100ML
INJECTION, SOLUTION INTRAVENOUS CONTINUOUS
Status: CANCELLED | OUTPATIENT
Start: 2019-11-11

## 2019-11-11 RX ORDER — MUPIROCIN 20 MG/G
OINTMENT TOPICAL
Status: CANCELLED | OUTPATIENT
Start: 2019-11-11

## 2019-11-11 RX ORDER — SCOLOPAMINE TRANSDERMAL SYSTEM 1 MG/1
1 PATCH, EXTENDED RELEASE TRANSDERMAL ONCE
Status: CANCELLED | OUTPATIENT
Start: 2019-11-11 | End: 2019-11-11

## 2019-11-11 RX ORDER — PREGABALIN 75 MG/1
75 CAPSULE ORAL ONCE
Status: CANCELLED | OUTPATIENT
Start: 2019-11-11 | End: 2019-11-11

## 2019-11-11 RX ORDER — ACETAMINOPHEN 500 MG
1000 TABLET ORAL
Status: CANCELLED | OUTPATIENT
Start: 2019-11-11 | End: 2019-11-11

## 2019-11-11 RX ORDER — LIDOCAINE HYDROCHLORIDE 10 MG/ML
0.5 INJECTION, SOLUTION EPIDURAL; INFILTRATION; INTRACAUDAL; PERINEURAL ONCE
Status: CANCELLED | OUTPATIENT
Start: 2019-11-11 | End: 2019-11-11

## 2019-11-11 RX ORDER — FAMOTIDINE 20 MG/1
20 TABLET, FILM COATED ORAL
Status: CANCELLED | OUTPATIENT
Start: 2019-11-11 | End: 2019-11-11

## 2019-11-11 NOTE — PROGRESS NOTES
HISTORY OF PRESENT ILLNESS:    Tj Hernandez is a 64 y.o. female, , No LMP recorded. Patient is postmenopausal.,  presents for a problem visit, complaining of      age 48.  No HRT.  One episode of  bleeding a few years ago - unsure what evaluation was done.  No further bleeding until last month - spotting with wiping.   U/S:  7 week normal uterus, EM 1 mm.    Has yearly paps because has history of severe dysplasia s/p CKC.  Paps all normal since the cone.   Pap and HPV negative.    Past Medical History:   Diagnosis Date    Adjustment disorder     Colon polyp     benign    Hormone disorder     Hyperlipidemia     Kidney stone     Neck pain     Recurrent UTI 2014       Past Surgical History:   Procedure Laterality Date    COLON SURGERY      COLONOSCOPY N/A 2018    Procedure: COLONOSCOPY;  Surgeon: Naveen Curry MD;  Location: [address]);  Service: Endoscopy;  Laterality: N/A;    CYSTOSCOPY      DILATION AND CURETTAGE OF UTERUS      TONSILLECTOMY         MEDICATIONS AND ALLERGIES:      Current Outpatient Medications:     biotin 1 mg tablet, Take 1,000 mcg by mouth 3 (three) times daily., Disp: , Rfl:     calcium carbonate (OS-MOSHE) 600 mg (1,500 mg) Tab, Take 600 mg by mouth once daily. , Disp: , Rfl:     cetirizine (ZYRTEC) 10 MG tablet, Take 10 mg by mouth once daily., Disp: , Rfl:     cholecalciferol, vitamin D3, 2,000 unit Cap, Take 1 capsule by mouth once daily., Disp: , Rfl:     estradiol (ESTRACE) 0.01 % (0.1 mg/gram) vaginal cream, Place 1 g vaginally twice a week., Disp: 42.5 g, Rfl: 4    ezetimibe (ZETIA) 10 mg tablet, TAKE ONE TABLET BY MOUTH DAILY, Disp: 30 tablet, Rfl: 11    ibandronate (BONIVA) 150 mg tablet, Take 1 tablet (150 mg total) by mouth every 30 days., Disp: 3 tablet, Rfl: 11    omeprazole (PRILOSEC) 20 MG capsule, Take 20 mg by mouth once daily., Disp: , Rfl:     pravastatin (PRAVACHOL) 20 MG tablet, TAKE 1 TABLET BY MOUTH ONCE A  DAY, Disp: 30 tablet, Rfl: 11    propranolol (INDERAL) 20 MG tablet, 1/2-1 tablet three times daily for anxiety and blood pressure, Disp: 90 tablet, Rfl: 11    ALPRAZolam (XANAX) 0.25 MG tablet, Take 1 tablet (0.25 mg total) by mouth 2 (two) times daily as needed for Anxiety., Disp: 30 tablet, Rfl: 0    diclofenac-misoprostol  mg-mcg (ARTHROTEC 75)  mg-mcg per tablet, Take 1 tablet by mouth once daily. Take 1st dose at 8pm the night before the procedure, 2nd dose the morning of the procedure for 2 days, Disp: 2 tablet, Rfl: 0    ESTRACE 0.01 % (0.1 mg/gram) vaginal cream, INSERT ONE gram VAGINALLY TWO TIMES A WEEK, Disp: 42.5 g, Rfl: 4    hydrocodone-acetaminophen 5-325mg (NORCO) 5-325 mg per tablet, Take 1 tablet by mouth every 6 (six) hours as needed for Pain (moderate to severe). (Patient not taking: Reported on 10/23/2019), Disp: 28 tablet, Rfl: 0    ibuprofen (ADVIL,MOTRIN) 800 MG tablet, , Disp: , Rfl: 0    meloxicam (MOBIC) 15 MG tablet, Take 1 tablet (15 mg total) by mouth once daily. (Patient not taking: Reported on 11/11/2019), Disp: 30 tablet, Rfl: 0    miSOPROStol (CYTOTEC) 200 MCG Tab, Take 1 tablet (200 mcg total) by mouth 4 (four) times daily. Take day before procedure for 1 day, Disp: 4 tablet, Rfl: 0    multivitamin capsule, Take 1 capsule by mouth once daily., Disp: , Rfl:     ondansetron (ZOFRAN) 4 MG tablet, Take 1 tablet (4 mg total) by mouth every 6 (six) hours as needed for Nausea. (Patient not taking: Reported on 11/11/2019), Disp: 12 tablet, Rfl: 0    ranitidine (ZANTAC) 300 MG tablet, Take 1 tablet (300 mg total) by mouth once daily. (Patient not taking: Reported on 10/23/2019), Disp: 30 tablet, Rfl: 6    UBIDECARENONE (COENZYME Q10) 100 mg Tab, Take 1 tablet (100 mg total) by mouth once daily. (Patient not taking: Reported on 11/11/2019), Disp: , Rfl:     zolpidem (AMBIEN) 10 mg Tab, Take 1 tablet (10 mg total) by mouth nightly as needed. (Patient not taking:  Reported on 11/11/2019), Disp: 30 tablet, Rfl: 1    Review of patient's allergies indicates:  No Known Allergies    Family History   Adopted: Yes       Social History     Socioeconomic History    Marital status:      Spouse name: Not on file    Number of children: Not on file    Years of education: Not on file    Highest education level: Not on file   Occupational History    Not on file   Social Needs    Financial resource strain: Not hard at all    Food insecurity:     Worry: Never true     Inability: Never true    Transportation needs:     Medical: No     Non-medical: No   Tobacco Use    Smoking status: Never Smoker    Smokeless tobacco: Never Used   Substance and Sexual Activity    Alcohol use: Yes     Alcohol/week: 2.0 standard drinks     Types: 2 Shots of liquor per week     Frequency: 2-3 times a week     Drinks per session: 1 or 2     Binge frequency: Never     Comment: rare    Drug use: No    Sexual activity: Never     Partners: Male     Birth control/protection: Other-see comments   Lifestyle    Physical activity:     Days per week: 5 days     Minutes per session: 40 min    Stress: To some extent   Relationships    Social connections:     Talks on phone: More than three times a week     Gets together: Once a week     Attends Mandaeism service: Not on file     Active member of club or organization: Yes     Attends meetings of clubs or organizations: More than 4 times per year     Relationship status:    Other Topics Concern    Not on file   Social History Narrative    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 .        ROS:  GENERAL: No weight changes. No swelling. No fatigue. No fever.  CARDIOVASCULAR: No chest pain. No shortness of breath. No leg cramps.   NEUROLOGICAL: No headaches. No vision changes.  BREASTS: No pain. No lumps. No discharge.  ABDOMEN: No pain. No nausea. No vomiting. No diarrhea. No constipation.  REPRODUCTIVE: see above  VULVA: No  "pain. No lesions. No itching.  VAGINA: No relaxation. No itching. No odor. No discharge. No lesions.  URINARY: No incontinence. No nocturia. No frequency. No dysuria.    BP (!) 156/94   Ht 5' 4" (1.626 m)   Wt 63.9 kg (140 lb 14 oz)   BMI 24.18 kg/m²     PE:  APPEARANCE: Well nourished, well developed, in no acute distress.  ABDOMEN: Soft. No tenderness or masses. No hepatosplenomegaly. No hernias.  BREASTS, FUNDOSCOPIC, RECTAL DEFERRED  PELVIC: External female genitalia without lesions.  Female hair distribution. Adequate perineal body, Normal urethral meatus. Vagina moist and well rugated without lesions or discharge.  No significant cystocele or rectocele present. Cervix pink without lesions, discharge or tenderness. Uterus is normal size, regular, mobile and nontender. Adnexa without masses or tenderness.  EXTREMITIES: No edema      DIAGNOSIS & PLAN  1. Postmenopausal bleeding  Place in Outpatient    Vital signs    POCT glucose    Notify physician if BS > 180 for hysterectomy patients    Notify Physician/Vital Signs Parameters Urine output less than 0.5mL/kg/hr (with indwelling catheter) or 30 mL/hr (without indwelling catheter) or blood glucose greater than 200 mg/dL    Notify physician    Notify Physician - Potential Need of Opioid Reversal    Diet NPO    Chlorohexidine Gluconate Bath    Full code    Place YULISA hose    Place sequential compression device         COUNSELING:  Patient counseled for HSC/D&C.  Patient counseled on operative risks of laparotomy, infection, bleeding, transfusion, organ injury and anesthesia complications.  She desires to proceed.    "

## 2019-11-11 NOTE — H&P (VIEW-ONLY)
HISTORY OF PRESENT ILLNESS:    Tj Hernandez is a 64 y.o. female, , No LMP recorded. Patient is postmenopausal.,  presents for a problem visit, complaining of      age 48.  No HRT.  One episode of  bleeding a few years ago - unsure what evaluation was done.  No further bleeding until last month - spotting with wiping.   U/S:  7 week normal uterus, EM 1 mm.    Has yearly paps because has history of severe dysplasia s/p CKC.  Paps all normal since the cone.   Pap and HPV negative.    Past Medical History:   Diagnosis Date    Adjustment disorder     Colon polyp     benign    Hormone disorder     Hyperlipidemia     Kidney stone     Neck pain     Recurrent UTI 2014       Past Surgical History:   Procedure Laterality Date    COLON SURGERY      COLONOSCOPY N/A 2018    Procedure: COLONOSCOPY;  Surgeon: Naveen Curry MD;  Location: [address]);  Service: Endoscopy;  Laterality: N/A;    CYSTOSCOPY      DILATION AND CURETTAGE OF UTERUS      TONSILLECTOMY         MEDICATIONS AND ALLERGIES:      Current Outpatient Medications:     biotin 1 mg tablet, Take 1,000 mcg by mouth 3 (three) times daily., Disp: , Rfl:     calcium carbonate (OS-MOSHE) 600 mg (1,500 mg) Tab, Take 600 mg by mouth once daily. , Disp: , Rfl:     cetirizine (ZYRTEC) 10 MG tablet, Take 10 mg by mouth once daily., Disp: , Rfl:     cholecalciferol, vitamin D3, 2,000 unit Cap, Take 1 capsule by mouth once daily., Disp: , Rfl:     estradiol (ESTRACE) 0.01 % (0.1 mg/gram) vaginal cream, Place 1 g vaginally twice a week., Disp: 42.5 g, Rfl: 4    ezetimibe (ZETIA) 10 mg tablet, TAKE ONE TABLET BY MOUTH DAILY, Disp: 30 tablet, Rfl: 11    ibandronate (BONIVA) 150 mg tablet, Take 1 tablet (150 mg total) by mouth every 30 days., Disp: 3 tablet, Rfl: 11    omeprazole (PRILOSEC) 20 MG capsule, Take 20 mg by mouth once daily., Disp: , Rfl:     pravastatin (PRAVACHOL) 20 MG tablet, TAKE 1 TABLET BY MOUTH ONCE A  DAY, Disp: 30 tablet, Rfl: 11    propranolol (INDERAL) 20 MG tablet, 1/2-1 tablet three times daily for anxiety and blood pressure, Disp: 90 tablet, Rfl: 11    ALPRAZolam (XANAX) 0.25 MG tablet, Take 1 tablet (0.25 mg total) by mouth 2 (two) times daily as needed for Anxiety., Disp: 30 tablet, Rfl: 0    diclofenac-misoprostol  mg-mcg (ARTHROTEC 75)  mg-mcg per tablet, Take 1 tablet by mouth once daily. Take 1st dose at 8pm the night before the procedure, 2nd dose the morning of the procedure for 2 days, Disp: 2 tablet, Rfl: 0    ESTRACE 0.01 % (0.1 mg/gram) vaginal cream, INSERT ONE gram VAGINALLY TWO TIMES A WEEK, Disp: 42.5 g, Rfl: 4    hydrocodone-acetaminophen 5-325mg (NORCO) 5-325 mg per tablet, Take 1 tablet by mouth every 6 (six) hours as needed for Pain (moderate to severe). (Patient not taking: Reported on 10/23/2019), Disp: 28 tablet, Rfl: 0    ibuprofen (ADVIL,MOTRIN) 800 MG tablet, , Disp: , Rfl: 0    meloxicam (MOBIC) 15 MG tablet, Take 1 tablet (15 mg total) by mouth once daily. (Patient not taking: Reported on 11/11/2019), Disp: 30 tablet, Rfl: 0    miSOPROStol (CYTOTEC) 200 MCG Tab, Take 1 tablet (200 mcg total) by mouth 4 (four) times daily. Take day before procedure for 1 day, Disp: 4 tablet, Rfl: 0    multivitamin capsule, Take 1 capsule by mouth once daily., Disp: , Rfl:     ondansetron (ZOFRAN) 4 MG tablet, Take 1 tablet (4 mg total) by mouth every 6 (six) hours as needed for Nausea. (Patient not taking: Reported on 11/11/2019), Disp: 12 tablet, Rfl: 0    ranitidine (ZANTAC) 300 MG tablet, Take 1 tablet (300 mg total) by mouth once daily. (Patient not taking: Reported on 10/23/2019), Disp: 30 tablet, Rfl: 6    UBIDECARENONE (COENZYME Q10) 100 mg Tab, Take 1 tablet (100 mg total) by mouth once daily. (Patient not taking: Reported on 11/11/2019), Disp: , Rfl:     zolpidem (AMBIEN) 10 mg Tab, Take 1 tablet (10 mg total) by mouth nightly as needed. (Patient not taking:  Reported on 11/11/2019), Disp: 30 tablet, Rfl: 1    Review of patient's allergies indicates:  No Known Allergies    Family History   Adopted: Yes       Social History     Socioeconomic History    Marital status:      Spouse name: Not on file    Number of children: Not on file    Years of education: Not on file    Highest education level: Not on file   Occupational History    Not on file   Social Needs    Financial resource strain: Not hard at all    Food insecurity:     Worry: Never true     Inability: Never true    Transportation needs:     Medical: No     Non-medical: No   Tobacco Use    Smoking status: Never Smoker    Smokeless tobacco: Never Used   Substance and Sexual Activity    Alcohol use: Yes     Alcohol/week: 2.0 standard drinks     Types: 2 Shots of liquor per week     Frequency: 2-3 times a week     Drinks per session: 1 or 2     Binge frequency: Never     Comment: rare    Drug use: No    Sexual activity: Never     Partners: Male     Birth control/protection: Other-see comments   Lifestyle    Physical activity:     Days per week: 5 days     Minutes per session: 40 min    Stress: To some extent   Relationships    Social connections:     Talks on phone: More than three times a week     Gets together: Once a week     Attends [employer] service: Not on file     Active member of club or organization: Yes     Attends meetings of clubs or organizations: More than 4 times per year     Relationship status:    Other Topics Concern    Not on file   Social History Narrative    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 .        ROS:  GENERAL: No weight changes. No swelling. No fatigue. No fever.  CARDIOVASCULAR: No chest pain. No shortness of breath. No leg cramps.   NEUROLOGICAL: No headaches. No vision changes.  BREASTS: No pain. No lumps. No discharge.  ABDOMEN: No pain. No nausea. No vomiting. No diarrhea. No constipation.  REPRODUCTIVE: see above  VULVA: No  "pain. No lesions. No itching.  VAGINA: No relaxation. No itching. No odor. No discharge. No lesions.  URINARY: No incontinence. No nocturia. No frequency. No dysuria.    BP (!) 156/94   Ht 5' 4" (1.626 m)   Wt 63.9 kg (140 lb 14 oz)   BMI 24.18 kg/m²     PE:  APPEARANCE: Well nourished, well developed, in no acute distress.  ABDOMEN: Soft. No tenderness or masses. No hepatosplenomegaly. No hernias.  BREASTS, FUNDOSCOPIC, RECTAL DEFERRED  PELVIC: External female genitalia without lesions.  Female hair distribution. Adequate perineal body, Normal urethral meatus. Vagina moist and well rugated without lesions or discharge.  No significant cystocele or rectocele present. Cervix pink without lesions, discharge or tenderness. Uterus is normal size, regular, mobile and nontender. Adnexa without masses or tenderness.  EXTREMITIES: No edema      DIAGNOSIS & PLAN  1. Postmenopausal bleeding  Place in Outpatient    Vital signs    POCT glucose    Notify physician if BS > 180 for hysterectomy patients    Notify Physician/Vital Signs Parameters Urine output less than 0.5mL/kg/hr (with indwelling catheter) or 30 mL/hr (without indwelling catheter) or blood glucose greater than 200 mg/dL    Notify physician    Notify Physician - Potential Need of Opioid Reversal    Diet NPO    Chlorohexidine Gluconate Bath    Full code    Place YULISA hose    Place sequential compression device         COUNSELING:  Patient counseled for HSC/D&C.  Patient counseled on operative risks of laparotomy, infection, bleeding, transfusion, organ injury and anesthesia complications.  She desires to proceed.    "

## 2019-11-11 NOTE — DISCHARGE INSTRUCTIONS
PRE-ADMIT TESTING -  [phone]    [address]  [place] [facility]          Your surgery has been scheduled at [facility]. We are pleased to have the opportunity to serve you. For Further Information please call [phone].    On the day of surgery please report to the Information Desk on the 1st floor.    · CONTACT YOUR PHYSICIAN'S OFFICE THE DAY PRIOR TO YOUR SURGERY TO OBTAIN YOUR ARRIVAL TIME.     · The evening before surgery do not eat anything after 9 p.m. ( this includes hard candy, chewing gum and mints).  You may only have GATORADE, POWERADE AND WATER  from 9 p.m. until you leave your home.   DO NOT DRINK ANY LIQUIDS ON THE WAY TO THE HOSPITAL.      SPECIAL MEDICATION INSTRUCTIONS: TAKE medications checked off by the Anesthesiologist on your Medication List.    Angiogram Patients: Take medications as instructed by your physician, including aspirin.     Surgery Patients:    If you take ASPIRIN - Your PHYSICIAN/SURGEON will need to inform you IF/OR when you need to stop taking aspirin prior to your surgery.     Do Not take any medications containing IBUPROFEN.  Do Not Wear any make-up or dark nail polish   (especially eye make-up) to surgery. If you come to surgery with makeup on you will be required to remove the makeup or nail polish.    Do not shave your surgical area at least 5 days prior to your surgery. The surgical prep will be performed at the hospital according to Infection Control regulations.    Leave all valuables at home.   Do Not wear any jewelry or watches, including any metal in body piercings. Jewelry must be removed prior to coming to the hospital.  There is a possibility that rings that are unable to be removed may be cut off if they are on the surgical extremity.    Contact Lens must be removed before surgery. Either do not wear the contact lens or bring a case and solution for storage.  Please bring a container for eyeglasses or dentures as required.  Bring  any paperwork your physician has provided, such as consent forms,  history and physicals, doctor's orders, etc.   Bring comfortable clothes that are loose fitting to wear upon discharge. Take into consideration the type of surgery being performed.  Maintain your diet as advised per your physician the day prior to surgery.      Adequate rest the night before surgery is advised.   Park in the Parking lot behind the hospital or in the [facility] across the street from the parking lot. Parking is complimentary.  If you will be discharged the same day as your procedure, please arrange for a responsible adult to drive you home or to accompany you if traveling by taxi.   YOU WILL NOT BE PERMITTED TO DRIVE OR TO LEAVE THE HOSPITAL ALONE AFTER SURGERY.   It is strongly recommended that you arrange for someone to remain with you for the first 24 hrs following your surgery.    The Surgeon will speak to your family/visitor after your surgery regarding the outcome of your surgery and post op care.  The Surgeon may speak to you after your surgery, but there is a possibility you may not remember the details.  Please check with your family members regarding the conversation with the Surgeon.    We strongly recommend whoever is bringing you home be present for discharge instructions.  This will ensure a thorough understanding for your post op home care.    EACH PATIENT IS ALLOWED TWO FAMILY MEMBERS OR VISITORS IN THE ROOM AND IN THE WAITING ROOMS WHILE YOU ARE IN SURGERY. ALL CHILDREN MUST ALWAYS BE ACCOMPANIED BY AN ADULT.    Thank you for your cooperation.  The Staff of [facility].                Bathing Instructions with Hibiclens     Shower the evening before and morning of your procedure with Hibiclens:   Wash your face with water and your regular face wash/soap   Apply Hibiclens directly on your skin or on a wet washcloth and wash gently. When showering: Move away from the shower stream when  applying Hibiclens to avoid rinsing off too soon.   Rinse thoroughly with warm water   Do not dilute Hibiclens         Dry off as usual, do not use any deodorant, powder, body lotions, perfume, after shave or cologne.

## 2019-11-12 ENCOUNTER — HOSPITAL ENCOUNTER (OUTPATIENT)
Facility: OTHER | Age: 64
Discharge: HOME | End: 2019-11-12
Attending: OBSTETRICS & GYNECOLOGY | Admitting: OBSTETRICS & GYNECOLOGY
Payer: COMMERCIAL

## 2019-11-12 ENCOUNTER — TELEPHONE (OUTPATIENT)
Dept: SURGERY | Facility: OTHER | Age: 64
End: 2019-11-12

## 2019-11-12 ENCOUNTER — ANESTHESIA (OUTPATIENT)
Dept: SURGERY | Facility: OTHER | Age: 64
End: 2019-11-12
Payer: COMMERCIAL

## 2019-11-12 VITALS
TEMPERATURE: 98 F | OXYGEN SATURATION: 99 % | BODY MASS INDEX: 24.05 KG/M2 | DIASTOLIC BLOOD PRESSURE: 72 MMHG | WEIGHT: 140.88 LBS | HEART RATE: 64 BPM | RESPIRATION RATE: 18 BRPM | SYSTOLIC BLOOD PRESSURE: 135 MMHG | HEIGHT: 64 IN

## 2019-11-12 DIAGNOSIS — N95.0 POSTMENOPAUSAL BLEEDING: ICD-10-CM

## 2019-11-12 DIAGNOSIS — Z92.89 HISTORY OF ENDOMETRIAL BIOPSY: Primary | ICD-10-CM

## 2019-11-12 LAB — POCT GLUCOSE: 87 MG/DL (ref 70–110)

## 2019-11-12 PROCEDURE — 36000706: Performed by: OBSTETRICS & GYNECOLOGY

## 2019-11-12 PROCEDURE — 90471 IMMUNIZATION ADMIN: CPT | Performed by: OBSTETRICS & GYNECOLOGY

## 2019-11-12 PROCEDURE — 63600175 PHARM REV CODE 636 W HCPCS: Performed by: OBSTETRICS & GYNECOLOGY

## 2019-11-12 PROCEDURE — 88305 TISSUE EXAM BY PATHOLOGIST: CPT | Mod: 26,,, | Performed by: PATHOLOGY

## 2019-11-12 PROCEDURE — 25000003 PHARM REV CODE 250: Performed by: OBSTETRICS & GYNECOLOGY

## 2019-11-12 PROCEDURE — 36000707: Performed by: OBSTETRICS & GYNECOLOGY

## 2019-11-12 PROCEDURE — 58558 HYSTEROSCOPY BIOPSY: CPT | Mod: ,,, | Performed by: OBSTETRICS & GYNECOLOGY

## 2019-11-12 PROCEDURE — 63600175 PHARM REV CODE 636 W HCPCS: Performed by: NURSE ANESTHETIST, CERTIFIED REGISTERED

## 2019-11-12 PROCEDURE — 71000016 HC POSTOP RECOV ADDL HR: Performed by: OBSTETRICS & GYNECOLOGY

## 2019-11-12 PROCEDURE — 25000003 PHARM REV CODE 250: Performed by: ANESTHESIOLOGY

## 2019-11-12 PROCEDURE — 63600175 PHARM REV CODE 636 W HCPCS: Performed by: ANESTHESIOLOGY

## 2019-11-12 PROCEDURE — 58558 PR HYSTEROSCOPY,W/ENDO BX: ICD-10-PCS | Mod: ,,, | Performed by: OBSTETRICS & GYNECOLOGY

## 2019-11-12 PROCEDURE — 88305 TISSUE EXAM BY PATHOLOGIST: CPT | Performed by: PATHOLOGY

## 2019-11-12 PROCEDURE — 71000033 HC RECOVERY, INTIAL HOUR: Performed by: OBSTETRICS & GYNECOLOGY

## 2019-11-12 PROCEDURE — 37000009 HC ANESTHESIA EA ADD 15 MINS: Performed by: OBSTETRICS & GYNECOLOGY

## 2019-11-12 PROCEDURE — 90662 IIV NO PRSV INCREASED AG IM: CPT | Performed by: OBSTETRICS & GYNECOLOGY

## 2019-11-12 PROCEDURE — 71000015 HC POSTOP RECOV 1ST HR: Performed by: OBSTETRICS & GYNECOLOGY

## 2019-11-12 PROCEDURE — 25000003 PHARM REV CODE 250: Performed by: NURSE ANESTHETIST, CERTIFIED REGISTERED

## 2019-11-12 PROCEDURE — 82962 GLUCOSE BLOOD TEST: CPT | Performed by: OBSTETRICS & GYNECOLOGY

## 2019-11-12 PROCEDURE — 88305 TISSUE EXAM BY PATHOLOGIST: ICD-10-PCS | Mod: 26,,, | Performed by: PATHOLOGY

## 2019-11-12 PROCEDURE — 37000008 HC ANESTHESIA 1ST 15 MINUTES: Performed by: OBSTETRICS & GYNECOLOGY

## 2019-11-12 RX ORDER — LIDOCAINE HCL/PF 100 MG/5ML
SYRINGE (ML) INTRAVENOUS
Status: DISCONTINUED | OUTPATIENT
Start: 2019-11-12 | End: 2019-11-12

## 2019-11-12 RX ORDER — OXYCODONE HYDROCHLORIDE 5 MG/1
5 TABLET ORAL
Status: DISCONTINUED | OUTPATIENT
Start: 2019-11-12 | End: 2019-11-12 | Stop reason: HOSPADM

## 2019-11-12 RX ORDER — SCOLOPAMINE TRANSDERMAL SYSTEM 1 MG/1
1 PATCH, EXTENDED RELEASE TRANSDERMAL ONCE
Status: COMPLETED | OUTPATIENT
Start: 2019-11-12 | End: 2019-11-12

## 2019-11-12 RX ORDER — MUPIROCIN 20 MG/G
OINTMENT TOPICAL
Status: DISCONTINUED | OUTPATIENT
Start: 2019-11-12 | End: 2019-11-12 | Stop reason: HOSPADM

## 2019-11-12 RX ORDER — SODIUM CHLORIDE, SODIUM LACTATE, POTASSIUM CHLORIDE, CALCIUM CHLORIDE 600; 310; 30; 20 MG/100ML; MG/100ML; MG/100ML; MG/100ML
INJECTION, SOLUTION INTRAVENOUS CONTINUOUS
Status: DISCONTINUED | OUTPATIENT
Start: 2019-11-12 | End: 2019-11-12 | Stop reason: HOSPADM

## 2019-11-12 RX ORDER — IBUPROFEN 800 MG/1
800 TABLET ORAL EVERY 8 HOURS PRN
Qty: 50 TABLET | Refills: 0 | Status: SHIPPED | OUTPATIENT
Start: 2019-11-12 | End: 2019-11-27

## 2019-11-12 RX ORDER — HYDROCODONE BITARTRATE AND ACETAMINOPHEN 5; 325 MG/1; MG/1
1 TABLET ORAL EVERY 4 HOURS PRN
Status: CANCELLED | OUTPATIENT
Start: 2019-11-12

## 2019-11-12 RX ORDER — HYDROCODONE BITARTRATE AND ACETAMINOPHEN 10; 325 MG/1; MG/1
1 TABLET ORAL EVERY 4 HOURS PRN
Status: CANCELLED | OUTPATIENT
Start: 2019-11-12

## 2019-11-12 RX ORDER — PROPOFOL 10 MG/ML
VIAL (ML) INTRAVENOUS
Status: DISCONTINUED | OUTPATIENT
Start: 2019-11-12 | End: 2019-11-12

## 2019-11-12 RX ORDER — ONDANSETRON 2 MG/ML
4 INJECTION INTRAMUSCULAR; INTRAVENOUS DAILY PRN
Status: DISCONTINUED | OUTPATIENT
Start: 2019-11-12 | End: 2019-11-12 | Stop reason: HOSPADM

## 2019-11-12 RX ORDER — DEXAMETHASONE SODIUM PHOSPHATE 4 MG/ML
INJECTION, SOLUTION INTRA-ARTICULAR; INTRALESIONAL; INTRAMUSCULAR; INTRAVENOUS; SOFT TISSUE
Status: DISCONTINUED | OUTPATIENT
Start: 2019-11-12 | End: 2019-11-12

## 2019-11-12 RX ORDER — EPHEDRINE SULFATE 50 MG/ML
INJECTION, SOLUTION INTRAVENOUS
Status: DISCONTINUED | OUTPATIENT
Start: 2019-11-12 | End: 2019-11-12

## 2019-11-12 RX ORDER — ONDANSETRON 2 MG/ML
INJECTION INTRAMUSCULAR; INTRAVENOUS
Status: DISCONTINUED | OUTPATIENT
Start: 2019-11-12 | End: 2019-11-12

## 2019-11-12 RX ORDER — ONDANSETRON 8 MG/1
8 TABLET, ORALLY DISINTEGRATING ORAL EVERY 8 HOURS PRN
Status: DISCONTINUED | OUTPATIENT
Start: 2019-11-12 | End: 2019-11-12 | Stop reason: HOSPADM

## 2019-11-12 RX ORDER — DIPHENHYDRAMINE HYDROCHLORIDE 50 MG/ML
25 INJECTION INTRAMUSCULAR; INTRAVENOUS EVERY 4 HOURS PRN
Status: CANCELLED | OUTPATIENT
Start: 2019-11-12

## 2019-11-12 RX ORDER — KETOROLAC TROMETHAMINE 30 MG/ML
INJECTION, SOLUTION INTRAMUSCULAR; INTRAVENOUS
Status: DISCONTINUED | OUTPATIENT
Start: 2019-11-12 | End: 2019-11-12

## 2019-11-12 RX ORDER — LIDOCAINE HYDROCHLORIDE 10 MG/ML
0.5 INJECTION, SOLUTION EPIDURAL; INFILTRATION; INTRACAUDAL; PERINEURAL ONCE
Status: DISCONTINUED | OUTPATIENT
Start: 2019-11-12 | End: 2019-11-12 | Stop reason: HOSPADM

## 2019-11-12 RX ORDER — HYDROMORPHONE HYDROCHLORIDE 2 MG/ML
0.4 INJECTION, SOLUTION INTRAMUSCULAR; INTRAVENOUS; SUBCUTANEOUS EVERY 5 MIN PRN
Status: DISCONTINUED | OUTPATIENT
Start: 2019-11-12 | End: 2019-11-12 | Stop reason: HOSPADM

## 2019-11-12 RX ORDER — SODIUM CHLORIDE 0.9 % (FLUSH) 0.9 %
3 SYRINGE (ML) INJECTION
Status: DISCONTINUED | OUTPATIENT
Start: 2019-11-12 | End: 2019-11-12 | Stop reason: HOSPADM

## 2019-11-12 RX ORDER — PREGABALIN 75 MG/1
75 CAPSULE ORAL ONCE
Status: COMPLETED | OUTPATIENT
Start: 2019-11-12 | End: 2019-11-12

## 2019-11-12 RX ORDER — DIPHENHYDRAMINE HCL 25 MG
25 CAPSULE ORAL EVERY 4 HOURS PRN
Status: CANCELLED | OUTPATIENT
Start: 2019-11-12

## 2019-11-12 RX ORDER — HYDROCODONE BITARTRATE AND ACETAMINOPHEN 5; 325 MG/1; MG/1
1 TABLET ORAL EVERY 4 HOURS PRN
Qty: 15 TABLET | Refills: 0 | Status: SHIPPED | OUTPATIENT
Start: 2019-11-12 | End: 2019-11-27

## 2019-11-12 RX ORDER — MEPERIDINE HYDROCHLORIDE 25 MG/ML
12.5 INJECTION INTRAMUSCULAR; INTRAVENOUS; SUBCUTANEOUS ONCE AS NEEDED
Status: DISCONTINUED | OUTPATIENT
Start: 2019-11-12 | End: 2019-11-12 | Stop reason: HOSPADM

## 2019-11-12 RX ORDER — IBUPROFEN 600 MG/1
600 TABLET ORAL EVERY 6 HOURS PRN
Status: CANCELLED | OUTPATIENT
Start: 2019-11-12

## 2019-11-12 RX ORDER — ACETAMINOPHEN 500 MG
1000 TABLET ORAL
Status: COMPLETED | OUTPATIENT
Start: 2019-11-12 | End: 2019-11-12

## 2019-11-12 RX ORDER — ATROPINE SULFATE 0.1 MG/ML
INJECTION INTRAVENOUS
Status: DISCONTINUED | OUTPATIENT
Start: 2019-11-12 | End: 2019-11-12

## 2019-11-12 RX ADMIN — ACETAMINOPHEN 1000 MG: 500 TABLET, FILM COATED ORAL at 09:11

## 2019-11-12 RX ADMIN — EPHEDRINE SULFATE 15 MG: 50 INJECTION INTRAMUSCULAR; INTRAVENOUS; SUBCUTANEOUS at 12:11

## 2019-11-12 RX ADMIN — SODIUM CHLORIDE, SODIUM LACTATE, POTASSIUM CHLORIDE, AND CALCIUM CHLORIDE: 600; 310; 30; 20 INJECTION, SOLUTION INTRAVENOUS at 11:11

## 2019-11-12 RX ADMIN — ATROPINE SULFATE 0.4 MG: 0.1 INJECTION, SOLUTION INTRAVENOUS at 12:11

## 2019-11-12 RX ADMIN — LIDOCAINE HYDROCHLORIDE 50 MG: 20 INJECTION, SOLUTION INTRAVENOUS at 11:11

## 2019-11-12 RX ADMIN — KETOROLAC TROMETHAMINE 30 MG: 30 INJECTION, SOLUTION INTRAMUSCULAR; INTRAVENOUS at 12:11

## 2019-11-12 RX ADMIN — PREGABALIN 75 MG: 75 CAPSULE ORAL at 09:11

## 2019-11-12 RX ADMIN — DEXAMETHASONE SODIUM PHOSPHATE 8 MG: 4 INJECTION, SOLUTION INTRAMUSCULAR; INTRAVENOUS at 12:11

## 2019-11-12 RX ADMIN — MUPIROCIN: 20 OINTMENT TOPICAL at 09:11

## 2019-11-12 RX ADMIN — PROPOFOL 200 MG: 10 INJECTION, EMULSION INTRAVENOUS at 11:11

## 2019-11-12 RX ADMIN — EPHEDRINE SULFATE 10 MG: 50 INJECTION INTRAMUSCULAR; INTRAVENOUS; SUBCUTANEOUS at 12:11

## 2019-11-12 RX ADMIN — SCOPALAMINE 1 PATCH: 1 PATCH, EXTENDED RELEASE TRANSDERMAL at 09:11

## 2019-11-12 RX ADMIN — ONDANSETRON 4 MG: 2 INJECTION INTRAMUSCULAR; INTRAVENOUS at 12:11

## 2019-11-12 RX ADMIN — INFLUENZA A VIRUS A/MICHIGAN/45/2015 X-275 (H1N1) ANTIGEN (FORMALDEHYDE INACTIVATED), INFLUENZA A VIRUS A/SINGAPORE/INFIMH-16-0019/2016 IVR-186 (H3N2) ANTIGEN (FORMALDEHYDE INACTIVATED), AND INFLUENZA B VIRUS B/MARYLAND/15/2016 BX-69A (A B/COLORADO/6/2017-LIKE VIRUS) ANTIGEN (FORMALDEHYDE INACTIVATED) 0.5 ML: 60; 60; 60 INJECTION, SUSPENSION INTRAMUSCULAR at 02:11

## 2019-11-12 NOTE — TRANSFER OF CARE
"Anesthesia Transfer of Care Note    Patient: Tj Hernandez    Procedure(s) Performed: Procedure(s) (LRB):  HYSTEROSCOPY, WITH DILATION AND CURETTAGE OF UTERUS (N/A)    Patient location: PACU    Anesthesia Type: general    Transport from OR: Transported from OR on 2-3 L/min O2 by NC with adequate spontaneous ventilation    Post pain: adequate analgesia    Post assessment: no apparent anesthetic complications    Post vital signs: stable    Level of consciousness: awake, alert and oriented    Nausea/Vomiting: no nausea/vomiting    Complications: none    Transfer of care protocol was followed      Last vitals:   Visit Vitals  BP (!) 152/78 (BP Location: Right arm, Patient Position: Lying)   Pulse 60   Temp 36.6 °C (97.8 °F) (Oral)   Resp 18   Ht 5' 4" (1.626 m)   Wt 63.9 kg (140 lb 14 oz)   SpO2 100%   Breastfeeding? No   BMI 24.18 kg/m²     "

## 2019-11-12 NOTE — PLAN OF CARE
Tj Hernandez has met all discharge criteria from Phase II. Vital Signs are stable, ambulating  without difficulty. Discharge instructions given, patient verbalized understanding. Discharged from facility via wheelchair in stable condition.

## 2019-11-12 NOTE — OP NOTE
[facility]  Surgery Department  Operative Note      Date of Procedure: 11/12/19     Procedure: HYSTEROSCOPY, WITH DILATION AND CURETTAGE OF UTERUS    Surgeon:  Lashawn Weston MD - Primary    Assisting Surgeon: Luiz Schmidt MD (Resident)    Pre-Operative Diagnosis: postmenopausal bleeding     Post-Operative Diagnosis: same, cervix stenosis    Anesthesia: LMA    Findings: Cervix stenotic.  Unable to fully dilate for HSC.  Sound to 8 cm.  Minimal tissue on curettage.  Fluid deficit 700 cc.    Procedure in detail:    Patient was taking to the operating room where general anesthesia was administered and found to be adequate.  She was prepped and draped in the dorsal lithotomy position.  The bladder was drained.  A speculum was placed in the vagina.  The anterior lip of the cervix was grasped with a single tooth tenaculum.  Cervix was noted to be markedly stenotic.  Dilated with lacrimal duct dilators and hegar dilators to size 10.  Attempted to hydrodistend further with the hysteroscope but unable to see the cavity.  Fluid deficit noted to be 700 so the the hysteroscope was removed.  Gentle curettage performed and endometrial pipelle used to obtain sample of endometrial liining - only scant tissue obtained.   The tenaculum was removed.  No bleeding from the cervix or the os noted.      Sponge, lap, needle counts were correct x 2.    Complications: No    Estimated Blood Loss (EBL): 10 cc           Specimens: endometrial biopsy/curettings    Condition: Good    Disposition: PACU - hemodynamically stable.    Attestation: I was present and scrubbed for the entire procedure.

## 2019-11-12 NOTE — DISCHARGE INSTRUCTIONS
Home Care Instruction D&C Hysteroscopy             ACTIVITY LEVEL:  If you received sedation and/or an anesthetic, you may feel sleepy for several hours. Rest until you feel more  awake. Gradually resume your normal activities.    DIET:  At home, continue with liquids. If there is no nausea, you may eat a soft diet and gradually resume a regular diet.    BATHING:  You may shower  as desired in one day.  You should avoid tub baths, hot tubs and swimming pools until seen by your physician for a post-op follow up.    CARE:  You can expect watery or bloody vaginal discharge for several days. Do not use tampons, please only use pads. Sexual activity is restricted as advised by your doctor.    MEDICATIONS:  You will receive instructions for any pain and/or antibiotic prescriptions. Pain medication should be taken only if needed and as directed. Antibiotics, if ordered, should be taken as directed until the entire prescription is completed.    WHEN TO CALL THE DOCTOR:   For any heavy vaginal bleeding   Fever over 101°F (38.4°C)   Any lower abdominal pain not relieved by the pain mediation    FOR EMERGENCIES:  If any unusual problems or difficulties occur, contact Dr. Weston.      Anesthesia: After Your Surgery  Youve just had surgery. During surgery, you received medication called anesthesia to keep you comfortable and pain-free. After surgery, you may experience some pain or nausea. This is common. Here are some tips for feeling better and recovering after surgery.    Going home  Your doctor or nurse will show you how to take care of yourself when you go home. He or she will also answer your questions. Have an adult family member or friend drive you home. For the first 24 hours after your surgery:    · Do not drive or use heavy equipment.  · Do not make important decisions or sign legal documents.  · Avoid alcohol.  · Have someone stay with you, if needed. He or she can watch for problems and help keep you  safe.  · Take your time getting up from a seated or lying position. You may experience dizziness for 24 hours.    Be sure to keep all follow-up appointments with your doctor. And rest after your procedure for as long as your doctor tells you to.    Coping with pain  If you have pain after surgery, pain medication will help you feel better. Take it as directed, before pain becomes severe. Also, ask your doctor or pharmacist about other ways to control pain, such as with heat, ice, and relaxation. And follow any other instructions your surgeon or nurse gives you.    URINARY RETENTION  Should you experience a decrease in your urine output or are unable to urinate following surgery, this can be due to the medications given during surgery.  We recommend you going to the nearest Emergency Department.    Tips for taking pain medication  To get the best relief possible, remember these points:    · Pain medications can upset your stomach. Taking them with a little food may help.  · Most pain relievers taken by mouth need at least 20 to 30 minutes to take effect.  · Taking medication on a schedule can help you remember to take it. Try to time your medication so that you can take it before beginning an activity, such as dressing, walking, or sitting down for dinner.  · Constipation is a common side effect of pain medications. Contact your doctor before taking any medications like laxatives or stool softeners to help relieve constipation. Also ask about any dietary restrictions, because drinking lots of fluids and eating foods like fruits and vegetables that are high in fiber can also help. Remember, dont take laxatives unless your surgeon has prescribed them.  · Mixing alcohol and pain medication can cause dizziness and slow your breathing. It can even be fatal. Dont drink alcohol while taking pain medication.  · Pain medication can slow your reflexes. Dont drive or operate machinery while taking pain medication.    If your  health care provider tells you to take acetaminophen to help relieve your pain, ask him or her how much you are supposed to take each day. (Acetaminophen is the generic name for Tylenol and other brand-name pain relievers.) Acetaminophen or other pain relievers may interact with your prescription medicines or other over-the-counter (OTC) drugs. Some prescription medications contain acetaminophen along with other active ingredients. Using both prescription and OTC acetaminophen for pain can cause you to overdose. The FDA recommends that you read the labels on your OTC medications carefully. This will help you to clearly understand the list of active ingredients, dosing instructions, and any warnings. It may also help you avoid taking too much acetaminophen. If you have questions or don't understand the information, ask your pharmacist or health care provider to explain it to you before you take the OTC medication.    Managing nausea  Some people have an upset stomach after surgery. This is often due to anesthesia, pain, pain medications, or the stress of surgery. The following tips will help you manage nausea and get good nutrition as you recover. If you were on a special diet before surgery, ask your doctor if you should follow it during recovery. These tips may help:    · Dont push yourself to eat. Your body will tell you when to eat and how much.  · Start off with clear liquids and soup. They are easier to digest.  · Progress to semi-solid foods (mashed potatoes, applesauce, and gelatin) as you feel ready.  · Slowly move to solid foods. Dont eat fatty, rich, or spicy foods at first.  · Dont force yourself to have three large meals a day. Instead, eat smaller amounts more often.  · Take pain medications with a small amount of solid food, such as crackers or toast to avoid nausea.      Call your surgeon if    · You feel too sleepy, dizzy, or groggy (medication may be too strong).  · You have side effects like  nausea, vomiting, or skin changes (rash, itching, or hives).     © 3477-1828 [employer]. [address] [place] [address], [place] [address]. All rights reserved. This information is not intended as a substitute for professional medical care. Always follow your healthcare professional's instructions.    PLEASE FOLLOW ANY OTHER INSTRUCTIONS PROVIDED TO YOU BY DR. GARCIA!

## 2019-11-12 NOTE — ANESTHESIA POSTPROCEDURE EVALUATION
Anesthesia Post Evaluation    Patient: Tj Hernandez    Procedure(s) Performed: Procedure(s) (LRB):  HYSTEROSCOPY, WITH DILATION AND CURETTAGE OF UTERUS (N/A)    Final Anesthesia Type: general  Patient location during evaluation: PACU  Patient participation: Yes- Able to Participate  Level of consciousness: oriented and awake  Post-procedure vital signs: reviewed and stable  Pain management: adequate  Airway patency: patent  PONV status at discharge: No PONV  Anesthetic complications: no      Cardiovascular status: hemodynamically stable  Respiratory status: unassisted, spontaneous ventilation and room air  Hydration status: euvolemic  Follow-up not needed.          Vitals Value Taken Time   /71 11/12/2019  2:10 PM   Temp 36.6 °C (97.8 °F) 11/12/2019  1:40 PM   Pulse 62 11/12/2019  2:10 PM   Resp 18 11/12/2019  2:10 PM   SpO2 99 % 11/12/2019  2:10 PM         Event Time     Out of Recovery 13:33:21          Pain/Alissa Score: Pain Rating Prior to Med Admin: 0 (11/12/2019  9:58 AM)  Alissa Score: 10 (11/12/2019  2:10 PM)

## 2019-11-13 NOTE — DISCHARGE SUMMARY
[facility]  Obstetrics & Gynecology  Discharge Summary    Patient Name: Tj Hernandez  MRN: [id number]  Admission Date: 2019  Hospital Length of Stay: 0 days  Discharge Date and Time: 2019  3:20 PM  Attending Physician: No att. providers found   Discharging Provider: Luiz Schmidt MD  Primary Care Provider: Bonnie Mathew MD    HPI:  Ms. Hernandez this is a 64-year-old female who presents for a scheduled hysteroscopy and D&C for evaluation of postmenopausal bleeding.  The patient's preoperative HPI can be seen below.    HPI  jT Hernandez is a 64 y.o. female, , No LMP recorded. Patient is postmenopausal.,  presents for a problem visit, complaining of       age 48.  No HRT.  One episode of  bleeding a few years ago - unsure what evaluation was done.  No further bleeding until last month - spotting with wiping.   U/S:  7 week normal uterus, EM 1 mm.     Has yearly paps because has history of severe dysplasia s/p CKC.  Paps all normal since the cone.   Pap and HPV negative.    Hospital Course:  2019 - patient underwent the scheduled hysteroscopy and D&C.  After the procedure the patient was noted to be meeting all appropriate postprocedure milestones.  The patient was discharged home in stable condition.  All appropriate education, precautions and follow-up were provided prior to discharge.    Procedure(s) (LRB):  HYSTEROSCOPY, WITH DILATION AND CURETTAGE OF UTERUS (N/A)         Significant Diagnostic Studies: Labs: All labs within the past 24 hours have been reviewed    Pending Diagnostic Studies:     Procedure Component Value Units Date/Time    Specimen to Pathology, Surgery Gynecology and Obstetrics [[id number]] Collected:  19 1252    Order Status:  Sent Lab Status:  In process Updated:  19 1420        Final Active Diagnoses:    Diagnosis Date Noted POA    Postmenopausal bleeding [N95.0] 2019 Yes    S/P endometrial biopsy and exam  under anesthesia [Z92.89] 11/12/2019 Unknown      Problems Resolved During this Admission:        Discharged Condition: good    Disposition: Home or Self Care    Follow Up:  Follow-up Information     Schedule an appointment as soon as possible for a visit with Lashawn Weston MD.    Specialty:  Obstetrics and Gynecology  Why:  For Post-Op Visit  Contact information:  [address]  [facility] [address]  [phone]                 Patient Instructions:      Diet general     Pelvic Rest   Order Comments: Pelvic Rest - Nothing in the Vagina for 2 weeks.     Call MD for:   Order Comments: Vaginal Bleeding greater than a pad per hour.     Call MD for:  extreme fatigue     Call MD for:  persistent dizziness or light-headedness     Call MD for:  hives     Call MD for:  redness, tenderness, or signs of infection (pain, swelling, redness, odor or green/yellow discharge around incision site)     Call MD for:  difficulty breathing, headache or visual disturbances     Call MD for:  severe uncontrolled pain     Call MD for:  persistent nausea and vomiting     Call MD for:  temperature >100.4     Medications:  Reconciled Home Medications:      Medication List      START taking these medications    ibuprofen 800 MG tablet  Commonly known as:  ADVIL,MOTRIN  Take 1 tablet (800 mg total) by mouth every 8 (eight) hours as needed for Pain.        CHANGE how you take these medications    HYDROcodone-acetaminophen 5-325 mg per tablet  Commonly known as:  NORCO  Take 1 tablet by mouth every 4 (four) hours as needed for Pain.  What changed:    · when to take this  · reasons to take this     propranolol 20 MG tablet  Commonly known as:  INDERAL  1/2-1 tablet three times daily for anxiety and blood pressure  What changed:    · how much to take  · when to take this        CONTINUE taking these medications    biotin 1 mg tablet  Take 1,000 mcg by mouth 3 (three) times daily.     calcium carbonate 600 mg calcium (1,500 mg) Tab  Commonly  known as:  OS-MOSHE  Take 600 mg by mouth once daily.     cetirizine 10 MG tablet  Commonly known as:  ZYRTEC  Take 10 mg by mouth once daily.     cholecalciferol (vitamin D3) 2,000 unit Cap  Commonly known as:  VITAMIN D3  Take 1 capsule by mouth once daily.     coenzyme Q10 100 mg Tab  Take 1 tablet (100 mg total) by mouth once daily.     * estradiol 0.01 % (0.1 mg/gram) vaginal cream  Commonly known as:  ESTRACE  Place 1 g vaginally twice a week.     * ESTRACE 0.01 % (0.1 mg/gram) vaginal cream  Generic drug:  estradiol  INSERT ONE gram VAGINALLY TWO TIMES A WEEK     ezetimibe 10 mg tablet  Commonly known as:  ZETIA  TAKE ONE TABLET BY MOUTH DAILY     ibandronate 150 mg tablet  Commonly known as:  BONIVA  Take 1 tablet (150 mg total) by mouth every 30 days.     multivitamin capsule  Take 1 capsule by mouth once daily.     omeprazole 20 MG capsule  Commonly known as:  PRILOSEC  Take 20 mg by mouth once daily.     pravastatin 20 MG tablet  Commonly known as:  PRAVACHOL  TAKE 1 TABLET BY MOUTH ONCE A DAY     zolpidem 10 mg Tab  Commonly known as:  AMBIEN  Take 1 tablet (10 mg total) by mouth nightly as needed.         * This list has 2 medication(s) that are the same as other medications prescribed for you. Read the directions carefully, and ask your doctor or other care provider to review them with you.            STOP taking these medications    meloxicam 15 MG tablet  Commonly known as:  MOBIC     miSOPROStol 200 MCG Tab  Commonly known as:  CYTOTEC     ondansetron 4 MG tablet  Commonly known as:  HARLEEN Schmidt MD  Obstetrics & Gynecology  [facility]

## 2019-11-13 NOTE — HPI
Ms. Hernandez this is a 64-year-old female who presents for a scheduled hysteroscopy and D&C for evaluation of postmenopausal bleeding.  The patient's preoperative HPI can be seen below.    HPI  Tj Hernandez is a 64 y.o. female, , No LMP recorded. Patient is postmenopausal.,  presents for a problem visit, complaining of       age 48.  No HRT.  One episode of  bleeding a few years ago - unsure what evaluation was done.  No further bleeding until last month - spotting with wiping.   U/S:  7 week normal uterus, EM 1 mm.     Has yearly paps because has history of severe dysplasia s/p CKC.  Paps all normal since the cone.   Pap and HPV negative.

## 2019-11-13 NOTE — HOSPITAL COURSE
11/12/2019 - patient underwent the scheduled hysteroscopy and D&C.  After the procedure the patient was noted to be meeting all appropriate postprocedure milestones.  The patient was discharged home in stable condition.  All appropriate education, precautions and follow-up were provided prior to discharge.

## 2019-11-26 ENCOUNTER — PATIENT MESSAGE (OUTPATIENT)
Dept: INTERNAL MEDICINE | Facility: CLINIC | Age: 64
End: 2019-11-26

## 2019-11-27 ENCOUNTER — OFFICE VISIT (OUTPATIENT)
Dept: OBSTETRICS AND GYNECOLOGY | Facility: CLINIC | Age: 64
End: 2019-11-27
Attending: OBSTETRICS & GYNECOLOGY
Payer: COMMERCIAL

## 2019-11-27 VITALS
HEIGHT: 64 IN | DIASTOLIC BLOOD PRESSURE: 84 MMHG | BODY MASS INDEX: 24.28 KG/M2 | WEIGHT: 142.19 LBS | SYSTOLIC BLOOD PRESSURE: 118 MMHG

## 2019-11-27 DIAGNOSIS — N95.0 POSTMENOPAUSAL BLEEDING: Primary | ICD-10-CM

## 2019-11-27 DIAGNOSIS — N88.2 STENOSIS OF CERVIX: ICD-10-CM

## 2019-11-27 PROCEDURE — 99999 PR PBB SHADOW E&M-EST. PATIENT-LVL III: CPT | Mod: PBBFAC,,, | Performed by: OBSTETRICS & GYNECOLOGY

## 2019-11-27 PROCEDURE — 99499 UNLISTED E&M SERVICE: CPT | Mod: S$GLB,,, | Performed by: OBSTETRICS & GYNECOLOGY

## 2019-11-27 PROCEDURE — 99499 NO LOS: ICD-10-PCS | Mod: S$GLB,,, | Performed by: OBSTETRICS & GYNECOLOGY

## 2019-11-27 PROCEDURE — 99999 PR PBB SHADOW E&M-EST. PATIENT-LVL III: ICD-10-PCS | Mod: PBBFAC,,, | Performed by: OBSTETRICS & GYNECOLOGY

## 2019-11-27 NOTE — PROGRESS NOTES
"CC: Postop visit    Tj Hernandez is a 64 y.o. female  post-op from a HSC/D&C on 19.  Patient is doing well postoperatively.  No pain.  No bowel or bladder complaints.  No fever.      Pathology: pending.  HSC inadequate.    Past Medical History:   Diagnosis Date    Abnormal Pap smear of cervix     Adjustment disorder     Colon polyp     benign    Hormone disorder     Hyperlipidemia     Hypertension     Kidney stone     Neck pain     PONV (postoperative nausea and vomiting)     Recurrent UTI 2014     Past Surgical History:   Procedure Laterality Date    COLONOSCOPY N/A 2018    Procedure: COLONOSCOPY;  Surgeon: Naveen Curry MD;  Location: [facility] ([address] [place] [address]);  Service: Endoscopy;  Laterality: N/A;    CYSTOSCOPY      DILATION AND CURETTAGE OF UTERUS      HYSTEROSCOPY WITH DILATION AND CURETTAGE OF UTERUS N/A 2019    Procedure: HYSTEROSCOPY, WITH DILATION AND CURETTAGE OF UTERUS;  Surgeon: Lashawn Weston MD;  Location: [facility];  Service: OB/GYN;  Laterality: N/A;    TONSILLECTOMY       Family History   Adopted: Yes     Social History     Tobacco Use    Smoking status: Never Smoker    Smokeless tobacco: Never Used   Substance Use Topics    Alcohol use: Yes     Alcohol/week: 2.0 standard drinks     Types: 2 Shots of liquor per week     Frequency: 2-3 times a week     Drinks per session: 1 or 2     Binge frequency: Never     Comment: Rarely.    Drug use: No     OB History    Para Term  AB Living   7 7 7         SAB TAB Ectopic Multiple Live Births                  # Outcome Date GA Lbr Kojo/2nd Weight Sex Delivery Anes PTL Lv   7 Term            6 Term            5 Term            4 Term            3 Term            2 Term            1 Term                /84 (BP Location: Right arm, Patient Position: Sitting)   Ht 5' 4" (1.626 m)   Wt 64.5 kg (142 lb 3.2 oz)   LMP  (LMP Unknown)   BMI 24.41 kg/m²     ROS:  GENERAL: No fever, chills, " fatigability or weight loss.  VULVAR: No pain, no lesions and no itching.  VAGINAL: No relaxation, no itching, no discharge, no abnormal bleeding and no lesions.  ABDOMEN: No abdominal pain. Denies nausea. Denies vomiting. No diarrhea. No constipation  BREAST: Denies pain. No lumps. No discharge.  URINARY: No incontinence, no nocturia, no frequency and no dysuria.  CARDIOVASCULAR: No chest pain. No shortness of breath. No leg cramps.  NEUROLOGICAL: No headaches. No vision changes.    PE:   GEN:  NAD, A&O x 3  ABDOMEN:  Soft, non-tender, non-distended.  PELVIC: Normal external genitalia without lesions.  Normal hair distribution.  Adequate perineal body, normal urethral meatus.  Vagina moist and well rugated without lesions or discharge.  Cuff intact and healing well.  Cervix and uterus absent.  Pelvis without masses or tenderness.      ICD-10-CM ICD-9-CM    1. Postmenopausal bleeding N95.0 627.1    2. Stenosis of cervix N88.2 622.4          Patient doing well.  Discussed hyst vs. obs and hyst if bleeds again.  Follow-up prn

## 2019-12-02 ENCOUNTER — OFFICE VISIT (OUTPATIENT)
Dept: PSYCHIATRY | Facility: CLINIC | Age: 64
End: 2019-12-02
Payer: COMMERCIAL

## 2019-12-02 ENCOUNTER — HOSPITAL ENCOUNTER (OUTPATIENT)
Dept: RADIOLOGY | Facility: HOSPITAL | Age: 64
Discharge: HOME | End: 2019-12-02
Attending: INTERNAL MEDICINE
Payer: COMMERCIAL

## 2019-12-02 DIAGNOSIS — Z12.31 SCREENING MAMMOGRAM, ENCOUNTER FOR: ICD-10-CM

## 2019-12-02 DIAGNOSIS — F43.21 ADJUSTMENT DISORDER WITH DEPRESSED MOOD: Primary | ICD-10-CM

## 2019-12-02 PROCEDURE — 90791 PSYCH DIAGNOSTIC EVALUATION: CPT | Mod: S$GLB,,, | Performed by: SOCIAL WORKER

## 2019-12-02 PROCEDURE — 77063 MAMMO DIGITAL SCREENING BILAT WITH TOMOSYNTHESIS_CAD: ICD-10-PCS | Mod: 26,,, | Performed by: RADIOLOGY

## 2019-12-02 PROCEDURE — 77067 SCR MAMMO BI INCL CAD: CPT | Mod: 26,,, | Performed by: RADIOLOGY

## 2019-12-02 PROCEDURE — 77067 SCR MAMMO BI INCL CAD: CPT | Mod: TC

## 2019-12-02 PROCEDURE — 90791 PR PSYCHIATRIC DIAGNOSTIC EVALUATION: ICD-10-PCS | Mod: S$GLB,,, | Performed by: SOCIAL WORKER

## 2019-12-02 PROCEDURE — 77063 BREAST TOMOSYNTHESIS BI: CPT | Mod: 26,,, | Performed by: RADIOLOGY

## 2019-12-02 PROCEDURE — 99999 PR PBB SHADOW E&M-EST. PATIENT-LVL I: CPT | Mod: PBBFAC,,, | Performed by: SOCIAL WORKER

## 2019-12-02 PROCEDURE — 77067 MAMMO DIGITAL SCREENING BILAT WITH TOMOSYNTHESIS_CAD: ICD-10-PCS | Mod: 26,,, | Performed by: RADIOLOGY

## 2019-12-02 PROCEDURE — 99999 PR PBB SHADOW E&M-EST. PATIENT-LVL I: ICD-10-PCS | Mod: PBBFAC,,, | Performed by: SOCIAL WORKER

## 2019-12-02 RX ORDER — LOSARTAN POTASSIUM 25 MG/1
25 TABLET ORAL DAILY
Qty: 90 TABLET | Refills: 3 | Status: SHIPPED | OUTPATIENT
Start: 2019-12-02 | End: 2020-01-08

## 2019-12-03 NOTE — PROGRESS NOTES
Psychiatry Initial Visit (PhD/LCSW)  Diagnostic Interview - CPT 44660    Date: 12/2/2019    Site: [facility]    Referral source: [place]    Clinical status of patient: Outpatient    Tj Hernandez, a 64 y.o. female, for initial evaluation visit.  Met with patient.    Chief complaint/reason for encounter: interpersonal    History of present illness: Pt's  of 46 years did [email or web address] with her, and learned that he has a son who was born shortly before she and  started dating. Pt is deeply hurt that  is unapologetic, seems excited about the young man, and unappreciative of pt's efforts to be accepting.  tells her he didn't think this would bother her since she is also adopted. Pt unsure why this all bothers her so much, but is aware that she has been unhappy with things about  for many years, and this has gotten worse since he retired. Pt comes in to consider how to resolve her feelings about .Pt is high-functioning, and happy with most things in her life. She does worry about 42yo daughter with long mental health hx whom she and  support.    Pain: noncontributory    Symptoms:   · Mood: denied  · Anxiety: denied  · Substance abuse: denied  · Cognitive functioning: denied  · Health behaviors: noncontributory    Psychiatric history: has participated in counseling/psychotherapy on an outpatient basis in the past at time daughter was in [place] for anorexia    Medical history: noncontributory    Family history of psychiatric illness:  has anxieties, claustrophia, is rigid and perfectionistic; daughter hx of hospitallization for anorexia, depression, has been in [place] with meds and therapy much of her life    Social history (marriage, employment, etc.): Pt adopted by loving parents, brother also adopted, and parents then had 2 biological daughters. 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 Pt loves her work, makes more than  ", and had him sign financial agreement making them separate after discovery of his son.  worked as a  and then as , retired 3 years ago, promising he was going to help around the house but has not done so. Couple  x 46 years. Couple share values re parenting, Restorationist, work ethic, and enjoy sporting events and going out with friends. Pt has good friends, engaged in [employer], close to sisters, exercises, enjoys 43 yo son who is  x 3 years and has 2yo child, successful career.    Pt finds  rigid, judgmental, "he's always right" and lacking empathy for her, "wish he would treat me like the least of his employees."    Pt never met her birth parents, who are both , found 2 half siblings, is in touch but not very interested in involving herself with them.      Substance use:   Alcohol: 1-2 drinks weekly   Drugs: none   Tobacco: none   Caffeine: not discussed    Current medications and drug reactions (include OTC, herbal): see medication list     Strengths and liabilities: Strength: Patient accepts guidance/feedback, Strength: Patient is expressive/articulate., Strength: Patient is physically healthy., Strength: Patient has positive support network., Strength: Patient has reasonable judgment.    Current Evaluation:     Mental Status Exam:  General Appearance:  age appropriate, attractive, nicely dressed in flowered sweater and leggings, straight dark shoulder length hair, average heigh, slim, engaging   Speech: normal tone, normal rate, normal pitch, normal volume      Level of Cooperation: cooperative      Thought Processes: normal and logical   Mood: depressed      Thought Content: normal, no suicidality, no homicidality, delusions, or paranoia   Affect: congruent and appropriate   Orientation: Oriented x3   Memory: recent >  intact, remote >  intact   Attention Span & Concentration: intact   Fund of General Knowledge: intact and appropriate to age and " level of education   Abstract Reasoning: intact   Judgment & Insight: good     Language  intact     Diagnostic Impression - Plan:     Adjustment disorder with depressed mood    Plan:individual psychotherapy    Return to Clinic: as scheduled    Length of Service (minutes): 45

## 2019-12-04 ENCOUNTER — PATIENT MESSAGE (OUTPATIENT)
Dept: OBSTETRICS AND GYNECOLOGY | Facility: CLINIC | Age: 64
End: 2019-12-04

## 2019-12-09 ENCOUNTER — OFFICE VISIT (OUTPATIENT)
Dept: PSYCHIATRY | Facility: CLINIC | Age: 64
End: 2019-12-09
Payer: COMMERCIAL

## 2019-12-09 ENCOUNTER — PATIENT MESSAGE (OUTPATIENT)
Dept: OBSTETRICS AND GYNECOLOGY | Facility: CLINIC | Age: 64
End: 2019-12-09

## 2019-12-09 DIAGNOSIS — F43.21 ADJUSTMENT DISORDER WITH DEPRESSED MOOD: Primary | ICD-10-CM

## 2019-12-09 PROCEDURE — 99999 PR PBB SHADOW E&M-EST. PATIENT-LVL I: ICD-10-PCS | Mod: PBBFAC,,, | Performed by: SOCIAL WORKER

## 2019-12-09 PROCEDURE — 90834 PR PSYCHOTHERAPY W/PATIENT, 45 MIN: ICD-10-PCS | Mod: S$GLB,,, | Performed by: SOCIAL WORKER

## 2019-12-09 PROCEDURE — 99999 PR PBB SHADOW E&M-EST. PATIENT-LVL I: CPT | Mod: PBBFAC,,, | Performed by: SOCIAL WORKER

## 2019-12-09 PROCEDURE — 90834 PSYTX W PT 45 MINUTES: CPT | Mod: S$GLB,,, | Performed by: SOCIAL WORKER

## 2019-12-09 NOTE — PROGRESS NOTES
"Individual Psychotherapy (PhD/LCSW)    12/9/2019    Site:  [facility]         Therapeutic Intervention: Met with patient.  Outpatient - Insight oriented psychotherapy 45 min - CPT code 42186    Chief complaint/reason for encounter: interpersonal     Interval history and content of current session: Pt brings in list of pros and cons concerning her marriage, is frustrated and angry with  "but I still love him" and does not wish to leave the marriage. Discuss her dilemma about how accepting to be of his newly found son from a prior relationship. Pt does not want to be pressured by , but has empathy for the young man. Encourage pt to consider what she would decide if  were not part of the decision. Pt is accustomed to resisting 's strong expectations of her, will try to attend to her own inner voice and feelings.     Treatment plan:  · Target symptoms: depression  · Why chosen therapy is appropriate versus another modality: relevant to diagnosis  · Outcome monitoring methods: self-report  · Therapeutic intervention type: insight oriented psychotherapy    Risk parameters:  Patient reports no suicidal ideation  Patient reports no homicidal ideation  Patient reports no self-injurious behavior  Patient reports no violent behavior    Verbal deficits: None    Patient's response to intervention:  The patient's response to intervention is motivated.    Progress toward goals and other mental status changes:  The patient's progress toward goals is good.    Diagnosis:   Adjustment disorder with depressed mood  Plan:  individual psychotherapy    Return to clinic: as scheduled    Length of Service (minutes): 45      "

## 2019-12-17 ENCOUNTER — PATIENT MESSAGE (OUTPATIENT)
Dept: INTERNAL MEDICINE | Facility: CLINIC | Age: 64
End: 2019-12-17

## 2019-12-19 ENCOUNTER — OFFICE VISIT (OUTPATIENT)
Dept: PSYCHIATRY | Facility: CLINIC | Age: 64
End: 2019-12-19
Payer: COMMERCIAL

## 2019-12-19 DIAGNOSIS — F43.21 ADJUSTMENT DISORDER WITH DEPRESSED MOOD: Primary | ICD-10-CM

## 2019-12-19 LAB
FINAL PATHOLOGIC DIAGNOSIS: NORMAL
GROSS: NORMAL

## 2019-12-19 PROCEDURE — 90834 PSYTX W PT 45 MINUTES: CPT | Mod: S$GLB,,, | Performed by: SOCIAL WORKER

## 2019-12-19 PROCEDURE — 99999 PR PBB SHADOW E&M-EST. PATIENT-LVL I: CPT | Mod: PBBFAC,,, | Performed by: SOCIAL WORKER

## 2019-12-19 PROCEDURE — 90834 PR PSYCHOTHERAPY W/PATIENT, 45 MIN: ICD-10-PCS | Mod: S$GLB,,, | Performed by: SOCIAL WORKER

## 2019-12-19 PROCEDURE — 99999 PR PBB SHADOW E&M-EST. PATIENT-LVL I: ICD-10-PCS | Mod: PBBFAC,,, | Performed by: SOCIAL WORKER

## 2019-12-19 NOTE — PROGRESS NOTES
Individual Psychotherapy (PhD/LCSW)    12/19/2019    Site:  [facility]         Therapeutic Intervention: Met with patient.  Outpatient - Insight oriented psychotherapy 45 min - CPT code 66302    Chief complaint/reason for encounter: interpersonal     Interval history and content of current session: Pt no longer focusing on issues between her and , sees issue of how his newly found son would fit into the family, if at all. Identify wish for  to work out with son what they each want in a relationship, without involving pt. Suggest couples Tx as an option for working this out, and pt is very receptive, notes  had suggested they might address this with a therapist. Pt given several names for referral, and may f/u with me as needed.    Treatment plan:  · Target symptoms: depression  · Why chosen therapy is appropriate versus another modality: relevant to diagnosis  · Outcome monitoring methods: self-report  · Therapeutic intervention type: insight oriented psychotherapy    Risk parameters:  Patient reports no suicidal ideation  Patient reports no homicidal ideation  Patient reports no self-injurious behavior  Patient reports no violent behavior    Verbal deficits: None    Patient's response to intervention:  The patient's response to intervention is motivated.    Progress toward goals and other mental status changes:  The patient's progress toward goals is good.    Diagnosis:   Adjustment disorder with depressed mood  Plan:  individual psychotherapy    Return to clinic: as scheduled    Length of Service (minutes): 45

## 2019-12-20 ENCOUNTER — PATIENT MESSAGE (OUTPATIENT)
Dept: OBSTETRICS AND GYNECOLOGY | Facility: CLINIC | Age: 64
End: 2019-12-20

## 2019-12-26 ENCOUNTER — PATIENT MESSAGE (OUTPATIENT)
Dept: OBSTETRICS AND GYNECOLOGY | Facility: CLINIC | Age: 64
End: 2019-12-26

## 2019-12-26 RX ORDER — EZETIMIBE 10 MG/1
TABLET ORAL
Qty: 90 TABLET | Refills: 0 | Status: SHIPPED | OUTPATIENT
Start: 2019-12-26 | End: 2020-04-13

## 2019-12-26 RX ORDER — PRAVASTATIN SODIUM 20 MG/1
TABLET ORAL
Qty: 90 TABLET | Refills: 0 | Status: SHIPPED | OUTPATIENT
Start: 2019-12-26 | End: 2020-04-09

## 2019-12-26 NOTE — TELEPHONE ENCOUNTER
Refill Authorization Note     is requesting a refill authorization.    Brief assessment and rationale for refill: APPROVE: needs labs          Medication Therapy Plan: TAMMI; FLOS                              Comments:     Requested Prescriptions   Signed Prescriptions Disp Refills    ezetimibe (ZETIA) 10 mg tablet 90 tablet 0     Sig: TAKE ONE TABLET BY MOUTH DAILY       Cardiovascular:  Antilipid - Sterol Transport Inhibitors Passed - 12/26/2019  9:24 AM        Passed - Patient is at least 18 years old        Passed - Office visit in past 12 months or future 90 days     Recent Outpatient Visits            1 week ago Adjustment disorder with depressed mood    [place] - Psychiatry Evelyn Cruz LCSW    2 weeks ago Adjustment disorder with depressed mood    [facility]    3 weeks ago Adjustment disorder with depressed mood    [facility]    4 weeks ago Postmenopausal bleeding    [facility] [id number] [place] [address] Lashawn Weston MD    1 month ago Postmenopausal bleeding    [facility] [id number] [place] [address] Lashawn Weston MD          Future Appointments              [facility]    In 1 week MD [facility] - Internal Medicine, Len Mcelroy PCW                Passed - Lipid Panel completed in last 360 days     Lab Results   Component Value Date    CHOL 185 01/02/2019    HDL 84 (H) 01/02/2019    LDLCALC 85.4 01/02/2019    TRIG 78 01/02/2019            pravastatin (PRAVACHOL) 20 MG tablet 90 tablet 0     Sig: TAKE ONE TABLET BY MOUTH DAILY       Cardiovascular:  Antilipid - Statins Passed - 12/26/2019  9:24 AM        Passed - Patient is at least 18 years old        Passed - Office visit in past 12 months or future 90 days     Recent Outpatient Visits            1 week ago Adjustment disorder with depressed mood    [place] - Psychiatry Evelyn Cruz LCSW    2 weeks  ago Adjustment disorder with depressed mood    [facility] Evelyn Cruz, LCSW    3 weeks ago Adjustment disorder with depressed mood    [facility] Evelyn Cruz, LCSW    4 weeks ago Postmenopausal bleeding    [facility] [id number] [place] [address] Lashawn Weston MD    1 month ago Postmenopausal bleeding    [facility] [id number] [place] [address] Lashawn Weston MD          Future Appointments              In 1 week LAB, [facility]    In 1 week MD Len Rosado [facility] - Internal Medicine, K[facility] PCW                Passed - Lipid Panel completed in last 360 days     Lab Results   Component Value Date    CHOL 185 01/02/2019    HDL 84 (H) 01/02/2019    LDLCALC 85.4 01/02/2019    TRIG 78 01/02/2019             Passed - ALT is 94 or below and within 360 days     ALT   Date Value Ref Range Status   01/02/2019 14 10 - 44 U/L Final   12/15/2017 11 10 - 44 U/L Final   04/07/2017 11 10 - 44 U/L Final              Passed - AST is 54 or below and within 360 days     AST   Date Value Ref Range Status   01/02/2019 27 10 - 40 U/L Final   12/15/2017 21 10 - 40 U/L Final   04/07/2017 19 10 - 40 U/L Final              Appointments  past 12m or future 3m with PCP    Date Provider   Last Visit   10/23/2019 Bonnie Mahtew MD   Next Visit   1/8/2020 Bonnie Mathew MD

## 2019-12-31 ENCOUNTER — PATIENT MESSAGE (OUTPATIENT)
Dept: INTERNAL MEDICINE | Facility: CLINIC | Age: 64
End: 2019-12-31

## 2020-01-06 ENCOUNTER — LAB VISIT (OUTPATIENT)
Dept: LAB | Facility: HOSPITAL | Age: 65
End: 2020-01-06
Attending: INTERNAL MEDICINE
Payer: COMMERCIAL

## 2020-01-06 DIAGNOSIS — E78.5 HYPERLIPIDEMIA, UNSPECIFIED HYPERLIPIDEMIA TYPE: ICD-10-CM

## 2020-01-06 DIAGNOSIS — E55.9 VITAMIN D DEFICIENCY DISEASE: ICD-10-CM

## 2020-01-06 LAB
25(OH)D3+25(OH)D2 SERPL-MCNC: 18 NG/ML (ref 30–96)
ALBUMIN SERPL BCP-MCNC: 4 G/DL (ref 3.5–5.2)
ALP SERPL-CCNC: 48 U/L (ref 55–135)
ALT SERPL W/O P-5'-P-CCNC: 14 U/L (ref 10–44)
ANION GAP SERPL CALC-SCNC: 6 MMOL/L (ref 8–16)
AST SERPL-CCNC: 20 U/L (ref 10–40)
BASOPHILS # BLD AUTO: 0.04 K/UL (ref 0–0.2)
BASOPHILS NFR BLD: 0.6 % (ref 0–1.9)
BILIRUB SERPL-MCNC: 0.9 MG/DL (ref 0.1–1)
BUN SERPL-MCNC: 18 MG/DL (ref 8–23)
CALCIUM SERPL-MCNC: 9.6 MG/DL (ref 8.7–10.5)
CHLORIDE SERPL-SCNC: 106 MMOL/L (ref 95–110)
CHOLEST SERPL-MCNC: 183 MG/DL (ref 120–199)
CHOLEST/HDLC SERPL: 2.7 {RATIO} (ref 2–5)
CO2 SERPL-SCNC: 29 MMOL/L (ref 23–29)
CREAT SERPL-MCNC: 0.9 MG/DL (ref 0.5–1.4)
DIFFERENTIAL METHOD: ABNORMAL
EOSINOPHIL # BLD AUTO: 0.1 K/UL (ref 0–0.5)
EOSINOPHIL NFR BLD: 1.2 % (ref 0–8)
ERYTHROCYTE [DISTWIDTH] IN BLOOD BY AUTOMATED COUNT: 14.4 % (ref 11.5–14.5)
EST. GFR  (AFRICAN AMERICAN): >60 ML/MIN/1.73 M^2
EST. GFR  (NON AFRICAN AMERICAN): >60 ML/MIN/1.73 M^2
GLUCOSE SERPL-MCNC: 96 MG/DL (ref 70–110)
HCT VFR BLD AUTO: 41.5 % (ref 37–48.5)
HDLC SERPL-MCNC: 67 MG/DL (ref 40–75)
HDLC SERPL: 36.6 % (ref 20–50)
HGB BLD-MCNC: 12.7 G/DL (ref 12–16)
IMM GRANULOCYTES # BLD AUTO: 0.02 K/UL (ref 0–0.04)
IMM GRANULOCYTES NFR BLD AUTO: 0.3 % (ref 0–0.5)
LDLC SERPL CALC-MCNC: 94.6 MG/DL (ref 63–159)
LYMPHOCYTES # BLD AUTO: 1.4 K/UL (ref 1–4.8)
LYMPHOCYTES NFR BLD: 21 % (ref 18–48)
MCH RBC QN AUTO: 25 PG (ref 27–31)
MCHC RBC AUTO-ENTMCNC: 30.6 G/DL (ref 32–36)
MCV RBC AUTO: 82 FL (ref 82–98)
MONOCYTES # BLD AUTO: 0.6 K/UL (ref 0.3–1)
MONOCYTES NFR BLD: 8.6 % (ref 4–15)
NEUTROPHILS # BLD AUTO: 4.5 K/UL (ref 1.8–7.7)
NEUTROPHILS NFR BLD: 68.3 % (ref 38–73)
NONHDLC SERPL-MCNC: 116 MG/DL
NRBC BLD-RTO: 0 /100 WBC
PLATELET # BLD AUTO: 236 K/UL (ref 150–350)
PMV BLD AUTO: 10.8 FL (ref 9.2–12.9)
POTASSIUM SERPL-SCNC: 3.9 MMOL/L (ref 3.5–5.1)
PROT SERPL-MCNC: 6.8 G/DL (ref 6–8.4)
RBC # BLD AUTO: 5.08 M/UL (ref 4–5.4)
SODIUM SERPL-SCNC: 141 MMOL/L (ref 136–145)
TRIGL SERPL-MCNC: 107 MG/DL (ref 30–150)
TSH SERPL DL<=0.005 MIU/L-ACNC: 2.08 UIU/ML (ref 0.4–4)
WBC # BLD AUTO: 6.63 K/UL (ref 3.9–12.7)

## 2020-01-06 PROCEDURE — 36415 COLL VENOUS BLD VENIPUNCTURE: CPT | Mod: PO

## 2020-01-06 PROCEDURE — 80053 COMPREHEN METABOLIC PANEL: CPT

## 2020-01-06 PROCEDURE — 82306 VITAMIN D 25 HYDROXY: CPT

## 2020-01-06 PROCEDURE — 84443 ASSAY THYROID STIM HORMONE: CPT

## 2020-01-06 PROCEDURE — 80061 LIPID PANEL: CPT

## 2020-01-06 PROCEDURE — 85025 COMPLETE CBC W/AUTO DIFF WBC: CPT

## 2020-01-07 ENCOUNTER — TELEPHONE (OUTPATIENT)
Dept: OBSTETRICS AND GYNECOLOGY | Facility: CLINIC | Age: 65
End: 2020-01-07

## 2020-01-08 ENCOUNTER — OFFICE VISIT (OUTPATIENT)
Dept: INTERNAL MEDICINE | Facility: CLINIC | Age: 65
End: 2020-01-08
Payer: COMMERCIAL

## 2020-01-08 VITALS
BODY MASS INDEX: 23.78 KG/M2 | OXYGEN SATURATION: 97 % | SYSTOLIC BLOOD PRESSURE: 132 MMHG | TEMPERATURE: 98 F | HEIGHT: 64 IN | DIASTOLIC BLOOD PRESSURE: 86 MMHG | HEART RATE: 66 BPM | WEIGHT: 139.31 LBS

## 2020-01-08 DIAGNOSIS — E78.5 HYPERLIPIDEMIA, UNSPECIFIED HYPERLIPIDEMIA TYPE: ICD-10-CM

## 2020-01-08 DIAGNOSIS — K21.9 GASTROESOPHAGEAL REFLUX DISEASE WITHOUT ESOPHAGITIS: ICD-10-CM

## 2020-01-08 DIAGNOSIS — N95.0 POSTMENOPAUSAL BLEEDING: Primary | ICD-10-CM

## 2020-01-08 DIAGNOSIS — I10 ESSENTIAL HYPERTENSION: ICD-10-CM

## 2020-01-08 PROCEDURE — 1159F PR MEDICATION LIST DOCUMENTED IN MEDICAL RECORD: ICD-10-PCS | Mod: S$GLB,,, | Performed by: INTERNAL MEDICINE

## 2020-01-08 PROCEDURE — 3075F SYST BP GE 130 - 139MM HG: CPT | Mod: CPTII,S$GLB,, | Performed by: INTERNAL MEDICINE

## 2020-01-08 PROCEDURE — 1126F AMNT PAIN NOTED NONE PRSNT: CPT | Mod: S$GLB,,, | Performed by: INTERNAL MEDICINE

## 2020-01-08 PROCEDURE — 3079F DIAST BP 80-89 MM HG: CPT | Mod: CPTII,S$GLB,, | Performed by: INTERNAL MEDICINE

## 2020-01-08 PROCEDURE — 1159F MED LIST DOCD IN RCRD: CPT | Mod: S$GLB,,, | Performed by: INTERNAL MEDICINE

## 2020-01-08 PROCEDURE — 99999 PR PBB SHADOW E&M-EST. PATIENT-LVL V: ICD-10-PCS | Mod: PBBFAC,,, | Performed by: INTERNAL MEDICINE

## 2020-01-08 PROCEDURE — 99214 PR OFFICE/OUTPT VISIT, EST, LEVL IV, 30-39 MIN: ICD-10-PCS | Mod: S$GLB,,, | Performed by: INTERNAL MEDICINE

## 2020-01-08 PROCEDURE — 3079F PR MOST RECENT DIASTOLIC BLOOD PRESSURE 80-89 MM HG: ICD-10-PCS | Mod: CPTII,S$GLB,, | Performed by: INTERNAL MEDICINE

## 2020-01-08 PROCEDURE — 1101F PR PT FALLS ASSESS DOC 0-1 FALLS W/OUT INJ PAST YR: ICD-10-PCS | Mod: CPTII,S$GLB,, | Performed by: INTERNAL MEDICINE

## 2020-01-08 PROCEDURE — 1101F PT FALLS ASSESS-DOCD LE1/YR: CPT | Mod: CPTII,S$GLB,, | Performed by: INTERNAL MEDICINE

## 2020-01-08 PROCEDURE — 99214 OFFICE O/P EST MOD 30 MIN: CPT | Mod: S$GLB,,, | Performed by: INTERNAL MEDICINE

## 2020-01-08 PROCEDURE — 99999 PR PBB SHADOW E&M-EST. PATIENT-LVL V: CPT | Mod: PBBFAC,,, | Performed by: INTERNAL MEDICINE

## 2020-01-08 PROCEDURE — 3075F PR MOST RECENT SYSTOLIC BLOOD PRESS GE 130-139MM HG: ICD-10-PCS | Mod: CPTII,S$GLB,, | Performed by: INTERNAL MEDICINE

## 2020-01-08 PROCEDURE — 1126F PR PAIN SEVERITY QUANTIFIED, NO PAIN PRESENT: ICD-10-PCS | Mod: S$GLB,,, | Performed by: INTERNAL MEDICINE

## 2020-01-08 RX ORDER — VERAPAMIL HYDROCHLORIDE 40 MG/1
40 TABLET ORAL 2 TIMES DAILY
Qty: 60 TABLET | Refills: 1 | Status: SHIPPED | OUTPATIENT
Start: 2020-01-08 | End: 2020-06-11

## 2020-01-08 RX ORDER — ERGOCALCIFEROL 1.25 MG/1
50000 CAPSULE ORAL
Qty: 24 CAPSULE | Refills: 3 | Status: SHIPPED | OUTPATIENT
Start: 2020-01-09 | End: 2021-01-23

## 2020-01-08 NOTE — PROGRESS NOTES
CHIEF COMPLAINT: Follow up of multiple issues.     HISTORY OF PRESENT ILLNESS: 63-year-old woman who presents for follow up of multiple issues.    She is currently taking losartan 25 mg 2 tablets daily for her hypertension. She has not felt well since on the losartan.  She has been tired and has a headache. She was very tired on propranolol 20 mg three times daily. No headaches on propranolol    Hoarseness is better since she has been taking loratadine 10 mg daily and omeprazoel 40 mg daily.      She has 2 soft watery BM every morning.  No cramping. She passes a lot of gas. No bloating.  She has tried a lot of supplements for her bowels which have not helped.      She is taking pravastatin 20 mg daily and Zeta 10 mg daily for her hyperlipidemia. NO muscle spasms or joint pain. She exercies regularly.     BNo chest pain or shortness of breath.      Neck comes and goes. She takes meloxicam as needed which helps.      She had another episode of postmenopausal bleeding> Dr Weston attempted a D and C in November but she has cervical stenosis and Dr Weston could not dilate the cervix.      She has been taking Boniva 150 mg once monthly for her osteoporosis.      She has seen Evelyn Cruz MSW for counseling which has helped her stress.       PAST MEDICAL HISTORY:   1. Hyperlipidemia.   2. Sleep disorder.   3. Cervical spine arthritis.   4. Episode of hematuria, negative cystoscope with Dr. Roland.     PAST SURGICAL HISTORY:   1. D&C for a miscarriage.   2. Conization due to dysplasia 18 years ago.   3. Tonsillectomy and adenoidectomy at age [age].     SOCIAL HISTORY: She does not smoke. She drinks two alcoholic beverages   a month. 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 She owns a business.     FAMILY HISTORY: She is adopted.     REVIEW OF SYSTEMS: She denies fevers, chills, night sweats, hot flashes, visual change. She has some slight hearing loss. She denies sinus congestion, sore throat, chest pain, shortness of breath,  "palpitations, nausea, vomiting, constipation, diarrhea, dysuria, hematuria, joint pain, muscle pain, rashes, headaches, polydipsia,   polyuria.     SCREENING: Mammogram 11/18, bone density 1/13.Colonoscopy was due 2018    PHYSICAL EXAMINATION:     /86   Pulse 66   Temp 98.4 °F (36.9 °C) (Oral)   Ht 5' 4" (1.626 m)   Wt 63.2 kg (139 lb 5.3 oz)   LMP  (LMP Unknown)   SpO2 97%   BMI 23.92 kg/m²   General: Alert, oriented. No apparent distress. Affect within normal   limits.   Conjunctivae anicteric. PERRL. Tympanic membranes clear fluid bilaterally. Oropharynx   clear.   Neck supple. No cervical lymphadenopathy, no thyroid enlargement.   Respiratory effort normal. Lungs clear to auscultation.   Heart: Regular rate and rhythm without murmurs, gallops or rubs.   No lower extremity edema.    ABDOMEN: soft, non distended, non tender, bowel sounds present, no hepatosplenomgaly     Labs 1/6/20 reviewed    ASSESSMENT AND PLAN:    1. Hypertension - try verapamil 40 mg twice daily.  Plan to titrate to veramamil  mg daily  2. Diarrhea  -stable  3.  Neck pain - stable  5. Hyperlipidemia- stable on meds  6. ALlergic rhinitis - stablel  7. Post menopausal bleeding- needs hysterectomy  8. Colonoscopy 2/2018 - normal due 2028.  9. Osteoporosis - BMD 12/17  - on Boniva  10. Insomnia - ambien pr  11. Left rotator cuff tear - better     She is to return in 2 weeks, sooner if problems arise.     Answers for HPI/ROS submitted by the patient on 1/6/2020   activity change: No  unexpected weight change: No  neck pain: Yes  hearing loss: Yes  rhinorrhea: No  trouble swallowing: No  eye discharge: No  visual disturbance: No  chest tightness: No  wheezing: No  chest pain: No  palpitations: No  blood in stool: No  constipation: No  vomiting: No  diarrhea: No  polydipsia: No  polyuria: No  difficulty urinating: No  hematuria: No  menstrual problem: No  dysuria: No  joint swelling: No  arthralgias: Yes  headaches: Yes  weakness: " No  confusion: No  dysphoric mood: No

## 2020-01-09 ENCOUNTER — PATIENT OUTREACH (OUTPATIENT)
Dept: ADMINISTRATIVE | Facility: HOSPITAL | Age: 65
End: 2020-01-09

## 2020-01-09 ENCOUNTER — PATIENT MESSAGE (OUTPATIENT)
Dept: OBSTETRICS AND GYNECOLOGY | Facility: CLINIC | Age: 65
End: 2020-01-09

## 2020-01-10 ENCOUNTER — TELEPHONE (OUTPATIENT)
Dept: OBSTETRICS AND GYNECOLOGY | Facility: CLINIC | Age: 65
End: 2020-01-10

## 2020-01-15 ENCOUNTER — TELEPHONE (OUTPATIENT)
Dept: OBSTETRICS AND GYNECOLOGY | Facility: CLINIC | Age: 65
End: 2020-01-15

## 2020-01-15 DIAGNOSIS — N39.3 STRESS INCONTINENCE OF URINE: Primary | ICD-10-CM

## 2020-01-15 NOTE — TELEPHONE ENCOUNTER
Called patient to discuss possible hyst.  She has occasional incontinence and is considering addressing all at the same time if she has surgery.    urogyn referral placed.

## 2020-01-15 NOTE — TELEPHONE ENCOUNTER
[facility],    Can you please contact pt and help her schedule a consult with Dr. Pineda for incontinence referred by Dr. Weston.    Thank you,  Bhavik

## 2020-01-17 ENCOUNTER — PATIENT MESSAGE (OUTPATIENT)
Dept: INTERNAL MEDICINE | Facility: CLINIC | Age: 65
End: 2020-01-17

## 2020-01-23 ENCOUNTER — OFFICE VISIT (OUTPATIENT)
Dept: INTERNAL MEDICINE | Facility: CLINIC | Age: 65
End: 2020-01-23
Payer: COMMERCIAL

## 2020-01-23 VITALS
BODY MASS INDEX: 24.25 KG/M2 | WEIGHT: 142.06 LBS | DIASTOLIC BLOOD PRESSURE: 82 MMHG | HEIGHT: 64 IN | TEMPERATURE: 98 F | SYSTOLIC BLOOD PRESSURE: 126 MMHG | OXYGEN SATURATION: 99 % | HEART RATE: 65 BPM

## 2020-01-23 DIAGNOSIS — E55.9 VITAMIN D DEFICIENCY DISEASE: Primary | ICD-10-CM

## 2020-01-23 DIAGNOSIS — I10 ESSENTIAL HYPERTENSION: Primary | ICD-10-CM

## 2020-01-23 DIAGNOSIS — E78.5 HYPERLIPIDEMIA, UNSPECIFIED HYPERLIPIDEMIA TYPE: ICD-10-CM

## 2020-01-23 DIAGNOSIS — K21.9 GASTROESOPHAGEAL REFLUX DISEASE WITHOUT ESOPHAGITIS: ICD-10-CM

## 2020-01-23 PROCEDURE — 1101F PT FALLS ASSESS-DOCD LE1/YR: CPT | Mod: CPTII,S$GLB,, | Performed by: INTERNAL MEDICINE

## 2020-01-23 PROCEDURE — 99999 PR PBB SHADOW E&M-EST. PATIENT-LVL V: CPT | Mod: PBBFAC,,, | Performed by: INTERNAL MEDICINE

## 2020-01-23 PROCEDURE — 1101F PR PT FALLS ASSESS DOC 0-1 FALLS W/OUT INJ PAST YR: ICD-10-PCS | Mod: CPTII,S$GLB,, | Performed by: INTERNAL MEDICINE

## 2020-01-23 PROCEDURE — 1126F PR PAIN SEVERITY QUANTIFIED, NO PAIN PRESENT: ICD-10-PCS | Mod: S$GLB,,, | Performed by: INTERNAL MEDICINE

## 2020-01-23 PROCEDURE — 1159F PR MEDICATION LIST DOCUMENTED IN MEDICAL RECORD: ICD-10-PCS | Mod: S$GLB,,, | Performed by: INTERNAL MEDICINE

## 2020-01-23 PROCEDURE — 99214 PR OFFICE/OUTPT VISIT, EST, LEVL IV, 30-39 MIN: ICD-10-PCS | Mod: S$GLB,,, | Performed by: INTERNAL MEDICINE

## 2020-01-23 PROCEDURE — 1159F MED LIST DOCD IN RCRD: CPT | Mod: S$GLB,,, | Performed by: INTERNAL MEDICINE

## 2020-01-23 PROCEDURE — 1126F AMNT PAIN NOTED NONE PRSNT: CPT | Mod: S$GLB,,, | Performed by: INTERNAL MEDICINE

## 2020-01-23 PROCEDURE — 3079F DIAST BP 80-89 MM HG: CPT | Mod: CPTII,S$GLB,, | Performed by: INTERNAL MEDICINE

## 2020-01-23 PROCEDURE — 99999 PR PBB SHADOW E&M-EST. PATIENT-LVL V: ICD-10-PCS | Mod: PBBFAC,,, | Performed by: INTERNAL MEDICINE

## 2020-01-23 PROCEDURE — 3074F SYST BP LT 130 MM HG: CPT | Mod: CPTII,S$GLB,, | Performed by: INTERNAL MEDICINE

## 2020-01-23 PROCEDURE — 3079F PR MOST RECENT DIASTOLIC BLOOD PRESSURE 80-89 MM HG: ICD-10-PCS | Mod: CPTII,S$GLB,, | Performed by: INTERNAL MEDICINE

## 2020-01-23 PROCEDURE — 99214 OFFICE O/P EST MOD 30 MIN: CPT | Mod: S$GLB,,, | Performed by: INTERNAL MEDICINE

## 2020-01-23 PROCEDURE — 3074F PR MOST RECENT SYSTOLIC BLOOD PRESSURE < 130 MM HG: ICD-10-PCS | Mod: CPTII,S$GLB,, | Performed by: INTERNAL MEDICINE

## 2020-01-23 RX ORDER — CITALOPRAM 10 MG/1
10 TABLET ORAL DAILY
Qty: 30 TABLET | Refills: 3 | Status: SHIPPED | OUTPATIENT
Start: 2020-01-23 | End: 2020-03-19

## 2020-01-23 RX ORDER — CARVEDILOL 6.25 MG/1
6.25 TABLET ORAL 2 TIMES DAILY WITH MEALS
Qty: 60 TABLET | Refills: 3 | Status: SHIPPED | OUTPATIENT
Start: 2020-01-23 | End: 2020-02-12 | Stop reason: SDUPTHER

## 2020-01-23 NOTE — PROGRESS NOTES
CHIEF COMPLAINT: Follow up of multiple issues.     HISTORY OF PRESENT ILLNESS: 64 year-old woman who presents for follow up of multiple issues.    She is now taking verapamil 40 mg 2 tablets twice daily for her hypertension.  She continues to have headaches.  She feels that she is having fluctuating of blood pressure. She had a dispute with her sister yesterday and her heart was racing at that time.  She feels her heart racing at night when she tries to go to sleep.  She is not tired on verapamil.     Stools were less frequent on the verapamil. She took a stool softner once. No cramping. She passes a lot of gas. No bloating.  She has tried a lot of supplements for her bowels which have not helped.     She had headaches on losartan and it did not help BP.   She was very tired on propranolol 20 mg three times daily. No headaches on propranolol. Propranolol helped her blood pressure.      Hoarseness is better since she has been taking loratadine 10 mg daily and omeprazole 40 mg daily.  She has sore throat and post nasal drip.      She is taking pravastatin 20 mg daily and Zeta 10 mg daily for her hyperlipidemia. NO muscle spasms or joint pain. She exercies regularly.     BNo chest pain or shortness of breath.      Neck comes and goes. She takes meloxicam as needed which helps.      She had another episode of postmenopausal bleeding> Dr Weston attempted a D and C in November but she has cervical stenosis and Dr Weston could not dilate the cervix.      She has been taking Boniva 150 mg once monthly for her osteoporosis.      She has seen Evelyn Cruz MSW for counseling which has helped her stress.       PAST MEDICAL HISTORY:   1. Hyperlipidemia.   2. Sleep disorder.   3. Cervical spine arthritis.   4. Episode of hematuria, negative cystoscope with Dr. Roland.     PAST SURGICAL HISTORY:   1. D&C for a miscarriage.   2. Conization due to dysplasia 18 years ago.   3. Tonsillectomy and adenoidectomy at age [age].     SOCIAL  "HISTORY: She does not smoke. She drinks two alcoholic beverages   a month. 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 She owns a business.     FAMILY HISTORY: She is adopted.     REVIEW OF SYSTEMS: She denies fevers, chills, night sweats, hot flashes, visual change. She has some slight hearing loss. She denies sinus congestion, sore throat, chest pain, shortness of breath, palpitations, nausea, vomiting, constipation, diarrhea, dysuria, hematuria, joint pain, muscle pain, rashes, headaches, polydipsia,   polyuria.     SCREENING: Mammogram 11/18, bone density 1/13.Colonoscopy was due 2018    PHYSICAL EXAMINATION:     /82 (BP Location: Right arm, Patient Position: Sitting, BP Method: Large (Manual))   Pulse 65   Temp 98.3 °F (36.8 °C) (Oral)   Ht 5' 4" (1.626 m)   Wt 64.4 kg (142 lb 1.4 oz)   LMP  (LMP Unknown)   SpO2 99%   BMI 24.39 kg/m²     General: Alert, oriented. No apparent distress. Affect within normal   limits.   Conjunctivae anicteric. PERRL. Tympanic membranes clear fluid bilaterally. Oropharynx   clear.   Neck supple. No cervical lymphadenopathy, no thyroid enlargement.   Respiratory effort normal. Lungs clear to auscultation.   Heart: Regular rate and rhythm without murmurs, gallops or rubs.   No lower extremity edema.    ABDOMEN: soft, non distended, non tender, bowel sounds present, no hepatosplenomgaly     Labs 1/6/20 reviewed     ASSESSMENT AND PLAN:    1. Hypertension -change verapamil to coreg 6.25 mg twice daily - start with a 1/2 tablet twice daily.  Add celexa 10 mg daily for stress. Suspect stress with family dynamics with work could be driving BP  2. Diarrhea  -stable  3.  Neck pain - stable  5. Hyperlipidemia- stable on meds  6. ALlergic rhinitis - stablel  7. Post menopausal bleeding- needs hysterectomy  8. Colonoscopy 2/2018 - normal due 2028.  9. Osteoporosis - BMD 12/17  - on Boniva  10. Insomnia - ambien pr  11. Left rotator cuff tear - better     She is to return in 2 weeks, " sooner if problems arise.      Answers for HPI/ROS submitted by the patient on 1/19/2020   activity change: No  unexpected weight change: No  neck pain: No  hearing loss: No  rhinorrhea: No  trouble swallowing: No  eye discharge: No  visual disturbance: No  chest tightness: No  wheezing: No  chest pain: No  palpitations: No  blood in stool: No  constipation: No  vomiting: No  diarrhea: No  polydipsia: No  polyuria: No  difficulty urinating: No  hematuria: No  menstrual problem: No  dysuria: No  joint swelling: No  arthralgias: No  headaches: No  weakness: No  confusion: No  dysphoric mood: No

## 2020-01-24 RX ORDER — IBANDRONATE SODIUM 150 MG/1
TABLET, FILM COATED ORAL
Qty: 3 TABLET | Refills: 0 | Status: SHIPPED | OUTPATIENT
Start: 2020-01-24 | End: 2020-06-11

## 2020-01-24 NOTE — TELEPHONE ENCOUNTER
Please schedule patient for Labs (DEXA scan)    Please also check with your provider if any further labs need to be added and scheduled together.    Thanks !

## 2020-01-24 NOTE — PROGRESS NOTES
Refill Authorization Note     is requesting a refill authorization.    Brief assessment and rationale for refill: REVIEW: abnormal labs     Medication-related problems identified: Requires labs    Medication Therapy Plan: VIT D<30; ergocalciferol added to therapy(1/20); NTBO(DEXA); FOVS                              Comments:   Requested Prescriptions   Pending Prescriptions Disp Refills    ibandronate (BONIVA) 150 mg tablet [Pharmacy Med Name: IBANDRONATE SODIUM 150 MG TABLET] 3 tablet 0     Sig: TAKE ONE TABLET BY MOUTH EVERY 30 days       Endocrinology:  Bisphosphonates Failed - 1/23/2020  9:14 AM        Failed - DEXA completed in last 720 days     Results for orders placed during the hospital encounter of 12/20/17   DXA Bone Density Spine And Hip 12/21/2017               Failed - Vitamin D in normal range and within 360 days     Vit D, 25-Hydroxy   Date Value Ref Range Status   01/06/2020 18 (L) 30 - 96 ng/mL Final     Comment:     Vitamin D deficiency.........<10 ng/mL                              Vitamin D insufficiency......10-29 ng/mL       Vitamin D sufficiency........> or equal to 30 ng/mL  Vitamin D toxicity............>100 ng/mL     01/02/2019 19 (L) 30 - 96 ng/mL Final     Comment:     Vitamin D deficiency.........<10 ng/mL                              Vitamin D insufficiency......10-29 ng/mL       Vitamin D sufficiency........> or equal to 30 ng/mL  Vitamin D toxicity............>100 ng/mL     12/15/2017 24 (L) 30 - 96 ng/mL Final     Comment:     Vitamin D deficiency.........<10 ng/mL                              Vitamin D insufficiency......10-29 ng/mL       Vitamin D sufficiency........> or equal to 30 ng/mL  Vitamin D toxicity............>100 ng/mL                Passed - Patient is at least 18 years old        Passed - Office visit in past 12 months or future 90 days.     Recent Outpatient Visits            Yesterday Essential hypertension    [facility] - [facility] Bonnie GARCIA  MD [place]    2 weeks ago Postmenopausal bleeding    [facility] - [facility] Bonnie Mathew MD    1 month ago Adjustment disorder with depressed mood    [place] - Psychiatry Evelyn Cruz, LCSW    1 month ago Adjustment disorder with depressed mood    [facility] Evelyn Cruz, LCSW    1 month ago Adjustment disorder with depressed mood    [facility] Evelyn Cruz, LCSW          Future Appointments              In 2 weeks MD Len Rosado estrellita - Internal Medicine, [facility] PCW    In 3 weeks Jeyson Pineda MD [facility] [place] [address] [facility]                Passed - Ca in normal range and within 360 days     Calcium   Date Value Ref Range Status   01/06/2020 9.6 8.7 - 10.5 mg/dL Final   01/02/2019 9.3 8.7 - 10.5 mg/dL Final   12/15/2017 9.5 8.7 - 10.5 mg/dL Final              Passed - Cr is 1.4 or below and within 360 days     Creatinine   Date Value Ref Range Status   01/06/2020 0.9 0.5 - 1.4 mg/dL Final   01/02/2019 1.0 0.5 - 1.4 mg/dL Final   12/15/2017 0.9 0.5 - 1.4 mg/dL Final              Passed - eGFR is 30 or above and within 360 days     eGFR if non    Date Value Ref Range Status   01/06/2020 >60.0 >60 mL/min/1.73 m^2 Final     Comment:     Calculation used to obtain the estimated glomerular filtration  rate (eGFR) is the CKD-EPI equation.      01/02/2019 59.3 (A) >60 mL/min/1.73 m^2 Final     Comment:     Calculation used to obtain the estimated glomerular filtration  rate (eGFR) is the CKD-EPI equation.      12/15/2017 >60.0 >60 mL/min/1.73 m^2 Final     Comment:     Calculation used to obtain the estimated glomerular filtration  rate (eGFR) is the CKD-EPI equation.        eGFR if    Date Value Ref Range Status   01/06/2020 >60.0 >60 mL/min/1.73 m^2 Final   01/02/2019 >60.0 >60 mL/min/1.73 m^2 Final   12/15/2017 >60.0 >60 mL/min/1.73 m^2 Final

## 2020-01-30 ENCOUNTER — PATIENT MESSAGE (OUTPATIENT)
Dept: INTERNAL MEDICINE | Facility: CLINIC | Age: 65
End: 2020-01-30

## 2020-02-07 RX ORDER — ESTRADIOL 0.1 MG/G
CREAM VAGINAL
Qty: 42.5 G | Refills: 4 | Status: ON HOLD | OUTPATIENT
Start: 2020-02-07 | End: 2020-09-25 | Stop reason: HOSPADM

## 2020-02-07 NOTE — PROGRESS NOTES
Refill Routing Note     Medication(s) are not appropriate for processing by [facility]:    Medication Outside of Protocol    Appointments  past 12m or future 3m with PCP    Date Provider   Last Visit   1/23/2020 Bonnie Mathew MD   Next Visit   2/12/2020 Bonnie Mathew MD           Automatic Epic Protocol Generated Data:    Requested Prescriptions   Pending Prescriptions Disp Refills    estradiol (ESTRACE) 0.01 % (0.1 mg/gram) vaginal cream [Pharmacy Med Name: ESTRADIOL 0.01 % CREAM/APPL] 42.5 g 4     Sig: INSERT ONE gram VAGINALLY TWO TIMES A WEEK       There is no refill protocol information for this order           Note composed:6:37 AM 02/07/2020

## 2020-02-12 ENCOUNTER — OFFICE VISIT (OUTPATIENT)
Dept: INTERNAL MEDICINE | Facility: CLINIC | Age: 65
End: 2020-02-12
Payer: COMMERCIAL

## 2020-02-12 VITALS
WEIGHT: 142.5 LBS | HEART RATE: 54 BPM | HEIGHT: 64 IN | SYSTOLIC BLOOD PRESSURE: 126 MMHG | DIASTOLIC BLOOD PRESSURE: 74 MMHG | BODY MASS INDEX: 24.33 KG/M2

## 2020-02-12 DIAGNOSIS — I10 ESSENTIAL HYPERTENSION: Primary | ICD-10-CM

## 2020-02-12 DIAGNOSIS — E78.5 HYPERLIPIDEMIA, UNSPECIFIED HYPERLIPIDEMIA TYPE: ICD-10-CM

## 2020-02-12 DIAGNOSIS — J30.9 ALLERGIC RHINITIS, UNSPECIFIED SEASONALITY, UNSPECIFIED TRIGGER: ICD-10-CM

## 2020-02-12 DIAGNOSIS — G47.09 OTHER INSOMNIA: ICD-10-CM

## 2020-02-12 PROCEDURE — 99214 OFFICE O/P EST MOD 30 MIN: CPT | Mod: S$GLB,,, | Performed by: INTERNAL MEDICINE

## 2020-02-12 PROCEDURE — 1159F PR MEDICATION LIST DOCUMENTED IN MEDICAL RECORD: ICD-10-PCS | Mod: S$GLB,,, | Performed by: INTERNAL MEDICINE

## 2020-02-12 PROCEDURE — 99214 PR OFFICE/OUTPT VISIT, EST, LEVL IV, 30-39 MIN: ICD-10-PCS | Mod: S$GLB,,, | Performed by: INTERNAL MEDICINE

## 2020-02-12 PROCEDURE — 3078F PR MOST RECENT DIASTOLIC BLOOD PRESSURE < 80 MM HG: ICD-10-PCS | Mod: CPTII,S$GLB,, | Performed by: INTERNAL MEDICINE

## 2020-02-12 PROCEDURE — 3074F SYST BP LT 130 MM HG: CPT | Mod: CPTII,S$GLB,, | Performed by: INTERNAL MEDICINE

## 2020-02-12 PROCEDURE — 3074F PR MOST RECENT SYSTOLIC BLOOD PRESSURE < 130 MM HG: ICD-10-PCS | Mod: CPTII,S$GLB,, | Performed by: INTERNAL MEDICINE

## 2020-02-12 PROCEDURE — 99999 PR PBB SHADOW E&M-EST. PATIENT-LVL III: CPT | Mod: PBBFAC,,, | Performed by: INTERNAL MEDICINE

## 2020-02-12 PROCEDURE — 1101F PR PT FALLS ASSESS DOC 0-1 FALLS W/OUT INJ PAST YR: ICD-10-PCS | Mod: CPTII,S$GLB,, | Performed by: INTERNAL MEDICINE

## 2020-02-12 PROCEDURE — 1101F PT FALLS ASSESS-DOCD LE1/YR: CPT | Mod: CPTII,S$GLB,, | Performed by: INTERNAL MEDICINE

## 2020-02-12 PROCEDURE — 99999 PR PBB SHADOW E&M-EST. PATIENT-LVL III: ICD-10-PCS | Mod: PBBFAC,,, | Performed by: INTERNAL MEDICINE

## 2020-02-12 PROCEDURE — 1159F MED LIST DOCD IN RCRD: CPT | Mod: S$GLB,,, | Performed by: INTERNAL MEDICINE

## 2020-02-12 PROCEDURE — 3078F DIAST BP <80 MM HG: CPT | Mod: CPTII,S$GLB,, | Performed by: INTERNAL MEDICINE

## 2020-02-12 PROCEDURE — 1126F PR PAIN SEVERITY QUANTIFIED, NO PAIN PRESENT: ICD-10-PCS | Mod: S$GLB,,, | Performed by: INTERNAL MEDICINE

## 2020-02-12 PROCEDURE — 1126F AMNT PAIN NOTED NONE PRSNT: CPT | Mod: S$GLB,,, | Performed by: INTERNAL MEDICINE

## 2020-02-12 RX ORDER — VALACYCLOVIR HYDROCHLORIDE 1 G/1
TABLET, FILM COATED ORAL
COMMUNITY
Start: 2020-01-27

## 2020-02-12 RX ORDER — ZOLPIDEM TARTRATE 10 MG/1
10 TABLET ORAL NIGHTLY PRN
Qty: 30 TABLET | Refills: 1 | Status: SHIPPED | OUTPATIENT
Start: 2020-02-12 | End: 2021-08-25 | Stop reason: SDUPTHER

## 2020-02-12 RX ORDER — CARVEDILOL 12.5 MG/1
12.5 TABLET ORAL 2 TIMES DAILY WITH MEALS
Qty: 60 TABLET | Refills: 3 | Status: SHIPPED | OUTPATIENT
Start: 2020-02-12 | End: 2020-03-06 | Stop reason: SDUPTHER

## 2020-02-12 NOTE — PROGRESS NOTES
CHIEF COMPLAINT: Follow up of multiple issues.     HISTORY OF PRESENT ILLNESS: 64 year-old woman who presents for follow up of multiple issues.    She is now taking coreg 12.5 mg twice daily. BP is much improved.  118/72, 106/58 at home. Headcahes are better.  Sinuses are doing ok. She is slightly tired when she exercises.  She feels better.  She has not started on celexa yet for stress.     She continues to have irregular bowels. Alternates between constipation and diarrhea     Sinuses are doing well on loratadine 10 mg daily and omeprazole 40 mg daily.  She has sore throat and post nasal drip.      She is taking pravastatin 20 mg daily and Zeta 10 mg daily for her hyperlipidemia. NO muscle spasms or joint pain. She exercies regularly.     No chest pain or shortness of breath.      Neck comes and goes. She takes meloxicam as needed which helps.      She will be having a hysterectomy due to post menopausal bleeding. She is being evaluated by Urogyn for her incontinence and may have a sling as well.     She has been taking Boniva 150 mg once monthly for her osteoporosis.      She has seen Evelyn Cruz MSW int he past for counseling which has helped her stress.       PAST MEDICAL HISTORY:   1. Hyperlipidemia.   2. Sleep disorder.   3. Cervical spine arthritis.   4. Episode of hematuria, negative cystoscope with Dr. Roland.     PAST SURGICAL HISTORY:   1. D&C for a miscarriage.   2. Conization due to dysplasia 18 years ago.   3. Tonsillectomy and adenoidectomy at age [age].     SOCIAL HISTORY: She does not smoke. She drinks two alcoholic beverages   a month. 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 She owns a business.     FAMILY HISTORY: She is adopted.     REVIEW OF SYSTEMS: She denies fevers, chills, night sweats, hot flashes, visual change. She has some slight hearing loss. She denies sinus congestion, sore throat, chest pain, shortness of breath, palpitations, nausea, vomiting, constipation, diarrhea, dysuria,  "hematuria, joint pain, muscle pain, rashes, headaches, polydipsia,   polyuria.     SCREENING: Mammogram 11/18, bone density 1/13.Colonoscopy was due 2018    PHYSICAL EXAMINATION:     /74   Pulse (!) 54   Ht 5' 4" (1.626 m)   Wt 64.7 kg (142 lb 8.4 oz)   LMP  (LMP Unknown)   BMI 24.46 kg/m²     General: Alert, oriented. No apparent distress. Affect within normal   limits.   Conjunctivae anicteric. PERRL. Tympanic membranes clear fluid bilaterally. Oropharynx   clear.   Neck supple. No cervical lymphadenopathy, no thyroid enlargement.   Respiratory effort normal. Lungs clear to auscultation.   Heart: Regular rate and rhythm without murmurs, gallops or rubs.   No lower extremity edema.         ASSESSMENT AND PLAN:    1. Hypertension -Continue coreg 12.5 mg twice daily  2. Stress - start on celexa 10 mg daily  3. Diarrhea  -stable  3.  Neck pain - stable  5. Hyperlipidemia- stable on meds  6. ALlergic rhinitis - stablel  7. Post menopausal bleeding- needs hysterectomy - t9. Osteoporosis - BMD 12/17  - on Boniva  10. Insomnia - ambien pred for a sling.   11. Left rotator cuff tear - better     She is to return in 8 weeks, sooner if problems arise.   Answers for HPI/ROS submitted by the patient on 2/5/2020   activity change: No  unexpected weight change: No  neck pain: No  hearing loss: No  rhinorrhea: No  trouble swallowing: No  eye discharge: No  visual disturbance: No  chest tightness: No  wheezing: No  chest pain: No  palpitations: No  blood in stool: No  constipation: No  vomiting: No  diarrhea: No  polydipsia: No  polyuria: No  difficulty urinating: No  hematuria: No  menstrual problem: No  dysuria: No  joint swelling: No  arthralgias: No  headaches: No  weakness: No  confusion: No  dysphoric mood: No    "

## 2020-02-17 ENCOUNTER — PATIENT OUTREACH (OUTPATIENT)
Dept: ADMINISTRATIVE | Facility: OTHER | Age: 65
End: 2020-02-17

## 2020-02-19 ENCOUNTER — TELEPHONE (OUTPATIENT)
Dept: UROGYNECOLOGY | Facility: CLINIC | Age: 65
End: 2020-02-19

## 2020-02-19 ENCOUNTER — HOSPITAL ENCOUNTER (OUTPATIENT)
Dept: RADIOLOGY | Facility: OTHER | Age: 65
Discharge: HOME | End: 2020-02-19
Attending: INTERNAL MEDICINE
Payer: COMMERCIAL

## 2020-02-19 DIAGNOSIS — E55.9 VITAMIN D DEFICIENCY DISEASE: ICD-10-CM

## 2020-02-19 PROCEDURE — 77080 DEXA BONE DENSITY SPINE HIP: ICD-10-PCS | Mod: 26,,, | Performed by: INTERNAL MEDICINE

## 2020-02-19 PROCEDURE — 77080 DXA BONE DENSITY AXIAL: CPT | Mod: 26,,, | Performed by: INTERNAL MEDICINE

## 2020-02-19 PROCEDURE — 77080 DXA BONE DENSITY AXIAL: CPT | Mod: TC

## 2020-02-26 ENCOUNTER — TELEPHONE (OUTPATIENT)
Dept: UROGYNECOLOGY | Facility: CLINIC | Age: 65
End: 2020-02-26

## 2020-02-26 NOTE — TELEPHONE ENCOUNTER
----- Message from Gabriel Diamond, Patient Care Assistant sent at 2/24/2020  4:55 PM CST -----  Contact: MAGGIE HUTSON [[id number]]  Name of Who is Calling: MAGGIE HUTSON [[id number]]    What is the request in detail: Requesting a call back to reschedule consult visit. Please contact to further discuss and advise      Can the clinic reply by [facility]: No    What Number to Call Back if not in [facility]:   [phone]

## 2020-03-02 ENCOUNTER — TELEPHONE (OUTPATIENT)
Dept: UROGYNECOLOGY | Facility: CLINIC | Age: 65
End: 2020-03-02

## 2020-03-02 ENCOUNTER — PATIENT MESSAGE (OUTPATIENT)
Dept: INTERNAL MEDICINE | Facility: CLINIC | Age: 65
End: 2020-03-02

## 2020-03-02 NOTE — TELEPHONE ENCOUNTER
----- Message from Zi Alonso sent at 3/2/2020  8:29 AM CST -----  PLEASE CALL PT SHE IS RETURNING YOUR CALL   TO RESCHEDULED HER APPT  [phone]

## 2020-03-02 NOTE — TELEPHONE ENCOUNTER
CALLED PATIENT TO RESCHEDULE HER APPT WITH DR RUIZ. WAS ABLE TO PUT HER ON [facility] SCHEDULE AND DR RUIZ WILL STEP IN TO ANSWER ANY OTHER QUESTIONS.

## 2020-03-03 ENCOUNTER — PATIENT OUTREACH (OUTPATIENT)
Dept: ADMINISTRATIVE | Facility: OTHER | Age: 65
End: 2020-03-03

## 2020-03-04 ENCOUNTER — INITIAL CONSULT (OUTPATIENT)
Dept: UROGYNECOLOGY | Facility: CLINIC | Age: 65
End: 2020-03-04
Attending: OBSTETRICS & GYNECOLOGY
Payer: COMMERCIAL

## 2020-03-04 ENCOUNTER — LAB VISIT (OUTPATIENT)
Dept: LAB | Facility: OTHER | Age: 65
End: 2020-03-04
Attending: INTERNAL MEDICINE
Payer: COMMERCIAL

## 2020-03-04 ENCOUNTER — PATIENT MESSAGE (OUTPATIENT)
Dept: INTERNAL MEDICINE | Facility: CLINIC | Age: 65
End: 2020-03-04

## 2020-03-04 VITALS
WEIGHT: 142.88 LBS | SYSTOLIC BLOOD PRESSURE: 158 MMHG | DIASTOLIC BLOOD PRESSURE: 88 MMHG | HEIGHT: 64 IN | BODY MASS INDEX: 24.39 KG/M2

## 2020-03-04 DIAGNOSIS — N14.11 CONTRAST DYE INDUCED NEPHROPATHY: ICD-10-CM

## 2020-03-04 DIAGNOSIS — K42.9 UMBILICAL HERNIA WITHOUT OBSTRUCTION AND WITHOUT GANGRENE: ICD-10-CM

## 2020-03-04 DIAGNOSIS — R15.1 FECAL SMEARING: ICD-10-CM

## 2020-03-04 DIAGNOSIS — T50.8X5A CONTRAST DYE INDUCED NEPHROPATHY: ICD-10-CM

## 2020-03-04 DIAGNOSIS — N95.0 POSTMENOPAUSAL BLEEDING: ICD-10-CM

## 2020-03-04 DIAGNOSIS — N81.2 CYSTOCELE WITH INCOMPLETE UTEROVAGINAL PROLAPSE: ICD-10-CM

## 2020-03-04 DIAGNOSIS — N39.46 MIXED INCONTINENCE URGE AND STRESS: Primary | ICD-10-CM

## 2020-03-04 LAB
CREAT SERPL-MCNC: 0.8 MG/DL (ref 0.5–1.4)
EST. GFR  (AFRICAN AMERICAN): >60 ML/MIN/1.73 M^2
EST. GFR  (NON AFRICAN AMERICAN): >60 ML/MIN/1.73 M^2

## 2020-03-04 PROCEDURE — 36415 COLL VENOUS BLD VENIPUNCTURE: CPT

## 2020-03-04 PROCEDURE — 99999 PR PBB SHADOW E&M-EST. PATIENT-LVL V: ICD-10-PCS | Mod: PBBFAC,,, | Performed by: PHYSICIAN ASSISTANT

## 2020-03-04 PROCEDURE — 1159F MED LIST DOCD IN RCRD: CPT | Mod: S$GLB,,, | Performed by: PHYSICIAN ASSISTANT

## 2020-03-04 PROCEDURE — 1126F PR PAIN SEVERITY QUANTIFIED, NO PAIN PRESENT: ICD-10-PCS | Mod: S$GLB,,, | Performed by: PHYSICIAN ASSISTANT

## 2020-03-04 PROCEDURE — 87086 URINE CULTURE/COLONY COUNT: CPT

## 2020-03-04 PROCEDURE — 1101F PT FALLS ASSESS-DOCD LE1/YR: CPT | Mod: CPTII,S$GLB,, | Performed by: PHYSICIAN ASSISTANT

## 2020-03-04 PROCEDURE — 1126F AMNT PAIN NOTED NONE PRSNT: CPT | Mod: S$GLB,,, | Performed by: PHYSICIAN ASSISTANT

## 2020-03-04 PROCEDURE — 3077F SYST BP >= 140 MM HG: CPT | Mod: CPTII,S$GLB,, | Performed by: PHYSICIAN ASSISTANT

## 2020-03-04 PROCEDURE — 1159F PR MEDICATION LIST DOCUMENTED IN MEDICAL RECORD: ICD-10-PCS | Mod: S$GLB,,, | Performed by: PHYSICIAN ASSISTANT

## 2020-03-04 PROCEDURE — 3079F DIAST BP 80-89 MM HG: CPT | Mod: CPTII,S$GLB,, | Performed by: PHYSICIAN ASSISTANT

## 2020-03-04 PROCEDURE — 99214 PR OFFICE/OUTPT VISIT, EST, LEVL IV, 30-39 MIN: ICD-10-PCS | Mod: S$GLB,,, | Performed by: PHYSICIAN ASSISTANT

## 2020-03-04 PROCEDURE — 82565 ASSAY OF CREATININE: CPT

## 2020-03-04 PROCEDURE — 99214 OFFICE O/P EST MOD 30 MIN: CPT | Mod: S$GLB,,, | Performed by: PHYSICIAN ASSISTANT

## 2020-03-04 PROCEDURE — 1101F PR PT FALLS ASSESS DOC 0-1 FALLS W/OUT INJ PAST YR: ICD-10-PCS | Mod: CPTII,S$GLB,, | Performed by: PHYSICIAN ASSISTANT

## 2020-03-04 PROCEDURE — 3079F PR MOST RECENT DIASTOLIC BLOOD PRESSURE 80-89 MM HG: ICD-10-PCS | Mod: CPTII,S$GLB,, | Performed by: PHYSICIAN ASSISTANT

## 2020-03-04 PROCEDURE — 3077F PR MOST RECENT SYSTOLIC BLOOD PRESSURE >= 140 MM HG: ICD-10-PCS | Mod: CPTII,S$GLB,, | Performed by: PHYSICIAN ASSISTANT

## 2020-03-04 PROCEDURE — 99999 PR PBB SHADOW E&M-EST. PATIENT-LVL V: CPT | Mod: PBBFAC,,, | Performed by: PHYSICIAN ASSISTANT

## 2020-03-04 NOTE — PATIENT INSTRUCTIONS
Mixed urinary incontinence, stress > urge    --Empty bladder every 2-3 hours, even if don't have the urge.  Empty well: wait a minute, lean forward on toilet.    --Avoid dietary irritants (see sheet).  Keep diary x 3-5 days to determine your irritants.  --KEGELS: do 10 in AM and 10 in PM, holding each x 10 seconds.  When you feel urge to go, STOP, KEGEL, and when urge has passed, then go to bathroom.    --Refer to PT. [facility]Rain Macias: (p) [phone]. (f) [phone].    --URGE: Consider anticholinergic.    --STRESS:  Pessary vs. Sling. Will schedule for Mid Urethral Sling (MUS) with Dr. Pineda, hysterectomy with either Dr. Pineda or Dr. Weston. Will need pre-op SUDS first.    Abnormal Uterine Bleeding  --will check with Dr. Weston to see if she wants to do lap or sarah hys, or have Dr. Pineda do it.   --surgical option with Dr. Pineda: LAV hysterectomy + BSO, MUS, possible uterosacral suspension, possible anterior repair  --Schedule SUDS + cysto with LK  --See Deirdre Velasquez after CT for surgery consult for umbilical hernia  --Needs pre-op clearance appointment from Dr. Mathew. Consider cardiology clearance.     Umbilical hernia  - ~3x3 cm hernia on exam  --schedule CT abdomen with contrast  -- refer to Dr. Gilmore or Eva for appt after CT    Fecal smearing  -- refer to PT    Vaginal atrophy  --Continue Use 1 gram of estrogen cream in vagina nightly x 2 weeks, then twice a week thereafter.    --Use REPLENS or REFRESH OTC: 1/2 applicator full in vagina twice a week. Can use fingers instead of applicator.  --For intercourse, use Astroglide    RTC for SUDS + cysto and pre-op consents with IVANNA

## 2020-03-04 NOTE — LETTER
March 4, 2020      Lashawn Weston MD  [address]  [place] [address]           [facility]   [address] [place] [id number]  Phone: [phone]          Patient: Tj Hernandez   MR Number: [id number]   YOB: 1955   Date of Visit: 3/4/2020       Dear Dr. Lashawn Weston:    Thank you for referring Tj Hernandez to me for evaluation. Attached you will find relevant portions of my assessment and plan of care.    If you have questions, please do not hesitate to call me. I look forward to following Tj Hernandez along with you.    Sincerely,    Tyree Esquivel PA-C    Enclosure  CC:  No Recipients    If you would like to receive this communication electronically, please contact [email or web address] or [phone] to request more information on [employer] access.    For providers and/or their staff who would like to refer a patient to [facility], please contact us through our one-stop-shop provider referral line, [facility], at [phone].    If you feel you have received this communication in error or would no longer like to receive these types of communications, please e-mail [email or web address]

## 2020-03-04 NOTE — PROGRESS NOTES
[facility] 4   [address] [address]    Tj Hernandez  [id number]  1955    Consulting Physician: Lashawn Weston MD   GYN: Enrico  PCP: Bonnie Mathew MD    CC:   Chief Complaint   Patient presents with    Urinary Incontinence    Urinary Urgency    abnormal vaginal bleeding       HPI: 65 y/o  with HTN, HLD, osteopenia, adjustment disorder, and AUB presents for urinary incontinence. Followed by Dr. Lashawn Weston for AUB--pelvic US normal, unable to do endometrial biopsy in office due to stenosis from previous cone biopsy decades ago, tried hysteroscopy in the OR, still unable to get good endometrial sample. Discussed option of hysterectomy, wants to have bladder surgery at the same time, so referred to Dr. Pineda. She has not had any vaginal bleeding in the last few months.     1)  UI:  (+) SAHARA > (+) UUI  X 20years, feels like her whole life, even before she had children. (+) light pad:1/day, usually minimum wetness and 1/night usually minimum wetness. (Wearing one now at night because of coughing fits causing leakage). Daytime frequency: Q ~ 3 hours, can hold longer if needs.  Nocturia: Yes: 1/night.   (--) dysuria,  (--) hematuria,  (--) frequent UTIs.  (--) incomplete bladder emptying. She cannot stop urinating mid-stream.     2)  POP: Absent.  (+) vaginal bleeding. (--) vaginal discharge. (+) sexually active.  (+) dyspareunia, but improved minmally since starting Estrace cream. More painful with insertion than deep penetration. (+)  Vaginal dryness, improved since starting Estrace, but still bothersome.  (+) vaginal estrogen (Estrace) use for several years.     3)  BM:  (--) constipation/straining. Very regular, goes every morning.  (--) chronic diarrhea. (+) hematochezia.  (+) fecal incontinence.  (+) fecal smearing/urgency.  (+) incomplete evacuation. Has been having smearing for as long as she can remember.     Past Medical  History  Past Medical History:   Diagnosis Date    Abnormal Pap smear of cervix     Adjustment disorder     Colon polyp     benign    Hormone disorder     Hyperlipidemia     Hypertension     Kidney stone     Neck pain     PONV (postoperative nausea and vomiting)     Recurrent UTI 9/29/2014        Past Surgical History  Past Surgical History:   Procedure Laterality Date    COLONOSCOPY N/A 2/19/2018    Procedure: COLONOSCOPY;  Surgeon: Naveen Curry MD;  Location: [address]);  Service: Endoscopy;  Laterality: N/A;    CYSTOSCOPY      DILATION AND CURETTAGE OF UTERUS      HYSTEROSCOPY WITH DILATION AND CURETTAGE OF UTERUS N/A 11/12/2019    Procedure: HYSTEROSCOPY, WITH DILATION AND CURETTAGE OF UTERUS;  Surgeon: Lashawn Weston MD;  Location: [facility];  Service: OB/GYN;  Laterality: N/A;    TONSILLECTOMY        Hysterectomy: No    Family History  Family History   Adopted: Yes     Social History  Social History     Tobacco Use   Smoking Status Never Smoker   Smokeless Tobacco Never Used      Social History     Substance and Sexual Activity   Alcohol Use Yes    Alcohol/week: 2.0 standard drinks    Types: 2 Shots of liquor per week    Frequency: 2-3 times a week    Drinks per session: 1 or 2    Binge frequency: Never    Comment: Rarely.   .    Social History     Substance and Sexual Activity   Drug Use No     The patient is .  Resides in [place] [address].  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    Allergies  Review of patient's allergies indicates:  No Known Allergies    Medications  Current Outpatient Medications on File Prior to Visit   Medication Sig Dispense Refill    biotin 1 mg tablet Take 1,000 mcg by mouth once daily.       calcium carbonate (OS-MOSHE) 600 mg (1,500 mg) Tab Take 600 mg by mouth once daily.       carvediloL (COREG) 12.5 MG tablet Take 1 tablet (12.5 mg total) by mouth 2 (two) times daily  with meals. 60 tablet 3    cetirizine (ZYRTEC) 10 MG tablet Take 10 mg by mouth once daily.      cholecalciferol, vitamin D3, 2,000 unit Cap Take 1 capsule by mouth once daily.      citalopram (CELEXA) 10 MG tablet Take 1 tablet (10 mg total) by mouth once daily. 30 tablet 3    ergocalciferol (ERGOCALCIFEROL) 50,000 unit Cap Take 1 capsule (50,000 Units total) by mouth twice a week. 24 capsule 3    estradiol (ESTRACE) 0.01 % (0.1 mg/gram) vaginal cream Place 1 g vaginally twice a week. 42.5 g 4    estradiol (ESTRACE) 0.01 % (0.1 mg/gram) vaginal cream INSERT ONE gram VAGINALLY TWO TIMES A WEEK 42.5 g 4    ezetimibe (ZETIA) 10 mg tablet TAKE ONE TABLET BY MOUTH DAILY 90 tablet 0    multivitamin capsule Take 1 capsule by mouth once daily.      omeprazole (PRILOSEC) 20 MG capsule Take 20 mg by mouth once daily.      pravastatin (PRAVACHOL) 20 MG tablet TAKE ONE TABLET BY MOUTH DAILY 90 tablet 0    UBIDECARENONE (COENZYME Q10) 100 mg Tab Take 1 tablet (100 mg total) by mouth once daily.      valACYclovir (VALTREX) 1000 MG tablet       verapamil (CALAN) 40 MG Tab Take 1 tablet (40 mg total) by mouth 2 (two) times daily. 60 tablet 1    zolpidem (AMBIEN) 10 mg Tab Take 1 tablet (10 mg total) by mouth nightly as needed. 30 tablet 1    ibandronate (BONIVA) 150 mg tablet TAKE ONE TABLET BY MOUTH EVERY 30 days (Patient not taking: Reported on 3/4/2020) 3 tablet 0    propranolol (INDERAL) 20 MG tablet 1/2-1 tablet three times daily for anxiety and blood pressure (Patient not taking: Reported on 3/4/2020) 90 tablet 11     No current facility-administered medications on file prior to visit.        Past OB History     x 2.   Largest infant weight: 9 lb 12oz  yes FAVD.   yes episiotomy.      Gynecologic History  LMP: No LMP recorded (lmp unknown). Patient is postmenopausal.  Method of contraception: post-menopausal  Age of menarche: 13  Age of menopause: late 40s  Menstrual history: normal until the  "end    Well Woman  Last pap: 1/2019 -- NSIL and HPV-  Last mammogram: 12/2019  Colonoscopy: 2018 -- repeat in 10 years  DEXA: 02/20 -- osteopenia    Review of Systems A 14 point ROS was reviewed with pertinent positives as noted above in the history of present illness.      Constitutional: negative  Eyes: negative  Endocrine: negative  Gastrointestinal: negative  Cardiovascular: negative  Respiratory: negative  Allergic/Immunologic: negative  Integumentary: negative  Psychiatric: negative  Musculoskeletal: negative   Ear/Nose/Throat: negative  Neurologic: negative  Genitourinary: SEE HPI  Hematologic/Lymphatic: negative   Breast: negative    Urogynecologic Exam  BP (!) 158/88 (BP Location: Right arm, Patient Position: Sitting)   Ht 5' 4" (1.626 m)   Wt 64.8 kg (142 lb 13.7 oz)   LMP  (LMP Unknown)   BMI 24.52 kg/m²     GENERAL APPEARANCE:  The patient is a well-developed, well-nourished female.  Neck:  Supple with no thyromegaly, no carotid bruits.  Heart:  Regular rate and rhythm, no murmurs, rubs or gallops.  Lungs:  Clear.  No CVA tenderness.  Abdomen:  Soft, nontender, nondistended, no hepatosplenomegaly.  Incisions:  none    PELVIC:    External genitalia:  Normal Bartholins, Skenes and labia bilaterally.    Urethra:  No caruncle, diverticulum or masses.  (+) hypermobility.    Vagina:  Atrophy (--) , no bladder masses or tender, no discharge.    Cervix:  normal appearance  Uterus: normal size, contour, position, consistency, mobility, non-tender  Adnexa: Not palpable.    POP-Q:  Aa 0; Ba 0; C -8; Ap -2; Bp -2; D -10.  Genital hiatus 5, perineal body 2, total vaginal length 10.  Deferred.  No obvious POP present with valsalva.     NEUROLOGIC:  Cranial nerves 2 through 12 intact.  Strength 5/5.  DTRs 2+ lower extremities.  S2 through 4 normal.  Sacral reflexes intact.    EXT: HASSAN, 2+ pulses bilaterally, no C/C/E    COUGH STRESS TEST:  negative  KEGEL: 1 /5    RECTAL:    External:  Normal, (--) hemorrhoids, " (--) dovetailing.   Internal:  deferred    PVR: 30 mL    01/10/2019 Pelvic US  FINDINGS:  Uterus:  Size: Normal in size, measuring 7.4 x 2.6 x 4.1 cm  Masses: None  Endometrium: Normal in this post menopausal patient, measuring 1 mm.    Right ovary:  Size: Small, measuring 1.6 x 1.7 x 1.4 cm  Appearance: Normal  Vascular flow: Normal.    Left ovary: Not visualized.    Free Fluid: None    11/12/2019 Hysteroscopy with D&C  Final Pathologic Diagnosis SCANT FRAGMENTS OF BENIGN SQUAMOUS EPITHELIUM AND MUCUS, NO DYSPLASIA OR   ENDOMETRIAL TISSUE IDENTIFIED        Impression  1. Mixed incontinence urge and stress    2. Fecal smearing    3. Umbilical hernia without obstruction and without gangrene    4. Postmenopausal bleeding    5. Contrast dye induced nephropathy    6. Cystocele with incomplete uterovaginal prolapse        Initial Plan  The patient was counseled regarding these issues. She was given a summary sheet containing each of these issues with possible options for evaluation and management. We also reviewed computer-generated diagrams specific to her pelvic organ prolapse quantification findings and she was given a copy of this for her records.  All her questions were addressed to her satisfaction.     Mixed urinary incontinence, stress > urge    --Empty bladder every 2-3 hours, even if don't have the urge.  Empty well: wait a minute, lean forward on toilet.    --Avoid dietary irritants (see sheet).  Keep diary x 3-5 days to determine your irritants.  --KEGELS: do 10 in AM and 10 in PM, holding each x 10 seconds.  When you feel urge to go, STOP, KEGEL, and when urge has passed, then go to bathroom.    --Refer to PT. [facility]: (p) [phone]. (f) [phone].    --URGE: Consider anticholinergic.    --STRESS:  Pessary vs. Sling. Will schedule for Mid Urethral Sling (MUS) with Dr. Pineda, hysterectomy with either Dr. Pineda or Dr. Weston.     Abnormal Uterine Bleeding  --check with Dr. Weston to  see if she wants to do hys or have Dr. Pineda do it.   --surgical option with Dr. Pineda: LAV hysterectomy + BSO, MUS, possible uterosacral suspension, possible anterior repair  --Schedule SUDS + cysto with LK  --See Deirdre Velasquez after CT for surgery consult for umbilical hernia  --Needs pre-op clearance appointment from Dr. Mathew. Consider cardiology clearance.     Umbilical hernia  -- CT abdomen with contrast  -- ~3x3 cm hernia on exam  -- refer to Dr. Gilmore or Eva for appt after CT    Fecal smearing  -- refer to PT    Vaginal atrophy  --Continue Use 1 gram of estrogen cream in vagina nightly x 2 weeks, then twice a week thereafter.    --Use REPLENS or REFRESH OTC: 1/2 applicator full in vagina twice a week. Can use fingers instead of applicator.  --For intercourse, use Astroglide    Vaginal Prolapse (Cystocele)  --reviewed online using [employer] interactive POP-Q  --depending on surgery, Dr. Pineda will also likely do uterosacral suspension and/or Anterior repair    RTC for SUDS + cysto with LK    Approximately 60 min were spent in consult, 80 % in discussion.     Dr. Pineda present for exam and plan.    Thank you for requesting consultation of your patient.  I look forward to participating in her care.    Tyree Esquivel PA-C  Division of Female Pelvic Medicine and Reconstructive Surgery  Department of Obstetrics and Gynecology  [facility] [place], [place]

## 2020-03-05 LAB — BACTERIA UR CULT: NO GROWTH

## 2020-03-06 ENCOUNTER — HOSPITAL ENCOUNTER (OUTPATIENT)
Dept: RADIOLOGY | Facility: HOSPITAL | Age: 65
Discharge: HOME | End: 2020-03-06
Attending: PHYSICIAN ASSISTANT
Payer: COMMERCIAL

## 2020-03-06 DIAGNOSIS — K42.9 UMBILICAL HERNIA WITHOUT OBSTRUCTION AND WITHOUT GANGRENE: ICD-10-CM

## 2020-03-06 PROCEDURE — 74177 CT ABD & PELVIS W/CONTRAST: CPT | Mod: TC

## 2020-03-06 PROCEDURE — 74177 CT ABDOMEN PELVIS WITH CONTRAST: ICD-10-PCS | Mod: 26,,, | Performed by: RADIOLOGY

## 2020-03-06 PROCEDURE — 74177 CT ABD & PELVIS W/CONTRAST: CPT | Mod: 26,,, | Performed by: RADIOLOGY

## 2020-03-06 PROCEDURE — 25500020 PHARM REV CODE 255: Performed by: PHYSICIAN ASSISTANT

## 2020-03-06 RX ORDER — CARVEDILOL 12.5 MG/1
TABLET ORAL
Qty: 90 TABLET | Refills: 3 | Status: SHIPPED | OUTPATIENT
Start: 2020-03-06 | End: 2020-04-13 | Stop reason: SDUPTHER

## 2020-03-06 RX ADMIN — IOHEXOL 15 ML: 300 INJECTION, SOLUTION INTRAVENOUS at 03:03

## 2020-03-06 RX ADMIN — IOHEXOL 75 ML: 350 INJECTION, SOLUTION INTRAVENOUS at 04:03

## 2020-03-09 ENCOUNTER — TELEPHONE (OUTPATIENT)
Dept: UROGYNECOLOGY | Facility: CLINIC | Age: 65
End: 2020-03-09

## 2020-03-09 ENCOUNTER — PATIENT MESSAGE (OUTPATIENT)
Dept: INTERNAL MEDICINE | Facility: CLINIC | Age: 65
End: 2020-03-09

## 2020-03-09 DIAGNOSIS — R93.429 ABNORMAL CT SCAN, KIDNEY: Primary | ICD-10-CM

## 2020-03-09 RX ORDER — LISINOPRIL 5 MG/1
5 TABLET ORAL DAILY
Qty: 30 TABLET | Refills: 3 | Status: SHIPPED | OUTPATIENT
Start: 2020-03-09 | End: 2020-04-09

## 2020-03-09 NOTE — TELEPHONE ENCOUNTER
Reviewed CT A/P with patient.  Need CT urogram (reviwed with URO, as well).  Will get someone to call her and schedule.

## 2020-03-10 ENCOUNTER — HOSPITAL ENCOUNTER (OUTPATIENT)
Dept: RADIOLOGY | Facility: HOSPITAL | Age: 65
Discharge: HOME | End: 2020-03-10
Attending: OBSTETRICS & GYNECOLOGY
Payer: COMMERCIAL

## 2020-03-10 DIAGNOSIS — R93.429 ABNORMAL CT SCAN, KIDNEY: ICD-10-CM

## 2020-03-10 PROCEDURE — 74178 CT ABD&PLV WO CNTR FLWD CNTR: CPT | Mod: 26,,, | Performed by: RADIOLOGY

## 2020-03-10 PROCEDURE — 74178 CT UROGRAM ABD PELVIS W WO: ICD-10-PCS | Mod: 26,,, | Performed by: RADIOLOGY

## 2020-03-10 PROCEDURE — 74178 CT ABD&PLV WO CNTR FLWD CNTR: CPT | Mod: TC

## 2020-03-10 PROCEDURE — 25500020 PHARM REV CODE 255: Performed by: OBSTETRICS & GYNECOLOGY

## 2020-03-10 RX ADMIN — IOHEXOL 125 ML: 350 INJECTION, SOLUTION INTRAVENOUS at 03:03

## 2020-03-16 ENCOUNTER — TELEPHONE (OUTPATIENT)
Dept: UROGYNECOLOGY | Facility: CLINIC | Age: 65
End: 2020-03-16

## 2020-03-16 NOTE — TELEPHONE ENCOUNTER
----- Message from Jeyson Pineda MD sent at 3/14/2020  6:21 PM CDT -----  Please let patient know that I reviewed her CT scan with Urology.  They are not concerned about findings.  It looks like she has some cyst around her kidneys, which do not look bad.  Sometimes people have these.  I will see her back for bladder testing.  Thanks!

## 2020-03-16 NOTE — TELEPHONE ENCOUNTER
Spoke to pt, she was informed that Dr. Pineda reviewed her CT scan with Urology.  They are not concerned about findings.  It looks like she has some cyst around her kidneys, which do not look bad.  Sometimes people have these.  I will see her back for bladder testing. Pt is aware and verbalizes understanding.

## 2020-03-17 ENCOUNTER — PATIENT MESSAGE (OUTPATIENT)
Dept: INTERNAL MEDICINE | Facility: CLINIC | Age: 65
End: 2020-03-17

## 2020-03-19 ENCOUNTER — PROCEDURE VISIT (OUTPATIENT)
Dept: UROGYNECOLOGY | Facility: CLINIC | Age: 65
End: 2020-03-19
Payer: COMMERCIAL

## 2020-03-19 VITALS
BODY MASS INDEX: 24.17 KG/M2 | SYSTOLIC BLOOD PRESSURE: 130 MMHG | HEIGHT: 64 IN | WEIGHT: 141.56 LBS | DIASTOLIC BLOOD PRESSURE: 78 MMHG

## 2020-03-19 DIAGNOSIS — N39.46 MIXED INCONTINENCE URGE AND STRESS: Primary | ICD-10-CM

## 2020-03-19 DIAGNOSIS — N81.2 CYSTOCELE WITH INCOMPLETE UTEROVAGINAL PROLAPSE: ICD-10-CM

## 2020-03-19 LAB
BILIRUB SERPL-MCNC: NORMAL MG/DL
BLOOD URINE, POC: >50
COLOR, POC UA: NORMAL
GLUCOSE UR QL STRIP: NORMAL
KETONES UR QL STRIP: NORMAL
LEUKOCYTE ESTERASE URINE, POC: NORMAL
NITRITE, POC UA: NORMAL
PH, POC UA: 6
PROTEIN, POC: NORMAL
SPECIFIC GRAVITY, POC UA: 1.01
UROBILINOGEN, POC UA: NORMAL

## 2020-03-19 PROCEDURE — 51728 PR COMPLEX CYSTOMETROGRAM VOIDING PRESSURE STUDIES: ICD-10-PCS | Mod: S$GLB,,, | Performed by: OBSTETRICS & GYNECOLOGY

## 2020-03-19 PROCEDURE — 51784 ANAL/URINARY MUSCLE STUDY: CPT | Mod: 51,S$GLB,, | Performed by: OBSTETRICS & GYNECOLOGY

## 2020-03-19 PROCEDURE — 51728 CYSTOMETROGRAM W/VP: CPT | Mod: S$GLB,,, | Performed by: OBSTETRICS & GYNECOLOGY

## 2020-03-19 PROCEDURE — 81002 URINALYSIS NONAUTO W/O SCOPE: CPT | Mod: S$GLB,,, | Performed by: OBSTETRICS & GYNECOLOGY

## 2020-03-19 PROCEDURE — 51797 PR VOIDING PRESS STUDY INTRA-ABDOMINAL VOID: ICD-10-PCS | Mod: S$GLB,,, | Performed by: OBSTETRICS & GYNECOLOGY

## 2020-03-19 PROCEDURE — 51797 INTRAABDOMINAL PRESSURE TEST: CPT | Mod: S$GLB,,, | Performed by: OBSTETRICS & GYNECOLOGY

## 2020-03-19 PROCEDURE — 51741 PR UROFLOWMETRY, COMPLEX: ICD-10-PCS | Mod: 51,S$GLB,, | Performed by: OBSTETRICS & GYNECOLOGY

## 2020-03-19 PROCEDURE — 51741 ELECTRO-UROFLOWMETRY FIRST: CPT | Mod: 51,S$GLB,, | Performed by: OBSTETRICS & GYNECOLOGY

## 2020-03-19 PROCEDURE — 52000 PR CYSTOURETHROSCOPY: ICD-10-PCS | Mod: 59,S$GLB,, | Performed by: OBSTETRICS & GYNECOLOGY

## 2020-03-19 PROCEDURE — 81002 POCT URINE DIPSTICK WITHOUT MICROSCOPE: ICD-10-PCS | Mod: S$GLB,,, | Performed by: OBSTETRICS & GYNECOLOGY

## 2020-03-19 PROCEDURE — 51784 PR ANAL/URINARY MUSCLE STUDY: ICD-10-PCS | Mod: 51,S$GLB,, | Performed by: OBSTETRICS & GYNECOLOGY

## 2020-03-19 PROCEDURE — 52000 CYSTOURETHROSCOPY: CPT | Mod: 59,S$GLB,, | Performed by: OBSTETRICS & GYNECOLOGY

## 2020-03-19 RX ORDER — CIPROFLOXACIN 500 MG/1
500 TABLET ORAL
Status: COMPLETED | OUTPATIENT
Start: 2020-03-19 | End: 2020-03-19

## 2020-03-19 RX ORDER — LIDOCAINE HYDROCHLORIDE 20 MG/ML
JELLY TOPICAL ONCE
Status: COMPLETED | OUTPATIENT
Start: 2020-03-19 | End: 2020-03-19

## 2020-03-19 RX ADMIN — CIPROFLOXACIN 500 MG: 500 TABLET ORAL at 02:03

## 2020-03-19 RX ADMIN — LIDOCAINE HYDROCHLORIDE 5 ML: 20 JELLY TOPICAL at 02:03

## 2020-03-19 NOTE — PATIENT INSTRUCTIONS
1)  Fecal smearing:  --make sure to use wet wipes after BM to help clean around some mild, redundant tissue from old, inactive hemorrhoids   --work on bulking stool   Start daily fiber.  Take 1 tsp of fiber powder (psyllium or other sugar-free powder).  Mix in 8 oz of water.  Take x 3-5 days.  Then, increase fiber by 1 tsp every 3-5 days until stool is easy to pass, less smearing on pad  Stop and continue at that dose.   Do not exceed 6 tsps/day.    --keep diary and avoid dietary triggers  --call PT and book appt for when they can get you started  --if refractory, consider pacemaker for bowel (Interstim)    2)  Call General surgery to make appt to evaluate abdominal hernia:  Razia Gilmore or Eva  [phone]    2)  Will call you to discuss OR dates once we have ok to move forward with surgeries.

## 2020-03-19 NOTE — PROGRESS NOTES
TITLE OF OPERATION:  1. Complex cystometry.  2. Complex uroflowmetry.  3. Electromyography with surface electrodes.  4. Pressure voiding flow study.  5. Abdominal pressure measurement.  6. Leak point pressure measurement.    INDICATIONS:  HPI: 63 y/o  with HTN, HLD, osteopenia, adjustment disorder, and AUB presents for urinary incontinence. Followed by Dr. Lashawn Weston for AUB--pelvic US normal, unable to do endometrial biopsy in office due to stenosis from previous cone biopsy decades ago, tried hysteroscopy in the OR, still unable to get good endometrial sample. Discussed option of hysterectomy, wants to have bladder surgery at the same time, so referred to Dr. Pineda. She has not had any vaginal bleeding in the last few months.     01/10/2019 Pelvic US  FINDINGS:  Uterus:  Size: Normal in size, measuring 7.4 x 2.6 x 4.1 cm  Masses: None  Endometrium: Normal in this post menopausal patient, measuring 1 mm.  Right ovary:  Size: Small, measuring 1.6 x 1.7 x 1.4 cm  Appearance: Normal  Vascular flow: Normal.  Left ovary: Not visualized.  Free Fluid: None     2019 Hysteroscopy with D&C  Final Pathologic Diagnosis SCANT FRAGMENTS OF BENIGN SQUAMOUS EPITHELIUM AND MUCUS, NO DYSPLASIA OR   ENDOMETRIAL TISSUE IDENTIFIED      1)  UI:  (+) SAHARA > (+) UUI  X 20years, feels like her whole life, even before she had children. (+) light pad:1/day, usually minimum wetness and 1/night usually minimum wetness. (Wearing one now at night because of coughing fits causing leakage). Daytime frequency: Q ~ 3 hours, can hold longer if needs.  Nocturia: Yes: 1/night.   (--) dysuria,  (--) hematuria,  (--) frequent UTIs.  (--) incomplete bladder emptying. She cannot stop urinating mid-stream.      2)  POP: Absent.  (+) vaginal bleeding. (--) vaginal discharge. (+) sexually active.  (+) dyspareunia, but improved minmally since starting Estrace cream. More painful with insertion than deep penetration. (+)  Vaginal dryness,  improved since starting Estrace, but still bothersome.  (+) vaginal estrogen (Estrace) use for several years.   --POP-Q:  Aa 0; Ba 0; C -8; Ap -2; Bp -2; D -10.  Genital hiatus 5, perineal body 2, total vaginal length 10.    3)  BM:  (--) constipation/straining. Very regular, goes every morning.  (--) chronic diarrhea. (+) hematochezia.  (+) fecal incontinence.  (+) fecal smearing/urgency.  (+) incomplete evacuation. Has been having smearing for as long as she can remember.     PREOPERATIVE DIAGNOSIS:  1. Mixed incontinence urge and stress    2. Fecal smearing    3. Umbilical hernia without obstruction and without gangrene    4. Postmenopausal bleeding    5. Contrast dye induced nephropathy    6. Cystocele with incomplete uterovaginal prolapse      POSTOPERATIVE DIAGNOSIS:  1. Mixed incontinence urge and stress    2. Fecal smearing    3. Umbilical hernia without obstruction and without gangrene    4. Postmenopausal bleeding    5. Contrast dye induced nephropathy    6. Cystocele with incomplete uterovaginal prolapse      ANESTHESIA:  None.    SPECIMEN (BACTERIOLOGICAL, PATHOLOGICAL OR OTHER):  None.    PROSTHETIC DEVICE/IMPLANT:  None.    SURGEONS NARRATIVE:  A time out was performed in which the patient identity and procedure were confirmed.  Urodynamic evaluation was performed using a computerized system ([employer]  Uroflowmetry was performed on the patient in the sitting position without catheters in place.  Subsequent urodynamic testing was performed with the patient in the lithotomy position at 45 degrees. Air charged catheters were used with sterile water as the infusion medium. Vesical and abdominal (rectal) pressures were measured, and detrusor pressure was calculated. EMG activity was recorded with surface electrodes. During filling, room temperature sterile water was infused at a rate of 30 cubic centimeters per minute. The patient was asked cough after instillation of each 100cc volume.  Two Valsalva leak point pressures and two cough leak point pressures were performed with the catheters in place at 300 cubic centimeters and again at maximum capacity. Valsalva leak point pressure was defined as the difference between vesical pressure at which leakage was noted (visualized at the external urethral meatus) and the baseline vesical pressure. Following urodynamic testing, a pressure flow study was performed with the patient in the sitting position. Vesical and abdominal pressures were monitored and detrusor pressures were calculated. After the pressure flow study, the catheters were then removed. The patient tolerated the procedure well.     Urine dipstick: neg.    1.  VOIDING PHASE:      a.  Uroflowmetry:   Prolapse reduction: No   Voided volume:  68 mL    PVR:   25 mL    The overall configuration of this uroflow study was with insufficient volume for interpretation.      b.  Pressure flow:   Prolapse reduction: Yes (pessary)    Voided volume:   300 mL   Voiding time:   100 seconds   Peak flow:  19 mL/s    Avg flow:  4 mL/s   Max det pressure:  46  cm H20   Det pressure at max flow: 12 cm H20   Void initiated by detrusor contraction.     Urethral relaxation (EMG):  absent.     PVR (calculated):  0 mL    The overall configuration of this pressure flow study was prolonged but normal.      2.  FILLING PHASE:   1st desire: 140 mL   Normal desire:  150 mL   Strong desire:  183 mL   Urgency:  301 mL   Compliance (calculated)  approx 60 mL/cm H20   EMG activity during filling:  stable   Detrusor contractions observed: No.     3.  URETHRAL FUNCTION/STORAGE PHASE:    a.  WITH prolapse reduction:   CLPP (150mL): Negative  at  75 cm H20   VLPP (150 mL): Negative  at  69 cm H20    CLPP (MAX ):    Positive  at  104 cm H20   VLPP (MAX):     Positive  at  77 cm H20     These findings are consistent with Positive urodynamic stress incontinence.    Assessment:  UF with insufficient volume for  interpretation.  PF prolonged but normal.  Compliance normal.  Max capacity 300 mL.  DO (--).  SAHARA (+).      Plan:  Mixed urinary incontinence, stress > urge    --Empty bladder every 2-3 hours, even if don't have the urge.  Empty well: wait a minute, lean forward on toilet.    --Avoid dietary irritants (see sheet).  Keep diary x 3-5 days to determine your irritants.  --start pelvic floor PT on [address] when possible  --URGE: Consider anticholinergic.    --STRESS:  add MUS to procedure     Abnormal Uterine Bleeding  --Weston ok with Edwin doing entire procedure  --surgical option with Dr. Pineda: LAV hysterectomy + BSO, MUS, possible uterosacral suspension, possible anterior repair (minimal apical descensus + needs hyst for AUB)  --pelvic US with thin EMB but GYN unable to do D&C due to stenosis  --See Deirdre or Eva after CT for surgery consult for umbilical hernia  --Needs pre-op clearance appointment from Dr. Mathew. Consider cardiology clearance.      Umbilical hernia  -- CT abdomen with contrast  -- ~3x3 cm hernia on exam  -- refer to Dr. Gilmore or Eva for appt after CT     Fecal smearing  --make sure to use wet wipes after BM to help clean around some mild, redundant tissue from old, inactive hemorrhoids   --work on bulking stool   Start daily fiber.  Take 1 tsp of fiber powder (psyllium or other sugar-free powder).  Mix in 8 oz of water.  Take x 3-5 days.  Then, increase fiber by 1 tsp every 3-5 days until stool is easy to pass, less smearing on pad  Stop and continue at that dose.   Do not exceed 6 tsps/day.    --keep diary and avoid dietary triggers  --call PT and book appt for when they can get you started  --if refractory, consider pacemaker for bowel (Interstim)     Vaginal atrophy  --Continue Use 1 gram of estrogen cream in vagina nightly x 2 weeks, then twice a week thereafter.    --Use REPLENS or REFRESH OTC: 1/2 applicator full in vagina twice a week. Can use fingers instead of  applicator.  --For intercourse, use Astroglide     Will call you to discuss OR dates once possible with COVID19 restrictions.       ------------------------------------------    Title of Operation:   Cystourethroscopy.     INDICATIONS:  As above    PREOPERATIVE DIAGNOSIS  As above    POSTOPERATIVE DIAGNOSIS:   As above    Anesthesia:   2% Xylocaine gel.    Specimen (Bacteriological, Pathological or other):   None.     Prosthetic Device/Implant:   None.     Surgeons Narrative:     After informed consent was obtained, the patient was placed in the lithotomy position. The urethral meatus was prepped with Betadine and 10 cubic centimeters of 2% Xylocaine gel were introduced into the urethra. A flexible cystourethroscope was introduced into the bladder. The bladder was distended with approximately 300 cubic centimeters of sterile water. A systematic survey was performed in which the bladder was surveyed using multiple sequential passes in a clockwise fashion from the bladder dome to the bladder base to the urethrovesical junction. The trigone and ureteral orifices were observed. The scope was then flipped back on itself, and the urethrovesical junction was viewed. A vaginal examining finger was then placed with pressure suburethrally at the urethrovesical junction as the telescope was withdrawn in order to perform positive pressure urethroscopy.  Standard maneuvers of cough, squeeze and Valsalva were performed. The telescope was then completely withdrawn.     Findings: Urethroscopy:  Normal.  Cystoscopy:  Normal bladder mucosa, bilateral ureteral flow was noted.     Assessment: Essentially normal cystourethroscopy.     Plan: The patient will follow up with Dr. Pineda as scheduled.  See urodynamics note from 3/19/2020 for further plan details.

## 2020-03-20 ENCOUNTER — TELEPHONE (OUTPATIENT)
Dept: UROGYNECOLOGY | Facility: CLINIC | Age: 65
End: 2020-03-20

## 2020-03-20 DIAGNOSIS — M54.9 BACK PAIN, UNSPECIFIED BACK LOCATION, UNSPECIFIED BACK PAIN LATERALITY, UNSPECIFIED CHRONICITY: Primary | ICD-10-CM

## 2020-03-20 NOTE — TELEPHONE ENCOUNTER
Spoke with pt who states she is having right lower back pain, pt had SUDS a couple of days ago and wants to know if this may be a side effect of the test. Pt not having any other symptoms such a fever, pain, and frequency of urine. Spoke with the doctor and this is not a known side effect of SUDS but we can check patient's urine to rule out UTI discussed this with patient she does not think she has a UTI due to negative results prior to the test, patient took something for pain and a muscle relaxer we wait to see if symptoms subside by tomorrow orders will be placed in the system and she will wait until then to see if she wants to drop off a urine sample voiced understanding and call was ended.

## 2020-03-20 NOTE — TELEPHONE ENCOUNTER
----- Message from Beatris Long sent at 3/20/2020  1:08 PM CDT -----  Contact: MAGGIE HUTSON [[id number]]  Who called:MAGGIE HUTSON [[id number]]    What is the request in detail: Patient is requesting a call back. She would like to know if a side effect if the SUDS test is back pain.   Please advise.    Can the clinic reply by [facility]? Y    Best call back number: [phone]    Additional Information: N/A

## 2020-03-24 ENCOUNTER — PATIENT MESSAGE (OUTPATIENT)
Dept: INTERNAL MEDICINE | Facility: CLINIC | Age: 65
End: 2020-03-24

## 2020-04-06 DIAGNOSIS — E78.5 HYPERLIPIDEMIA, UNSPECIFIED HYPERLIPIDEMIA TYPE: Primary | ICD-10-CM

## 2020-04-06 DIAGNOSIS — I10 ESSENTIAL HYPERTENSION: ICD-10-CM

## 2020-04-09 RX ORDER — PRAVASTATIN SODIUM 20 MG/1
TABLET ORAL
Qty: 90 TABLET | Refills: 3 | Status: SHIPPED | OUTPATIENT
Start: 2020-04-09 | End: 2020-07-17

## 2020-04-09 NOTE — PROGRESS NOTES
Refill Authorization Note     is requesting a refill authorization.    Brief assessment and rationale for refill: REVIEW: Lisinopril; APPROVE: prr     Medication-related problems identified: Drug-drug interaction    Medication Therapy Plan: Pharmacy sent note that pt states she should be taking lisinopril bid, last sent in for once daily-may be confusing with carvedilol dosage, please review; approve 12 more on pravastatin     Medication reconciliation completed: No                         Comments:   Refill Center Care Gap Closure protocols temporarily suspended.   Requested Prescriptions   Pending Prescriptions Disp Refills    lisinopriL (PRINIVIL,ZESTRIL) 5 MG tablet [Pharmacy Med Name: lisinopril 5 mg tablet] 90 tablet 3     Sig: TAKE ONE TABLET BY MOUTH DAILY (BLOOD PRESSURE)       Cardiovascular:  ACE Inhibitors Passed - 4/9/2020  2:13 PM        Passed - Patient is at least 18 years old        Passed - Last BP in normal range within 360 days.     BP Readings from Last 3 Encounters:   03/19/20 130/78   03/04/20 (!) 158/88   02/12/20 126/74              Passed - Office visit in past 12 months or future 90 days.     Recent Outpatient Visits            1 month ago Essential hypertension    [facility] Bonnie Mathew MD    2 months ago Essential hypertension    [facility] - Internal Medicine Bonnie Mathew MD    3 months ago Postmenopausal bleeding    [facility] - Internal Medicine Bonnie Mathew MD    3 months ago Adjustment disorder with depressed mood    [place] - Psychiatry Evelyn Cruz LCSW    4 months ago Adjustment disorder with depressed mood    [facility] Evelyn Cruz LCSW                    Passed - Cr is 1.3 or below and within 360 days     Creatinine   Date Value Ref Range Status   03/04/2020 0.8 0.5 - 1.4 mg/dL Final   01/06/2020 0.9 0.5 - 1.4 mg/dL Final   01/02/2019 1.0 0.5 - 1.4 mg/dL Final              Passed - K in normal range and  within 360 days     Potassium   Date Value Ref Range Status   01/06/2020 3.9 3.5 - 5.1 mmol/L Final   01/02/2019 4.0 3.5 - 5.1 mmol/L Final   12/15/2017 4.2 3.5 - 5.1 mmol/L Final              Passed - eGFR within 360 days     eGFR if non    Date Value Ref Range Status   03/04/2020 >60 >60 mL/min/1.73 m^2 Final     Comment:     Calculation used to obtain the estimated glomerular filtration  rate (eGFR) is the CKD-EPI equation.      01/06/2020 >60.0 >60 mL/min/1.73 m^2 Final     Comment:     Calculation used to obtain the estimated glomerular filtration  rate (eGFR) is the CKD-EPI equation.      01/02/2019 59.3 (A) >60 mL/min/1.73 m^2 Final     Comment:     Calculation used to obtain the estimated glomerular filtration  rate (eGFR) is the CKD-EPI equation.        eGFR if    Date Value Ref Range Status   03/04/2020 >60 >60 mL/min/1.73 m^2 Final   01/06/2020 >60.0 >60 mL/min/1.73 m^2 Final   01/02/2019 >60.0 >60 mL/min/1.73 m^2 Final            Signed Prescriptions Disp Refills    pravastatin (PRAVACHOL) 20 MG tablet 90 tablet 3     Sig: TAKE ONE TABLET BY MOUTH DAILY       Cardiovascular:  Antilipid - Statins Passed - 4/9/2020  2:13 PM        Passed - Patient is at least 18 years old        Passed - Office visit in past 12 months or future 90 days.     Recent Outpatient Visits            1 month ago Essential hypertension    [facility] Bonnie Mathew MD    2 months ago Essential hypertension    [facility] Bonnie Mathew MD    3 months ago Postmenopausal bleeding    [facility] Bonnie Mathew MD    3 months ago Adjustment disorder with depressed mood    [place] - Psychiatry Evelyn Cruz LCSW    4 months ago Adjustment disorder with depressed mood    [facility] Evelyn Cruz LCSW                    Passed - Lipid Panel completed in last 360 days     Lab Results   Component Value Date    CHOL 183  01/06/2020    HDL 67 01/06/2020    LDLCALC 94.6 01/06/2020    TRIG 107 01/06/2020             Passed - ALT is 94 or below and within 360 days     ALT   Date Value Ref Range Status   01/06/2020 14 10 - 44 U/L Final   01/02/2019 14 10 - 44 U/L Final   12/15/2017 11 10 - 44 U/L Final              Passed - AST is 54 or below and within 360 days     AST   Date Value Ref Range Status   01/06/2020 20 10 - 40 U/L Final   01/02/2019 27 10 - 40 U/L Final   12/15/2017 21 10 - 40 U/L Final               Appointments  past 12m or future 3m with PCP    Date Provider   Last Visit   2/12/2020 Bonnie Mathew MD   Next Visit   No visit found Bonnie Mathew MD   .  ED visits in past 90 days: 0       Note composed:3:20 PM 04/09/2020

## 2020-04-13 ENCOUNTER — PATIENT MESSAGE (OUTPATIENT)
Dept: INTERNAL MEDICINE | Facility: CLINIC | Age: 65
End: 2020-04-13

## 2020-04-13 DIAGNOSIS — I10 ESSENTIAL HYPERTENSION: ICD-10-CM

## 2020-04-13 RX ORDER — EZETIMIBE 10 MG/1
TABLET ORAL
Qty: 90 TABLET | Refills: 0 | Status: SHIPPED | OUTPATIENT
Start: 2020-04-13 | End: 2020-07-17

## 2020-04-13 RX ORDER — CARVEDILOL 12.5 MG/1
TABLET ORAL
Start: 2020-04-13 | End: 2021-03-18

## 2020-04-13 RX ORDER — LISINOPRIL 5 MG/1
2.5 TABLET ORAL DAILY
Start: 2020-04-13 | End: 2021-04-28 | Stop reason: SDUPTHER

## 2020-04-13 RX ORDER — LISINOPRIL 5 MG/1
TABLET ORAL
Qty: 90 TABLET | Refills: 3 | Status: SHIPPED | OUTPATIENT
Start: 2020-04-13 | End: 2020-04-13

## 2020-04-13 NOTE — TELEPHONE ENCOUNTER
Please see the following assessment. This refill request still requires some action on your part. Per pharmacy note, pt requesting lisinopril 5 mg BID but this dose has not been prescribed by you. Lisinopril dose was adjusted to 2.5 mg QD on 3/25/20 by you in addition to pt's Coreg 12.5 BID (Detailed Report). Defer to you in setting of recent dose adjustment. Thank you.

## 2020-04-13 NOTE — PROGRESS NOTES
Refill Authorization Note     is requesting a refill authorization.    Brief assessment and rationale for refill: APPROVE: NA non-intentional     Medication-related problems identified: Non-adherence (knowledge deficit) non-intentional    Medication Therapy Plan: Per EPIC data 79% adherence; Approve 3 more months    Medication reconciliation completed: No                         Comments:   Refill Center Care Gap Closure protocols temporarily suspended.   Requested Prescriptions   Pending Prescriptions Disp Refills    ezetimibe (ZETIA) 10 mg tablet [Pharmacy Med Name: ezetimibe 10 mg tablet] 90 tablet 0     Sig: TAKE ONE TABLET BY MOUTH DAILY       Cardiovascular:  Antilipid - Sterol Transport Inhibitors Passed - 4/8/2020  4:19 PM        Passed - Patient is at least 18 years old        Passed - Office visit in past 12 months or future 90 days.     Recent Outpatient Visits            2 months ago Essential hypertension    [facility] - Internal Medicine Bonnie Mathew MD    2 months ago Essential hypertension    [facility] Internal Medicine Bonnie Mathew MD    3 months ago Postmenopausal bleeding    [facility] Internal Medicine Bonnie Mathew MD    3 months ago Adjustment disorder with depressed mood    [place] - Psychiatry Evelyn Cruz LCSW    4 months ago Adjustment disorder with depressed mood    [facility] Evelyn Cruz LCSW                    Passed - Lipid Panel completed in last 360 days     Lab Results   Component Value Date    CHOL 183 01/06/2020    HDL 67 01/06/2020    LDLCALC 94.6 01/06/2020    TRIG 107 01/06/2020              Appointments  past 12m or future 3m with PCP    Date Provider   Last Visit   2/12/2020 Bonnie Mathew MD   Next Visit   Visit date not found Bonnie Mathew MD   .  ED visits in past 90 days: 0       Note composed:2:40 PM 04/13/2020

## 2020-05-08 ENCOUNTER — TELEPHONE (OUTPATIENT)
Dept: UROGYNECOLOGY | Facility: CLINIC | Age: 65
End: 2020-05-08

## 2020-05-08 NOTE — TELEPHONE ENCOUNTER
Attempted to reach patient regarding scheduling surgery.  VM left to call office.  Stephanie Roper, ADRIANP-BC

## 2020-05-15 ENCOUNTER — TELEPHONE (OUTPATIENT)
Dept: UROGYNECOLOGY | Facility: CLINIC | Age: 65
End: 2020-05-15

## 2020-05-15 ENCOUNTER — LAB VISIT (OUTPATIENT)
Dept: LAB | Facility: HOSPITAL | Age: 65
End: 2020-05-15
Attending: OBSTETRICS & GYNECOLOGY
Payer: COMMERCIAL

## 2020-05-15 DIAGNOSIS — M54.9 BACK PAIN, UNSPECIFIED BACK LOCATION, UNSPECIFIED BACK PAIN LATERALITY, UNSPECIFIED CHRONICITY: ICD-10-CM

## 2020-05-15 DIAGNOSIS — N39.41 URGE INCONTINENCE: ICD-10-CM

## 2020-05-15 DIAGNOSIS — N39.41 URGE INCONTINENCE: Primary | ICD-10-CM

## 2020-05-15 LAB
BACTERIA #/AREA URNS AUTO: ABNORMAL /HPF
BILIRUB UR QL STRIP: NEGATIVE
BILIRUB UR QL STRIP: NEGATIVE
CLARITY UR REFRACT.AUTO: CLEAR
CLARITY UR REFRACT.AUTO: CLEAR
COLOR UR AUTO: YELLOW
COLOR UR AUTO: YELLOW
GLUCOSE UR QL STRIP: NEGATIVE
GLUCOSE UR QL STRIP: NEGATIVE
HGB UR QL STRIP: NEGATIVE
HGB UR QL STRIP: NEGATIVE
KETONES UR QL STRIP: NEGATIVE
KETONES UR QL STRIP: NEGATIVE
LEUKOCYTE ESTERASE UR QL STRIP: NEGATIVE
LEUKOCYTE ESTERASE UR QL STRIP: NEGATIVE
MICROSCOPIC COMMENT: ABNORMAL
NITRITE UR QL STRIP: NEGATIVE
NITRITE UR QL STRIP: NEGATIVE
PH UR STRIP: 7 [PH] (ref 5–8)
PH UR STRIP: 7 [PH] (ref 5–8)
PROT UR QL STRIP: NEGATIVE
PROT UR QL STRIP: NEGATIVE
RBC #/AREA URNS AUTO: 5 /HPF (ref 0–4)
SP GR UR STRIP: 1.02 (ref 1–1.03)
SP GR UR STRIP: 1.02 (ref 1–1.03)
SQUAMOUS #/AREA URNS AUTO: 0 /HPF
URN SPEC COLLECT METH UR: NORMAL
URN SPEC COLLECT METH UR: NORMAL
WBC #/AREA URNS AUTO: 0 /HPF (ref 0–5)

## 2020-05-15 PROCEDURE — 81001 URINALYSIS AUTO W/SCOPE: CPT

## 2020-05-15 PROCEDURE — 87086 URINE CULTURE/COLONY COUNT: CPT

## 2020-05-15 NOTE — TELEPHONE ENCOUNTER
----- Message from Beatris Long sent at 5/15/2020  8:50 AM CDT -----  Contact: MAGGIE HUTSON [[id number]]    Type:  Patient Returning Call    Who Called: MAGGIE HUTSON [[id number]]    Who Left Message for Patient: ASHLEY Judson     Does the patient know what this is regarding?: Y    Can the clinic reply in [facility]: No    Best Call Back Number: [phone]    Additional Information: N/A

## 2020-05-15 NOTE — TELEPHONE ENCOUNTER
Patient is not ready to schedule due to 's mobility problem. Will call when ready.  She has urge incontinence. Was treated for uti a few weeks ago. Will recheck urine.  NIR Lei-BC

## 2020-05-16 LAB
BACTERIA UR CULT: NORMAL
BACTERIA UR CULT: NORMAL

## 2020-05-21 ENCOUNTER — TELEPHONE (OUTPATIENT)
Dept: UROGYNECOLOGY | Facility: CLINIC | Age: 65
End: 2020-05-21

## 2020-05-21 NOTE — TELEPHONE ENCOUNTER
Reveiwed urine culture with patient. Still having some urinary urgency. Will schedule follow up for cath specimen.  NIR Lei-BC

## 2020-06-10 ENCOUNTER — PATIENT OUTREACH (OUTPATIENT)
Dept: ADMINISTRATIVE | Facility: OTHER | Age: 65
End: 2020-06-10

## 2020-06-11 ENCOUNTER — OFFICE VISIT (OUTPATIENT)
Dept: UROGYNECOLOGY | Facility: CLINIC | Age: 65
End: 2020-06-11
Payer: COMMERCIAL

## 2020-06-11 VITALS
DIASTOLIC BLOOD PRESSURE: 70 MMHG | BODY MASS INDEX: 24.46 KG/M2 | WEIGHT: 143.31 LBS | HEIGHT: 64 IN | SYSTOLIC BLOOD PRESSURE: 110 MMHG

## 2020-06-11 DIAGNOSIS — R39.15 URINARY URGENCY: Primary | ICD-10-CM

## 2020-06-11 DIAGNOSIS — N39.41 URGE INCONTINENCE: ICD-10-CM

## 2020-06-11 PROCEDURE — 87086 URINE CULTURE/COLONY COUNT: CPT

## 2020-06-11 PROCEDURE — 1159F PR MEDICATION LIST DOCUMENTED IN MEDICAL RECORD: ICD-10-PCS | Mod: S$GLB,,, | Performed by: NURSE PRACTITIONER

## 2020-06-11 PROCEDURE — 51701 PR INSERTION OF NON-INDWELLING BLADDER CATHETERIZATION FOR RESIDUAL UR: ICD-10-PCS | Mod: S$GLB,,, | Performed by: NURSE PRACTITIONER

## 2020-06-11 PROCEDURE — 1126F AMNT PAIN NOTED NONE PRSNT: CPT | Mod: S$GLB,,, | Performed by: NURSE PRACTITIONER

## 2020-06-11 PROCEDURE — 99213 PR OFFICE/OUTPT VISIT, EST, LEVL III, 20-29 MIN: ICD-10-PCS | Mod: 25,S$GLB,, | Performed by: NURSE PRACTITIONER

## 2020-06-11 PROCEDURE — 51701 INSERT BLADDER CATHETER: CPT | Mod: S$GLB,,, | Performed by: NURSE PRACTITIONER

## 2020-06-11 PROCEDURE — 3078F DIAST BP <80 MM HG: CPT | Mod: CPTII,S$GLB,, | Performed by: NURSE PRACTITIONER

## 2020-06-11 PROCEDURE — 1101F PR PT FALLS ASSESS DOC 0-1 FALLS W/OUT INJ PAST YR: ICD-10-PCS | Mod: CPTII,S$GLB,, | Performed by: NURSE PRACTITIONER

## 2020-06-11 PROCEDURE — 1159F MED LIST DOCD IN RCRD: CPT | Mod: S$GLB,,, | Performed by: NURSE PRACTITIONER

## 2020-06-11 PROCEDURE — 1101F PT FALLS ASSESS-DOCD LE1/YR: CPT | Mod: CPTII,S$GLB,, | Performed by: NURSE PRACTITIONER

## 2020-06-11 PROCEDURE — 99999 PR PBB SHADOW E&M-EST. PATIENT-LVL IV: CPT | Mod: PBBFAC,,, | Performed by: NURSE PRACTITIONER

## 2020-06-11 PROCEDURE — 3074F SYST BP LT 130 MM HG: CPT | Mod: CPTII,S$GLB,, | Performed by: NURSE PRACTITIONER

## 2020-06-11 PROCEDURE — 1126F PR PAIN SEVERITY QUANTIFIED, NO PAIN PRESENT: ICD-10-PCS | Mod: S$GLB,,, | Performed by: NURSE PRACTITIONER

## 2020-06-11 PROCEDURE — 3078F PR MOST RECENT DIASTOLIC BLOOD PRESSURE < 80 MM HG: ICD-10-PCS | Mod: CPTII,S$GLB,, | Performed by: NURSE PRACTITIONER

## 2020-06-11 PROCEDURE — 3074F PR MOST RECENT SYSTOLIC BLOOD PRESSURE < 130 MM HG: ICD-10-PCS | Mod: CPTII,S$GLB,, | Performed by: NURSE PRACTITIONER

## 2020-06-11 PROCEDURE — 99213 OFFICE O/P EST LOW 20 MIN: CPT | Mod: 25,S$GLB,, | Performed by: NURSE PRACTITIONER

## 2020-06-11 PROCEDURE — 99999 PR PBB SHADOW E&M-EST. PATIENT-LVL IV: ICD-10-PCS | Mod: PBBFAC,,, | Performed by: NURSE PRACTITIONER

## 2020-06-11 NOTE — PATIENT INSTRUCTIONS
Mixed urinary incontinence, stress > urge    --Empty bladder every 2-3 hours, even if don't have the urge.  Empty well: wait a minute, lean forward on toilet.    --Avoid dietary irritants (see sheet).  Keep diary x 3-5 days to determine your irritants.  --start pelvic floor PT on [address] when possible  --URGE: Consider anticholinergic.    --STRESS:  add MUS to procedure     Abnormal Uterine Bleeding  --Weston ok with Edwin doing entire procedure  --surgical option with Dr. Pineda: LAV hysterectomy + BSO, MUS, possible uterosacral suspension, possible anterior repair (minimal apical descensus + needs hyst for AUB)  --pelvic US with thin EMB but GYN unable to do D&C due to stenosis  --See Deirdre or Eva after CT for surgery consult for umbilical hernia  --Needs pre-op clearance appointment from Dr. Mathew. Consider cardiology clearance.      Umbilical hernia  -- CT abdomen with contrast  -- ~3x3 cm hernia on exam  -- refer to Dr. Gilmore or Eva for appt after CT     Fecal smearing  --make sure to use wet wipes after BM to help clean around some mild, redundant tissue from old, inactive hemorrhoids   --work on bulking stool   Start daily fiber.  Take 1 tsp of fiber powder (psyllium or other sugar-free powder).  Mix in 8 oz of water.  Take x 3-5 days.  Then, increase fiber by 1 tsp every 3-5 days until stool is easy to pass, less smearing on pad  Stop and continue at that dose.   Do not exceed 6 tsps/day.    --keep diary and avoid dietary triggers  --call PT and book appt for when they can get you started  --if refractory, consider pacemaker for bowel (Interstim)     Vaginal atrophy  --Continue Use 1 gram of estrogen cream in vagina nightly x 2 weeks, then twice a week thereafter.    --Use REPLENS or REFRESH OTC: 1/2 applicator full in vagina twice a week. Can use fingers instead of applicator.  --For intercourse, use Astroglide     Call when ready to schedule surgery or for any other problems

## 2020-06-11 NOTE — PROGRESS NOTES
Urogyn follow up  06/11/2020  .  [facility] UROGYNECOLOGY-[id number]   [address] [id number]    Tj Hernandez  [id number]  1955      Tj Hernandez is a 64 y.o. here for a urogyn follow up for cath specimen due to recurrent urinary urgency.    Last HPI from 03/04/2019  )  UI:  (+) SAHARA > (+) UUI  X 20years, feels like her whole life, even before she had children. (+) light pad:1/day, usually minimum wetness and 1/night usually minimum wetness. (Wearing one now at night because of coughing fits causing leakage). Daytime frequency: Q ~ 3 hours, can hold longer if needs.  Nocturia: Yes: 1/night.   (--) dysuria,  (--) hematuria,  (--) frequent UTIs.  (--) incomplete bladder emptying. She cannot stop urinating mid-stream.      2)  POP: Absent.  (+) vaginal bleeding. (--) vaginal discharge. (+) sexually active.  (+) dyspareunia, but improved minmally since starting Estrace cream. More painful with insertion than deep penetration. (+)  Vaginal dryness, improved since starting Estrace, but still bothersome.  (+) vaginal estrogen (Estrace) use for several years.      3)  BM:  (--) constipation/straining. Very regular, goes every morning.  (--) chronic diarrhea. (+) hematochezia.  (+) fecal incontinence.  (+) fecal smearing/urgency.  (+) incomplete evacuation. Has been having smearing for as long as she can remember.        Changes from last visit:  )  UI:  (+) SAHARA > (+) UUI   (+) light pad:1/day, usually minimum wetness and 1/night usually minimum wetness (was using 3-4 pads when she had a uti). Daytime frequency: Q 1-2 hours, can hold longer if needs.  Nocturia: Yes: 2-3/night.   (--) dysuria,  (--) hematuria,  (--) frequent UTIs.  (--) incomplete bladder emptying.   Urine culture on 5/15/2020 showed multiple organisms; non in predominence     2)  POP: Absent.  (+) vaginal bleeding. (--) vaginal discharge. (+) sexually active.  (+) dyspareunia, but improved minmally since starting Estrace  cream. More painful with insertion than deep penetration. (+)  Vaginal dryness, improved since starting Estrace, but still bothersome.  (+) vaginal estrogen (Estrace) use for several years.      3)  BM:  (--) constipation/straining. Very regular, goes every morning.  (--) chronic diarrhea. (+) hematochezia.  (+) fecal incontinence.  (+) fecal smearing/urgency.  (+) incomplete evacuation.Smearing is improving with metamucil.      Past Medical History:   Diagnosis Date    Abnormal Pap smear of cervix     Adjustment disorder     Colon polyp     benign    Hormone disorder     Hyperlipidemia     Hypertension     Kidney stone     Neck pain     PONV (postoperative nausea and vomiting)     Recurrent UTI 9/29/2014       Past Surgical History:   Procedure Laterality Date    COLONOSCOPY N/A 2/19/2018    Procedure: COLONOSCOPY;  Surgeon: Naveen Curry MD;  Location: [address];  Service: Endoscopy;  Laterality: N/A;    CYSTOSCOPY      DILATION AND CURETTAGE OF UTERUS      HYSTEROSCOPY WITH DILATION AND CURETTAGE OF UTERUS N/A 11/12/2019    Procedure: HYSTEROSCOPY, WITH DILATION AND CURETTAGE OF UTERUS;  Surgeon: Lashawn Weston MD;  Location: [facility];  Service: OB/GYN;  Laterality: N/A;    TONSILLECTOMY         Family History   Adopted: Yes       Social History     Socioeconomic History    Marital status:      Spouse name: Not on file    Number of children: Not on file    Years of education: Not on file    Highest education level: Not on file   Occupational History    Not on file   Social Needs    Financial resource strain: Not hard at all    Food insecurity:     Worry: Never true     Inability: Never true    Transportation needs:     Medical: No     Non-medical: No   Tobacco Use    Smoking status: Never Smoker    Smokeless tobacco: Never Used   Substance and Sexual Activity    Alcohol use: Yes     Alcohol/week: 2.0 standard drinks     Types: 2 Shots of liquor per week      Frequency: 2-3 times a week     Drinks per session: 1 or 2     Binge frequency: Never     Comment: Rarely.    Drug use: No    Sexual activity: Yes     Partners: Male     Birth control/protection: Post-menopausal   Lifestyle    Physical activity:     Days per week: 5 days     Minutes per session: 40 min    Stress: To some extent   Relationships    Social connections:     Talks on phone: More than three times a week     Gets together: Once a week     Attends [employer] service: Not on file     Active member of club or organization: Yes     Attends meetings of clubs or organizations: More than 4 times per year     Relationship status:    Other Topics Concern    Not on file   Social History Narrative    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 .        Current Outpatient Medications   Medication Sig Dispense Refill    biotin 1 mg tablet Take 1,000 mcg by mouth once daily.       calcium carbonate (OS-MOSHE) 600 mg (1,500 mg) Tab Take 600 mg by mouth once daily.       carvediloL (COREG) 12.5 MG tablet One tablet in am and 1/2 tablet in evening      cetirizine (ZYRTEC) 10 MG tablet Take 10 mg by mouth once daily.      cholecalciferol, vitamin D3, 2,000 unit Cap Take 1 capsule by mouth once daily.      ergocalciferol (ERGOCALCIFEROL) 50,000 unit Cap Take 1 capsule (50,000 Units total) by mouth twice a week. 24 capsule 3    estradiol (ESTRACE) 0.01 % (0.1 mg/gram) vaginal cream INSERT ONE gram VAGINALLY TWO TIMES A WEEK 42.5 g 4    ezetimibe (ZETIA) 10 mg tablet TAKE ONE TABLET BY MOUTH DAILY 90 tablet 0    lisinopriL (PRINIVIL,ZESTRIL) 5 MG tablet Take 0.5 tablets (2.5 mg total) by mouth once daily.      multivitamin capsule Take 1 capsule by mouth once daily.      omeprazole (PRILOSEC) 20 MG capsule Take 20 mg by mouth once daily.      pravastatin (PRAVACHOL) 20 MG tablet TAKE ONE TABLET BY MOUTH DAILY 90 tablet 3    UBIDECARENONE (COENZYME Q10) 100 mg Tab Take 1 tablet (100  "mg total) by mouth once daily.      valACYclovir (VALTREX) 1000 MG tablet       zolpidem (AMBIEN) 10 mg Tab Take 1 tablet (10 mg total) by mouth nightly as needed. 30 tablet 1     No current facility-administered medications for this visit.        Review of patient's allergies indicates:  No Known Allergies    Well woman:  Last pap: 1/2019 -- NSIL and HPV-  Last mammogram: 12/2019  Colonoscopy: 2018 -- repeat in 10 years  DEXA: 02/20 -- osteopenia:    ROS:  As per HPI.      Exam  /70 (BP Location: Left arm, Patient Position: Sitting, BP Method: Medium (Manual))   Ht 5' 4" (1.626 m)   Wt 65 kg (143 lb 4.8 oz)   LMP  (LMP Unknown)   BMI 24.60 kg/m²   General: alert and oriented, no acute distress  Respiratory: normal respiratory effort  Abd: soft, non-tender, non-distended    Pelvic  Ext. Genitalia: normal external genitalia. Normal bartholin's and skeens glands  Vagina: + atrophy. Normal vaginal mucosa without lesions. No discharge noted.   Non-tender bladder base without palpable mass.  Cervix: no lesions, posterior lip adhered to vaginal wall.  Uterus:  uterus is normal size, shape, consistency and nontender   Urethra: no masses or tenderness  Urethral meatus: no lesions, caruncle or prolapse.    Cath urine specimen obtained.    Impression  No diagnosis found.  We reviewed the above issues and discussed options for short-term versus long-term management of her problems.   Plan:   Plan:  Mixed urinary incontinence, stress > urge    --Empty bladder every 2-3 hours, even if don't have the urge.  Empty well: wait a minute, lean forward on toilet.    --Avoid dietary irritants (see sheet).  Keep diary x 3-5 days to determine your irritants.  --start pelvic floor PT on [address] when possible  --URGE: Consider anticholinergic.    --STRESS:  add MUS to procedure     Abnormal Uterine Bleeding  --Enrico ok with Edwin doing entire procedure  --surgical option with Dr. Pineda: LAV hysterectomy + BSO, MUS, possible " uterosacral suspension, possible anterior repair (minimal apical descensus + needs hyst for AUB)  --pelvic US with thin EMB but GYN unable to do D&C due to stenosis  --See Deirdre Velasquez after CT for surgery consult for umbilical hernia  --Needs pre-op clearance appointment from Dr. Mathew. Consider cardiology clearance.      Umbilical hernia  -- CT abdomen with contrast  -- ~3x3 cm hernia on exam  -- refer to Dr. Gilmore or Eva for appt after CT     Fecal smearing  --make sure to use wet wipes after BM to help clean around some mild, redundant tissue from old, inactive hemorrhoids   --work on bulking stool   Start daily fiber.  Take 1 tsp of fiber powder (psyllium or other sugar-free powder).  Mix in 8 oz of water.  Take x 3-5 days.  Then, increase fiber by 1 tsp every 3-5 days until stool is easy to pass, less smearing on pad  Stop and continue at that dose.   Do not exceed 6 tsps/day.    --keep diary and avoid dietary triggers  --call PT and book appt for when they can get you started  --if refractory, consider pacemaker for bowel (Interstim)     Vaginal atrophy  --Continue Use 1 gram of estrogen cream in vagina nightly x 2 weeks, then twice a week thereafter.    --Use REPLENS or REFRESH OTC: 1/2 applicator full in vagina twice a week. Can use fingers instead of applicator.  --For intercourse, use Astroglide     Call when ready to schedule surgery or for any other problems      30 minutes were spent in face to face time with this patient  90 % of this time was spent in counseling and/or coordination of care    NIR Lei-BC [facility]  Division of Female Pelvic Medicine and Reconstructive Surgery  Department of Obstetrics & Gynecology

## 2020-06-12 LAB — BACTERIA UR CULT: NO GROWTH

## 2020-06-15 ENCOUNTER — PATIENT MESSAGE (OUTPATIENT)
Dept: UROGYNECOLOGY | Facility: CLINIC | Age: 65
End: 2020-06-15

## 2020-07-02 ENCOUNTER — CLINICAL SUPPORT (OUTPATIENT)
Dept: REHABILITATION | Facility: HOSPITAL | Age: 65
End: 2020-07-02
Payer: COMMERCIAL

## 2020-07-02 DIAGNOSIS — N39.41 URGE INCONTINENCE: ICD-10-CM

## 2020-07-02 DIAGNOSIS — M62.81 MUSCLE WEAKNESS: ICD-10-CM

## 2020-07-02 PROCEDURE — 97530 THERAPEUTIC ACTIVITIES: CPT | Mod: PN,GP

## 2020-07-02 PROCEDURE — 97161 PT EVAL LOW COMPLEX 20 MIN: CPT | Mod: PN

## 2020-07-02 NOTE — PLAN OF CARE
"[facility]  Pelvic Health Physical Therapy Initial Evaluation    Date: 7/2/2020   Name: Tj Hernandez  Clinic Number: [id number]  Therapy Diagnosis:   Encounter Diagnoses   Name Primary?    Urge incontinence     Muscle weakness      Physician: Stephanie Roper NP    Physician Orders: PT Eval and Treat   Medical Diagnosis from Referral: Urge incontinence  Evaluation Date: 7/2/2020  Authorization Period Expiration: 9/2/2020  Plan of Care Expiration: 10/2/2020  Visit # / Visits authorized: 1/ 20    Time In: 1:03p  Time Out: 1:40p  Total Appointment Time (timed & untimed codes): 37 minutes  Total Billing Time: 37    Precautions: universal    Subjective     Date of onset: years and years    History of current condition - Tj reports: she has had incontinence for years. It gets harder and harder to stop the leakage. She has to have a hysterectomy at some point this year. She also has a hernia and thinking about having a bladder lift. She has 2 children with 7 pregnancies - vaginal deliveries and 7 miscarriages. She has had no other treatment for the leakage. If she coughs and sneezes on a full bladder she will leak but mostly will leak on her way to the toilet. Pt reports she has to leave early because of a work conflict. She states she is unable to do internal assessment today.     OB/GYN History:  childbirth vaginal delivery  Sexually active? Yes  Pain with vaginal exams, intercourse or tampon use? No - she uses estrogen cream     Bladder/Bowel History: dribbling after urination, trouble emptying bladder completely, recurrent bladder infections, urinary incontinence and difficulty stopping the urine stream   Frequency of urination:   Daytime: 10            Nighttime: 1-2   Difficulty initiating urine stream: No   Urine stream: "normal" - every now and again she will stand up and it starts agin   Complete emptying: No - she feels like she does but was told she does not    Bladder leakage: " "Yes   Frequency of incidents: "unsure"    Amount leaked (urine): few drops   Urinary Urgency: Yes  Able to delay the urge for at least 00 minute(s).   Frequency of bowel movements: 1-2 times a day - both in the morning    Difficulty initiating BM: No   Quality/Shape of BM: [place] Stool Chart 4   Does Patient Feel Empty after BM? Yes after she goes the second time    Fiber Supplements or Laxative Use?  Yes   Colon leakage: Yes   Frequency of incidents: with the metamucil it is not even happening and before not that frequent but frequent enough     Form of protection: pad   Number of pads required in 24 hours: normally doesn't change it during the day but will change it when going out for the night - 1-2 pads/day     PAIN: N/A     Medical History: Tj  has a past medical history of Abnormal Pap smear of cervix, Adjustment disorder, Colon polyp, Hormone disorder, Hyperlipidemia, Hypertension, Kidney stone, Neck pain, PONV (postoperative nausea and vomiting), and Recurrent UTI (9/29/2014).     Surgical History:  Tj Hernandez  has a past surgical history that includes Tonsillectomy; Dilation and curettage of uterus; Cystoscopy; Colonoscopy (N/A, 2/19/2018); and Hysteroscopy with dilation and curettage of uterus (N/A, 11/12/2019).    Medications: Tj has a current medication list which includes the following prescription(s): biotin, calcium carbonate, carvedilol, cetirizine, cholecalciferol (vitamin d3), ergocalciferol, estradiol, ezetimibe, lisinopril, multivitamin, omeprazole, pravastatin, coenzyme q10, valacyclovir, and zolpidem.    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  Prior Level of Function: independent   Current Level of Function: independent     Types of fluid intake: "not enough of water" 2 bottles a day; water and coffee  Diet: B: water, coffee, prilosec, yogurt, rice cake/PB; L: cottage cheese and fruit; sandwich, chips; D: protein, carb, veggie, salad    Habitus:well developed, well nourished  Abuse/Neglect: No     Pts goals: decreasing leakage     OBJECTIVE     ORTHO SCREEN  Posture in sitting: WNL  Posture in standing: WNL  Pelvic alignment: no sign of deviations noted in supine   SI Joint Palpation: Denies tenderness to SI joint palpation bilaterally.  Sacral spring test: negative (Positive=NO spring)    HIP STRENGTH:    Right  Left    Hip flexion: 4+/5 Hip flexion: 4+/5   Hip Internal Rotation:  4-/5    Hip Internal Rotation: 4/5      Hip External Rotation: 4-/5    Hip External Rotation: 4-/5      Hip extension:  4/5 Hip extension: 4/5   Hip abduction: 4-/5 Hip abduction: 4-/5   Hip adduction: 4-/5 Hip adduction 4-/5       ABDOMINAL WALL ASSESSMENT  Palpation: tender and hernia noted during sit up  Abdominal strength: Rectus abdominus: 4/5     Transverse abdominus: 3/5  Diastasis: absent      BREATHING MECHANICS ASSESSMENT   Thorax Assessment During Quiet Respiration: WNL excursion of ribcage and WNL excursion of abdominal wall  Thorax Assessment During Deep Respiration: WNL excursion of ribcage and WNL excursion of abdominal wall    VAGINAL PELVIC FLOOR EXAM      Limitation/Restriction for FOTO Urinary Problem Survey    Therapist reviewed FOTO scores for Tj Hernandez on 7/2/2020.   FOTO documents entered into [employer] - see Media section.    Limitation Score: 54%       TREATMENT     Treatment Time In: 1:32p  Treatment Time Out: 1:40p  Total Treatment time (time-based codes) separate from Evaluation: 8 minutes    Therapeutic Activity " Patient participated in dynamic functional therapeutic activities to improve functional performance for 8 minutes. Including: Education as described below.     Patient Education provided:   general anatomy/physiology of urinary/ bowel  system, benefits of treatment, risks of treatment and alternative methods of treatment were discussed with the pt. Additionally, bladder irritants, anatomy/physiology of pelvic floor, bladder retraining, diaphragmatic breathing and fluid intake/dietary modifications were reviewed.     Home Exercises Provided:  Written Home Exercises Provided: Patient instructed to cont prior HEP.  Exercises were reviewed and Tj was able to demonstrate them prior to the end of the session.    Tj demonstrated good  understanding of the education provided.     See EMR under Patient Instructions for exercises provided 7/2/2020.    Assessment     Tj is a 65 y.o. female referred to outpatient Physical Therapy with a medical diagnosis of urge incontinence. Pt presents with complaints of urinary leakage and recent improvements of fecal leakage due to increased use of metamucil as prescribed by doctor. This is a chronic issue that has not been resolved or treated before. She is going to have a hysterectomy, hernia repair, and bladder lift surgery performed in the near future. Today, internal assessment not performed due to time limitations and patient needing to return to work. Upon examination, pt demonstrates mild hip and trunk weakness and increased bilateral hip mobility especially in IR/ER. Patient has a hernia and hemorrhoids which indicate a history of straining and poor pressure management and breathing mechanics. Due to her symptoms, pt unable to participate in exercise routine, ADLs, and sleep without incontinence. Pt was educated on pelvic floor anatomy/physiology, bladder retraining, purpose of pelvic floor therapy, and improving water intake. At this time, pt is appropriate for physical  therapy and would benefit from patient education, improving muscular strength, and reducing incontinence during ADLs. Plan to perform internal assessment next session with informed consent.    Pt prognosis is Good.   Pt will benefit from skilled outpatient Physical Therapy to address the deficits stated above and in the chart below, provide pt/family education, and to maximize pt's level of independence.     Plan of care discussed with patient: Yes  Pt's spiritual, cultural and educational needs considered and patient is agreeable to the plan of care and goals as stated below:       Anticipated Barriers for therapy: none    Medical Necessity is demonstrated by the following    History  Co-morbidities and personal factors that may impact the plan of care Co-morbidities:   hyperlipidemia, hypertension, kidney stone, recurrent UTI    Personal Factors:   no deficits     moderate   Examination  Body Structures and Functions, activity limitations and participation restrictions that may impact the plan of care Body Regions/Systems/Functions:  increased frequency of urination, poor coordination of pelvic floor muscles during ADL's leading to urinary or fecal leakage, poor fluid intake and incomplete urination     Activity Limitations:  urgency , delaying urge to urinate, bearing down for BM, incontinence with ADLs and bathroom mapping    Participation Restrictions:  all ADLs/iADLs uninterrupted by urinary incontinence/urgency/frequency, social activities with friends/family and exercise restrictions due to incontinence     Activity limitations:   Learning and applying knowledge  no deficits    General Tasks and Commands  no deficits    Communication  no deficits    Mobility  lifting and carrying objects  walking    Self care  no deficits    Domestic Life  cooking  doing house work (cleaning house, washing dishes, laundry)    Interactions/Relationships  no deficits    Life Areas  employment    Community and Social  "Life  community life       low   Clinical Presentation stable and uncomplicated low   Decision Making/ Complexity Score: low       Goals:  Short Term Goals: 4 weeks   Pt to be edu pelvic muscle bracing and be able to consistently perform correctly and quickly to help decrease incontinence with cough/laugh/sneeze.  Pt to perform "the knack" prior to coughing, laughing or sneezing to decrease risk of incontinence.  Pt to demonstrate being able to correctly and consistently perform a kegel which is needed  to increase pelvic floor muscle coordination and strength needed for continence.  Pt to be able to delay the urge to urinate at least 5 minutes with a strong urge to urinate in order to make it to the bathroom without leaking.  Pt to report a decrease in urinary frequency from every 1-2 hours to no more than once every 2.5 hour(s) to improve ability to participate in social activities.  Pt to voice understanding of the role that diet plays on urinary urgency.    Pt to demonstrate proper positioning on commode with breathing techniques to decrease strain with BM to enable pt to feel empty after BM.   Pt to demonstrate independence with performing bowel massage to help with gut motility.   Pt to be able to bulge pelvic floor which is needed for comfortable BM and complete evacuation.     Long Term Goals: 8 weeks   Pt to be discharged with home plan for carry over after discharge.    Pt to be able to perform a 10 second kegel x 10 reps to demonstrate improving strength and endurance needed for continence.  Pt to report a decrease in pad usage to 0 pads a day to demonstrate improving pelvic floor muscle controls as evidenced by decreased episodes of incontinence needed to improve confidence in social situations.  Pt to be able to delay the urge to urinate at least 10 minutes with a strong urge to urinate in order to make it to the bathroom without leaking.  Pt to report no longer feeling the need to urinate just in case " when shopping or participating in social activities to demonstrate improving pelvic floor and bladder control.  Pt to report a decrease in urinary frequency from 1-2 hours to no more than once every 3 hour(s) to improve ability to participate in social activities.  Pt to increase pelvic floor strength to at least 4/5 to demonstrate improved strength needed for continence with ADLs.     Plan     Plan of care Certification: 7/2/2020 to 10/2/2020.    Outpatient Physical Therapy 1 times weekly for 12 weeks to include the following interventions: therapeutic exercises, therapeutic activity, neuromuscular re-education, manual therapy, modalities PRN, patient/family education and self care/home management    Plan for next visit: perform internal assessment     Viviana Moreno, PT        I have seen the patient, reviewed the therapist's plan of care, and I agree with the plan of care.      I certify the need for these services furnished under this plan of treatment and while under my care.     ___________________ ________ Physician/Referring Practitioner            ___________________________ Date of Signature

## 2020-07-07 ENCOUNTER — TELEPHONE (OUTPATIENT)
Dept: UROGYNECOLOGY | Facility: CLINIC | Age: 65
End: 2020-07-07

## 2020-07-07 DIAGNOSIS — Z87.42 HISTORY OF VAGINAL BLEEDING: Primary | ICD-10-CM

## 2020-07-07 NOTE — TELEPHONE ENCOUNTER
Spoke with patient.  She has been deferring surgery because  has been ill. He may need surgery in near future.     Discussed  bleeding.  She has not had any VB since early 2020 before she 1st saw us in Urogynecology in 3/2020.  No other pelvic/abdominal pain.  No other concerning s/sx.      Discussed my desire for her to move forward with surgery, since her D&C was incomplete, although her pelvic US 1/2020 was normal, and she has had no VB since then.  She is willing to try to move forward in near future, and will look at dates with her family.      Will get repeat pelvic US now.  Needs to see gen surg (who depends on location of OR).  Had UDS 3/2020: +SAHARA.  Will have NP call to pick OR date, then get gen surg at that location to see her preop for concomitant hernia repair. Will need to see us to sign consents preop.     Bhavik:  Can you please call patient to schedule pelvic US in next few weeks?    Stephanie: can we discuss OR dates/preop/gen surg consult?

## 2020-07-07 NOTE — TELEPHONE ENCOUNTER
Spoke to pt, US appointment scheduled 7/20/2020 at the [facility] location, pt aware and verbalizes understanding.

## 2020-07-15 ENCOUNTER — CLINICAL SUPPORT (OUTPATIENT)
Dept: REHABILITATION | Facility: HOSPITAL | Age: 65
End: 2020-07-15
Payer: COMMERCIAL

## 2020-07-15 DIAGNOSIS — M62.81 MUSCLE WEAKNESS: ICD-10-CM

## 2020-07-15 PROCEDURE — 97530 THERAPEUTIC ACTIVITIES: CPT | Mod: PN,GP

## 2020-07-15 PROCEDURE — 97110 THERAPEUTIC EXERCISES: CPT | Mod: PN,GP

## 2020-07-15 NOTE — PATIENT INSTRUCTIONS
Home Exercise Program: 07/15/2020      BLADDER HEALTH      WHAT IS CONSIDERED NORMAL?   The average bladder can hold about 2 cups of urine before it needs to be emptied.   The normal range of voiding urine is 6 to 8 times during a 24 hour period, or every 3-4 hours. As we get older, our bladder capacity can get smaller and we may need to pass urine more frequently but usually not more than every 2 hours.   Urine should flow easily without discomfort in a good, steady stream until the bladder is empty. No pushing or straining is necessary to empty the bladder.   An urge is a normal signal that you feel as the bladder stretches to fill with urine. Urges can be felt even if the bladder is not full. Urges are not commands to go to the toilet, merely a signal and can be controlled.    WHAT ARE GOOD BLADDER HABITS?   Take your time when emptying your bladder. Dont strain or push to empty your bladder. Make sure you empty your bladder completely each time you pass urine. Do not rush the process.    Dont wait too long - consistently ignoring the urge to go (waiting more than 4 hours between toileting) or urinating too infrequently may be convenient but not healthy for your bladder. You can actually overstretch your bladder beyond its normal size.    Dont go too often - avoid going to the toilet just in case (more often than every 2 hours). It is usually not necessary to go when you feel the first urge. Try to go only when your bladder is full. Dont let your bladder control your life.    DIET CAN AFFECT THE BLADDER   Maintain a healthy fluid intake. Many people decrease their intake of liquids in hopes that they will need to urinate less frequently or have less urinary leakage. While a decrease in liquid intake does result in a decrease in the volume of urine, the smaller amount of urine may be more highly concentrated. Highly concentrated, dark yellow urine is irritating to the bladder surface and may actually  cause you to go to the bathroom more frequently. It also encourages the growth of bacteria, which may lead to infections resulting in incontinence.  Depending on your body size and environment, drink 4-8 cups (8 oz each) of fluid per day, spread throughout the entire day, unless otherwise advised by your doctor.     Avoid of use the following acidic food/beverages in moderation:   Alcoholic beverages     Tomato-based products   Vinegar   Coffee (regular and decaf)   Tea (regular and decaf)   Turcios   Citrus fruits and juices   Spicy foods   Caffeinated beverages   Cola   Milk   Food colorings and flavorings   Artificial sweeteners   Chocolate     Try using these dietary substitutions:   Water: grape juice, apple juice   Fruit: pears, apricots, papaya, watermelon   Coffee: KAVA®, Postum®, Rubi®, Kaffree Rancocas®   Tea: Non-citrus herbal, sun-brewed tea   Vitamin C: Calcium carbonate co-buffered with calcium ascorbate     Avoid constipation by maintaining a balanced diet of dietary fiber. Typical dietary recommendations for fiber are between 25-35 grams per day. You should discuss your fiber needs with your physician, pharmacist or nutritionist.     Stop smoking. Smoking is irritating to the bladder surface and is associated with bladder cancer. In addition, the coughing associated with smoking may lead to increased incontinent episodes.    Exercises:   Kegels while laying down    1. Lay on your back comfortably with legs and buttocks relaxed.  2. Contract and LIFT the pelvic floor muscles as if you're trying to stop the stream of urine and passage of gas.  3. Hold LIFT for 5 seconds without holding breath.  4. Release and DROP the pelvic floor muscles for 3 seconds.  5. Repeat 10 times, 3 sets per day. Spread throughout the day.    Quick Flicks    Contract pelvic floor muscles by closing around your openings and lifting up and in. Try not to use abdominal or buttocks muscles, and do not hold your  breath. Relax completely after each contraction.   Repeat 10 times. Perform 2 sets/day.

## 2020-07-15 NOTE — PROGRESS NOTES
Pelvic Health Physical Therapy   Treatment Note     Name: Tj Hernandez  Clinic Number: [id number]    Therapy Diagnosis:   Encounter Diagnosis   Name Primary?    Muscle weakness      Physician: Stephanie Roper NP    Visit Date: 7/15/2020    Physician Orders: PT Eval and Treat   Medical Diagnosis from Referral: Urge incontinence  Evaluation Date: 7/2/2020  Authorization Period Expiration: 9/2/2020  Plan of Care Expiration: 10/2/2020  Visit # / Visits authorized: 2/ 20  Cancelled Visits: 0  No Show Visits: 0    Time In: 3:10p  Time Out: 3:50p  Total Billable Time: 40 minutes    Precautions: Standard    Subjective     Pt reports: she filled out her bladder diaries. She thinks this is her norm and this is how she grew up with her mom. She has some leakage with coughing and when trying to get to the bathroom. She states she hovers sometimes when at work and out but never at home. She knows this is going to limit her from completely emptying her bladder.   She was compliant with home exercise program.  Response to previous treatment: none   Functional change: none     Pain: N/A    Objective       VAGINAL PELVIC FLOOR EXAM    EXTERNAL ASSESSMENT  Introitus: WNL  Skin condition: atrophy   Scarring: none   Sensation: WNL   Pain:  none  Voluntary contraction: visible lift  Involuntary contraction: visible drop  Bearing down: visible drop  Perineal descent: present      INTERNAL ASSESSMENT  Pain: none   Sensation: able to localized pressure appropriately   Vaginal vault: WNL   Muscle Bulk: atrophy   Muscle Power: 4/5  Muscle Endurance: 5 sec    Fast Contractions in 10 seconds: 5     Quality of contraction: decreased hold   Specificity: WNL   Coordination: WNL   Prolapse check:Grade 2 cystocele    Tj received therapeutic exercises to develop  strength, endurance, ROM, flexibility, posture and core stabilization for 15 minutes including: Kegels Endurance Holds and Kegels: Quick flicks     Tj received the following  manual therapy techniques: to develop flexibility, extensibility and desensitization for 00 minutes including    Tj participated in neuromuscular re-education activities to develop Sense for 00 minutes including    Tj participated in dynamic functional therapeutic activities to improve functional performance for 25  minutes, including:      Home Exercises Provided and Patient Education Provided     Education provided:   - bladder retraining, kegels and fluid intake, bladder norms/health  Discussed progression of plan of care with patient; educated pt in activity modification; reviewed HEP with pt. Pt demonstrated and verbalized understanding of all instruction and was provided with a handout of HEP (see Patient Instructions)    Written Home Exercises Provided: yes.  Exercises were reviewed and Tj was able to demonstrate them prior to the end of the session.  Tj demonstrated good  understanding of the education provided.     See EMR under Patient Instructions for exercises provided 7/15/2020.    Assessment     Pt tolerated therapy session well. PT discussed bladder diaries with her. She demonstrated increased urinary frequency and urge incontinence. She had decreased water intake each day. She was educated on bladder norms and instructed to increase water intake gradually this week as well as being more mindful and aware of how often she was using the restroom. Internal assessment performed this session due to time limitations last session. She had good pelvic floor muscle contraction and ability to relax. She had anterior wall descent with bearing down and coughing and would benefit from functional bracing. Pt had decreased pelvic floor endurance and coordination of quick flicks. Pt would continue to benefit from skilled physical therapy to promote improved bladder habits, increase water intake, and reduce incontinence episodes.     Tj is progressing well towards her goals.   Pt prognosis is Good.     Pt  "will continue to benefit from skilled outpatient physical therapy to address the deficits listed in the problem list box on initial evaluation, provide pt/family education and to maximize pt's level of independence in the home and community environment.   Pt's spiritual, cultural and educational needs considered and pt agreeable to plan of care and goals.     Anticipated barriers to physical therapy: none     Goals:   Short Term Goals: 4 weeks ongoing, in progress   Pt to be edu pelvic muscle bracing and be able to consistently perform correctly and quickly to help decrease incontinence with cough/laugh/sneeze.  Pt to perform "the knack" prior to coughing, laughing or sneezing to decrease risk of incontinence.  Pt to demonstrate being able to correctly and consistently perform a kegel which is needed  to increase pelvic floor muscle coordination and strength needed for continence.  Pt to be able to delay the urge to urinate at least 5 minutes with a strong urge to urinate in order to make it to the bathroom without leaking.  Pt to report a decrease in urinary frequency from every 1-2 hours to no more than once every 2.5 hour(s) to improve ability to participate in social activities.  Pt to voice understanding of the role that diet plays on urinary urgency.    Pt to demonstrate proper positioning on commode with breathing techniques to decrease strain with BM to enable pt to feel empty after BM.   Pt to demonstrate independence with performing bowel massage to help with gut motility.   Pt to be able to bulge pelvic floor which is needed for comfortable BM and complete evacuation.      Long Term Goals: 8 weeks ongoing, in progress   Pt to be discharged with home plan for carry over after discharge.    Pt to be able to perform a 10 second kegel x 10 reps to demonstrate improving strength and endurance needed for continence.  Pt to report a decrease in pad usage to 0 pads a day to demonstrate improving pelvic floor " muscle controls as evidenced by decreased episodes of incontinence needed to improve confidence in social situations.  Pt to be able to delay the urge to urinate at least 10 minutes with a strong urge to urinate in order to make it to the bathroom without leaking.  Pt to report no longer feeling the need to urinate just in case when shopping or participating in social activities to demonstrate improving pelvic floor and bladder control.  Pt to report a decrease in urinary frequency from 1-2 hours to no more than once every 3 hour(s) to improve ability to participate in social activities.  Pt to increase pelvic floor strength to at least 4/5 to demonstrate improved strength needed for continence with ADLs.     Plan     Plan for next visit: red Shukla biofeedback, review bladder habits     Viviana Moreno, PT

## 2020-07-16 ENCOUNTER — TELEPHONE (OUTPATIENT)
Dept: UROGYNECOLOGY | Facility: CLINIC | Age: 65
End: 2020-07-16

## 2020-07-16 NOTE — TELEPHONE ENCOUNTER
----- Message from Zi Alonso sent at 7/16/2020 10:28 AM CDT -----  Please call pt regarding surgery [phone]

## 2020-07-17 DIAGNOSIS — E78.5 HYPERLIPIDEMIA, UNSPECIFIED HYPERLIPIDEMIA TYPE: ICD-10-CM

## 2020-07-17 RX ORDER — PRAVASTATIN SODIUM 20 MG/1
TABLET ORAL
Qty: 90 TABLET | Refills: 1 | Status: SHIPPED | OUTPATIENT
Start: 2020-07-17 | End: 2021-06-22

## 2020-07-17 RX ORDER — EZETIMIBE 10 MG/1
TABLET ORAL
Qty: 180 TABLET | Refills: 1 | Status: SHIPPED | OUTPATIENT
Start: 2020-07-17 | End: 2021-06-22 | Stop reason: ALTCHOICE

## 2020-07-17 NOTE — PROGRESS NOTES
Refill Authorization Note    is requesting a refill authorization.    Brief assessment and rationale for refill: APPROVE: prr               Medication reconciliation completed: No                         Comments:      Requested Prescriptions   Pending Prescriptions Disp Refills    ezetimibe (ZETIA) 10 mg tablet [Pharmacy Med Name: ezetimibe 10 mg tablet] 180 tablet 1     Sig: Take one tablet by mouth daily       Cardiovascular:  Antilipid - Sterol Transport Inhibitors Passed - 7/17/2020  2:17 PM        Passed - Patient is at least 18 years old        Passed - Office visit in past 12 months or future 90 days.     Recent Outpatient Visits            4 days ago Epigastric hernia    [facility] MD    1 month ago Urinary urgency    [facility] [place] [id number]  Stephanie Roper NP    5 months ago Essential hypertension    [facility] - [facility] Bonnie Mathew MD    5 months ago Essential hypertension    [facility] Bonnie Mathew MD    6 months ago Postmenopausal bleeding    [facility] - Internal Medicine Bonnie Mathew MD          Future Appointments              In 3 days [facility] ROOM 2 [facility]    In 5 days Viviana Moreno, [facility]                Passed - Lipid Panel completed in last 360 days     Lab Results   Component Value Date    CHOL 183 01/06/2020    HDL 67 01/06/2020    LDLCALC 94.6 01/06/2020    TRIG 107 01/06/2020               pravastatin (PRAVACHOL) 20 MG tablet [Pharmacy Med Name: pravastatin 20 mg tablet] 90 tablet 1     Sig: Take one tablet by mouth daily       Cardiovascular:  Antilipid - Statins Passed - 7/17/2020  2:17 PM        Passed - Patient is at least 18 years old        Passed - Office visit in past 12 months or future 90 days.     Recent Outpatient Visits            4 days ago Epigastric hernia    [facility]  MOSES Velasquez MD    1 month ago Urinary urgency    [id number]  Stephanie Roper NP    5 months ago Essential hypertension    [facility] - Internal Medicine Bonnie Mathew MD    5 months ago Essential hypertension    [facility] Bonnie Mathew MD    6 months ago Postmenopausal bleeding    [facility] Bonnie Mathew MD          Future Appointments              In 3 days [facility] ROOM 2 [facility]    In 5 days Viviana Moreno, [facility] -                 Passed - Lipid Panel completed in last 360 days     Lab Results   Component Value Date    CHOL 183 01/06/2020    HDL 67 01/06/2020    LDLCALC 94.6 01/06/2020    TRIG 107 01/06/2020             Passed - ALT is 94 or below and within 360 days     ALT   Date Value Ref Range Status   01/06/2020 14 10 - 44 U/L Final   01/02/2019 14 10 - 44 U/L Final   12/15/2017 11 10 - 44 U/L Final              Passed - AST is 54 or below and within 360 days     AST   Date Value Ref Range Status   01/06/2020 20 10 - 40 U/L Final   01/02/2019 27 10 - 40 U/L Final   12/15/2017 21 10 - 40 U/L Final                  Appointments  past 12m or future 3m with PCP    Date Provider   Last Visit   2/12/2020 Bonnie Mathew MD   Next Visit   Visit date not found Bonnie Mathew MD   ED visits in past 90 days: 0     Note composed:2:18 PM 07/17/2020

## 2020-07-17 NOTE — TELEPHONE ENCOUNTER
Care Due:                  Date            Visit Type   Department     Provider  --------------------------------------------------------------------------------                                ESTABLISHED   [facility] INTERNAL  Last Visit: 02-      PATIENT      MEDICINE       JOURDAN TINOCO  Next Visit: None Scheduled  None         None Found                                                            Last  Test          Frequency    Reason                     Performed    Due Date  --------------------------------------------------------------------------------    Vitamin D...  12 months..  ergocalciferol...........  Not Found    Overdue    Powered by [employer]. Reference number: [id number]. 7/17/2020 10:39:15 AM   CDT

## 2020-07-20 ENCOUNTER — HOSPITAL ENCOUNTER (OUTPATIENT)
Dept: RADIOLOGY | Facility: HOSPITAL | Age: 65
Discharge: HOME | End: 2020-07-20
Attending: OBSTETRICS & GYNECOLOGY
Payer: COMMERCIAL

## 2020-07-20 ENCOUNTER — TELEPHONE (OUTPATIENT)
Dept: UROGYNECOLOGY | Facility: CLINIC | Age: 65
End: 2020-07-20

## 2020-07-20 DIAGNOSIS — Z87.42 HISTORY OF VAGINAL BLEEDING: ICD-10-CM

## 2020-07-20 PROCEDURE — 76830 US PELVIS COMP WITH TRANSVAG NON-OB (XPD): ICD-10-PCS | Mod: 26,,, | Performed by: RADIOLOGY

## 2020-07-20 PROCEDURE — 76830 TRANSVAGINAL US NON-OB: CPT | Mod: 26,,, | Performed by: RADIOLOGY

## 2020-07-20 PROCEDURE — 76856 US PELVIS COMP WITH TRANSVAG NON-OB (XPD): ICD-10-PCS | Mod: 26,,, | Performed by: RADIOLOGY

## 2020-07-20 PROCEDURE — 76856 US EXAM PELVIC COMPLETE: CPT | Mod: 26,,, | Performed by: RADIOLOGY

## 2020-07-20 PROCEDURE — 76830 TRANSVAGINAL US NON-OB: CPT | Mod: TC

## 2020-07-20 NOTE — TELEPHONE ENCOUNTER
Spoke to pt, she was informed that her US was normal and that someone will contact her to discuss surgery dates, pt verbalizes understanding.

## 2020-07-20 NOTE — TELEPHONE ENCOUNTER
----- Message from Jeyson Pineda MD sent at 7/20/2020  1:43 PM CDT -----  Please let patient know US looks normal. Someone should be in touch with her soon to pick OR date. Thanks!

## 2020-07-21 ENCOUNTER — TELEPHONE (OUTPATIENT)
Dept: UROGYNECOLOGY | Facility: CLINIC | Age: 65
End: 2020-07-21

## 2020-07-21 NOTE — TELEPHONE ENCOUNTER
Called patient to discuss surgery dates at [facility] with Dr. Velasquez, n/a. Will try to call back later today.

## 2020-07-21 NOTE — PROGRESS NOTES
Pelvic Health Physical Therapy   Treatment Note     Name: Tj Hernandez  Clinic Number: [id number]    Therapy Diagnosis:   Encounter Diagnosis   Name Primary?    Muscle weakness      Physician: Stephanie Roper NP    Visit Date: 7/22/2020    Physician Orders: PT Eval and Treat   Medical Diagnosis from Referral: Urge incontinence  Evaluation Date: 7/2/2020 8/2/2020  Authorization Period Expiration: 9/2/2020  Plan of Care Expiration: 10/2/2020  Visit # / Visits authorized: 3/ 20  Cancelled Visits: 0  No Show Visits: 0    Time In: 9:37a  Time Out: 10:13a  Total Billable Time: 36 minutes    Precautions: Standard    Subjective     Pt reports: she attempted to drink more water and not use the bathroom as much, but she finds drinking more water has made her go to the bathroom more. She just thinks this is how it is. She has also scheduled her surgery for September 24. She feels that she can attempt to continue not using the bathroom as much.   She was compliant with home exercise program.  Response to previous treatment: none   Functional change: none     Pain: N/A    Objective     Tj received therapeutic exercises to develop  strength, endurance, ROM, flexibility, posture and core stabilization for 16 minutes including: Kegels Endurance Holds, Kegels: Quick flicks , bridging + kegel  and Knack    Tj received the following manual therapy techniques: to develop flexibility, extensibility and desensitization for 00 minutes including    Tj participated in neuromuscular re-education activities to develop Sense for 00 minutes including    Tj participated in dynamic functional therapeutic activities to improve functional performance for 20 minutes, including:    Home Exercises Provided and Patient Education Provided     Education provided:   - bladder retraining, kegels, fluid intake/dietary modifications, behavior modifications, Coordination of kegels with functional activities such as cough, laugh, sneeze,  lift, etc.  and urge suppression techniques  Discussed progression of plan of care with patient; educated pt in activity modification; reviewed HEP with pt. Pt demonstrated and verbalized understanding of all instruction and was provided with a handout of HEP (see Patient Instructions)    Written Home Exercises Provided: yes.  Exercises were reviewed and Tj was able to demonstrate them prior to the end of the session.  Tj demonstrated good  understanding of the education provided.     See EMR under Patient Instructions for exercises provided 7/15/2020.    Assessment     Pt tolerated therapy session well. Pt was educated on urge suppression techniques and attempting a voiding log. Since patient is mainly fearful of holding her urine at work due to incontinence, she was instructed to first try this at home. She was also instructed in increasing fluid intake gradually and taking sips throughout the day instead of having large amounts at small intervals throughout the day. She was set up with a voiding interval of every hour and 30 minutes and to go at this time even when she does not have a strong urge in order to begin bladder retraining. She tends to have overflow from glute muscles during kegels - cued to decrease this and ensure full relaxation between each kegel. Pt had fair ability to perform Knack - plan to reassess this again next session. Pt is appropriate to continue with therapy at this time and she would have good success with education if patient is compliant and adherent to protocol.     Tj is progressing well towards her goals.   Pt prognosis is Good.     Pt will continue to benefit from skilled outpatient physical therapy to address the deficits listed in the problem list box on initial evaluation, provide pt/family education and to maximize pt's level of independence in the home and community environment.   Pt's spiritual, cultural and educational needs considered and pt agreeable to plan of care  "and goals.     Anticipated barriers to physical therapy: none     Goals:   Short Term Goals: 4 weeks ongoing, in progress   Pt to be edu pelvic muscle bracing and be able to consistently perform correctly and quickly to help decrease incontinence with cough/laugh/sneeze.  Pt to perform "the knack" prior to coughing, laughing or sneezing to decrease risk of incontinence.  Pt to demonstrate being able to correctly and consistently perform a kegel which is needed  to increase pelvic floor muscle coordination and strength needed for continence.  Pt to be able to delay the urge to urinate at least 5 minutes with a strong urge to urinate in order to make it to the bathroom without leaking.  Pt to report a decrease in urinary frequency from every 1-2 hours to no more than once every 2.5 hour(s) to improve ability to participate in social activities.  Pt to voice understanding of the role that diet plays on urinary urgency.    Pt to demonstrate proper positioning on commode with breathing techniques to decrease strain with BM to enable pt to feel empty after BM.   Pt to demonstrate independence with performing bowel massage to help with gut motility.   Pt to be able to bulge pelvic floor which is needed for comfortable BM and complete evacuation.      Long Term Goals: 8 weeks ongoing, in progress   Pt to be discharged with home plan for carry over after discharge.    Pt to be able to perform a 10 second kegel x 10 reps to demonstrate improving strength and endurance needed for continence.  Pt to report a decrease in pad usage to 0 pads a day to demonstrate improving pelvic floor muscle controls as evidenced by decreased episodes of incontinence needed to improve confidence in social situations.  Pt to be able to delay the urge to urinate at least 10 minutes with a strong urge to urinate in order to make it to the bathroom without leaking.  Pt to report no longer feeling the need to urinate just in case when shopping or " participating in social activities to demonstrate improving pelvic floor and bladder control.  Pt to report a decrease in urinary frequency from 1-2 hours to no more than once every 3 hour(s) to improve ability to participate in social activities.  Pt to increase pelvic floor strength to at least 4/5 to demonstrate improved strength needed for continence with ADLs.     Plan     Plan for next visit: red Shukla biofeedback, review bladder habits     Viviana Moreno, PT

## 2020-07-22 ENCOUNTER — CLINICAL SUPPORT (OUTPATIENT)
Dept: REHABILITATION | Facility: HOSPITAL | Age: 65
End: 2020-07-22
Payer: COMMERCIAL

## 2020-07-22 DIAGNOSIS — M62.81 MUSCLE WEAKNESS: ICD-10-CM

## 2020-07-22 PROCEDURE — 97530 THERAPEUTIC ACTIVITIES: CPT | Mod: PN,GP

## 2020-07-22 PROCEDURE — 97110 THERAPEUTIC EXERCISES: CPT | Mod: PN,GP

## 2020-07-22 NOTE — PATIENT INSTRUCTIONS
"Home Exercise Program: 07/22/2020      INSTRUCTIONS FOR CONTROLLING URINARY URGE      When you experience a strong urge to urinate and know that your bladder is not as full as it has the potential to be (for instance you used the restroom 30 minutes to 1 1/2 hours ago).     FIRST: Stop activity, stand quietly or sit down. Try to stay very still to maintain control. Avoid rushing to the toilet. In this position you can start performing heel raises or toe raises.     SECOND: Begin Quick Flicks (1 second LIFT and squeeze of pelvic floor muscles, 4 second DROP). Pelvic floor contractions send a message to the bladder to relax and hold urine.     THIRD: Relax. Do not rush to the toilet. Take a deep belly or diaphragmatic breath and let it out slowly. Let the urge to urinate pass by using distraction techniques, positive thoughts, visualization, and meditation. Try not to think about going to the bathroom.    FINALLY: If the urge returns, repeat the above steps to regain control. When you feel the urge subside, walk normally to the bathroom. Try to avoid starting to undress until you are in he bathroom and ready to urinate. You can urinate once the urge returns at a later and more appropriate time.     Try this at home first in case you do have an accident, but as you continue to practice, your bladder will increase the ability its capacity and hold more urine without such a strong urge.         Knack or "functional bracing": perform a kegel and hold this while you are coughing. You can use your hand over your vulva to see if you are maintaining the kegel and making sure the pelvic floor doesn't bulge downwards. Do x10      Kegel + abdominal contraction + bridge: perform a kegel and squeeze your abdominals; then squeeze your glutes and lift your hips off of the mat  - make sure you are relaxing between each repetition and perform 2 sets of 10.     Continue doing your "long hold" kegels and "quick flicks" every day about 3 " sets of 10 each (these are most important to perform daily)

## 2020-08-10 ENCOUNTER — PATIENT OUTREACH (OUTPATIENT)
Dept: ADMINISTRATIVE | Facility: HOSPITAL | Age: 65
End: 2020-08-10

## 2020-08-11 ENCOUNTER — TELEPHONE (OUTPATIENT)
Dept: UROGYNECOLOGY | Facility: CLINIC | Age: 65
End: 2020-08-11

## 2020-08-18 DIAGNOSIS — Z01.818 PRE-OP EVALUATION: Primary | ICD-10-CM

## 2020-09-02 ENCOUNTER — HOSPITAL ENCOUNTER (OUTPATIENT)
Dept: RADIOLOGY | Facility: HOSPITAL | Age: 65
Discharge: HOME | End: 2020-09-02
Attending: INTERNAL MEDICINE
Payer: COMMERCIAL

## 2020-09-02 ENCOUNTER — HOSPITAL ENCOUNTER (OUTPATIENT)
Dept: CARDIOLOGY | Facility: CLINIC | Age: 65
Discharge: HOME | End: 2020-09-02
Payer: COMMERCIAL

## 2020-09-02 DIAGNOSIS — I10 ESSENTIAL HYPERTENSION: ICD-10-CM

## 2020-09-02 PROCEDURE — 93005 EKG 12-LEAD: ICD-10-PCS | Mod: S$GLB,,, | Performed by: INTERNAL MEDICINE

## 2020-09-02 PROCEDURE — 71046 X-RAY EXAM CHEST 2 VIEWS: CPT | Mod: 26,,, | Performed by: RADIOLOGY

## 2020-09-02 PROCEDURE — 93010 ELECTROCARDIOGRAM REPORT: CPT | Mod: S$GLB,,, | Performed by: INTERNAL MEDICINE

## 2020-09-02 PROCEDURE — 71046 XR CHEST PA AND LATERAL: ICD-10-PCS | Mod: 26,,, | Performed by: RADIOLOGY

## 2020-09-02 PROCEDURE — 71046 X-RAY EXAM CHEST 2 VIEWS: CPT | Mod: TC

## 2020-09-02 PROCEDURE — 93005 ELECTROCARDIOGRAM TRACING: CPT | Mod: S$GLB,,, | Performed by: INTERNAL MEDICINE

## 2020-09-02 PROCEDURE — 93010 EKG 12-LEAD: ICD-10-PCS | Mod: S$GLB,,, | Performed by: INTERNAL MEDICINE

## 2020-09-03 ENCOUNTER — OFFICE VISIT (OUTPATIENT)
Dept: INTERNAL MEDICINE | Facility: CLINIC | Age: 65
End: 2020-09-03
Payer: COMMERCIAL

## 2020-09-03 VITALS
BODY MASS INDEX: 24.64 KG/M2 | WEIGHT: 144.31 LBS | HEART RATE: 54 BPM | OXYGEN SATURATION: 99 % | DIASTOLIC BLOOD PRESSURE: 80 MMHG | HEIGHT: 64 IN | SYSTOLIC BLOOD PRESSURE: 132 MMHG

## 2020-09-03 DIAGNOSIS — I10 ESSENTIAL HYPERTENSION: ICD-10-CM

## 2020-09-03 DIAGNOSIS — K42.9 UMBILICAL HERNIA WITHOUT OBSTRUCTION AND WITHOUT GANGRENE: ICD-10-CM

## 2020-09-03 DIAGNOSIS — R39.81 FUNCTIONAL URINARY INCONTINENCE: Primary | ICD-10-CM

## 2020-09-03 DIAGNOSIS — K21.9 GASTROESOPHAGEAL REFLUX DISEASE WITHOUT ESOPHAGITIS: ICD-10-CM

## 2020-09-03 DIAGNOSIS — E78.5 HYPERLIPIDEMIA, UNSPECIFIED HYPERLIPIDEMIA TYPE: ICD-10-CM

## 2020-09-03 DIAGNOSIS — Z12.31 SCREENING MAMMOGRAM, ENCOUNTER FOR: ICD-10-CM

## 2020-09-03 PROCEDURE — 1101F PR PT FALLS ASSESS DOC 0-1 FALLS W/OUT INJ PAST YR: ICD-10-PCS | Mod: CPTII,S$GLB,, | Performed by: INTERNAL MEDICINE

## 2020-09-03 PROCEDURE — 3075F PR MOST RECENT SYSTOLIC BLOOD PRESS GE 130-139MM HG: ICD-10-PCS | Mod: CPTII,S$GLB,, | Performed by: INTERNAL MEDICINE

## 2020-09-03 PROCEDURE — 99999 PR PBB SHADOW E&M-EST. PATIENT-LVL III: ICD-10-PCS | Mod: PBBFAC,,, | Performed by: INTERNAL MEDICINE

## 2020-09-03 PROCEDURE — 99999 PR PBB SHADOW E&M-EST. PATIENT-LVL III: CPT | Mod: PBBFAC,,, | Performed by: INTERNAL MEDICINE

## 2020-09-03 PROCEDURE — 1159F MED LIST DOCD IN RCRD: CPT | Mod: S$GLB,,, | Performed by: INTERNAL MEDICINE

## 2020-09-03 PROCEDURE — 3079F DIAST BP 80-89 MM HG: CPT | Mod: CPTII,S$GLB,, | Performed by: INTERNAL MEDICINE

## 2020-09-03 PROCEDURE — 3008F BODY MASS INDEX DOCD: CPT | Mod: CPTII,S$GLB,, | Performed by: INTERNAL MEDICINE

## 2020-09-03 PROCEDURE — 99214 PR OFFICE/OUTPT VISIT, EST, LEVL IV, 30-39 MIN: ICD-10-PCS | Mod: S$GLB,,, | Performed by: INTERNAL MEDICINE

## 2020-09-03 PROCEDURE — 3079F PR MOST RECENT DIASTOLIC BLOOD PRESSURE 80-89 MM HG: ICD-10-PCS | Mod: CPTII,S$GLB,, | Performed by: INTERNAL MEDICINE

## 2020-09-03 PROCEDURE — 1101F PT FALLS ASSESS-DOCD LE1/YR: CPT | Mod: CPTII,S$GLB,, | Performed by: INTERNAL MEDICINE

## 2020-09-03 PROCEDURE — 99214 OFFICE O/P EST MOD 30 MIN: CPT | Mod: S$GLB,,, | Performed by: INTERNAL MEDICINE

## 2020-09-03 PROCEDURE — 3008F PR BODY MASS INDEX (BMI) DOCUMENTED: ICD-10-PCS | Mod: CPTII,S$GLB,, | Performed by: INTERNAL MEDICINE

## 2020-09-03 PROCEDURE — 1159F PR MEDICATION LIST DOCUMENTED IN MEDICAL RECORD: ICD-10-PCS | Mod: S$GLB,,, | Performed by: INTERNAL MEDICINE

## 2020-09-03 PROCEDURE — 3075F SYST BP GE 130 - 139MM HG: CPT | Mod: CPTII,S$GLB,, | Performed by: INTERNAL MEDICINE

## 2020-09-03 RX ORDER — ASPIRIN 81 MG/1
81 TABLET ORAL DAILY
COMMUNITY

## 2020-09-03 NOTE — PROGRESS NOTES
CHIEF COMPLAINT: Pre Op clearance    HISTORY OF PRESENT ILLNESS: 65 year-old woman who presents for pre op clearance for bladder lift, ERIN/BSO, umbilical hernia repair.    She has been doing well. Feeling good. Exercising regularly.  No chest pain, shortness of breath     She is now taking coreg 12.5 mg one tablet in am and 1/2 tablet in evening and lisinopril 5 mg 1/2 tablet in the evening. . Headcahes are better.  Sinuses are doing ok. She is slightly tired when she exercises.  She feels better.  She has not started on celexa yet for stress.    She is taking metamucil which has helped her diarrhea.   Diarrhea is better.     Sinuses are doing well on zyrtec 10 mg daily and omeprazole 40 mg daily.  She has sore throat and post nasal drip.      She is taking pravastatin 20 mg daily and Zeta 10 mg daily for her hyperlipidemia. NO muscle spasms or joint pain. She exercies regularly. SHe takes co enzyme Q10 200 mg daily.      No chest pain or shortness of breath.      Neck comes and goes. She takes meloxicam as needed which helps.      She will be having a hysterectomy due to post menopausal bleeding. She is being evaluated by Urogyn for her incontinence and may have a sling as well.     She took Boniva 150 mg once monthly for her osteoporosis.  She stopped the Boniva in February 2020 due to improvement.      She has seen Evelyn HUNTER int he past for counseling which has helped her stress.       PAST MEDICAL HISTORY:   1. Hyperlipidemia.   2. Sleep disorder.   3. Cervical spine arthritis.   4. Episode of hematuria, negative cystoscope with Dr. Roland.     PAST SURGICAL HISTORY:   1. D&C for a miscarriage.   2. Conization due to dysplasia 18 years ago.   3. Tonsillectomy and adenoidectomy at age [age].     SOCIAL HISTORY: She does not smoke. She drinks two alcoholic beverages   a month. 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 She owns a business.     FAMILY HISTORY: She is adopted.     REVIEW OF SYSTEMS: She denies  "fevers, chills, night sweats, hot flashes, visual change. She has some slight hearing loss. She denies sinus congestion, sore throat, chest pain, shortness of breath, palpitations, nausea, vomiting, constipation, diarrhea, dysuria, hematuria, joint pain, muscle pain, rashes, headaches, polydipsia,   polyuria.     SCREENING: Mammogram 12/19, bone density 2/20.Colonoscopy was due 2018    PHYSICAL EXAMINATION:     /80   Pulse (!) 54   Ht 5' 4" (1.626 m)   Wt 65.5 kg (144 lb 4.7 oz)   LMP  (LMP Unknown)   SpO2 99%   BMI 24.77 kg/m²     General: Alert, oriented. No apparent distress. Affect within normal   limits.   Conjunctivae anicteric. PERRL. Tympanic membranes clear fluid bilaterally. Oropharynx   clear.   Neck supple. No cervical lymphadenopathy, no thyroid enlargement.   Respiratory effort normal. Lungs clear to auscultation.   Heart: Regular rate and rhythm without murmurs, gallops or rubs.   No lower extremity edema.       Labs 9/2/20 reviewed  CXR 9/2/20 - normal   EKG - 9/2/20 - NSR - no change     ASSESSMENT AND PLAN:    1 Urinary incontinence and umbilical hernia - Pre op exam  - low risk for cardiovascular morbidity and moraltity  - may have procedure done  2. Hypertension -stable  2. Stress - start on celexa 10 mg daily  3. Diarrhea  -stable  3.  Neck pain - stable  5. Hyperlipidemia- stable on meds  6. ALlergic rhinitis - stablel  9. Osteoporosis - BMD 12/17  - on Boniva  10. Insomnia - ambien    11. Left rotator cuff tear - better     She is to return in 12 weeks, sooner if problems arise.     "

## 2020-09-08 ENCOUNTER — PATIENT OUTREACH (OUTPATIENT)
Dept: ADMINISTRATIVE | Facility: OTHER | Age: 65
End: 2020-09-08

## 2020-09-10 ENCOUNTER — ANESTHESIA EVENT (OUTPATIENT)
Dept: SURGERY | Facility: OTHER | Age: 65
End: 2020-09-10
Payer: COMMERCIAL

## 2020-09-10 ENCOUNTER — HOSPITAL ENCOUNTER (OUTPATIENT)
Dept: PREADMISSION TESTING | Facility: OTHER | Age: 65
Discharge: HOME | End: 2020-09-10
Attending: SPECIALIST
Payer: COMMERCIAL

## 2020-09-10 ENCOUNTER — OFFICE VISIT (OUTPATIENT)
Dept: UROGYNECOLOGY | Facility: CLINIC | Age: 65
End: 2020-09-10
Payer: COMMERCIAL

## 2020-09-10 VITALS
OXYGEN SATURATION: 99 % | BODY MASS INDEX: 24.46 KG/M2 | TEMPERATURE: 98 F | DIASTOLIC BLOOD PRESSURE: 72 MMHG | HEART RATE: 55 BPM | SYSTOLIC BLOOD PRESSURE: 148 MMHG | WEIGHT: 143.31 LBS | HEIGHT: 64 IN

## 2020-09-10 VITALS
SYSTOLIC BLOOD PRESSURE: 130 MMHG | BODY MASS INDEX: 24.46 KG/M2 | HEIGHT: 64 IN | DIASTOLIC BLOOD PRESSURE: 80 MMHG | WEIGHT: 143.31 LBS

## 2020-09-10 DIAGNOSIS — R15.1 FECAL SMEARING: ICD-10-CM

## 2020-09-10 DIAGNOSIS — N39.46 MIXED INCONTINENCE URGE AND STRESS: ICD-10-CM

## 2020-09-10 DIAGNOSIS — K42.9 UMBILICAL HERNIA WITHOUT OBSTRUCTION AND WITHOUT GANGRENE: ICD-10-CM

## 2020-09-10 DIAGNOSIS — Z87.42 HISTORY OF VAGINAL BLEEDING: Primary | ICD-10-CM

## 2020-09-10 DIAGNOSIS — R39.15 URINARY URGENCY: ICD-10-CM

## 2020-09-10 DIAGNOSIS — N81.2 CYSTOCELE WITH INCOMPLETE UTEROVAGINAL PROLAPSE: ICD-10-CM

## 2020-09-10 DIAGNOSIS — Z01.818 PREOP TESTING: Primary | ICD-10-CM

## 2020-09-10 DIAGNOSIS — N39.41 URGE INCONTINENCE: ICD-10-CM

## 2020-09-10 LAB
ABO + RH BLD: NORMAL
BLD GP AB SCN CELLS X3 SERPL QL: NORMAL

## 2020-09-10 PROCEDURE — 86850 RBC ANTIBODY SCREEN: CPT

## 2020-09-10 PROCEDURE — 99999 PR PBB SHADOW E&M-EST. PATIENT-LVL III: CPT | Mod: PBBFAC,,, | Performed by: NURSE PRACTITIONER

## 2020-09-10 PROCEDURE — 99999 PR PBB SHADOW E&M-EST. PATIENT-LVL III: ICD-10-PCS | Mod: PBBFAC,,, | Performed by: NURSE PRACTITIONER

## 2020-09-10 PROCEDURE — 99499 NO LOS: ICD-10-PCS | Mod: S$GLB,,, | Performed by: NURSE PRACTITIONER

## 2020-09-10 PROCEDURE — 99499 UNLISTED E&M SERVICE: CPT | Mod: S$GLB,,, | Performed by: NURSE PRACTITIONER

## 2020-09-10 PROCEDURE — 36415 COLL VENOUS BLD VENIPUNCTURE: CPT

## 2020-09-10 RX ORDER — CELECOXIB 200 MG/1
400 CAPSULE ORAL
Status: CANCELLED | OUTPATIENT
Start: 2020-09-10 | End: 2020-09-10

## 2020-09-10 RX ORDER — LIDOCAINE HYDROCHLORIDE 10 MG/ML
0.5 INJECTION, SOLUTION EPIDURAL; INFILTRATION; INTRACAUDAL; PERINEURAL ONCE
Status: CANCELLED | OUTPATIENT
Start: 2020-09-10 | End: 2020-09-10

## 2020-09-10 RX ORDER — SODIUM CHLORIDE, SODIUM LACTATE, POTASSIUM CHLORIDE, CALCIUM CHLORIDE 600; 310; 30; 20 MG/100ML; MG/100ML; MG/100ML; MG/100ML
INJECTION, SOLUTION INTRAVENOUS CONTINUOUS
Status: CANCELLED | OUTPATIENT
Start: 2020-09-10

## 2020-09-10 RX ORDER — SCOLOPAMINE TRANSDERMAL SYSTEM 1 MG/1
1 PATCH, EXTENDED RELEASE TRANSDERMAL ONCE
Status: CANCELLED | OUTPATIENT
Start: 2020-09-10 | End: 2020-09-10

## 2020-09-10 RX ORDER — PREGABALIN 50 MG/1
50 CAPSULE ORAL
Status: CANCELLED | OUTPATIENT
Start: 2020-09-10 | End: 2020-09-10

## 2020-09-10 RX ORDER — ACETAMINOPHEN 500 MG
1000 TABLET ORAL
Status: CANCELLED | OUTPATIENT
Start: 2020-09-10 | End: 2020-09-10

## 2020-09-10 NOTE — DISCHARGE INSTRUCTIONS
Information to Prepare you for your Surgery    PRE-ADMIT TESTING -  [phone]    [address]  [place] [facility]          Your surgery has been scheduled at [facility]. We are pleased to have the opportunity to serve you. For Further Information please call [phone].    On the day of surgery please report to the Information Desk on the 1st floor.    · CONTACT YOUR PHYSICIAN'S OFFICE THE DAY PRIOR TO YOUR SURGERY TO OBTAIN YOUR ARRIVAL TIME.     · The evening before surgery do not eat anything after 9 p.m. ( this includes hard candy, chewing gum and mints).  You may only have GATORADE, POWERADE AND WATER  from 9 p.m. until you leave your home.   DO NOT DRINK ANY LIQUIDS ON THE WAY TO THE HOSPITAL.      SPECIAL MEDICATION INSTRUCTIONS: TAKE medications checked off by the Anesthesiologist on your Medication List.    Angiogram Patients: Take medications as instructed by your physician, including aspirin.     Surgery Patients:    If you take ASPIRIN - Your PHYSICIAN/SURGEON will need to inform you IF/OR when you need to stop taking aspirin prior to your surgery.     Do Not take any medications containing IBUPROFEN.  Do Not Wear any make-up or dark nail polish   (especially eye make-up) to surgery. If you come to surgery with makeup on you will be required to remove the makeup or nail polish.    Do not shave your surgical area at least 5 days prior to your surgery. The surgical prep will be performed at the hospital according to Infection Control regulations.    Leave all valuables at home.   Do Not wear any jewelry or watches, including any metal in body piercings. Jewelry must be removed prior to coming to the hospital.  There is a possibility that rings that are unable to be removed may be cut off if they are on the surgical extremity.    Contact Lens must be removed before surgery. Either do not wear the contact lens or bring a case and solution for  storage.  Please bring a container for eyeglasses or dentures as required.  Bring any paperwork your physician has provided, such as consent forms,  history and physicals, doctor's orders, etc.   Bring comfortable clothes that are loose fitting to wear upon discharge. Take into consideration the type of surgery being performed.  Maintain your diet as advised per your physician the day prior to surgery.      Adequate rest the night before surgery is advised.   Park in the Parking lot behind the hospital or in the [facility] across the street from the parking lot. Parking is complimentary.  If you will be discharged the same day as your procedure, please arrange for a responsible adult to drive you home or to accompany you if traveling by taxi.   YOU WILL NOT BE PERMITTED TO DRIVE OR TO LEAVE THE HOSPITAL ALONE AFTER SURGERY.   If you are being discharged the same day, it is strongly recommended that you arrange for someone to remain with you for the first 24 hrs following your surgery.    The Surgeon will speak to your family/visitor after your surgery regarding the outcome of your surgery and post op care.  The Surgeon may speak to you after your surgery, but there is a possibility you may not remember the details.  Please check with your family members regarding the conversation with the Surgeon.    We strongly recommend whoever is bringing you home be present for discharge instructions.  This will ensure a thorough understanding for your post op home care.    ALL CHILDREN MUST ALWAYS BE ACCOMPANIED BY AN ADULT.    Visitors-Refer to current Visitor policy handouts.    Thank you for your cooperation.  The Staff of [facility].                Bathing Instructions with Hibiclens     Shower the evening before and morning of your procedure with Hibiclens:   Wash your face with water and your regular face wash/soap   Apply Hibiclens directly on your skin or on a wet washcloth and wash  gently. When showering: Move away from the shower stream when applying Hibiclens to avoid rinsing off too soon.   Rinse thoroughly with warm water   Do not dilute Hibiclens         Dry off as usual, do not use any deodorant, powder, body lotions, perfume, after shave or cologne.

## 2020-09-10 NOTE — ANESTHESIA PREPROCEDURE EVALUATION
09/10/2020  Tj Hernandez is a 65 y.o., female.    Anesthesia Evaluation    I have reviewed the Patient Summary Reports.    I have reviewed the Nursing Notes.    I have reviewed the Medications.     Review of Systems  Anesthesia Hx:  PONV History of prior surgery of interest to airway management or planning: Previous anesthesia: General Nov 2019 [facility] with general anesthesia.  Procedure performed at an [facility]. Denies Family Hx of Anesthesia complications.  Personal Hx of Anesthesia complications, Post-Operative Nausea/Vomiting, in the past, but not with recent anesthetics / prophylaxis   Social:  Non-Smoker    Hematology/Oncology:  Hematology Normal   Oncology Normal     EENT/Dental:   chronic allergic rhinitis   Cardiovascular:   Hypertension, well controlled hyperlipidemia    Pulmonary:  Pulmonary Normal    Renal/:   renal calculi    Hepatic/GI:   GERD    Musculoskeletal:   Arthritis   Spine Disorders: cervical Degenerative disease    Neurological:   Seizures, well controlled As child   Endocrine:  Endocrine Normal    Dermatological:  Skin Normal    Psych:  Psychiatric Normal           Physical Exam  General:  Well nourished    Airway/Jaw/Neck:  Airway Findings: Mouth Opening: Normal General Airway Assessment: Adult  Mallampati: II      Dental:  Dental Findings: Upper front caps, Molar caps, Lower front caps        Mental Status:  Mental Status Findings:         Anesthesia Plan  Type of Anesthesia, risks & benefits discussed:  Anesthesia Type:  general  Patient's Preference:   Intra-op Monitoring Plan: standard ASA monitors  Intra-op Monitoring Plan Comments:   Post Op Pain Control Plan: per primary service following discharge from PACU and multimodal analgesia  Post Op Pain Control Plan Comments:   Induction:   IV  Beta Blocker:         Informed Consent: Patient understands risks and  agrees with Anesthesia plan.  Questions answered.   ASA Score: 2     Day of Surgery Review of History & Physical:    H&P update referred to the surgeon.     Anesthesia Plan Notes: Labs today        Ready For Surgery From Anesthesia Perspective.
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
## 2020-09-11 ENCOUNTER — IMMUNIZATION (OUTPATIENT)
Dept: PHARMACY | Facility: CLINIC | Age: 65
End: 2020-09-11

## 2020-09-11 ENCOUNTER — IMMUNIZATION (OUTPATIENT)
Dept: PHARMACY | Facility: CLINIC | Age: 65
End: 2020-09-11
Payer: COMMERCIAL

## 2020-09-19 PROBLEM — K42.9 UMBILICAL HERNIA WITHOUT OBSTRUCTION AND WITHOUT GANGRENE: Status: ACTIVE | Noted: 2020-09-19

## 2020-09-19 PROBLEM — R39.15 URINARY URGENCY: Status: ACTIVE | Noted: 2020-09-19

## 2020-09-19 PROBLEM — Z87.42 HISTORY OF VAGINAL BLEEDING: Status: ACTIVE | Noted: 2020-09-19

## 2020-09-19 PROBLEM — N81.2 CYSTOCELE WITH INCOMPLETE UTEROVAGINAL PROLAPSE: Status: ACTIVE | Noted: 2020-09-19

## 2020-09-19 PROBLEM — R15.1 FECAL SMEARING: Status: ACTIVE | Noted: 2020-09-19

## 2020-09-19 PROBLEM — N39.41 URGE INCONTINENCE: Status: ACTIVE | Noted: 2020-09-19

## 2020-09-19 PROBLEM — N39.46 MIXED INCONTINENCE URGE AND STRESS: Status: ACTIVE | Noted: 2020-09-19

## 2020-09-21 ENCOUNTER — LAB VISIT (OUTPATIENT)
Dept: FAMILY MEDICINE | Facility: CLINIC | Age: 65
End: 2020-09-21
Payer: COMMERCIAL

## 2020-09-21 DIAGNOSIS — Z01.818 PRE-OP EVALUATION: ICD-10-CM

## 2020-09-21 LAB — SARS-COV-2 RNA RESP QL NAA+PROBE: NOT DETECTED

## 2020-09-21 PROCEDURE — U0003 INFECTIOUS AGENT DETECTION BY NUCLEIC ACID (DNA OR RNA); SEVERE ACUTE RESPIRATORY SYNDROME CORONAVIRUS 2 (SARS-COV-2) (CORONAVIRUS DISEASE [COVID-19]), AMPLIFIED PROBE TECHNIQUE, MAKING USE OF HIGH THROUGHPUT TECHNOLOGIES AS DESCRIBED BY CMS-2020-01-R: HCPCS

## 2020-09-23 ENCOUNTER — TELEPHONE (OUTPATIENT)
Dept: UROGYNECOLOGY | Facility: CLINIC | Age: 65
End: 2020-09-23

## 2020-09-23 NOTE — TELEPHONE ENCOUNTER
Spoke to pt, she was reminded of her procedure schedule tomorrow 9/23/2020, pt was informed to arrive at 5:15a. Pt verbalizes understanding.

## 2020-09-24 ENCOUNTER — ANESTHESIA (OUTPATIENT)
Dept: SURGERY | Facility: OTHER | Age: 65
End: 2020-09-24
Payer: COMMERCIAL

## 2020-09-24 ENCOUNTER — HOSPITAL ENCOUNTER (OUTPATIENT)
Facility: OTHER | Age: 65
Discharge: HOME | End: 2020-09-25
Attending: SPECIALIST | Admitting: OBSTETRICS & GYNECOLOGY
Payer: COMMERCIAL

## 2020-09-24 DIAGNOSIS — Z90.710 S/P HYSTERECTOMY: ICD-10-CM

## 2020-09-24 DIAGNOSIS — K43.6: ICD-10-CM

## 2020-09-24 DIAGNOSIS — N81.11 MIDLINE CYSTOCELE: ICD-10-CM

## 2020-09-24 DIAGNOSIS — N39.46 URINARY INCONTINENCE, MIXED: Primary | ICD-10-CM

## 2020-09-24 DIAGNOSIS — N95.0 POSTMENOPAUSAL BLEEDING: ICD-10-CM

## 2020-09-24 LAB — POCT GLUCOSE: 108 MG/DL (ref 70–110)

## 2020-09-24 PROCEDURE — 71000039 HC RECOVERY, EACH ADD'L HOUR: Performed by: SPECIALIST

## 2020-09-24 PROCEDURE — 63600175 PHARM REV CODE 636 W HCPCS: Performed by: ANESTHESIOLOGY

## 2020-09-24 PROCEDURE — S0020 INJECTION, BUPIVICAINE HYDRO: HCPCS | Performed by: OBSTETRICS & GYNECOLOGY

## 2020-09-24 PROCEDURE — 25000003 PHARM REV CODE 250: Performed by: OBSTETRICS & GYNECOLOGY

## 2020-09-24 PROCEDURE — 57425 LAPAROSCOPY SURG COLPOPEXY: CPT | Mod: ,,, | Performed by: OBSTETRICS & GYNECOLOGY

## 2020-09-24 PROCEDURE — 57288 PR SLING OPER STRES INCONTINENCE: ICD-10-PCS | Mod: 51,,, | Performed by: OBSTETRICS & GYNECOLOGY

## 2020-09-24 PROCEDURE — 58571 PR LAPAROSCOPY W TOT HYSTERECTUTERUS <=250 GRAM  W TUBE/OVARY: ICD-10-PCS | Mod: AS,51,, | Performed by: PHYSICIAN ASSISTANT

## 2020-09-24 PROCEDURE — 27201423 OPTIME MED/SURG SUP & DEVICES STERILE SUPPLY: Performed by: SPECIALIST

## 2020-09-24 PROCEDURE — 25000003 PHARM REV CODE 250: Performed by: ANESTHESIOLOGY

## 2020-09-24 PROCEDURE — 57425 PR LAPAROSCOPY, SURG, COLPOPEXY: ICD-10-PCS | Mod: ,,, | Performed by: OBSTETRICS & GYNECOLOGY

## 2020-09-24 PROCEDURE — P9045 ALBUMIN (HUMAN), 5%, 250 ML: HCPCS | Mod: JG | Performed by: NURSE ANESTHETIST, CERTIFIED REGISTERED

## 2020-09-24 PROCEDURE — 63600175 PHARM REV CODE 636 W HCPCS: Performed by: OBSTETRICS & GYNECOLOGY

## 2020-09-24 PROCEDURE — 58571 TLH W/T/O 250 G OR LESS: CPT | Mod: 51,,, | Performed by: OBSTETRICS & GYNECOLOGY

## 2020-09-24 PROCEDURE — 57288 PR SLING OPER STRES INCONTINENCE: ICD-10-PCS | Mod: AS,51,, | Performed by: PHYSICIAN ASSISTANT

## 2020-09-24 PROCEDURE — 88307 TISSUE EXAM BY PATHOLOGIST: CPT | Mod: 26,,, | Performed by: PATHOLOGY

## 2020-09-24 PROCEDURE — 58571 TLH W/T/O 250 G OR LESS: CPT | Mod: AS,51,, | Performed by: PHYSICIAN ASSISTANT

## 2020-09-24 PROCEDURE — 71000033 HC RECOVERY, INTIAL HOUR: Performed by: SPECIALIST

## 2020-09-24 PROCEDURE — C1765 ADHESION BARRIER: HCPCS | Performed by: SPECIALIST

## 2020-09-24 PROCEDURE — 57288 REPAIR BLADDER DEFECT: CPT | Mod: AS,51,, | Performed by: PHYSICIAN ASSISTANT

## 2020-09-24 PROCEDURE — 37000008 HC ANESTHESIA 1ST 15 MINUTES: Performed by: SPECIALIST

## 2020-09-24 PROCEDURE — 57425 PR LAPAROSCOPY, SURG, COLPOPEXY: ICD-10-PCS | Mod: AS,,, | Performed by: PHYSICIAN ASSISTANT

## 2020-09-24 PROCEDURE — C1771 REP DEV, URINARY, W/SLING: HCPCS | Performed by: SPECIALIST

## 2020-09-24 PROCEDURE — 63600175 PHARM REV CODE 636 W HCPCS: Performed by: NURSE ANESTHETIST, CERTIFIED REGISTERED

## 2020-09-24 PROCEDURE — 25000003 PHARM REV CODE 250: Performed by: STUDENT IN AN ORGANIZED HEALTH CARE EDUCATION/TRAINING PROGRAM

## 2020-09-24 PROCEDURE — 63600175 PHARM REV CODE 636 W HCPCS: Performed by: STUDENT IN AN ORGANIZED HEALTH CARE EDUCATION/TRAINING PROGRAM

## 2020-09-24 PROCEDURE — 37000009 HC ANESTHESIA EA ADD 15 MINS: Performed by: SPECIALIST

## 2020-09-24 PROCEDURE — 88307 TISSUE EXAM BY PATHOLOGIST: CPT | Performed by: PATHOLOGY

## 2020-09-24 PROCEDURE — C2628 CATHETER, OCCLUSION: HCPCS | Performed by: SPECIALIST

## 2020-09-24 PROCEDURE — 94761 N-INVAS EAR/PLS OXIMETRY MLT: CPT

## 2020-09-24 PROCEDURE — 58571 PR LAPAROSCOPY W TOT HYSTERECTUTERUS <=250 GRAM  W TUBE/OVARY: ICD-10-PCS | Mod: 51,,, | Performed by: OBSTETRICS & GYNECOLOGY

## 2020-09-24 PROCEDURE — 88307 PR  SURG PATH,LEVEL V: ICD-10-PCS | Mod: 26,,, | Performed by: PATHOLOGY

## 2020-09-24 PROCEDURE — 57425 LAPAROSCOPY SURG COLPOPEXY: CPT | Mod: AS,,, | Performed by: PHYSICIAN ASSISTANT

## 2020-09-24 PROCEDURE — 57288 REPAIR BLADDER DEFECT: CPT | Mod: 51,,, | Performed by: OBSTETRICS & GYNECOLOGY

## 2020-09-24 PROCEDURE — 82962 GLUCOSE BLOOD TEST: CPT | Performed by: SPECIALIST

## 2020-09-24 PROCEDURE — 36000710: Performed by: SPECIALIST

## 2020-09-24 PROCEDURE — 36000711: Performed by: SPECIALIST

## 2020-09-24 PROCEDURE — 94799 UNLISTED PULMONARY SVC/PX: CPT

## 2020-09-24 PROCEDURE — 25000003 PHARM REV CODE 250: Performed by: NURSE ANESTHETIST, CERTIFIED REGISTERED

## 2020-09-24 DEVICE — BARRIER INTERCEED 3X4 ST DIS: Type: IMPLANTABLE DEVICE | Site: ABDOMEN | Status: FUNCTIONAL

## 2020-09-24 DEVICE — SLING FIT ADVANTAGE: Type: IMPLANTABLE DEVICE | Site: ABDOMEN | Status: FUNCTIONAL

## 2020-09-24 RX ORDER — HYDROMORPHONE HYDROCHLORIDE 1 MG/ML
1 INJECTION, SOLUTION INTRAMUSCULAR; INTRAVENOUS; SUBCUTANEOUS EVERY 6 HOURS PRN
Status: DISCONTINUED | OUTPATIENT
Start: 2020-09-24 | End: 2020-09-24

## 2020-09-24 RX ORDER — CARVEDILOL 12.5 MG/1
12.5 TABLET ORAL EVERY MORNING
Status: DISCONTINUED | OUTPATIENT
Start: 2020-09-25 | End: 2020-09-25 | Stop reason: HOSPADM

## 2020-09-24 RX ORDER — ONDANSETRON 8 MG/1
8 TABLET, ORALLY DISINTEGRATING ORAL EVERY 8 HOURS PRN
Status: DISCONTINUED | OUTPATIENT
Start: 2020-09-24 | End: 2020-09-25 | Stop reason: HOSPADM

## 2020-09-24 RX ORDER — PRAVASTATIN SODIUM 20 MG/1
20 TABLET ORAL DAILY
Status: DISCONTINUED | OUTPATIENT
Start: 2020-09-24 | End: 2020-09-25 | Stop reason: HOSPADM

## 2020-09-24 RX ORDER — HYDROCODONE BITARTRATE AND ACETAMINOPHEN 10; 325 MG/1; MG/1
1 TABLET ORAL EVERY 4 HOURS PRN
Status: DISCONTINUED | OUTPATIENT
Start: 2020-09-24 | End: 2020-09-25 | Stop reason: HOSPADM

## 2020-09-24 RX ORDER — IBUPROFEN 600 MG/1
600 TABLET ORAL EVERY 6 HOURS
Status: DISCONTINUED | OUTPATIENT
Start: 2020-09-24 | End: 2020-09-25 | Stop reason: HOSPADM

## 2020-09-24 RX ORDER — HEPARIN SODIUM 5000 [USP'U]/ML
5000 INJECTION, SOLUTION INTRAVENOUS; SUBCUTANEOUS EVERY 8 HOURS
Status: DISCONTINUED | OUTPATIENT
Start: 2020-09-24 | End: 2020-09-25 | Stop reason: HOSPADM

## 2020-09-24 RX ORDER — ACETAMINOPHEN 500 MG
1000 TABLET ORAL
Status: COMPLETED | OUTPATIENT
Start: 2020-09-24 | End: 2020-09-24

## 2020-09-24 RX ORDER — HYDROCODONE BITARTRATE AND ACETAMINOPHEN 5; 325 MG/1; MG/1
1 TABLET ORAL EVERY 4 HOURS PRN
Status: DISCONTINUED | OUTPATIENT
Start: 2020-09-24 | End: 2020-09-25 | Stop reason: HOSPADM

## 2020-09-24 RX ORDER — EPHEDRINE SULFATE 50 MG/ML
INJECTION, SOLUTION INTRAVENOUS
Status: DISCONTINUED | OUTPATIENT
Start: 2020-09-24 | End: 2020-09-24

## 2020-09-24 RX ORDER — PHENYLEPHRINE HYDROCHLORIDE 10 MG/ML
INJECTION INTRAVENOUS
Status: DISCONTINUED | OUTPATIENT
Start: 2020-09-24 | End: 2020-09-24

## 2020-09-24 RX ORDER — CEFAZOLIN SODIUM 2 G/50ML
2 SOLUTION INTRAVENOUS
Status: DISCONTINUED | OUTPATIENT
Start: 2020-09-24 | End: 2020-09-24

## 2020-09-24 RX ORDER — SCOLOPAMINE TRANSDERMAL SYSTEM 1 MG/1
1 PATCH, EXTENDED RELEASE TRANSDERMAL ONCE
Status: COMPLETED | OUTPATIENT
Start: 2020-09-24 | End: 2020-09-24

## 2020-09-24 RX ORDER — CETIRIZINE HYDROCHLORIDE 10 MG/1
10 TABLET ORAL DAILY
Status: DISCONTINUED | OUTPATIENT
Start: 2020-09-24 | End: 2020-09-25 | Stop reason: HOSPADM

## 2020-09-24 RX ORDER — OXYCODONE HYDROCHLORIDE 5 MG/1
5 TABLET ORAL
Status: DISCONTINUED | OUTPATIENT
Start: 2020-09-24 | End: 2020-09-24 | Stop reason: HOSPADM

## 2020-09-24 RX ORDER — DIPHENHYDRAMINE HCL 25 MG
25 CAPSULE ORAL EVERY 4 HOURS PRN
Status: DISCONTINUED | OUTPATIENT
Start: 2020-09-24 | End: 2020-09-25 | Stop reason: HOSPADM

## 2020-09-24 RX ORDER — LIDOCAINE HYDROCHLORIDE 10 MG/ML
0.5 INJECTION, SOLUTION EPIDURAL; INFILTRATION; INTRACAUDAL; PERINEURAL ONCE
Status: DISCONTINUED | OUTPATIENT
Start: 2020-09-24 | End: 2020-09-24

## 2020-09-24 RX ORDER — ONDANSETRON 2 MG/ML
4 INJECTION INTRAMUSCULAR; INTRAVENOUS DAILY PRN
Status: DISCONTINUED | OUTPATIENT
Start: 2020-09-24 | End: 2020-09-24 | Stop reason: HOSPADM

## 2020-09-24 RX ORDER — PREGABALIN 50 MG/1
50 CAPSULE ORAL
Status: COMPLETED | OUTPATIENT
Start: 2020-09-24 | End: 2020-09-24

## 2020-09-24 RX ORDER — DEXAMETHASONE SODIUM PHOSPHATE 4 MG/ML
INJECTION, SOLUTION INTRA-ARTICULAR; INTRALESIONAL; INTRAMUSCULAR; INTRAVENOUS; SOFT TISSUE
Status: DISCONTINUED | OUTPATIENT
Start: 2020-09-24 | End: 2020-09-24

## 2020-09-24 RX ORDER — MEPERIDINE HYDROCHLORIDE 25 MG/ML
12.5 INJECTION INTRAMUSCULAR; INTRAVENOUS; SUBCUTANEOUS ONCE AS NEEDED
Status: DISCONTINUED | OUTPATIENT
Start: 2020-09-24 | End: 2020-09-24 | Stop reason: HOSPADM

## 2020-09-24 RX ORDER — BUPIVACAINE HYDROCHLORIDE 5 MG/ML
INJECTION, SOLUTION EPIDURAL; INTRACAUDAL
Status: DISCONTINUED | OUTPATIENT
Start: 2020-09-24 | End: 2020-09-24 | Stop reason: HOSPADM

## 2020-09-24 RX ORDER — DIPHENHYDRAMINE HYDROCHLORIDE 50 MG/ML
INJECTION INTRAMUSCULAR; INTRAVENOUS
Status: DISCONTINUED | OUTPATIENT
Start: 2020-09-24 | End: 2020-09-24

## 2020-09-24 RX ORDER — HEPARIN SODIUM 5000 [USP'U]/ML
5000 INJECTION, SOLUTION INTRAVENOUS; SUBCUTANEOUS
Status: DISCONTINUED | OUTPATIENT
Start: 2020-09-24 | End: 2020-09-24

## 2020-09-24 RX ORDER — PROPOFOL 10 MG/ML
VIAL (ML) INTRAVENOUS
Status: DISCONTINUED | OUTPATIENT
Start: 2020-09-24 | End: 2020-09-24

## 2020-09-24 RX ORDER — NALOXONE HCL 0.4 MG/ML
VIAL (ML) INJECTION
Status: DISPENSED
Start: 2020-09-24 | End: 2020-09-25

## 2020-09-24 RX ORDER — PANTOPRAZOLE SODIUM 40 MG/1
40 TABLET, DELAYED RELEASE ORAL DAILY
Status: DISCONTINUED | OUTPATIENT
Start: 2020-09-24 | End: 2020-09-25 | Stop reason: HOSPADM

## 2020-09-24 RX ORDER — SODIUM CHLORIDE 0.9 % (FLUSH) 0.9 %
3 SYRINGE (ML) INJECTION
Status: DISCONTINUED | OUTPATIENT
Start: 2020-09-24 | End: 2020-09-25 | Stop reason: HOSPADM

## 2020-09-24 RX ORDER — NITROFURANTOIN 25; 75 MG/1; MG/1
100 CAPSULE ORAL EVERY 12 HOURS
Status: DISCONTINUED | OUTPATIENT
Start: 2020-09-24 | End: 2020-09-25 | Stop reason: HOSPADM

## 2020-09-24 RX ORDER — FENTANYL CITRATE 50 UG/ML
INJECTION, SOLUTION INTRAMUSCULAR; INTRAVENOUS
Status: DISCONTINUED | OUTPATIENT
Start: 2020-09-24 | End: 2020-09-24

## 2020-09-24 RX ORDER — MUPIROCIN 20 MG/G
OINTMENT TOPICAL
Status: DISCONTINUED | OUTPATIENT
Start: 2020-09-24 | End: 2020-09-24

## 2020-09-24 RX ORDER — MUPIROCIN 20 MG/G
1 OINTMENT TOPICAL 2 TIMES DAILY
Status: DISCONTINUED | OUTPATIENT
Start: 2020-09-24 | End: 2020-09-25 | Stop reason: HOSPADM

## 2020-09-24 RX ORDER — SODIUM CHLORIDE, SODIUM LACTATE, POTASSIUM CHLORIDE, CALCIUM CHLORIDE 600; 310; 30; 20 MG/100ML; MG/100ML; MG/100ML; MG/100ML
INJECTION, SOLUTION INTRAVENOUS CONTINUOUS
Status: DISCONTINUED | OUTPATIENT
Start: 2020-09-24 | End: 2020-09-24

## 2020-09-24 RX ORDER — NEOSTIGMINE METHYLSULFATE 1 MG/ML
INJECTION, SOLUTION INTRAVENOUS
Status: DISCONTINUED | OUTPATIENT
Start: 2020-09-24 | End: 2020-09-24

## 2020-09-24 RX ORDER — ONDANSETRON 2 MG/ML
INJECTION INTRAMUSCULAR; INTRAVENOUS
Status: DISCONTINUED | OUTPATIENT
Start: 2020-09-24 | End: 2020-09-24

## 2020-09-24 RX ORDER — PHENAZOPYRIDINE HYDROCHLORIDE 100 MG/1
100 TABLET, FILM COATED ORAL
Status: DISCONTINUED | OUTPATIENT
Start: 2020-09-24 | End: 2020-09-25

## 2020-09-24 RX ORDER — ROCURONIUM BROMIDE 10 MG/ML
INJECTION, SOLUTION INTRAVENOUS
Status: DISCONTINUED | OUTPATIENT
Start: 2020-09-24 | End: 2020-09-24

## 2020-09-24 RX ORDER — SIMETHICONE 80 MG
80 TABLET,CHEWABLE ORAL EVERY 4 HOURS PRN
Status: DISCONTINUED | OUTPATIENT
Start: 2020-09-24 | End: 2020-09-25 | Stop reason: HOSPADM

## 2020-09-24 RX ORDER — HYDROMORPHONE HYDROCHLORIDE 2 MG/ML
0.4 INJECTION, SOLUTION INTRAMUSCULAR; INTRAVENOUS; SUBCUTANEOUS EVERY 5 MIN PRN
Status: DISCONTINUED | OUTPATIENT
Start: 2020-09-24 | End: 2020-09-24 | Stop reason: HOSPADM

## 2020-09-24 RX ORDER — CARVEDILOL 6.25 MG/1
6.25 TABLET ORAL NIGHTLY
Status: DISCONTINUED | OUTPATIENT
Start: 2020-09-24 | End: 2020-09-25 | Stop reason: HOSPADM

## 2020-09-24 RX ORDER — CELECOXIB 200 MG/1
400 CAPSULE ORAL
Status: COMPLETED | OUTPATIENT
Start: 2020-09-24 | End: 2020-09-24

## 2020-09-24 RX ORDER — LIDOCAINE HCL/PF 100 MG/5ML
SYRINGE (ML) INTRAVENOUS
Status: DISCONTINUED | OUTPATIENT
Start: 2020-09-24 | End: 2020-09-24

## 2020-09-24 RX ORDER — SODIUM CHLORIDE 9 MG/ML
INJECTION, SOLUTION INTRAVENOUS CONTINUOUS
Status: DISCONTINUED | OUTPATIENT
Start: 2020-09-24 | End: 2020-09-25

## 2020-09-24 RX ORDER — ALBUMIN HUMAN 50 G/1000ML
SOLUTION INTRAVENOUS CONTINUOUS PRN
Status: DISCONTINUED | OUTPATIENT
Start: 2020-09-24 | End: 2020-09-24

## 2020-09-24 RX ORDER — VECURONIUM BROMIDE FOR INJECTION 1 MG/ML
INJECTION, POWDER, LYOPHILIZED, FOR SOLUTION INTRAVENOUS
Status: DISCONTINUED | OUTPATIENT
Start: 2020-09-24 | End: 2020-09-24

## 2020-09-24 RX ORDER — LIDOCAINE HYDROCHLORIDE AND EPINEPHRINE 10; 10 MG/ML; UG/ML
INJECTION, SOLUTION INFILTRATION; PERINEURAL
Status: DISCONTINUED | OUTPATIENT
Start: 2020-09-24 | End: 2020-09-24 | Stop reason: HOSPADM

## 2020-09-24 RX ADMIN — MUPIROCIN 1 G: 20 OINTMENT TOPICAL at 09:09

## 2020-09-24 RX ADMIN — SODIUM CHLORIDE, SODIUM LACTATE, POTASSIUM CHLORIDE, AND CALCIUM CHLORIDE: 600; 310; 30; 20 INJECTION, SOLUTION INTRAVENOUS at 06:09

## 2020-09-24 RX ADMIN — FENTANYL CITRATE 100 MCG: 50 INJECTION, SOLUTION INTRAMUSCULAR; INTRAVENOUS at 07:09

## 2020-09-24 RX ADMIN — HEPARIN SODIUM 5000 UNITS: 5000 INJECTION, SOLUTION INTRAVENOUS; SUBCUTANEOUS at 05:09

## 2020-09-24 RX ADMIN — MUPIROCIN: 20 OINTMENT TOPICAL at 05:09

## 2020-09-24 RX ADMIN — CELECOXIB 400 MG: 200 CAPSULE ORAL at 05:09

## 2020-09-24 RX ADMIN — SODIUM CHLORIDE, SODIUM LACTATE, POTASSIUM CHLORIDE, AND CALCIUM CHLORIDE: 600; 310; 30; 20 INJECTION, SOLUTION INTRAVENOUS at 10:09

## 2020-09-24 RX ADMIN — LIDOCAINE HYDROCHLORIDE 75 MG: 20 INJECTION, SOLUTION INTRAVENOUS at 07:09

## 2020-09-24 RX ADMIN — CEFAZOLIN SODIUM 2 G: 2 SOLUTION INTRAVENOUS at 07:09

## 2020-09-24 RX ADMIN — NITROFURANTOIN (MONOHYDRATE/MACROCRYSTALS) 100 MG: 75; 25 CAPSULE ORAL at 09:09

## 2020-09-24 RX ADMIN — ONDANSETRON HYDROCHLORIDE 4 MG: 2 INJECTION INTRAMUSCULAR; INTRAVENOUS at 07:09

## 2020-09-24 RX ADMIN — DIPHENHYDRAMINE HYDROCHLORIDE 12.5 MG: 50 INJECTION, SOLUTION INTRAMUSCULAR; INTRAVENOUS at 07:09

## 2020-09-24 RX ADMIN — CARVEDILOL 6.25 MG: 6.25 TABLET, FILM COATED ORAL at 09:09

## 2020-09-24 RX ADMIN — PREGABALIN 50 MG: 50 CAPSULE ORAL at 05:09

## 2020-09-24 RX ADMIN — HYDROCODONE BITARTRATE AND ACETAMINOPHEN 1 TABLET: 10; 325 TABLET ORAL at 09:09

## 2020-09-24 RX ADMIN — VECURONIUM BROMIDE FOR INJECTION 2 MG: 1 INJECTION, POWDER, LYOPHILIZED, FOR SOLUTION INTRAVENOUS at 08:09

## 2020-09-24 RX ADMIN — DEXAMETHASONE SODIUM PHOSPHATE 8 MG: 4 INJECTION, SOLUTION INTRAMUSCULAR; INTRAVENOUS at 07:09

## 2020-09-24 RX ADMIN — NEOSTIGMINE METHYLSULFATE 4 MG: 1 INJECTION INTRAVENOUS at 11:09

## 2020-09-24 RX ADMIN — FENTANYL CITRATE 50 MCG: 50 INJECTION, SOLUTION INTRAMUSCULAR; INTRAVENOUS at 08:09

## 2020-09-24 RX ADMIN — PANTOPRAZOLE SODIUM 40 MG: 40 TABLET, DELAYED RELEASE ORAL at 03:09

## 2020-09-24 RX ADMIN — ACETAMINOPHEN 1000 MG: 500 TABLET, FILM COATED ORAL at 05:09

## 2020-09-24 RX ADMIN — SCOPALAMINE 1 PATCH: 1 PATCH, EXTENDED RELEASE TRANSDERMAL at 05:09

## 2020-09-24 RX ADMIN — FENTANYL CITRATE 50 MCG: 50 INJECTION, SOLUTION INTRAMUSCULAR; INTRAVENOUS at 11:09

## 2020-09-24 RX ADMIN — PHENAZOPYRIDINE HYDROCHLORIDE 100 MG: 100 TABLET ORAL at 05:09

## 2020-09-24 RX ADMIN — CEFAZOLIN SODIUM 2 G: 2 SOLUTION INTRAVENOUS at 11:09

## 2020-09-24 RX ADMIN — ONDANSETRON 8 MG: 8 TABLET, ORALLY DISINTEGRATING ORAL at 09:09

## 2020-09-24 RX ADMIN — EPHEDRINE SULFATE 10 MG: 50 INJECTION INTRAVENOUS at 11:09

## 2020-09-24 RX ADMIN — OXYCODONE 5 MG: 5 TABLET ORAL at 01:09

## 2020-09-24 RX ADMIN — VECURONIUM BROMIDE FOR INJECTION 2 MG: 1 INJECTION, POWDER, LYOPHILIZED, FOR SOLUTION INTRAVENOUS at 10:09

## 2020-09-24 RX ADMIN — ROCURONIUM BROMIDE 40 MG: 10 INJECTION, SOLUTION INTRAVENOUS at 07:09

## 2020-09-24 RX ADMIN — HEPARIN SODIUM 5000 UNITS: 5000 INJECTION INTRAVENOUS; SUBCUTANEOUS at 03:09

## 2020-09-24 RX ADMIN — ALBUMIN (HUMAN): 12.5 SOLUTION INTRAVENOUS at 08:09

## 2020-09-24 RX ADMIN — PRAVASTATIN SODIUM 20 MG: 20 TABLET ORAL at 03:09

## 2020-09-24 RX ADMIN — CETIRIZINE HYDROCHLORIDE 10 MG: 10 TABLET, FILM COATED ORAL at 03:09

## 2020-09-24 RX ADMIN — IBUPROFEN 600 MG: 600 TABLET, FILM COATED ORAL at 05:09

## 2020-09-24 RX ADMIN — GLYCOPYRROLATE 0.4 MG: 0.2 INJECTION, SOLUTION INTRAMUSCULAR; INTRAVITREAL at 11:09

## 2020-09-24 RX ADMIN — GLYCOPYRROLATE 0.2 MG: 0.2 INJECTION, SOLUTION INTRAMUSCULAR; INTRAVITREAL at 11:09

## 2020-09-24 RX ADMIN — PHENYLEPHRINE HYDROCHLORIDE 100 MCG: 10 INJECTION INTRAVENOUS at 07:09

## 2020-09-24 RX ADMIN — HYDROCODONE BITARTRATE AND ACETAMINOPHEN 1 TABLET: 10; 325 TABLET ORAL at 03:09

## 2020-09-24 RX ADMIN — PROPOFOL 150 MG: 10 INJECTION, EMULSION INTRAVENOUS at 07:09

## 2020-09-24 RX ADMIN — SODIUM CHLORIDE: 0.9 INJECTION, SOLUTION INTRAVENOUS at 06:09

## 2020-09-24 RX ADMIN — ROCURONIUM BROMIDE 10 MG: 10 INJECTION, SOLUTION INTRAVENOUS at 08:09

## 2020-09-24 RX ADMIN — HEPARIN SODIUM 5000 UNITS: 5000 INJECTION INTRAVENOUS; SUBCUTANEOUS at 09:09

## 2020-09-24 RX ADMIN — ALBUMIN (HUMAN): 12.5 SOLUTION INTRAVENOUS at 10:09

## 2020-09-24 NOTE — OP NOTE
Title of Operation:  1)  Total laparoscopic hysterectomy with bilateral salpingo-oophorectomy  2)  Laparoscopic bilateral uterosacral vaginal vault suspension  3)  Placement of retropubic tension-free midurethral sling, Advantage Fit (Sazze)  4)  Cystourethroscopy  5)  Ventral hernia repair (Dr. Velasquez.  Please see his note for further detail).     Indications for Surgery:    1) r/o  [age] y/o  with HTN, HLD, osteopenia, adjustment disorder, and AUB presents for urinary incontinence. Followed by Dr. Lashawn Weston for AUB--pelvic US normal, unable to do endometrial biopsy in office due to stenosis from previous cone biopsy decades ago, tried hysteroscopy in the OR, still unable to get good endometrial sample. Discussed option of hysterectomy, wants to have bladder surgery at the same time, so referred to Dr. Pineda. She has not had any vaginal bleeding in the last few months.      01/10/2019 Pelvic US  FINDINGS:  Uterus:  Size: Normal in size, measuring 7.4 x 2.6 x 4.1 cm  Masses: None  Endometrium: Normal in this post menopausal patient, measuring 1 mm.  Right ovary:  Size: Small, measuring 1.6 x 1.7 x 1.4 cm  Appearance: Normal  Vascular flow: Normal.  Left ovary: Not visualized.  Free Fluid: None     2019 Hysteroscopy with D&C  Final Pathologic Diagnosis SCANT FRAGMENTS OF BENIGN SQUAMOUS EPITHELIUM AND MUCUS, NO DYSPLASIA OR   ENDOMETRIAL TISSUE IDENTIFIED    --unable to enter uterine cavity due to cervical stenosis    Pelvic US 2020:  FINDINGS:  Uterus:  Size: 7.3 x 2.8 x 4.2 cm  Masses: None.  Endometrium: Normal in this post menopausal patient, measuring 1 mm.  There are cervical nabothian cysts.  There are small nonspecific echogenic foci noted at the cervical os, likely representing foci of blood products or calcification.  Right ovary: Not visualized.  Left ovary: Not visualized.  Free Fluid:  None.  Impression:  1. No significant sonographic abnormality.  2. The  left and right ovaries are not seen.    1)  UI:  (+) SAHARA > (+) UUI  X 20years, feels like her whole life, even before she had children. (+) light pad:1/day, usually minimum wetness and 1/night usually minimum wetness. (Wearing one now at night because of coughing fits causing leakage). Daytime frequency: Q ~ 3 hours, can hold longer if needs.  Nocturia: Yes: 1/night.   (--) dysuria,  (--) hematuria,  (--) frequent UTIs.  (--) incomplete bladder emptying. She cannot stop urinating mid-stream.   --3/2020 UDS:  3.  URETHRAL FUNCTION/STORAGE PHASE:   a.  WITH prolapse reduction:  · CLPP (150mL): Negative  at  75 cm H20  · VLPP (150 mL): Negative  at  69 cm H20   · CLPP (MAX ):    Positive  at  104 cm H20  · VLPP (MAX):     Positive  at  77 cm H20  ·    These findings are consistent with Positive urodynamic stress incontinence.   Assessment:  UF with insufficient volume for interpretation.  PF prolonged but normal.  Compliance normal.  Max capacity 300 mL.  DO (--).  SAHARA (+).   --cysto: normal     2)  POP: Absent.  (+) vaginal bleeding. (--) vaginal discharge. (+) sexually active.  (+) dyspareunia, but improved minmally since starting Estrace cream. More painful with insertion than deep penetration. (+)  Vaginal dryness, improved since starting Estrace, but still bothersome.  (+) vaginal estrogen (Estrace) use for several years.   --POP-Q:  Aa 0; Ba 0; C -8; Ap -2; Bp -2; D -10.  Genital hiatus 5, perineal body 2, total vaginal length 10.     3)  BM:  (--) constipation/straining. Very regular, goes every morning.  (--) chronic diarrhea. (+) hematochezia.  (+) fecal incontinence.  (+) fecal smearing/urgency.  (+) incomplete evacuation. Has been having smearing for as long as she can remember.      Preoperative Diagnosis:  1. Mixed incontinence urge and stress    2. Fecal smearing    3. Umbilical hernia without obstruction and without gangrene    4. Postmenopausal bleeding    5. Contrast dye induced nephropathy    6.  Cystocele with incomplete uterovaginal prolapse    7.    Urodynamic stress incontinence    Postoperative Diagnosis:  1. Mixed incontinence urge and stress    2. Fecal smearing    3. Umbilical hernia without obstruction and without gangrene    4. Postmenopausal bleeding    5. Contrast dye induced nephropathy    6. Cystocele with incomplete uterovaginal prolapse    7.    Urodynamic stress incontinence    Anesthesia:  General endotracheal anesthesia.  Additionally, 0.5% marcaine was injected prior to placement of laparoscopic ports, and a dilute solution of 1% lidocaine with epinephrine was injected vaginally for local anesthesia.    Specimen (Bacteriological, Pathological or other):  Uterus and cervix, bilateral fallopian tubes and ovaries    Prosthetic Device/Implant:  1)  Advantage Fit sling.  LOT# [id number].      Surgeons Narrative:    Surgeon: Jeyson Pineda MD    Assistants:  Luiz Schmidt MD (PGY4).  MONSERRAT Rodarte (insufficient resident assistance).      Intravenous Fluids:  2450 mL     Estimated Blood Loss:  150 mL     Urine Output:  475 mL     Counts:  Sponge, lap, needle counts correct x 2.     Drains: Gill catheter.     Disposition:  The patient was sent to the PACU in stable condition.     Findings:     1.  On exam under anesthesia,  normal external female genitalia. There was prolapse as previously noted in clinic.  Uterus and cervix: Normal size .  Cervical os was stenotic but able to be dilated gently to accomodate a scope.  Once we confirmed that we were inside the uterine cavity, we were able to place SHRUTHI-KOH device more assuredly.   normal bimanual exam     2.  On laparoscopic survey:    --The bowel was grossly Normal.    --The appendix was not visualized.   --The uterus and cervix, bilateral fallopian tubes and ovaries were present and Normal.   --The liver margin Normal.   --The gall bladder was present and Normal.   --Bilateral ureters were visualized and noted to vermiculate at the start and  close of the case.    --Adhesions were absent.    --Other findings included:  none    3.  On cystoscopy, the bladder mucosa was Normal.  After TLH/BSO, USS, and final passage of the sling there was no suture or mesh within the bladder mucosa. Of note, after initial passage of sling trocars, it was noted that the left trocar perforated the bladder. This was removed and replaced without any defect noted.   The ureteral orifices were visualized bilaterally with (+) noted good efflux x 2. On a systematic survey of the bladder dome to the base of the urethrovesical junction, there were not other abnormalities noted. The urethra was normal on retraction of the scope.     4.  Rectal exam:  At the close of the case, rectal exam was performed.  There was no suture, mesh, or other injury noted on exam.  No obvious masses were palpated.     Description of procedure:    The patient was identified in the preoperative area where informed consent was confirmed, and she was taken to the operating room where an adequate level of general anesthesia was obtained.  The patient was positioned in lithotomy position with legs in Eamon stirrups. Care was taken to avoid joint hyperflexion or hyperextension, and all extremity surfaces were carefully padded so as to minimize risk of neurologic injury. Intravenous antibiotics were administered preoperatively. Sequential compression devices were applied to the patient's lower extremities preoperatively and heparin was administered subcutaneously for VTE prophylaxis.  Surgical time-out was performed, where the patient was identified and procedures confirmed.  An examination under anesthesia was performed with findings described as above.  The patient's abdomen, perineum, and vagina were sterilely prepped and draped. A wright catheter was placed in the bladder for drainage.      A weighted speculum was placed in the vagina.  The cervix was identified, the posterior lip was grasped with a single  toothed tenaculum.  Cervical os was stenotic but able to be dilated gently to accomodate a scope.  Once we confirmed that we were inside the uterine cavity, we were able to place SHRUTHI-KOH device more assuredly.   Stay sutures of 0-vicryl were placed in the anterior and posterior cervix.  The uterus was sounded, and the appropriate uterine manipulator and KOH colpotomizer were selected.  The SHRUTHI/KOH apparatus was assembled, and the uterine manipulator was advanced into the uterine cavity until the KOH colpotomizer was secured optimally around the cervix.  The uterine manipulator was inflated to secure the device, and a vaginal occluder balloon was placed distally to the SHRUTHI/KOH and inflated until the vaginal cavity was occluded.      Attention was turned abdominally, where after injection of Marcaine locally, an incision was made at the umbilicus, through which a 10 mm Xcel trocar and sleeve were placed with laparoscopic visualization. Proper intraperitoneal placement was confirmed with a pneumoperitoneum of carbon dioxide gas obtained to a pressure of no greater than 15 mmHg. After injection of the skin with Marcaine locally, an incision was made at the left lower quadrant, 2 fingerbreadths cephalad and 2 fingerbreadths medial to the anterior-superior iliac spine, through which a 10 mm laparoscopic trocar and sleeve were advanced under direct visualization with the laparoscope. This procedure was repeated on the patient's right lower quadrant. Care was taken to stay medial of the inferior epigastric vessels.  Finally, a 5 mm laparoscopic trocar and sleeve were advanced to the left abdominal wall along the midclavicular line at approximately the level of the umbilicus, 1 hand width cephalad to the 10 mm trocar. All trocars were placed without complication.  The patient was placed in Trendelenburg and the bowel was displaced gently from the pelvis. Careful laparoscopic survey of the pelvis and abdomenrevealed  findings as detailed above. Bilateral ureters were visualized retroperitoneally and noted to vermiculate.     We began the case with total laparoscopic hysterectomy.  The ureters were visualized bilaterally.  Bilateral tubes and ovaries were visualized, noted to be normal, and left in place as the patient was perimenopausal with no risk factors.  Sequentially, the right utero-ovarian, round, and broad ligamens were electrodessicated with bipolar cautery.  The anterior and posterior sheaths of the round ligament were gently , the uterine vessels were exposed, and electrodessicated.  Using sharp dissection, the vesicouterine fold was created, and the bladder was carefully dissected off the anterior vagina.   Next, these same steps were repeated along the patient's left side.  Excellent hemostasis was noted.  Using the monopolar erwin, the anterior colpotomy was created along the KOH device and considered circumferentially until the entire uterine specimen was freed from the pelvis.  The specimen was then handed to pathology for further evaluation.  The vaginal cuff was closed with several figure-of-eight stitches of 0-vicryl.  Excellent hemostasis was noted.     Next, we proceeded with laparoscopic uterosacral suspension.  Bilateral uterosacral ligaments were previously tagged with 0-vicryl sutures bilaterally while the uterus was in place.  These tags were identified.  Bilateral ureters were re-identified, and care was taken to avoid damage to them during this process.  A 0-Prolene suture was placed through the right uterosacral ligament at the level of the ischial spine and then passed through the posterior and anterior vaginal cuff angle, making sure not to pass the suture through full thickness of the vaginal epithelium.  Next, a 0-PDS suture was placed through the right uterosacral ligament about 1 cm distal and medial to the prolene stitch and passed completely through the posterior and anterior  vaginal cuff at the right cuff angle.  Both sutures were tied down, resulting in excellent elevation of the right vaginal apex.  The same procedure was performed on the patient's left side, passing a 0-Prolene stitch more cephalad and lateral to the 0-PDS stitch.  Both sutures were tied down, resulting in excellent elevation of the right vaginal apex.   At this point, bilateral ureters were noted to vermiculate well.  The vaginal cuff was check manually and noted to be intact, without defect, and well-elevated at the apex after the uterosacral suspension.  The laparoscopic instruments were placed on standby, and cystourethroscopy was performed.  There were no abnormalities noted, and excellent efflux from bilateral ureteral orifices was seen.      At this point, laparoscopic portion of the procedure was completed. The pelvis was irrigated, and all irrigants were removed. The abdominal wall fascia was closed at the 10 mm laparoscopic port sites with an Endoclose suture carrier with stitches of 0 Vicryl. This was done under direct laparoscopic visualization. The abdomen was deflated and all laparoscopic sleeves and other instruments were removed from the abdomen. All laparoscopic skin incisions were closed with subcuticular stitches of 4-0 Monocryl and secured with steri strips and band-aids.  Excellent cosmesis and hemostasis was noted.             We then turned our attention vaginally.  All walls of the vagina were well-supported from the uterosacral suspension. Rectal exam was normal as above noted.     Therefore, we proceeded with placement of the Advantage Fit midurethral sling. The skin was marked just above the symphysis pubis, 2 cm laterally on either side of the midline indicating the exit sites for the trocars.  The Gill catheter was palpated at the urethrovesical junction, and 2 Allis clamps were placed at the anterior vaginal wall along the midurethra. The Gill catheter was removed from the patient's  bladder. The vaginal epithelium between the two Allis clamps was injected with the 1% lidocaine with epinephrine.  Metzenbaum scissors were used to dissect the vaginal epithelium off the underlying pubocervical muscular tissue in the direction of the angle formed between the ischiopubic ramus and the pubic bone bilaterally. The rigid catheter guide was used to deviate the bladder neck and urethra to the patient's left while the right trocar was advanced to the dissected tract in the patient's right side.  Once the urogenital membrane was perforated, the trocar and handle were reoriented in the sagittal plane and the tip of the trocar was advanced through the retropubic space in intimate proximity to the pubic bone.  The trocar was then advanced through the skin approximately 2 cm to the right of the midline just above the pubic symphysis. The passage of the Advantage Fit trocar was repeated on the patient's left hand side in a similar fashion, using the catheter guide to deviate the bladder neck to the right.  At this point, cystourethroscopy was performed.   Of note, after initial passage of sling trocars, it was noted that the left trocar perforated the bladder. This was removed and replaced without any defect noted.  The ureteral orifices were noted to have good efflux bilaterally.  The bladder was then drained. With a #10 Hegar dilator placed between the sling mesh and the urethra, the arms of the sling were elevated above the pubic symphysis and the plastic sheaths were removed from the mesh arms.  Excess mesh was trimmed beneath the suprapubic skin.  The Hegar dilator ensured that the mesh sling was placed in a tension-free manner.  The vaginal mucosa overlying the sling mesh was closed with a several mattress stitches of 2-0 Vicryl with excellent hemostasis noted.  The suprapubic incisions were closed with dermabond.    At this point, Dr. Velasquez performed a ventral hernia repair.  Please see his note for  further detail.      At the close of the case, all counts were correct x2.  The vagina was irrigated, and all irrigants were removed.  The vagina was packed with a role of Kerlix, coated with Premarin cream for assurance of immediate postop hemostasis.  The Gill was in place and draining well.  The patient was awakened from general endotracheal anesthesia and was taken to the Recovery Room in stable condition.  She tolerated the procedure well.    I was present and scrubbed for the entire procedure.

## 2020-09-24 NOTE — OR NURSING
"The patient is lying on the stretcher in a light sleep awakens to voice, VSS, Afebrile, Sats % on 2L of O2, pain reassessed on a scale of 010, and the patient stated "6' on a scale of 0-10 post PRN pain medication, bedside handoff given to FERNANDEZ Sandoval. The patient  at the bedside.   "

## 2020-09-24 NOTE — ANESTHESIA PROCEDURE NOTES
Intubation  Performed by: Reen Sen Jr., CRNA  Authorized by: Bao Sears MD     Intubation:     Induction:  Intravenous    Intubated:  Postinduction    Mask Ventilation:  Easy mask    Attempts:  1    Attempted By:  CRNA    Method of Intubation:  Video laryngoscopy    Blade:  Dyan 3    Laryngeal View Grade: Grade I - full view of chords      Difficult Airway Encountered?: No      Complications:  None    Airway Device:  Oral endotracheal tube    Airway Device Size:  7.5    Style/Cuff Inflation:  Cuffed (inflated to minimal occlusive pressure)    Tube secured:  22    Secured at:  The lips    Placement Verified By:  Capnometry    Complicating Factors:  None    Findings Post-Intubation:  BS equal bilateral and atraumatic/condition of teeth unchanged

## 2020-09-24 NOTE — NURSING
Pt arrived to floor via stretcher with FERNANDEZ Velásquez and transferred to bed. SCDs applied, oriented to room, call light placed within reach, bed low and locked, and family at bedside. No complaints of pain. Gill noted draining clear yellow urine to gravity. Incisions noted to abdomen , CDI. No acute distress noted at this time. Will continue to monitor.

## 2020-09-24 NOTE — OP NOTE
[facility]  Surgery Department  Operative Note    SUMMARY     Patient: Tj Hernandez    Medical Record: [id number]    Date of Procedure: 9/24/2020     Surgeon: Surgeon(s) and Role:  Panel 1:     * Thompson Velasquez MD - Primary  Panel 2:     * Jeyson Pineda MD - Primary     * Luiz Schmidt MD - Resident - Assisting        Pre-Operative Diagnosis: Epigastric hernia with obstruction but no gangrene [K43.6]    Post-Operative Diagnosis: Post-Op Diagnosis Codes:     * Epigastric hernia with obstruction but no gangrene [K43.6]    Procedure: Procedure(s) (LRB):  REPAIR, HERNIA, EPIGASTRIC (N/A)  HYSTERECTOMY, VAGINAL, LAPAROSCOPY-ASSISTED WITH BSO (N/A)  COLPOPEXY (N/A)  COLPORRHAPHY, COMBINED ANTEROPOSTERIOR (N/A)  SLING, MIDURETHRAL (N/A)  CYSTOSCOPY (N/A)  SUSPENSION, LIGAMENT, UTEROSACRAL, LAPAROSCOPIC (N/A)    Procedure in Detail:  The patient had been taken to surgery by Dr. Terrell hysterectomy.  After she completed her abdominal portion of the operation I entered the case.  The patient had a ventral hernia above the umbilicus.  The laparoscoped was placed in was no defect that could be seen.  This was obviously preperitoneal fat through a small defect.  An incision was made over the suspected area and carried down to the hernia sac which was mostly fat.  The fat was excised in the defect identified.  The defect was less than a cm.  The defect was closed using interrupted 2 0 Ethibond suture.  Subcu tissue and skin were then closed.  Dr. Terrell then continued her procedure.

## 2020-09-24 NOTE — TRANSFER OF CARE
"Anesthesia Transfer of Care Note    Patient: Tj Hernandez    Procedure(s) Performed: Procedure(s) (LRB):  REPAIR, HERNIA, EPIGASTRIC (N/A)  HYSTERECTOMY, VAGINAL, LAPAROSCOPY-ASSISTED WITH BSO (N/A)  SLING, MIDURETHRAL (N/A)  CYSTOSCOPY (N/A)  SUSPENSION, LIGAMENT, UTEROSACRAL, LAPAROSCOPIC (N/A)    Patient location: PACU    Anesthesia Type: general    Transport from OR: Transported from OR on 2-3 L/min O2 by NC with adequate spontaneous ventilation. Continuous SpO2 monitoring in transport    Post pain: adequate analgesia    Post assessment: no apparent anesthetic complications    Post vital signs: stable    Level of consciousness: awake    Nausea/Vomiting: no nausea/vomiting    Complications: none    Transfer of care protocol was followed      Last vitals:   Visit Vitals  /71 (BP Location: Left arm, Patient Position: Sitting)   Pulse 63   Temp 36.6 °C (97.9 °F)   Resp 16   Ht 5' 4" (1.626 m)   Wt 64.9 kg (143 lb)   LMP  (LMP Unknown)   SpO2 98%   Breastfeeding No   BMI 24.55 kg/m²     "

## 2020-09-24 NOTE — CARE UPDATE
URO-GYN Service Progress Note    S:  Patient states that she is doing well.  She states that her pain is currently controlled.  The patient has tolerated fluids and a small amount of the launch that she was provided without nausea or vomiting.  Of note, the patient did have an episode of hypoxemia upon arriving in PACU.  She did require bag ventilation for a period of time but then was found to be stable.  At this point, the patient denies any complaints of shortness of breath or difficulty breathing.    O:    Temp:  [96.7 °F (35.9 °C)-98 °F (36.7 °C)] 98 °F (36.7 °C)  Pulse:  [54-86] 86  Resp:  [14-18] 16  SpO2:  [96 %-100 %] 99 %  BP: (121-159)/(60-82) 128/65    GEN: No acute distress  LUNGS: Non-labored breathing  ABD: Soft, mildly tender, LAP sites C/DI  EXT: SCDs in place   : Gill in place    A/P  - Standard post-op advances  - SCDs and Heparin for VTE PPX  - REG DIET  - Gill to remain in place given bladder perforation. Macrobid/Pyridium  - Ibuprofen/Norco. D/C'd Dilaudid given hypoxemic episode in PACU  - Encourage ambulation and I.S.     Luiz Schmidt M.D.  PGY-4 OB/GYN  [facility]

## 2020-09-24 NOTE — TRANSFER OF CARE
"Anesthesia Transfer of Care Note    Patient: Tj Hernandez    Procedure(s) Performed: Procedure(s) (LRB):  REPAIR, HERNIA, EPIGASTRIC (N/A)  HYSTERECTOMY, VAGINAL, LAPAROSCOPY-ASSISTED WITH BSO (N/A)  SLING, MIDURETHRAL (N/A)  CYSTOSCOPY (N/A)  SUSPENSION, LIGAMENT, UTEROSACRAL, LAPAROSCOPIC (N/A)    Patient location: PACU    Anesthesia Type: general    Transport from OR: Transported from OR on 2-3 L/min O2 by NC with adequate spontaneous ventilation    Post pain: adequate analgesia    Post assessment: no apparent anesthetic complications and tolerated procedure well    Post vital signs: stable    Level of consciousness: awake, alert and oriented    Nausea/Vomiting: no nausea/vomiting    Complications: none    Transfer of care protocol was followed      Last vitals:   Visit Vitals  /71 (BP Location: Left arm, Patient Position: Sitting)   Pulse 63   Temp 36.6 °C (97.9 °F)   Resp 16   Ht 5' 4" (1.626 m)   Wt 64.9 kg (143 lb)   LMP  (LMP Unknown)   SpO2 98%   Breastfeeding No   BMI 24.55 kg/m²     "

## 2020-09-24 NOTE — INTERVAL H&P NOTE
The patient has been examined and the H&P has been reviewed:    I concur with the findings and no changes have occurred since H&P was written.    Surgery risks, benefits and alternative options discussed and understood by patient/family.          Active Hospital Problems    Diagnosis  POA    Midline cystocele [N81.11]  Yes      Resolved Hospital Problems   No resolved problems to display.

## 2020-09-24 NOTE — OPERATIVE NOTE ADDENDUM
Certification of Assistant at Surgery       Surgery Date: 9/24/2020     Participating Surgeons:  Surgeon(s) and Role:  Panel 1:     * Thompson Velasquez MD - Primary  Panel 2:     * Jeyson Pineda MD - Primary     * Luiz Schmidt MD - Resident - Assisting    Procedures:  Procedure(s) (LRB):  REPAIR, HERNIA, EPIGASTRIC (N/A)  HYSTERECTOMY, VAGINAL, LAPAROSCOPY-ASSISTED WITH BSO (N/A)  SLING, MIDURETHRAL (N/A)  CYSTOSCOPY (N/A)  SUSPENSION, LIGAMENT, UTEROSACRAL, LAPAROSCOPIC (N/A)    Assistant Surgeon's Certification of Necessity:  I understand that section 1842 (b) (6) (d) of the Social Security Act generally prohibits Medicare Part B reasonable charge payment for the services of assistants at surgery in teaching hospitals when qualified residents are available to furnish such services. I certify that the services for which payment is claimed were medically necessary, and that no qualified resident was available to perform the services. I further understand that these services are subject to post-payment review by the Medicare carrier.      Tyree Esquivel PA-C    09/24/2020  12:57 PM

## 2020-09-24 NOTE — ANESTHESIA POSTPROCEDURE EVALUATION
Anesthesia Post Evaluation    Patient: Tj Hernandez    Procedure(s) Performed: Procedure(s) (LRB):  REPAIR, HERNIA, EPIGASTRIC (N/A)  HYSTERECTOMY, VAGINAL, LAPAROSCOPY-ASSISTED WITH BSO (N/A)  SLING, MIDURETHRAL (N/A)  CYSTOSCOPY (N/A)  SUSPENSION, LIGAMENT, UTEROSACRAL, LAPAROSCOPIC (N/A)    Final Anesthesia Type: general    Patient location during evaluation: PACU  Patient participation: Yes- Able to Participate  Level of consciousness: awake and alert  Post-procedure vital signs: reviewed and stable  Pain management: adequate  Airway patency: patent    PONV status at discharge: No PONV  Anesthetic complications: no      Cardiovascular status: blood pressure returned to baseline and stable  Respiratory status: unassisted, spontaneous ventilation and room air  Hydration status: euvolemic  Follow-up not needed.          Vitals Value Taken Time   /77 09/24/20 1432   Temp 36.7 °C (98 °F) 09/24/20 1432   Pulse 81 09/24/20 1432   Resp 18 09/24/20 1432   SpO2 99 % 09/24/20 1432         Event Time   Out of Recovery 14:13:28         Pain/Alissa Score: Pain Rating Prior to Med Admin: 6 (9/24/2020  1:20 PM)  Pain Rating Post Med Admin: 6 (9/24/2020  1:50 PM)  Alissa Score: 8 (9/24/2020  1:50 PM)

## 2020-09-25 VITALS
RESPIRATION RATE: 16 BRPM | SYSTOLIC BLOOD PRESSURE: 118 MMHG | OXYGEN SATURATION: 95 % | BODY MASS INDEX: 24.41 KG/M2 | DIASTOLIC BLOOD PRESSURE: 59 MMHG | HEIGHT: 64 IN | WEIGHT: 143 LBS | TEMPERATURE: 99 F | HEART RATE: 64 BPM

## 2020-09-25 LAB
BASOPHILS # BLD AUTO: 0.02 K/UL (ref 0–0.2)
BASOPHILS NFR BLD: 0.1 % (ref 0–1.9)
DIFFERENTIAL METHOD: ABNORMAL
EOSINOPHIL # BLD AUTO: 0 K/UL (ref 0–0.5)
EOSINOPHIL NFR BLD: 0 % (ref 0–8)
ERYTHROCYTE [DISTWIDTH] IN BLOOD BY AUTOMATED COUNT: 13.7 % (ref 11.5–14.5)
HCT VFR BLD AUTO: 33 % (ref 37–48.5)
HGB BLD-MCNC: 10.6 G/DL (ref 12–16)
IMM GRANULOCYTES # BLD AUTO: 0.07 K/UL (ref 0–0.04)
IMM GRANULOCYTES NFR BLD AUTO: 0.4 % (ref 0–0.5)
LYMPHOCYTES # BLD AUTO: 1.2 K/UL (ref 1–4.8)
LYMPHOCYTES NFR BLD: 7.2 % (ref 18–48)
MCH RBC QN AUTO: 25.4 PG (ref 27–31)
MCHC RBC AUTO-ENTMCNC: 32.1 G/DL (ref 32–36)
MCV RBC AUTO: 79 FL (ref 82–98)
MONOCYTES # BLD AUTO: 1.3 K/UL (ref 0.3–1)
MONOCYTES NFR BLD: 7.7 % (ref 4–15)
NEUTROPHILS # BLD AUTO: 14.1 K/UL (ref 1.8–7.7)
NEUTROPHILS NFR BLD: 84.6 % (ref 38–73)
NRBC BLD-RTO: 0 /100 WBC
PLATELET # BLD AUTO: 194 K/UL (ref 150–350)
PMV BLD AUTO: 11.3 FL (ref 9.2–12.9)
RBC # BLD AUTO: 4.18 M/UL (ref 4–5.4)
WBC # BLD AUTO: 16.72 K/UL (ref 3.9–12.7)

## 2020-09-25 PROCEDURE — 25000003 PHARM REV CODE 250: Performed by: STUDENT IN AN ORGANIZED HEALTH CARE EDUCATION/TRAINING PROGRAM

## 2020-09-25 PROCEDURE — 36415 COLL VENOUS BLD VENIPUNCTURE: CPT

## 2020-09-25 PROCEDURE — 85025 COMPLETE CBC W/AUTO DIFF WBC: CPT

## 2020-09-25 PROCEDURE — 25000003 PHARM REV CODE 250: Performed by: OBSTETRICS & GYNECOLOGY

## 2020-09-25 PROCEDURE — 63600175 PHARM REV CODE 636 W HCPCS: Performed by: STUDENT IN AN ORGANIZED HEALTH CARE EDUCATION/TRAINING PROGRAM

## 2020-09-25 RX ORDER — NITROFURANTOIN (MACROCRYSTALS) 100 MG/1
100 CAPSULE ORAL 2 TIMES DAILY
Qty: 14 CAPSULE | Refills: 0 | Status: SHIPPED | OUTPATIENT
Start: 2020-09-25 | End: 2020-10-02

## 2020-09-25 RX ORDER — IBUPROFEN 600 MG/1
600 TABLET ORAL EVERY 6 HOURS PRN
Qty: 30 TABLET | Refills: 1 | Status: SHIPPED | OUTPATIENT
Start: 2020-09-25 | End: 2021-01-30

## 2020-09-25 RX ORDER — DOCUSATE SODIUM 100 MG/1
100 CAPSULE, LIQUID FILLED ORAL 2 TIMES DAILY
Qty: 60 CAPSULE | Refills: 1 | Status: SHIPPED | OUTPATIENT
Start: 2020-09-25 | End: 2020-10-25

## 2020-09-25 RX ORDER — ONDANSETRON 4 MG/1
4 TABLET, FILM COATED ORAL EVERY 8 HOURS PRN
Qty: 30 TABLET | Refills: 0 | Status: SHIPPED | OUTPATIENT
Start: 2020-09-25 | End: 2021-01-30

## 2020-09-25 RX ORDER — HYDROCODONE BITARTRATE AND ACETAMINOPHEN 5; 325 MG/1; MG/1
1 TABLET ORAL EVERY 6 HOURS PRN
Qty: 40 TABLET | Refills: 0 | Status: SHIPPED | OUTPATIENT
Start: 2020-09-25 | End: 2021-01-30

## 2020-09-25 RX ORDER — URINARY ANTISEPTIC ANTISPASMODIC 81.6; 40.8; 10.8; .12 MG/1; MG/1; MG/1; MG/1
1 TABLET ORAL 4 TIMES DAILY PRN
Qty: 30 TABLET | Refills: 1 | Status: SHIPPED | OUTPATIENT
Start: 2020-09-25 | End: 2021-01-30

## 2020-09-25 RX ORDER — PHENAZOPYRIDINE HYDROCHLORIDE 200 MG/1
200 TABLET, FILM COATED ORAL 3 TIMES DAILY PRN
Qty: 10 TABLET | Refills: 0 | Status: SHIPPED | OUTPATIENT
Start: 2020-09-25 | End: 2020-10-05

## 2020-09-25 RX ORDER — PHENAZOPYRIDINE HYDROCHLORIDE 100 MG/1
200 TABLET, FILM COATED ORAL
Status: DISCONTINUED | OUTPATIENT
Start: 2020-09-25 | End: 2020-09-25 | Stop reason: HOSPADM

## 2020-09-25 RX ADMIN — PHENAZOPYRIDINE HYDROCHLORIDE 200 MG: 100 TABLET ORAL at 08:09

## 2020-09-25 RX ADMIN — PRAVASTATIN SODIUM 20 MG: 20 TABLET ORAL at 08:09

## 2020-09-25 RX ADMIN — PHENAZOPYRIDINE HYDROCHLORIDE 200 MG: 100 TABLET ORAL at 02:09

## 2020-09-25 RX ADMIN — IBUPROFEN 600 MG: 600 TABLET, FILM COATED ORAL at 01:09

## 2020-09-25 RX ADMIN — PANTOPRAZOLE SODIUM 40 MG: 40 TABLET, DELAYED RELEASE ORAL at 08:09

## 2020-09-25 RX ADMIN — IBUPROFEN 600 MG: 600 TABLET, FILM COATED ORAL at 12:09

## 2020-09-25 RX ADMIN — CETIRIZINE HYDROCHLORIDE 10 MG: 10 TABLET, FILM COATED ORAL at 08:09

## 2020-09-25 RX ADMIN — HEPARIN SODIUM 5000 UNITS: 5000 INJECTION INTRAVENOUS; SUBCUTANEOUS at 01:09

## 2020-09-25 RX ADMIN — NITROFURANTOIN (MONOHYDRATE/MACROCRYSTALS) 100 MG: 75; 25 CAPSULE ORAL at 08:09

## 2020-09-25 RX ADMIN — MUPIROCIN 1 G: 20 OINTMENT TOPICAL at 09:09

## 2020-09-25 RX ADMIN — HYDROCODONE BITARTRATE AND ACETAMINOPHEN 1 TABLET: 5; 325 TABLET ORAL at 10:09

## 2020-09-25 RX ADMIN — HEPARIN SODIUM 5000 UNITS: 5000 INJECTION INTRAVENOUS; SUBCUTANEOUS at 05:09

## 2020-09-25 RX ADMIN — IBUPROFEN 600 MG: 600 TABLET, FILM COATED ORAL at 05:09

## 2020-09-25 NOTE — PLAN OF CARE
Discharge Planning Assessment:  Final note:    Patient admitted on 9-24-20  Chart reviewed, Care plan discussed with treatment team,  attending Dr Velasquez  PCP updated in Epic: yes  DME at home: n/a  Current dispo: home today  Self reports no needs, eager to discharge  Transportation: has reliable  Case management  to follow as needed       09/25/20 8530   Final Note   Assessment Type Final Discharge Note   Anticipated Discharge Disposition Home   Hospital Follow Up  Appt(s) scheduled? Yes   Discharge plans and expectations educations in teach back method with documentation complete? Yes   Right Care Referral Info   Post Acute Recommendation No Care   Post-Acute Status   Discharge Delays None known at this time

## 2020-09-25 NOTE — SUBJECTIVE & OBJECTIVE
Interval History: Patient doing OK this. Complains of soreness but no acute pain. Did have one episode of emesis overnight. Has not ambulated and wright has remained in place. Complains of irritation with wright in place. She denies any respiratory symptoms.     Scheduled Meds:   carvediloL  12.5 mg Oral QAM    carvediloL  6.25 mg Oral QHS    cetirizine  10 mg Oral Daily    heparin (porcine)  5,000 Units Subcutaneous Q8H    ibuprofen  600 mg Oral Q6H    mupirocin  1 g Nasal BID    nitrofurantoin (macrocrystal-monohydrate)  100 mg Oral Q12H    pantoprazole  40 mg Oral Daily    phenazopyridine  100 mg Oral TID WM    pravastatin  20 mg Oral Daily     Continuous Infusions:   sodium chloride 0.9% 75 mL/hr at 09/24/20 1823     PRN Meds:diphenhydrAMINE, HYDROcodone-acetaminophen, HYDROcodone-acetaminophen, ondansetron, promethazine (PHENERGAN) IVPB, simethicone, sodium chloride 0.9%    Review of patient's allergies indicates:  No Known Allergies    Objective:     Vital Signs (Most Recent):  Temp: 98.5 °F (36.9 °C) (09/25/20 0407)  Pulse: 78 (09/25/20 0407)  Resp: 18 (09/25/20 0407)  BP: (!) 104/56 (09/25/20 0407)  SpO2: (!) 94 % (09/25/20 0407) Vital Signs (24h Range):  Temp:  [96.7 °F (35.9 °C)-98.7 °F (37.1 °C)] 98.5 °F (36.9 °C)  Pulse:  [54-98] 78  Resp:  [14-18] 18  SpO2:  [94 %-100 %] 94 %  BP: (104-159)/(55-82) 104/56     Weight: 64.9 kg (143 lb 0.2 oz)  Body mass index is 24.55 kg/m².  No LMP recorded (lmp unknown). Patient is postmenopausal.    I&O (Last 24H):    Intake/Output Summary (Last 24 hours) at 9/25/2020 0602  Last data filed at 9/25/2020 0400  Gross per 24 hour   Intake 3410 ml   Output 2100 ml   Net 1310 ml       Physical Exam:   Constitutional: She is oriented to person, place, and time. She appears well-developed and well-nourished. No distress.   Patient appears to be in no acute distress.     HENT:   Head: Normocephalic and atraumatic.    Eyes: Conjunctivae are normal.    Neck: Neck supple.     Cardiovascular: Normal rate, regular rhythm and intact distal pulses.     Pulmonary/Chest: Effort normal. No respiratory distress.        Abdominal: Soft. She exhibits abdominal incision. She exhibits no distension (Trocar sites C/D/I. ). There is abdominal tenderness (Appropriate for post-op period. ). There is no rebound and no guarding.     Genitourinary:    Vagina normal.      Genitourinary Comments: Vaginal packing removed. Minimal saturation. Gill in place. Orange urine noted.              Musculoskeletal: Normal range of motion and moves all extremeties.      Comments: SCDs in place.        Neurological: She is alert and oriented to person, place, and time.    Skin: Skin is warm and dry. She is not diaphoretic.    Psychiatric: She has a normal mood and affect. Her behavior is normal. Judgment and thought content normal.       Laboratory:  CBC: AM CBC Pending  I have personallly reviewed all pertinent lab results from the last 24 hours.

## 2020-09-25 NOTE — PLAN OF CARE
Initial Discharge Planning Assessment:  Patient admitted on 9-24-20  Chart reviewed, Care plan discussed with treatment team,  attending Dr Velasquez  PCP updated in Epic: yes    DME at home: n/a  Current dispo: home today  Transportation: has reliable  Case management  to follow       09/25/20 3314   Discharge Assessment   Assessment Type Discharge Planning Assessment   Confirmed/corrected address and phone number on facesheet? Yes   Assessment information obtained from? Patient;Caregiver;Medical Record   Communicated expected length of stay with patient/caregiver yes   Prior to hospitilization cognitive status: Alert/Oriented   Prior to hospitalization functional status: Independent   Current cognitive status: Alert/Oriented   Current Functional Status: Independent   Lives With spouse   Able to Return to Prior Arrangements yes   Patient currently being followed by outpatient case management? No   Patient currently receives any other outside agency services? No   Equipment Currently Used at Home none   Do you have any problems affording any of your prescribed medications? No   Is the patient taking medications as prescribed? yes   Does the patient have transportation home? Yes   Discharge Plan A Home with family   DME Needed Upon Discharge  none   Patient/Family in Agreement with Plan yes

## 2020-09-25 NOTE — HPI
Ms. Hernandez is a 65 yr old female who presents for a scheduled laparoscopic hysterectomy with BSO, USS and MUS for surgical management of POP and SAHARA.

## 2020-09-25 NOTE — PROGRESS NOTES
[facility]  Obstetrics & Gynecology  Progress Note    Patient Name: Tj Hernandez  MRN: [id number]  Admission Date: 9/24/2020  Primary Care Provider: Bonnie Mathew MD  Principal Problem: <principal problem not specified>    Subjective:     HPI:  Ms. Hernandez is a 65 yr old female who presents for a scheduled laparoscopic hysterectomy with BSO, USS and MUS for surgical management of POP and SAHARA.      Interval History: Patient doing OK this. Complains of soreness but no acute pain. Did have one episode of emesis overnight. Has not ambulated and wright has remained in place. Complains of irritation with wright in place. She denies any respiratory symptoms.     Scheduled Meds:   carvediloL  12.5 mg Oral QAM    carvediloL  6.25 mg Oral QHS    cetirizine  10 mg Oral Daily    heparin (porcine)  5,000 Units Subcutaneous Q8H    ibuprofen  600 mg Oral Q6H    mupirocin  1 g Nasal BID    nitrofurantoin (macrocrystal-monohydrate)  100 mg Oral Q12H    pantoprazole  40 mg Oral Daily    phenazopyridine  100 mg Oral TID WM    pravastatin  20 mg Oral Daily     Continuous Infusions:   sodium chloride 0.9% 75 mL/hr at 09/24/20 1823     PRN Meds:diphenhydrAMINE, HYDROcodone-acetaminophen, HYDROcodone-acetaminophen, ondansetron, promethazine (PHENERGAN) IVPB, simethicone, sodium chloride 0.9%    Review of patient's allergies indicates:  No Known Allergies    Objective:     Vital Signs (Most Recent):  Temp: 98.5 °F (36.9 °C) (09/25/20 0407)  Pulse: 78 (09/25/20 0407)  Resp: 18 (09/25/20 0407)  BP: (!) 104/56 (09/25/20 0407)  SpO2: (!) 94 % (09/25/20 0407) Vital Signs (24h Range):  Temp:  [96.7 °F (35.9 °C)-98.7 °F (37.1 °C)] 98.5 °F (36.9 °C)  Pulse:  [54-98] 78  Resp:  [14-18] 18  SpO2:  [94 %-100 %] 94 %  BP: (104-159)/(55-82) 104/56     Weight: 64.9 kg (143 lb 0.2 oz)  Body mass index is 24.55 kg/m².  No LMP recorded (lmp unknown). Patient is postmenopausal.    I&O (Last 24H):    Intake/Output Summary  (Last 24 hours) at 9/25/2020 0602  Last data filed at 9/25/2020 0400  Gross per 24 hour   Intake 3410 ml   Output 2100 ml   Net 1310 ml       Physical Exam:   Constitutional: She is oriented to person, place, and time. She appears well-developed and well-nourished. No distress.   Patient appears to be in no acute distress.     HENT:   Head: Normocephalic and atraumatic.    Eyes: Conjunctivae are normal.    Neck: Neck supple.    Cardiovascular: Normal rate, regular rhythm and intact distal pulses.     Pulmonary/Chest: Effort normal. No respiratory distress.        Abdominal: Soft. She exhibits abdominal incision. She exhibits no distension (Trocar sites C/D/I. ). There is abdominal tenderness (Appropriate for post-op period. ). There is no rebound and no guarding.     Genitourinary:    Vagina normal.      Genitourinary Comments: Vaginal packing removed. Minimal saturation. Gill in place. Orange urine noted.              Musculoskeletal: Normal range of motion and moves all extremeties.      Comments: SCDs in place.        Neurological: She is alert and oriented to person, place, and time.    Skin: Skin is warm and dry. She is not diaphoretic.    Psychiatric: She has a normal mood and affect. Her behavior is normal. Judgment and thought content normal.       Laboratory:  CBC: AM CBC Pending  I have personallly reviewed all pertinent lab results from the last 24 hours.      Assessment/Plan:     S/P TLH/BSO/USS/MUS/Cysto/Ventral Hernia Repair  - Standard post-op advances  - SCDs and Heparin for VTE PPX  - REG DIET  - Gill to remain in place given bladder perforation. Macrobid/Pyridium  - Ibuprofen/Norco for pain control. D/C'd Dilaudid given hypoxemic episode in PACU  - Encourage ambulation and I.S.        Luiz Schmidt MD  Obstetrics & Gynecology  [facility]

## 2020-09-25 NOTE — HOSPITAL COURSE
09/25/2020 - Underwent uncomplicated TLH/BSO/USS/MUS/Cysto/Hernia Repair. Immediate post-op period complicated by acute hypoxemia requiring bag ventilation for brief period. Patient quickly stabilized and was found to be stable.

## 2020-09-25 NOTE — ASSESSMENT & PLAN NOTE
- Standard post-op advances  - SCDs and Heparin for VTE PPX  - REG DIET  - Gill to remain in place given bladder perforation. Macrobid/Pyridium  - Ibuprofen/Norco for pain control. D/C'd Dilaudid given hypoxemic episode in PACU  - Encourage ambulation and I.S.

## 2020-09-26 NOTE — DISCHARGE SUMMARY
[facility]  Discharge Note  Short Stay    Procedure(s) (LRB):  REPAIR, HERNIA, EPIGASTRIC (N/A)  HYSTERECTOMY, VAGINAL, LAPAROSCOPY-ASSISTED WITH BSO (N/A)  SLING, MIDURETHRAL (N/A)  CYSTOSCOPY (N/A)  SUSPENSION, LIGAMENT, UTEROSACRAL, LAPAROSCOPIC (N/A)    OUTCOME: Patient tolerated treatment/procedure well without complication and is now ready for discharge.    DISPOSITION: Home or Self Care    FINAL DIAGNOSIS:  S/P hysterectomy    FOLLOWUP: In clinic    DISCHARGE INSTRUCTIONS:    Discharge Procedure Orders   Diet Adult Regular     Pelvic Rest     Other restrictions (specify):   Order Comments: 1)  No heavy lifting >10 # x 6 weeks.   2)  Nothing in vagina x 6 weeks.   3)  Control constipation/avoid straining with bowel movements.   4)  Gill catheter care as per nurse instruction.     Notify your health care provider if you experience any of the following:  temperature >100.4     Notify your health care provider if you experience any of the following:  persistent nausea and vomiting or diarrhea     Notify your health care provider if you experience any of the following:  severe uncontrolled pain     Notify your health care provider if you experience any of the following:  redness, tenderness, or signs of infection (pain, swelling, redness, odor or green/yellow discharge around incision site)     Notify your health care provider if you experience any of the following:  difficulty breathing or increased cough     Notify your health care provider if you experience any of the following:  severe persistent headache     Notify your health care provider if you experience any of the following:  worsening rash     Notify your health care provider if you experience any of the following:  persistent dizziness, light-headedness, or visual disturbances     Notify your health care provider if you experience any of the following:  increased confusion or weakness     Notify your health care provider if you experience  any of the following:   Order Comments: Please watch for the following symptoms after your surgery: Fever >101º F, painful urination, an increase in vaginal bleeding, persistent nausea/vomiting, worsening pain, or other concerns related to your surgery.     Change dressing (specify)   Order Comments: Remove abdominal steri strips and band-aids in 3-5 days.

## 2020-09-28 ENCOUNTER — TELEPHONE (OUTPATIENT)
Dept: UROGYNECOLOGY | Facility: CLINIC | Age: 65
End: 2020-09-28

## 2020-09-28 DIAGNOSIS — R39.15 URINARY URGENCY: Primary | ICD-10-CM

## 2020-09-28 RX ORDER — FLAVOXATE HYDROCHLORIDE 100 MG/1
100 TABLET ORAL 3 TIMES DAILY PRN
Qty: 30 TABLET | Refills: 1 | Status: SHIPPED | OUTPATIENT
Start: 2020-09-28 | End: 2021-01-30

## 2020-09-28 NOTE — TELEPHONE ENCOUNTER
----- Message from Radha Reese MA sent at 9/28/2020 11:01 AM CDT -----  Regarding: FW: Call back    ----- Message -----  From: Isabella Cortes  Sent: 9/28/2020   8:52 AM CDT  To: [facility] Staff  Subject: Call back                                        Name of Who is Calling: MAGGIE HUTSON [[id number]] What is the request in detail: Pt is requesting a call back concerning she had surgery on 9/24 and she has a catheter and she is wondering if it have to stay in her until Thursday and she said its a lot of pressure as if she have to use the restroom  Can the clinic reply by [facility]: NO What Number to Call Back [facility] [phone]

## 2020-09-28 NOTE — TELEPHONE ENCOUNTER
Consulted with Dr. Pineda-- she wants patient to keep catheter in until 10/01/2020. Patient complains of urinary urgency.  She is taking pyridium and uribel. Explained to patient to take only pyridium.  Will start flavoxate 100 mg three times as needed. She is taking ibuprofen 800 mg every 8 hours. Has not had a bowel movement. Denies nausea/ vomiting. She is taking colace twice daily. She recently restarted fiber supplement. Instructed to increase colace to 3 times daily. She should take milk of magnesia as directed on the bottle. Verbalized understanding. Stephanie Roper, ADRIANP-BC

## 2020-09-29 ENCOUNTER — TELEPHONE (OUTPATIENT)
Dept: UROGYNECOLOGY | Facility: CLINIC | Age: 65
End: 2020-09-29

## 2020-09-29 NOTE — TELEPHONE ENCOUNTER
----- Message from Earline Ellis sent at 9/29/2020 11:16 AM CDT -----  Regarding: Refill      Medication Refill  Request      Please refill the medication(s) listed below. Please call the patient when the prescription(s) is ready for  at this phone number    [phone] (H)      Medication #1   phenazopyridine (PYRIDIUM) 200 MG tablet        Preferred Pharmacy:   [facility]     [phone] (Phone)  [phone] (Fax)

## 2020-09-30 LAB
FINAL PATHOLOGIC DIAGNOSIS: NORMAL
GROSS: NORMAL

## 2020-10-01 ENCOUNTER — TELEPHONE (OUTPATIENT)
Dept: UROGYNECOLOGY | Facility: CLINIC | Age: 65
End: 2020-10-01

## 2020-10-01 ENCOUNTER — OFFICE VISIT (OUTPATIENT)
Dept: UROGYNECOLOGY | Facility: CLINIC | Age: 65
End: 2020-10-01
Payer: COMMERCIAL

## 2020-10-01 VITALS
DIASTOLIC BLOOD PRESSURE: 70 MMHG | SYSTOLIC BLOOD PRESSURE: 142 MMHG | WEIGHT: 140 LBS | BODY MASS INDEX: 23.9 KG/M2 | HEIGHT: 64 IN

## 2020-10-01 DIAGNOSIS — R33.9 INCOMPLETE BLADDER EMPTYING: ICD-10-CM

## 2020-10-01 DIAGNOSIS — Z98.890 POST-OPERATIVE STATE: Primary | ICD-10-CM

## 2020-10-01 PROBLEM — Z87.42 HISTORY OF VAGINAL BLEEDING: Status: RESOLVED | Noted: 2020-09-19 | Resolved: 2020-10-01

## 2020-10-01 PROBLEM — N81.11 MIDLINE CYSTOCELE: Status: RESOLVED | Noted: 2020-09-24 | Resolved: 2020-10-01

## 2020-10-01 PROBLEM — N81.2 CYSTOCELE WITH INCOMPLETE UTEROVAGINAL PROLAPSE: Status: RESOLVED | Noted: 2020-09-19 | Resolved: 2020-10-01

## 2020-10-01 PROBLEM — N95.0 POSTMENOPAUSAL BLEEDING: Status: RESOLVED | Noted: 2019-11-12 | Resolved: 2020-10-01

## 2020-10-01 PROCEDURE — 99024 POSTOP FOLLOW-UP VISIT: CPT | Mod: S$GLB,,, | Performed by: NURSE PRACTITIONER

## 2020-10-01 PROCEDURE — 99999 PR PBB SHADOW E&M-EST. PATIENT-LVL IV: CPT | Mod: PBBFAC,,, | Performed by: NURSE PRACTITIONER

## 2020-10-01 PROCEDURE — 99999 PR PBB SHADOW E&M-EST. PATIENT-LVL IV: ICD-10-PCS | Mod: PBBFAC,,, | Performed by: NURSE PRACTITIONER

## 2020-10-01 PROCEDURE — 87086 URINE CULTURE/COLONY COUNT: CPT

## 2020-10-01 PROCEDURE — 99024 PR POST-OP FOLLOW-UP VISIT: ICD-10-PCS | Mod: S$GLB,,, | Performed by: NURSE PRACTITIONER

## 2020-10-01 NOTE — TELEPHONE ENCOUNTER
Attempted to contact pt to inform her that her surgical pathology was normal and to see how she's doing postoperatively. Pt did not answer. Lm to call office.

## 2020-10-01 NOTE — TELEPHONE ENCOUNTER
----- Message from Calli Atkins sent at 10/1/2020 11:43 AM CDT -----  Type:  Patient Returning Call    Who Called: MAGGIE HUTSON     Who Left Message for Patient: Bhavik Lopez    Does the patient know what this is regarding?: Yes    Best Call Back Number:[phone]    Additional Information:

## 2020-10-01 NOTE — PROGRESS NOTES
The patient was placed in dorsal lithotomy position with feet in stirrups.   The bladder was filled with 300 mL sterile water to gravity with a 60 mL wright tipped syringe, at which point she voiced a strong desire to void.  The catheter was removed.She had UUI approximately 50 mL.  Voided 300 Ml.  She tolerated the procedure well.    NIR Lei-BC

## 2020-10-01 NOTE — TELEPHONE ENCOUNTER
----- Message from Jeyson Pineda MD sent at 9/30/2020  9:37 PM CDT -----  Please let patient know surgical pathology (uterus, cervix, bilateral tubes/ovaries) was normal, nothing bad. How is she doing postop? Thanks!

## 2020-10-01 NOTE — TELEPHONE ENCOUNTER
"Spoke to pt, she was informed that her surgical pathology was normal. Pt verbalized understanding.    Pt states she's "doing pretty good' postoperatively.   "

## 2020-10-03 LAB — BACTERIA UR CULT: NO GROWTH

## 2020-10-06 ENCOUNTER — TELEPHONE (OUTPATIENT)
Dept: INTERNAL MEDICINE | Facility: CLINIC | Age: 65
End: 2020-10-06

## 2020-10-06 NOTE — TELEPHONE ENCOUNTER
----- Message from Imani Marroquin sent at 10/6/2020  9:31 AM CDT -----  Contact: Pt- [phone]  Type:  Needs Medical Advice    Who Called:  Pt    Symptoms (please be specific): post op    Would the patient rather a call back or a response via [facility]? Call    Best Call Back Number: [phone]    Additional Information:  PT called to find out if she needs a post op appt with her PCP. She is requesting a call back.

## 2020-10-06 NOTE — TELEPHONE ENCOUNTER
Pt was reading her after visit summary and see that it says to follow up with pcp. Pt wants to know if this is necessary?

## 2020-10-07 ENCOUNTER — OFFICE VISIT (OUTPATIENT)
Dept: UROGYNECOLOGY | Facility: CLINIC | Age: 65
End: 2020-10-07
Payer: COMMERCIAL

## 2020-10-07 ENCOUNTER — TELEPHONE (OUTPATIENT)
Dept: UROGYNECOLOGY | Facility: CLINIC | Age: 65
End: 2020-10-07

## 2020-10-07 VITALS — HEIGHT: 64 IN | WEIGHT: 138.88 LBS | BODY MASS INDEX: 23.71 KG/M2

## 2020-10-07 DIAGNOSIS — R10.2 SUPRAPUBIC PRESSURE: ICD-10-CM

## 2020-10-07 DIAGNOSIS — Z98.890 POST-OPERATIVE STATE: Primary | ICD-10-CM

## 2020-10-07 PROCEDURE — 99999 PR PBB SHADOW E&M-EST. PATIENT-LVL IV: ICD-10-PCS | Mod: PBBFAC,,, | Performed by: NURSE PRACTITIONER

## 2020-10-07 PROCEDURE — 99999 PR PBB SHADOW E&M-EST. PATIENT-LVL IV: CPT | Mod: PBBFAC,,, | Performed by: NURSE PRACTITIONER

## 2020-10-07 PROCEDURE — 99024 POSTOP FOLLOW-UP VISIT: CPT | Mod: S$GLB,,, | Performed by: NURSE PRACTITIONER

## 2020-10-07 PROCEDURE — 87086 URINE CULTURE/COLONY COUNT: CPT

## 2020-10-07 PROCEDURE — 99024 PR POST-OP FOLLOW-UP VISIT: ICD-10-PCS | Mod: S$GLB,,, | Performed by: NURSE PRACTITIONER

## 2020-10-07 NOTE — TELEPHONE ENCOUNTER
Pt feels ok, she is 2 weeks post surg and she is still sore in the abdomin ranges from a 2-4 on a scale from 1-10 but she is not taking any medication for it she doesn't like taking them. She will be calling the surgeon today to follow up on how sore she should be and what is a normal range of how long she will be this way. She states this is a different pain than she had before the surgery.

## 2020-10-07 NOTE — PATIENT INSTRUCTIONS
1. Post op healing well. Continue to follow post op instructions.  2. Use ibuprofen 200-800 mg every 8 hours  3. Urine culture today  4. Patient will follow up with us in 4 weeks

## 2020-10-07 NOTE — TELEPHONE ENCOUNTER
Pt stated she has some post surgery concerns, back pain in which she completed the Ibuprofren and took 1 pain pill and refuse to take the rest. She's spotting and has a discharge with odor. Urinary incontinence is worse than before. The incision stiches is poking out. Verbally informed NP Judson in which she stated to have pt come in for a visit today.Offered pt an appointment for today, pt agreed to the 1 pm appointment. Call ended.
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
## 2020-10-09 LAB — BACTERIA UR CULT: NO GROWTH

## 2020-10-12 ENCOUNTER — PATIENT MESSAGE (OUTPATIENT)
Dept: UROGYNECOLOGY | Facility: CLINIC | Age: 65
End: 2020-10-12

## 2020-10-29 ENCOUNTER — TELEPHONE (OUTPATIENT)
Dept: UROGYNECOLOGY | Facility: CLINIC | Age: 65
End: 2020-10-29
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
## 2020-11-04 ENCOUNTER — OFFICE VISIT (OUTPATIENT)
Dept: UROGYNECOLOGY | Facility: CLINIC | Age: 65
End: 2020-11-04
Payer: COMMERCIAL

## 2020-11-04 VITALS
WEIGHT: 142.63 LBS | HEART RATE: 64 BPM | HEIGHT: 64 IN | BODY MASS INDEX: 24.35 KG/M2 | DIASTOLIC BLOOD PRESSURE: 66 MMHG | SYSTOLIC BLOOD PRESSURE: 129 MMHG

## 2020-11-04 DIAGNOSIS — N81.89 PELVIC FLOOR WEAKNESS: ICD-10-CM

## 2020-11-04 DIAGNOSIS — Z98.890 POST-OPERATIVE STATE: Primary | ICD-10-CM

## 2020-11-04 DIAGNOSIS — M62.89 PELVIC FLOOR TENSION: ICD-10-CM

## 2020-11-04 DIAGNOSIS — N95.2 VAGINAL ATROPHY: ICD-10-CM

## 2020-11-04 DIAGNOSIS — N39.46 URINARY INCONTINENCE, MIXED: ICD-10-CM

## 2020-11-04 PROCEDURE — 99999 PR PBB SHADOW E&M-EST. PATIENT-LVL V: CPT | Mod: PBBFAC,,, | Performed by: NURSE PRACTITIONER

## 2020-11-04 PROCEDURE — 99024 POSTOP FOLLOW-UP VISIT: CPT | Mod: S$GLB,,, | Performed by: NURSE PRACTITIONER

## 2020-11-04 PROCEDURE — 99024 PR POST-OP FOLLOW-UP VISIT: ICD-10-PCS | Mod: S$GLB,,, | Performed by: NURSE PRACTITIONER

## 2020-11-04 PROCEDURE — 99999 PR PBB SHADOW E&M-EST. PATIENT-LVL V: ICD-10-PCS | Mod: PBBFAC,,, | Performed by: NURSE PRACTITIONER

## 2020-11-04 PROCEDURE — 87086 URINE CULTURE/COLONY COUNT: CPT

## 2020-11-04 RX ORDER — ESTRADIOL 0.1 MG/G
1 CREAM VAGINAL
Qty: 42.5 G | Refills: 3 | Status: SHIPPED | OUTPATIENT
Start: 2020-11-05 | End: 2021-11-12 | Stop reason: SDUPTHER

## 2020-11-04 NOTE — PATIENT INSTRUCTIONS
1. Post op healing well. No lifting > 50 pounds without assistance.  No intercourse x 4 weeks.  Ok to exercise.  2. Start pelvic floor PT with   SARAH Gaspar/Rain Macias: (p) [phone]  . (f) [phone].    3. Restart vaginal estrogen cream 1 gm twice weekly  4. Continue to control bowel movements  5. Patient will follow up with us in  2 months        ___________________________________________________________________    Bladder Irritants  Certain foods and drinks have been associated with worsening symptoms of urinary frequency, urgency, urge incontinence, or  bladder pain. If you suffer from any of these conditions, you may wish to try eliminating one or more of these foods from your  diet and see if your symptoms improve. If bladder symptoms are related to dietary factors, strict adherence to a diet that  eliminates the food should bring marked relief in 10 days. Once you are feeling better, you can begin to add foods back into  your diet, one at a time. If symptoms return, you will be able to identify the irritant. As you add foods back to your diet it is  very important that you drink significant amounts of water.  Low-acid fruit substitutions include apricots, papaya, pears and watermelon. Coffee drinkers can drink Kava or other lowacid  instant drinks. Tea drinkers can substitute non-citrus herbal and sun brewed teas. Calcium carbonate co-buffered with  calcium ascorbate can be substituted for Vitamin C. Prelief is a dietary supplement that works as an acid blocker for the  bladder.  Where to get more information:   Overcoming Bladder Disorders by Gina Christine and Esperanza Cee, 1990   You Dont Have to Live with Cystitis! By Aggie Monahan, 1988  List of Common Bladder Irritants*  Alcoholic beverages  Apples and apple juice  Cantaloupe  Carbonated beverages  Chili and spicy foods  Chocolate  Citrus fruit  Coffee (including decaffeinated)  Cranberries and cranberry  juice  Grapes  Guava  Milk Products: milk, cheese, cottage cheese, yogurt, ice cream  Peaches  Pineapple  Plums  Strawberries  Sugar especially artificial sweeteners, saccharin, aspartame, corn sweeteners, honey, fructose, sucrose, lactose  Tea  Tomatoes and tomato juice  Vitamin B complex  Vinegar  *Most people are not sensitive to ALL of these products; your goal is to find the foods that make YOUR  symptoms worse
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
## 2020-11-05 LAB — BACTERIA UR CULT: NO GROWTH

## 2020-11-09 ENCOUNTER — PATIENT MESSAGE (OUTPATIENT)
Dept: UROGYNECOLOGY | Facility: CLINIC | Age: 65
End: 2020-11-09

## 2020-12-02 ENCOUNTER — CLINICAL SUPPORT (OUTPATIENT)
Dept: REHABILITATION | Facility: HOSPITAL | Age: 65
End: 2020-12-02
Payer: COMMERCIAL

## 2020-12-02 DIAGNOSIS — R27.9 LACK OF COORDINATION: ICD-10-CM

## 2020-12-02 DIAGNOSIS — N39.46 URINARY INCONTINENCE, MIXED: ICD-10-CM

## 2020-12-02 DIAGNOSIS — M62.81 MUSCLE WEAKNESS: ICD-10-CM

## 2020-12-02 DIAGNOSIS — N81.89 PELVIC FLOOR WEAKNESS: ICD-10-CM

## 2020-12-02 DIAGNOSIS — M62.89 PELVIC FLOOR TENSION: ICD-10-CM

## 2020-12-02 PROCEDURE — 97161 PT EVAL LOW COMPLEX 20 MIN: CPT | Mod: PN

## 2020-12-02 PROCEDURE — 97112 NEUROMUSCULAR REEDUCATION: CPT | Mod: PN

## 2020-12-02 NOTE — PATIENT INSTRUCTIONS
Home Exercise Program: 12/02/2020    DIAPHRAGMATIC BREATHING     The diaphragm is a dome shaped muscle that forms the floor of the rib cage. It is the most efficient muscle for breathing and relaxation, although most people are not used to using the diaphragm. Diaphragmatic or belly breathing is an important technique to learn because it helps settle down or relax the autonomic nervous system. The correct use of diaphragmatic breathing can help to quiet brain activity resulting in the relaxation of all the muscles and organs of the body. This is accomplished by slow rhythmic breathing concentrated in the diaphragm muscle rather than the chest.    How to do proper RELAXATION breathing:     Start by lying on your back or reclining in a chair in a relaxed position. Place one hand on your chest and the other on your abdomen.   Relax your jaw by placing your tongue on the roof of your mouth and keeping your teeth slightly apart.    Take a deep breath in, letting the abdomen expand and rise while you keep your upper chest, neck and shoulders relaxed.    As you breathe out, allow your abdomen and chest to fall. Exhale completely.   It doesn't matter if you breathe in/out through your nose and/or mouth. Do whichever feels comfortable.   Remember to breathe slowly.  Do not force your breathing. Do not hold your breath.   Repeat for 5-10 minutes every day.

## 2020-12-02 NOTE — PLAN OF CARE
[facility]  Pelvic Health Physical Therapy Initial Evaluation    Date: 12/2/2020   Name: Tj Hernandez  Clinic Number: [id number]  Therapy Diagnosis:   Encounter Diagnoses   Name Primary?    Urinary incontinence, mixed     Pelvic floor weakness     Pelvic floor tension     Muscle weakness     Lack of coordination      Physician: Stephanie Roper, NP    Physician Orders: PT Eval and Treat   Medical Diagnosis from Referral:   Urinary incontinence, mixed   N81.89 (ICD-10-CM) - Pelvic floor weakness   N34.3 (ICD-10-CM) - Pelvic floor tension   Evaluation Date: 12/2/2020  Authorization Period Expiration: 12/31/2020  Plan of Care Expiration: 3/2/2021  Visit # / Visits authorized: 1/ 20    Time In: 11:35a  Time Out: 12:28p  Total Appointment Time (timed & untimed codes): 53 minutes  Total Billing Time: 53    Precautions: universal    Subjective     Date of onset: 9/24/2020    History of current condition - Tj reports: the urinary urgency is worse since the surgery. She doesn't feel like she needs to go and then all of a sudden she needs to go and can't make it. She is leaking on the way to the toilet, at the grocery store, she feels that her bladder is somewhat spasming after she tries to empty her bladder. She is filling her pads 2x - fully and once the urine starts she can't stop it. She had total hysterectomy and bladder lift. She goes back to the doctor in January. She states that she was told the doctor nicked her bladder during surgery. She uses estrogen cream 2x/week.     PSHx: 9/24/2020: Title of Operation:  1)  Total laparoscopic hysterectomy with bilateral salpingo-oophorectomy  2)  Laparoscopic bilateral uterosacral vaginal vault suspension  3)  Placement of retropubic tension-free midurethral sling, Advantage Fit ([employer])  4)  Cystourethroscopy  5)  Ventral hernia repair (Dr. Velasquez.  Please see his note for further detail).     OB/GYN History:  childbirth vaginal  "delivery  Sexually active? No - not cleared   Pain with vaginal exams, intercourse or tampon use? No    Bladder/Bowel History   Frequency of urination:   Daytime: multiple times a day - 12-13x            Nighttime: 2x   Difficulty initiating urine stream: No   Urine stream: strong   Complete emptying: "I think so"    Bladder leakage: Yes   Frequency of incidents: multiple times a day    Amount leaked (urine): large squirt, moderate amount and full emptying   Urinary Urgency: Yes - unable to hold a strong urge to go to the bathroom (starts to leak when she sits up from bed to go to the bathroom)    Frequency of bowel movements: 2 times a day   Difficulty initiating BM: No   Quality/Shape of BM: [place] Stool Chart type 3,4    Does Patient Feel Empty after BM? Yes - after the second time    Fiber Supplements or Laxative Use?  Yes - metamucil    Colon leakage: Yes   Frequency of incidents: 3-4x/week - it is a liquid (could be in between the first and the second or after the second)     Amount leaked (bowels): small amount   Form of protection: pad   Number of pads required in 24 hours: 2x    PAIN: N/A     Medical History: Tj  has a past medical history of Abnormal Pap smear of cervix, Adjustment disorder, Colon polyp, Hormone disorder, Hyperlipidemia, Hypertension, Kidney stone, Neck pain, PONV (postoperative nausea and vomiting), Recurrent UTI (9/29/2014), and Seizures.     Surgical History:  Tj Hernandez  has a past surgical history that includes Tonsillectomy; Dilation and curettage of uterus; Cystoscopy; Colonoscopy (N/A, 2/19/2018); Hysteroscopy with dilation and curettage of uterus (N/A, 11/12/2019); Repair of epigastric hernia (N/A, 9/24/2020); Laparoscopy-assisted vaginal hysterectomy (N/A, 9/24/2020); Insertion of midurethral sling (N/A, 9/24/2020); and laparoscopic suspension of uterosacral ligament (N/A, 9/24/2020).    Medications: Tj has a current medication list which includes the " following prescription(s): aspirin, biotin, calcium carbonate, carvedilol, cetirizine, cholecalciferol (vitamin d3), ergocalciferol, estradiol, ezetimibe, flavoxate, hydrocodone-acetaminophen, ibuprofen, lisinopril, methen-sod phos-meth blue-hyos, multivitamin, omeprazole, ondansetron, pravastatin, coenzyme q10, valacyclovir, and zolpidem.    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  Prior Level of Function: independent   Current Level of Function: independent- increased urgency and urination and incontinence     Types of fluid intake: 3 16 oz bottles a day 1 glass in morning; water and 2 cups coffee in morning   Diet: B: water, coffee, prilosec, yogurt, rice cake/PB; L: cottage cheese and fruit; sandwich, chips; D: protein, carb, veggie, salad    Habitus:well developed, well nourished  Abuse/Neglect: No     Pts goals: more control over bowels and urine and less leakage     OBJECTIVE     See EMR under MEDIA for written consent provided 12/2/2020  Chaperone: declined    ORTHO SCREEN  Posture in sitting: WNL  Posture in standing: WNL  Pelvic alignment: no sign of deviations noted in supine   SI Joint Palpation: Denies tenderness to SI joint palpation bilaterally.  Sacral spring test: negative (Positive=NO spring)     HIP STRENGTH:     Right   Left    Hip flexion: 4+/5 Hip flexion: 4+/5   Hip Internal Rotation:  4-/5    Hip Internal Rotation: 4/5      Hip External Rotation: 4-/5    Hip External Rotation: 4-/5      Hip extension:  4/5 Hip extension: 4/5   Hip abduction: 4-/5 Hip abduction: 4-/5   Hip adduction: 4-/5 Hip adduction  4-/5         ABDOMINAL WALL ASSESSMENT  Palpation: tender around umbilicus, LLQ  Scarrin in LUQ, 1 above umbilicus, 1 in mid of R abdomen - tenderness over umbilical scar   Abdominal strength: Rectus abdominus: 4/5                                      Transverse abdominus: 3/5  Diastasis: absent       BREATHING MECHANICS ASSESSMENT   Thorax Assessment During Quiet Respiration: WNL excursion of ribcage and WNL excursion of abdominal wall  Thorax Assessment During Deep Respiration: WNL excursion of ribcage and WNL excursion of abdominal wall      VAGINAL PELVIC FLOOR EXAM    EXTERNAL ASSESSMENT  Introitus: WNL  Skin condition: WNL  Scarring: none   Sensation: WNL   Pain:  none  Voluntary contraction: visible lift  Voluntary relaxation: visible drop  Bearing down: visible lift, accessory muscle use and tightening and inability to relax  Perineal descent: present      INTERNAL ASSESSMENT  Pain: tender along L and R levator ani - intolerable to movement   Sensation: able to localized pressure appropriately   Vaginal vault: decreased length   Muscle Bulk: hypertonus   Muscle Power: 2/5 - decreased movements at layer 1 and 2 - more movement at layer 3   Muscle Endurance: 5 sec  # Reps To Fatigue: 5    Fast Contractions in 10 seconds: 3     Quality of contraction: decreased hold, incomplete relaxation   Specificity: WNL   Coordination: tends to hold breath during PFM contration   Prolapse check:none  Comments: increased bulging movement as compared to tightening and contraction - pressure increased and anal contraction increased with bulging movement     Therapist reviewed FOTO scores for Tj Hernandez on 2020.   FOTO documents entered into [employer] - see Media section.    CMS Impairment/Limitation/Restriction for Urinary Problem Survey  Status Limitation G-Code CMS Severity Modifier  Intake 50% 50% Current Status CK - At least 40 percent but less than 60 percent  Predicted 58% 42% Goal Status+ CK - At least 40  percent but less than 60 percent    CMS Impairment/Limitation/Restriction for Bowel Leakage Survey  Status Limitation G-Code CMS Severity Modifier  Intake 49% 51% Current Status CK - At least 40 percent but less than 60 percent  Predicted 57% 43% Goal Status+ CK - At least 40 percent but less than 60 percent       TREATMENT     Treatment Time In: 12:13  Treatment Time Out: 12:28p  Total Treatment time (time-based codes) separate from Evaluation: 15 minutes    Neuromuscular Re-education to develop Coordination, Control, Down training, Posture, Proprioception and Sense for 10 minutes including: pelvic floor relaxation/bulging training and diaphragmatic breathing    Therapeutic Activity Patient participated in dynamic functional therapeutic activities to improve functional performance for 5 minutes. Including: Education as described below.     Patient Education provided:   benefits of treatment and risks of treatment were discussed with the pt. Additionally, posture/body mechanices, diaphragmatic breathing and behavior modifications were reviewed.     Home Exercises Provided:  Written Home Exercises Provided: yes.  Exercises were reviewed and Tj was able to demonstrate them prior to the end of the session.    Tj demonstrated good  understanding of the education provided.     See EMR under Patient Instructions for exercises provided 12/2/2020.    Assessment     Tj is a 65 y.o. female referred to outpatient Physical Therapy with a medical diagnosis of urinary incontinence, mixed; pelvic floor weakness; and pelvic floor tension. Pt presents following a total hysterectomy, bladder lift, and hernia repair which were performed on 9/24/2020. Today she presents with complaints of urinary urgency, frequency, incontinence and bowel incontinence. Upon examination, randal has pelvic floor tenderness, abdominal tenderness, poor breathing and bulging mechanics, poor pelvic floor relaxation strategies, weakness, coordination  deficits, and decreased tolerance to functional mobility. Pt has significant tenderness over bilateral levator ani and is intolerable to movement. Pt had reflexive pelvic floor tightening with deep breathing, and with cueing this improved mildly. Pt had increased straining and breath holding during bulging techniques. Pt is aware when she is james her pelvic floor, but she has difficulty relaxing. Due to her symptoms and post-op protocol, pt is unable to participate in exercise, intercourse, or sleep without modification or pain. At this time, pt is appropriate for physical therapy and would benefit from patient education, improving pelvic floor coordination and strength, and reducing incontinence during ADLs    Pt prognosis is Good.   Pt will benefit from skilled outpatient Physical Therapy to address the deficits stated above and in the chart below, provide pt/family education, and to maximize pt's level of independence.     Plan of care discussed with patient: Yes  Pt's spiritual, cultural and educational needs considered and patient is agreeable to the plan of care and goals as stated below:     Anticipated Barriers for therapy: none    Medical Necessity is demonstrated by the following     History  Co-morbidities and personal factors that may impact the plan of care Co-morbidities:   hyperlipidemia, hypertension, kidney stone, recurrent UTI, prior abdominal and pelvic surgery     Personal Factors:   no deficits       moderate   Examination  Body Structures and Functions, activity limitations and participation restrictions that may impact the plan of care Body Regions/Systems/Functions:  increased frequency of urination, poor coordination of pelvic floor muscles during ADL's leading to urinary or fecal leakage, poor fluid intake and incomplete urination      Activity Limitations:  urgency , delaying urge to urinate, bearing down for BM, incontinence with ADLs and bathroom mapping     Participation  Restrictions:  all ADLs/iADLs uninterrupted by urinary incontinence/urgency/frequency, social activities with friends/family and exercise restrictions due to incontinence      Activity limitations:   Learning and applying knowledge  no deficits     General Tasks and Commands  no deficits     Communication  no deficits     Mobility  lifting and carrying objects  walking     Self care  no deficits     Domestic Life  cooking  doing house work (cleaning house, washing dishes, laundry)     Interactions/Relationships  no deficits     Life Areas  employment     Community and Social Life  community life          low   Clinical Presentation stable and uncomplicated low   Decision Making/ Complexity Score: low     Goals:  Short Term Goals: 6 weeks   Pt to report a decrease in pad usage to no more than 1 a day to demonstrate improving pelvic floor function needed for continence.  Pt to demonstrate being able to correctly and consistently perform a kegel which is needed  to increase pelvic floor muscle coordination and strength needed for continence.  Pt to be able to delay the urge to urinate at least 10 minutes with a strong urge to urinate in order to make it to the bathroom without leaking.  Pt to report a decrease in urinary frequency from 2x/hour to no more than once every 2 hour(s) to improve ability to participate in social activities.  Pt to report a decrease in nocturia to no more than 1x/night for improved ability to achieve restorative sleep.    Pt to demonstrate proper diaphragmatic breathing to help with calming the nervous system in order to decrease pain and to improve abdominal wall musculature extensibility.   Pt to report minimal pain with palpation of abdominal wall due to improvement in soft tissue mobility from moderate to minimal restrictions.  Pt will report minimal to no pain with single digit pelvic assessment and display relaxation during this assessment in order to progress to dilator use.  Pt to  demonstrate proper positioning on commode with breathing techniques to decrease strain with BM to enable pt to feel empty after BM.   Pt to be able to bulge pelvic floor which is needed for comfortable BM and complete evacuation.     Long Term Goals: 12 weeks   Pt to be discharged with home plan for carry over after discharge.    Pt to be able to perform a 10 second kegel x 10 reps to demonstrate improving strength and endurance needed for continence.  Pt to demonstrate an improved score in the FOTO Urinary Problem survey  to at least 42% to demonstrate improving incontinence with ADLs .    Pt to be able to delay the urge to urinate at least 20 minutes with a strong urge to urinate in order to make it to the bathroom without leaking.  Pt to report a decrease in urinary frequency from 2x/hour to no more than once every 2.5-3 hours hour(s) to improve ability to participate in social activities.  Pt to report being able to have a comfortable vaginal exam without significant increase in pelvic pain.  Pt to increase abdominal wall strength by at least 1 muscle grade to improve lumbopelvic stability with ADLs that would normally increase pain.  Pt will be independent with use of dilation in order to progress towards self management and intercourse.  Pt to report being able to have a BM without straining 84% of the time to demonstrate improving PF coordination.   Pt to demonstrate an improved score in the FOTO Bowel Leakage survey  to at least 43% to demonstrate improving confidence and ability to have complete bowel movements.      Plan     Plan of care Certification: 12/2/2020 to 3/2/2021.    Outpatient Physical Therapy 1 times weekly for 12 weeks to include the following interventions: therapeutic exercises, therapeutic activity, neuromuscular re-education, manual therapy, modalities PRN, patient/family education and self care/home management    Plan for next visit: pressure management, PFME, breathing     Viviana  Tiffanie, PT  12/02/2020        I have seen the patient, reviewed the therapist's plan of care, and I agree with the plan of care.      I certify the need for these services furnished under this plan of treatment and while under my care.     ___________________ ________ Physician/Referring Practitioner            ___________________________ Date of Signature

## 2020-12-08 ENCOUNTER — HOSPITAL ENCOUNTER (OUTPATIENT)
Dept: RADIOLOGY | Facility: HOSPITAL | Age: 65
Discharge: HOME | End: 2020-12-08
Attending: INTERNAL MEDICINE
Payer: COMMERCIAL

## 2020-12-08 VITALS — HEIGHT: 64 IN | WEIGHT: 140 LBS | BODY MASS INDEX: 23.9 KG/M2

## 2020-12-08 DIAGNOSIS — Z12.31 SCREENING MAMMOGRAM, ENCOUNTER FOR: ICD-10-CM

## 2020-12-08 PROCEDURE — 77063 BREAST TOMOSYNTHESIS BI: CPT | Mod: 26,,, | Performed by: RADIOLOGY

## 2020-12-08 PROCEDURE — 77067 SCR MAMMO BI INCL CAD: CPT | Mod: 26,,, | Performed by: RADIOLOGY

## 2020-12-08 PROCEDURE — 77067 MAMMO DIGITAL SCREENING BILAT WITH TOMO: ICD-10-PCS | Mod: 26,,, | Performed by: RADIOLOGY

## 2020-12-08 PROCEDURE — 77067 SCR MAMMO BI INCL CAD: CPT | Mod: TC

## 2020-12-08 PROCEDURE — 77063 MAMMO DIGITAL SCREENING BILAT WITH TOMO: ICD-10-PCS | Mod: 26,,, | Performed by: RADIOLOGY

## 2020-12-14 ENCOUNTER — PATIENT MESSAGE (OUTPATIENT)
Dept: REHABILITATION | Facility: HOSPITAL | Age: 65
End: 2020-12-14

## 2020-12-21 ENCOUNTER — PATIENT MESSAGE (OUTPATIENT)
Dept: REHABILITATION | Facility: HOSPITAL | Age: 65
End: 2020-12-21

## 2020-12-28 NOTE — PROGRESS NOTES
Pelvic Health Physical Therapy   Treatment Note     Name: Tj Hernandez  Clinic Number: [id number]    Therapy Diagnosis:   Encounter Diagnoses   Name Primary?    Muscle weakness     Lack of coordination      Physician: Stephanie Roper NP    Visit Date: 12/29/2020    Physician Orders: PT Eval and Treat   Medical Diagnosis from Referral:   Urinary incontinence, mixed   N81.89 (ICD-10-CM) - Pelvic floor weakness   N34.3 (ICD-10-CM) - Pelvic floor tension   Evaluation Date: 12/2/2020  Authorization Period Expiration: 12/31/2020  Plan of Care Expiration: 3/2/2021  Visit # / Visits authorized: 2/ 20    Time In: 8:02a  Time Out: 8:45a  Total Billable Time: 43 minutes    Precautions: Standard    Subjective     Pt reports: the problem is the urgency.   She was compliant with home exercise program.  Response to previous treatment: good   Functional change: none     Pain: 0/10     Objective     Tj received therapeutic exercises to develop  strength, endurance, ROM, flexibility, posture and core stabilization for 13 minutes including: see below  Piriformis stretch [id number]   Cat cow with breath x10   Child's pose [id number]     Tj received the following manual therapy techniques: to develop flexibility, extensibility and desensitization for 00 minutes including: see belwo    Tj participated in neuromuscular re-education activities to develop Coordination, Control, Down training, Proprioception and Sense for 30 minutes including: see below    Breathing mechanics with counts   Pelvic drops x10  Pelvic drops on PB   Pressure management with movement - rolling, sit to stand  Proper bearing down with abdominal release      Tj participated in dynamic functional therapeutic activities to improve functional performance for 00 minutes, including:      Home Exercises Provided and Patient Education Provided     Education provided:   - posture/body mechanices, diaphragmatic breathing and proper bearing down  techniques  Discussed progression of plan of care with patient; educated pt in activity modification; reviewed HEP with pt. Pt demonstrated and verbalized understanding of all instruction and was provided with a handout of HEP (see Patient Instructions).    Written Home Exercises Provided: yes.  Exercises were reviewed and Tj was able to demonstrate them prior to the end of the session.  Tj demonstrated good  understanding of the education provided.     See EMR under Patient Instructions for exercises provided 12/29/2020.    Assessment     Pt tolerated therapy session well. She continues with urinary urgency and incontinence. She has not returned to exercise or intercourse. She demonstrates overactivity of pelvic floor, abdominals, and adductors. She had one instance of abdominal pain during cat cow exercise - which reduced with decreased upon stopping exercise. Pt has shallow breaths. She was instructed on proper bearing down and defecation maneuver with fair ability. She has difficulty dropping pelvic floor muscles and releasing tension. Plan to continue with downtraining and strengthening as able.   Tj is progressing well towards her goals.   Pt prognosis is Good.     Pt will continue to benefit from skilled outpatient physical therapy to address the deficits listed in the problem list box on initial evaluation, provide pt/family education and to maximize pt's level of independence in the home and community environment.   Pt's spiritual, cultural and educational needs considered and pt agreeable to plan of care and goals.     Anticipated barriers to physical therapy: none     Goals:  Short Term Goals: 6 weeks   Pt to report a decrease in pad usage to no more than 1 a day to demonstrate improving pelvic floor function needed for continence.  Pt to demonstrate being able to correctly and consistently perform a kegel which is needed  to increase pelvic floor muscle coordination and strength needed for  continence.  Pt to be able to delay the urge to urinate at least 10 minutes with a strong urge to urinate in order to make it to the bathroom without leaking.  Pt to report a decrease in urinary frequency from 2x/hour to no more than once every 2 hour(s) to improve ability to participate in social activities.  Pt to report a decrease in nocturia to no more than 1x/night for improved ability to achieve restorative sleep.    Pt to demonstrate proper diaphragmatic breathing to help with calming the nervous system in order to decrease pain and to improve abdominal wall musculature extensibility.   Pt to report minimal pain with palpation of abdominal wall due to improvement in soft tissue mobility from moderate to minimal restrictions.  Pt will report minimal to no pain with single digit pelvic assessment and display relaxation during this assessment in order to progress to dilator use.  Pt to demonstrate proper positioning on commode with breathing techniques to decrease strain with BM to enable pt to feel empty after BM.   Pt to be able to bulge pelvic floor which is needed for comfortable BM and complete evacuation.      Long Term Goals: 12 weeks   Pt to be discharged with home plan for carry over after discharge.    Pt to be able to perform a 10 second kegel x 10 reps to demonstrate improving strength and endurance needed for continence.  Pt to demonstrate an improved score in the FOTO Urinary Problem survey  to at least 42% to demonstrate improving incontinence with ADLs .    Pt to be able to delay the urge to urinate at least 20 minutes with a strong urge to urinate in order to make it to the bathroom without leaking.  Pt to report a decrease in urinary frequency from 2x/hour to no more than once every 2.5-3 hours hour(s) to improve ability to participate in social activities.  Pt to report being able to have a comfortable vaginal exam without significant increase in pelvic pain.  Pt to increase abdominal wall  strength by at least 1 muscle grade to improve lumbopelvic stability with ADLs that would normally increase pain.  Pt will be independent with use of dilation in order to progress towards self management and intercourse.  Pt to report being able to have a BM without straining 84% of the time to demonstrate improving PF coordination.   Pt to demonstrate an improved score in the FOTO Bowel Leakage survey  to at least 43% to demonstrate improving confidence and ability to have complete bowel movements.      Plan     Plan for next visit: review exercises; internal; dilators     Viviana Moreno, PT   12/29/2020

## 2020-12-29 ENCOUNTER — CLINICAL SUPPORT (OUTPATIENT)
Dept: REHABILITATION | Facility: HOSPITAL | Age: 65
End: 2020-12-29
Payer: COMMERCIAL

## 2020-12-29 DIAGNOSIS — R27.9 LACK OF COORDINATION: ICD-10-CM

## 2020-12-29 DIAGNOSIS — M62.81 MUSCLE WEAKNESS: ICD-10-CM

## 2020-12-29 PROCEDURE — 97110 THERAPEUTIC EXERCISES: CPT | Mod: PN

## 2020-12-29 PROCEDURE — 97112 NEUROMUSCULAR REEDUCATION: CPT | Mod: PN

## 2020-12-29 NOTE — PATIENT INSTRUCTIONS
"Home Exercise Program: 12/29/2020    1. "CHECK IN" WITH YOUR PELVIC FLOOR  - Every hour, "check in" with your pelvic floor.   Is it tight and contracted?   Is it relaxed and dropped?    - Take 10 seconds and try to release any tension in the pelvic floor muscles and external anal sphincter.   Diaphragmatic Breathing   One quick flick   Mindfulness   Full body relaxation    - Then return to your regular tasks.    Do this every hour throughout the day in any position.       2. Exercises:   Cat cow (inhale with your back arched and exhale with your back rounded) 2x10   Child's pose [id number]   Figure 4 stretch 3x30 sec     3. Pelvic drops:   Supported butterfly position - breathing in and out and act like you are gently laying an egg - really allowing the tissues to relax and drop     4. Bowel Movement Mechanics  1. Sit on the toilet comfortably with legs and buttocks relaxed.  2. Put your feet on a step stool or squatty potty (8 inches tall).  3. Lean forward while keeping your back straight.  4. Keep your knees apart.  5. Exhale like you are blowing out birthday candles while you gently bear down.       Do not strain and Do not hold your breath.               "

## 2021-01-06 ENCOUNTER — CLINICAL SUPPORT (OUTPATIENT)
Dept: REHABILITATION | Facility: HOSPITAL | Age: 66
End: 2021-01-06
Payer: COMMERCIAL

## 2021-01-06 DIAGNOSIS — M62.81 MUSCLE WEAKNESS: ICD-10-CM

## 2021-01-06 DIAGNOSIS — R27.9 LACK OF COORDINATION: ICD-10-CM

## 2021-01-06 PROCEDURE — 97140 MANUAL THERAPY 1/> REGIONS: CPT | Mod: PN

## 2021-01-06 PROCEDURE — 97112 NEUROMUSCULAR REEDUCATION: CPT | Mod: PN

## 2021-01-13 ENCOUNTER — CLINICAL SUPPORT (OUTPATIENT)
Dept: REHABILITATION | Facility: HOSPITAL | Age: 66
End: 2021-01-13
Payer: COMMERCIAL

## 2021-01-13 DIAGNOSIS — R27.9 LACK OF COORDINATION: ICD-10-CM

## 2021-01-13 DIAGNOSIS — M62.81 MUSCLE WEAKNESS: ICD-10-CM

## 2021-01-13 PROCEDURE — 97530 THERAPEUTIC ACTIVITIES: CPT | Mod: PN

## 2021-01-13 PROCEDURE — 97140 MANUAL THERAPY 1/> REGIONS: CPT | Mod: PN

## 2021-01-13 PROCEDURE — 97112 NEUROMUSCULAR REEDUCATION: CPT | Mod: PN

## 2021-01-15 ENCOUNTER — PATIENT MESSAGE (OUTPATIENT)
Dept: INTERNAL MEDICINE | Facility: CLINIC | Age: 66
End: 2021-01-15

## 2021-01-20 ENCOUNTER — CLINICAL SUPPORT (OUTPATIENT)
Dept: REHABILITATION | Facility: HOSPITAL | Age: 66
End: 2021-01-20
Payer: COMMERCIAL

## 2021-01-20 DIAGNOSIS — R27.9 LACK OF COORDINATION: ICD-10-CM

## 2021-01-20 DIAGNOSIS — M62.81 MUSCLE WEAKNESS: ICD-10-CM

## 2021-01-20 PROCEDURE — 97530 THERAPEUTIC ACTIVITIES: CPT | Mod: PN

## 2021-01-20 PROCEDURE — 97112 NEUROMUSCULAR REEDUCATION: CPT | Mod: PN

## 2021-01-23 RX ORDER — ERGOCALCIFEROL 1.25 MG/1
CAPSULE ORAL
Qty: 24 CAPSULE | Refills: 2 | Status: SHIPPED | OUTPATIENT
Start: 2021-01-23 | End: 2021-11-29

## 2021-01-27 ENCOUNTER — OFFICE VISIT (OUTPATIENT)
Dept: UROGYNECOLOGY | Facility: CLINIC | Age: 66
End: 2021-01-27
Payer: COMMERCIAL

## 2021-01-27 VITALS
BODY MASS INDEX: 24.53 KG/M2 | WEIGHT: 143.69 LBS | SYSTOLIC BLOOD PRESSURE: 131 MMHG | HEART RATE: 64 BPM | DIASTOLIC BLOOD PRESSURE: 73 MMHG | HEIGHT: 64 IN

## 2021-01-27 DIAGNOSIS — R39.15 URINARY URGENCY: Primary | ICD-10-CM

## 2021-01-27 PROCEDURE — 1101F PR PT FALLS ASSESS DOC 0-1 FALLS W/OUT INJ PAST YR: ICD-10-PCS | Mod: CPTII,S$GLB,, | Performed by: NURSE PRACTITIONER

## 2021-01-27 PROCEDURE — 1159F PR MEDICATION LIST DOCUMENTED IN MEDICAL RECORD: ICD-10-PCS | Mod: S$GLB,,, | Performed by: NURSE PRACTITIONER

## 2021-01-27 PROCEDURE — 99999 PR PBB SHADOW E&M-EST. PATIENT-LVL III: ICD-10-PCS | Mod: PBBFAC,,, | Performed by: NURSE PRACTITIONER

## 2021-01-27 PROCEDURE — 99213 OFFICE O/P EST LOW 20 MIN: CPT | Mod: S$GLB,,, | Performed by: NURSE PRACTITIONER

## 2021-01-27 PROCEDURE — 1126F PR PAIN SEVERITY QUANTIFIED, NO PAIN PRESENT: ICD-10-PCS | Mod: S$GLB,,, | Performed by: NURSE PRACTITIONER

## 2021-01-27 PROCEDURE — 1159F MED LIST DOCD IN RCRD: CPT | Mod: S$GLB,,, | Performed by: NURSE PRACTITIONER

## 2021-01-27 PROCEDURE — 3288F FALL RISK ASSESSMENT DOCD: CPT | Mod: CPTII,S$GLB,, | Performed by: NURSE PRACTITIONER

## 2021-01-27 PROCEDURE — 3075F SYST BP GE 130 - 139MM HG: CPT | Mod: CPTII,S$GLB,, | Performed by: NURSE PRACTITIONER

## 2021-01-27 PROCEDURE — 99999 PR PBB SHADOW E&M-EST. PATIENT-LVL III: CPT | Mod: PBBFAC,,, | Performed by: NURSE PRACTITIONER

## 2021-01-27 PROCEDURE — 1101F PT FALLS ASSESS-DOCD LE1/YR: CPT | Mod: CPTII,S$GLB,, | Performed by: NURSE PRACTITIONER

## 2021-01-27 PROCEDURE — 3078F PR MOST RECENT DIASTOLIC BLOOD PRESSURE < 80 MM HG: ICD-10-PCS | Mod: CPTII,S$GLB,, | Performed by: NURSE PRACTITIONER

## 2021-01-27 PROCEDURE — 3288F PR FALLS RISK ASSESSMENT DOCUMENTED: ICD-10-PCS | Mod: CPTII,S$GLB,, | Performed by: NURSE PRACTITIONER

## 2021-01-27 PROCEDURE — 3075F PR MOST RECENT SYSTOLIC BLOOD PRESS GE 130-139MM HG: ICD-10-PCS | Mod: CPTII,S$GLB,, | Performed by: NURSE PRACTITIONER

## 2021-01-27 PROCEDURE — 99213 PR OFFICE/OUTPT VISIT, EST, LEVL III, 20-29 MIN: ICD-10-PCS | Mod: S$GLB,,, | Performed by: NURSE PRACTITIONER

## 2021-01-27 PROCEDURE — 3078F DIAST BP <80 MM HG: CPT | Mod: CPTII,S$GLB,, | Performed by: NURSE PRACTITIONER

## 2021-01-27 PROCEDURE — 3008F BODY MASS INDEX DOCD: CPT | Mod: CPTII,S$GLB,, | Performed by: NURSE PRACTITIONER

## 2021-01-27 PROCEDURE — 1126F AMNT PAIN NOTED NONE PRSNT: CPT | Mod: S$GLB,,, | Performed by: NURSE PRACTITIONER

## 2021-01-27 PROCEDURE — 3008F PR BODY MASS INDEX (BMI) DOCUMENTED: ICD-10-PCS | Mod: CPTII,S$GLB,, | Performed by: NURSE PRACTITIONER

## 2021-01-27 RX ORDER — OXYBUTYNIN CHLORIDE 10 MG/1
10 TABLET, EXTENDED RELEASE ORAL DAILY
Qty: 30 TABLET | Refills: 12 | Status: SHIPPED | OUTPATIENT
Start: 2021-01-27 | End: 2021-05-20

## 2021-02-03 ENCOUNTER — CLINICAL SUPPORT (OUTPATIENT)
Dept: REHABILITATION | Facility: HOSPITAL | Age: 66
End: 2021-02-03
Payer: COMMERCIAL

## 2021-02-03 DIAGNOSIS — M62.81 MUSCLE WEAKNESS: ICD-10-CM

## 2021-02-03 DIAGNOSIS — R27.9 LACK OF COORDINATION: ICD-10-CM

## 2021-02-03 PROCEDURE — 97110 THERAPEUTIC EXERCISES: CPT | Mod: PN

## 2021-02-08 ENCOUNTER — PATIENT MESSAGE (OUTPATIENT)
Dept: REHABILITATION | Facility: HOSPITAL | Age: 66
End: 2021-02-08

## 2021-02-23 ENCOUNTER — CLINICAL SUPPORT (OUTPATIENT)
Dept: REHABILITATION | Facility: HOSPITAL | Age: 66
End: 2021-02-23
Payer: COMMERCIAL

## 2021-02-23 DIAGNOSIS — M62.81 MUSCLE WEAKNESS: ICD-10-CM

## 2021-02-23 DIAGNOSIS — R27.9 LACK OF COORDINATION: ICD-10-CM

## 2021-02-23 PROCEDURE — 97112 NEUROMUSCULAR REEDUCATION: CPT | Mod: PN

## 2021-02-23 PROCEDURE — 97140 MANUAL THERAPY 1/> REGIONS: CPT | Mod: PN

## 2021-03-02 ENCOUNTER — CLINICAL SUPPORT (OUTPATIENT)
Dept: REHABILITATION | Facility: HOSPITAL | Age: 66
End: 2021-03-02
Payer: COMMERCIAL

## 2021-03-02 DIAGNOSIS — R27.9 LACK OF COORDINATION: ICD-10-CM

## 2021-03-02 DIAGNOSIS — M62.81 MUSCLE WEAKNESS: ICD-10-CM

## 2021-03-02 PROBLEM — M25.512 LEFT SHOULDER PAIN: Status: RESOLVED | Noted: 2019-02-20 | Resolved: 2021-03-02

## 2021-03-02 PROCEDURE — 97112 NEUROMUSCULAR REEDUCATION: CPT | Mod: PN

## 2021-03-02 PROCEDURE — 97140 MANUAL THERAPY 1/> REGIONS: CPT | Mod: PN

## 2021-03-02 PROCEDURE — 97110 THERAPEUTIC EXERCISES: CPT | Mod: PN

## 2021-03-12 ENCOUNTER — PATIENT MESSAGE (OUTPATIENT)
Dept: INTERNAL MEDICINE | Facility: CLINIC | Age: 66
End: 2021-03-12

## 2021-03-18 RX ORDER — CARVEDILOL 12.5 MG/1
TABLET ORAL
Qty: 270 TABLET | Refills: 1 | Status: SHIPPED | OUTPATIENT
Start: 2021-03-18 | End: 2021-04-28 | Stop reason: SDUPTHER

## 2021-03-29 ENCOUNTER — OFFICE VISIT (OUTPATIENT)
Dept: UROGYNECOLOGY | Facility: CLINIC | Age: 66
End: 2021-03-29
Payer: COMMERCIAL

## 2021-03-29 VITALS
DIASTOLIC BLOOD PRESSURE: 80 MMHG | SYSTOLIC BLOOD PRESSURE: 140 MMHG | WEIGHT: 144.19 LBS | HEIGHT: 64 IN | BODY MASS INDEX: 24.62 KG/M2

## 2021-03-29 DIAGNOSIS — N39.46 URINARY INCONTINENCE, MIXED: ICD-10-CM

## 2021-03-29 DIAGNOSIS — R39.15 URINARY URGENCY: Primary | ICD-10-CM

## 2021-03-29 DIAGNOSIS — N81.89 PELVIC FLOOR WEAKNESS: ICD-10-CM

## 2021-03-29 PROCEDURE — 1126F AMNT PAIN NOTED NONE PRSNT: CPT | Mod: S$GLB,,, | Performed by: NURSE PRACTITIONER

## 2021-03-29 PROCEDURE — 3008F BODY MASS INDEX DOCD: CPT | Mod: CPTII,S$GLB,, | Performed by: NURSE PRACTITIONER

## 2021-03-29 PROCEDURE — 1101F PT FALLS ASSESS-DOCD LE1/YR: CPT | Mod: CPTII,S$GLB,, | Performed by: NURSE PRACTITIONER

## 2021-03-29 PROCEDURE — 3079F PR MOST RECENT DIASTOLIC BLOOD PRESSURE 80-89 MM HG: ICD-10-PCS | Mod: CPTII,S$GLB,, | Performed by: NURSE PRACTITIONER

## 2021-03-29 PROCEDURE — 1101F PR PT FALLS ASSESS DOC 0-1 FALLS W/OUT INJ PAST YR: ICD-10-PCS | Mod: CPTII,S$GLB,, | Performed by: NURSE PRACTITIONER

## 2021-03-29 PROCEDURE — 1159F PR MEDICATION LIST DOCUMENTED IN MEDICAL RECORD: ICD-10-PCS | Mod: S$GLB,,, | Performed by: NURSE PRACTITIONER

## 2021-03-29 PROCEDURE — 99213 OFFICE O/P EST LOW 20 MIN: CPT | Mod: S$GLB,,, | Performed by: NURSE PRACTITIONER

## 2021-03-29 PROCEDURE — 3288F PR FALLS RISK ASSESSMENT DOCUMENTED: ICD-10-PCS | Mod: CPTII,S$GLB,, | Performed by: NURSE PRACTITIONER

## 2021-03-29 PROCEDURE — 3077F SYST BP >= 140 MM HG: CPT | Mod: CPTII,S$GLB,, | Performed by: NURSE PRACTITIONER

## 2021-03-29 PROCEDURE — 99999 PR PBB SHADOW E&M-EST. PATIENT-LVL IV: ICD-10-PCS | Mod: PBBFAC,,, | Performed by: NURSE PRACTITIONER

## 2021-03-29 PROCEDURE — 1126F PR PAIN SEVERITY QUANTIFIED, NO PAIN PRESENT: ICD-10-PCS | Mod: S$GLB,,, | Performed by: NURSE PRACTITIONER

## 2021-03-29 PROCEDURE — 3008F PR BODY MASS INDEX (BMI) DOCUMENTED: ICD-10-PCS | Mod: CPTII,S$GLB,, | Performed by: NURSE PRACTITIONER

## 2021-03-29 PROCEDURE — 1159F MED LIST DOCD IN RCRD: CPT | Mod: S$GLB,,, | Performed by: NURSE PRACTITIONER

## 2021-03-29 PROCEDURE — 99999 PR PBB SHADOW E&M-EST. PATIENT-LVL IV: CPT | Mod: PBBFAC,,, | Performed by: NURSE PRACTITIONER

## 2021-03-29 PROCEDURE — 3288F FALL RISK ASSESSMENT DOCD: CPT | Mod: CPTII,S$GLB,, | Performed by: NURSE PRACTITIONER

## 2021-03-29 PROCEDURE — 99213 PR OFFICE/OUTPT VISIT, EST, LEVL III, 20-29 MIN: ICD-10-PCS | Mod: S$GLB,,, | Performed by: NURSE PRACTITIONER

## 2021-03-29 PROCEDURE — 3077F PR MOST RECENT SYSTOLIC BLOOD PRESSURE >= 140 MM HG: ICD-10-PCS | Mod: CPTII,S$GLB,, | Performed by: NURSE PRACTITIONER

## 2021-03-29 PROCEDURE — 3079F DIAST BP 80-89 MM HG: CPT | Mod: CPTII,S$GLB,, | Performed by: NURSE PRACTITIONER

## 2021-03-30 ENCOUNTER — CLINICAL SUPPORT (OUTPATIENT)
Dept: REHABILITATION | Facility: HOSPITAL | Age: 66
End: 2021-03-30
Payer: COMMERCIAL

## 2021-03-30 DIAGNOSIS — R27.9 LACK OF COORDINATION: ICD-10-CM

## 2021-03-30 DIAGNOSIS — M62.81 MUSCLE WEAKNESS: ICD-10-CM

## 2021-03-30 PROCEDURE — 97112 NEUROMUSCULAR REEDUCATION: CPT | Mod: PN

## 2021-03-30 PROCEDURE — 97535 SELF CARE MNGMENT TRAINING: CPT | Mod: PN

## 2021-03-30 PROCEDURE — 97110 THERAPEUTIC EXERCISES: CPT | Mod: PN

## 2021-04-09 ENCOUNTER — PATIENT MESSAGE (OUTPATIENT)
Dept: INTERNAL MEDICINE | Facility: CLINIC | Age: 66
End: 2021-04-09

## 2021-04-11 ENCOUNTER — PATIENT MESSAGE (OUTPATIENT)
Dept: INTERNAL MEDICINE | Facility: CLINIC | Age: 66
End: 2021-04-11

## 2021-04-13 ENCOUNTER — CLINICAL SUPPORT (OUTPATIENT)
Dept: REHABILITATION | Facility: HOSPITAL | Age: 66
End: 2021-04-13
Payer: COMMERCIAL

## 2021-04-13 DIAGNOSIS — M62.81 MUSCLE WEAKNESS: ICD-10-CM

## 2021-04-13 DIAGNOSIS — R27.9 LACK OF COORDINATION: ICD-10-CM

## 2021-04-13 PROCEDURE — 97140 MANUAL THERAPY 1/> REGIONS: CPT | Mod: PN

## 2021-04-13 PROCEDURE — 97112 NEUROMUSCULAR REEDUCATION: CPT | Mod: PN

## 2021-04-13 PROCEDURE — 97535 SELF CARE MNGMENT TRAINING: CPT | Mod: PN

## 2021-04-19 ENCOUNTER — PATIENT MESSAGE (OUTPATIENT)
Dept: INTERNAL MEDICINE | Facility: CLINIC | Age: 66
End: 2021-04-19

## 2021-04-20 ENCOUNTER — CLINICAL SUPPORT (OUTPATIENT)
Dept: REHABILITATION | Facility: HOSPITAL | Age: 66
End: 2021-04-20
Payer: COMMERCIAL

## 2021-04-20 DIAGNOSIS — M62.81 MUSCLE WEAKNESS: ICD-10-CM

## 2021-04-20 DIAGNOSIS — R27.9 LACK OF COORDINATION: ICD-10-CM

## 2021-04-20 PROCEDURE — 97535 SELF CARE MNGMENT TRAINING: CPT | Mod: PN

## 2021-04-20 PROCEDURE — 97112 NEUROMUSCULAR REEDUCATION: CPT | Mod: PN

## 2021-04-20 PROCEDURE — 97110 THERAPEUTIC EXERCISES: CPT | Mod: PN

## 2021-04-28 ENCOUNTER — OFFICE VISIT (OUTPATIENT)
Dept: INTERNAL MEDICINE | Facility: CLINIC | Age: 66
End: 2021-04-28
Payer: COMMERCIAL

## 2021-04-28 VITALS
HEART RATE: 61 BPM | SYSTOLIC BLOOD PRESSURE: 120 MMHG | OXYGEN SATURATION: 98 % | WEIGHT: 142.19 LBS | HEIGHT: 64 IN | BODY MASS INDEX: 24.28 KG/M2 | DIASTOLIC BLOOD PRESSURE: 68 MMHG

## 2021-04-28 DIAGNOSIS — J30.9 ALLERGIC RHINITIS, UNSPECIFIED SEASONALITY, UNSPECIFIED TRIGGER: ICD-10-CM

## 2021-04-28 DIAGNOSIS — R19.7 DIARRHEA, UNSPECIFIED TYPE: ICD-10-CM

## 2021-04-28 DIAGNOSIS — I10 ESSENTIAL HYPERTENSION: Primary | ICD-10-CM

## 2021-04-28 DIAGNOSIS — F43.9 STRESS: ICD-10-CM

## 2021-04-28 DIAGNOSIS — K21.9 GASTROESOPHAGEAL REFLUX DISEASE WITHOUT ESOPHAGITIS: ICD-10-CM

## 2021-04-28 PROCEDURE — 3008F PR BODY MASS INDEX (BMI) DOCUMENTED: ICD-10-PCS | Mod: CPTII,S$GLB,, | Performed by: INTERNAL MEDICINE

## 2021-04-28 PROCEDURE — 1126F AMNT PAIN NOTED NONE PRSNT: CPT | Mod: S$GLB,,, | Performed by: INTERNAL MEDICINE

## 2021-04-28 PROCEDURE — 3288F FALL RISK ASSESSMENT DOCD: CPT | Mod: CPTII,S$GLB,, | Performed by: INTERNAL MEDICINE

## 2021-04-28 PROCEDURE — 1159F MED LIST DOCD IN RCRD: CPT | Mod: S$GLB,,, | Performed by: INTERNAL MEDICINE

## 2021-04-28 PROCEDURE — 1159F PR MEDICATION LIST DOCUMENTED IN MEDICAL RECORD: ICD-10-PCS | Mod: S$GLB,,, | Performed by: INTERNAL MEDICINE

## 2021-04-28 PROCEDURE — 99999 PR PBB SHADOW E&M-EST. PATIENT-LVL IV: ICD-10-PCS | Mod: PBBFAC,,, | Performed by: INTERNAL MEDICINE

## 2021-04-28 PROCEDURE — 3074F PR MOST RECENT SYSTOLIC BLOOD PRESSURE < 130 MM HG: ICD-10-PCS | Mod: CPTII,S$GLB,, | Performed by: INTERNAL MEDICINE

## 2021-04-28 PROCEDURE — 3078F PR MOST RECENT DIASTOLIC BLOOD PRESSURE < 80 MM HG: ICD-10-PCS | Mod: CPTII,S$GLB,, | Performed by: INTERNAL MEDICINE

## 2021-04-28 PROCEDURE — 1101F PR PT FALLS ASSESS DOC 0-1 FALLS W/OUT INJ PAST YR: ICD-10-PCS | Mod: CPTII,S$GLB,, | Performed by: INTERNAL MEDICINE

## 2021-04-28 PROCEDURE — 3008F BODY MASS INDEX DOCD: CPT | Mod: CPTII,S$GLB,, | Performed by: INTERNAL MEDICINE

## 2021-04-28 PROCEDURE — 1126F PR PAIN SEVERITY QUANTIFIED, NO PAIN PRESENT: ICD-10-PCS | Mod: S$GLB,,, | Performed by: INTERNAL MEDICINE

## 2021-04-28 PROCEDURE — 99999 PR PBB SHADOW E&M-EST. PATIENT-LVL IV: CPT | Mod: PBBFAC,,, | Performed by: INTERNAL MEDICINE

## 2021-04-28 PROCEDURE — 99214 PR OFFICE/OUTPT VISIT, EST, LEVL IV, 30-39 MIN: ICD-10-PCS | Mod: S$GLB,,, | Performed by: INTERNAL MEDICINE

## 2021-04-28 PROCEDURE — 3078F DIAST BP <80 MM HG: CPT | Mod: CPTII,S$GLB,, | Performed by: INTERNAL MEDICINE

## 2021-04-28 PROCEDURE — 1101F PT FALLS ASSESS-DOCD LE1/YR: CPT | Mod: CPTII,S$GLB,, | Performed by: INTERNAL MEDICINE

## 2021-04-28 PROCEDURE — 3074F SYST BP LT 130 MM HG: CPT | Mod: CPTII,S$GLB,, | Performed by: INTERNAL MEDICINE

## 2021-04-28 PROCEDURE — 3288F PR FALLS RISK ASSESSMENT DOCUMENTED: ICD-10-PCS | Mod: CPTII,S$GLB,, | Performed by: INTERNAL MEDICINE

## 2021-04-28 PROCEDURE — 99214 OFFICE O/P EST MOD 30 MIN: CPT | Mod: S$GLB,,, | Performed by: INTERNAL MEDICINE

## 2021-04-28 RX ORDER — LISINOPRIL 5 MG/1
5 TABLET ORAL 2 TIMES DAILY
Qty: 180 TABLET | Refills: 3 | Status: SHIPPED | OUTPATIENT
Start: 2021-04-28 | End: 2021-05-10 | Stop reason: SDUPTHER

## 2021-04-28 RX ORDER — ESCITALOPRAM OXALATE 5 MG/1
5 TABLET ORAL DAILY
Qty: 30 TABLET | Refills: 4 | Status: SHIPPED | OUTPATIENT
Start: 2021-04-28 | End: 2021-05-18

## 2021-04-28 RX ORDER — CARVEDILOL 12.5 MG/1
6.25 TABLET ORAL 2 TIMES DAILY
Start: 2021-04-28 | End: 2021-09-16

## 2021-05-07 ENCOUNTER — PATIENT MESSAGE (OUTPATIENT)
Dept: INTERNAL MEDICINE | Facility: CLINIC | Age: 66
End: 2021-05-07

## 2021-05-07 DIAGNOSIS — R06.83 SNORING: Primary | ICD-10-CM

## 2021-05-07 DIAGNOSIS — R53.83 FATIGUE, UNSPECIFIED TYPE: ICD-10-CM

## 2021-05-07 DIAGNOSIS — I10 ESSENTIAL HYPERTENSION: ICD-10-CM

## 2021-05-09 ENCOUNTER — PATIENT MESSAGE (OUTPATIENT)
Dept: INTERNAL MEDICINE | Facility: CLINIC | Age: 66
End: 2021-05-09

## 2021-05-09 DIAGNOSIS — I10 ESSENTIAL HYPERTENSION: ICD-10-CM

## 2021-05-10 ENCOUNTER — PATIENT MESSAGE (OUTPATIENT)
Dept: INTERNAL MEDICINE | Facility: CLINIC | Age: 66
End: 2021-05-10

## 2021-05-10 RX ORDER — LISINOPRIL 5 MG/1
7.5 TABLET ORAL 2 TIMES DAILY
Start: 2021-05-10 | End: 2021-05-20 | Stop reason: SDUPTHER

## 2021-05-18 ENCOUNTER — OFFICE VISIT (OUTPATIENT)
Dept: SLEEP MEDICINE | Facility: CLINIC | Age: 66
End: 2021-05-18
Payer: COMMERCIAL

## 2021-05-18 VITALS
BODY MASS INDEX: 24.07 KG/M2 | HEIGHT: 64 IN | WEIGHT: 141 LBS | SYSTOLIC BLOOD PRESSURE: 150 MMHG | HEART RATE: 57 BPM | DIASTOLIC BLOOD PRESSURE: 78 MMHG

## 2021-05-18 DIAGNOSIS — I10 ESSENTIAL HYPERTENSION: ICD-10-CM

## 2021-05-18 DIAGNOSIS — R06.83 SNORING: ICD-10-CM

## 2021-05-18 DIAGNOSIS — R53.83 FATIGUE, UNSPECIFIED TYPE: ICD-10-CM

## 2021-05-18 PROCEDURE — 3288F PR FALLS RISK ASSESSMENT DOCUMENTED: ICD-10-PCS | Mod: CPTII,S$GLB,, | Performed by: INTERNAL MEDICINE

## 2021-05-18 PROCEDURE — 99999 PR PBB SHADOW E&M-EST. PATIENT-LVL IV: ICD-10-PCS | Mod: PBBFAC,,, | Performed by: INTERNAL MEDICINE

## 2021-05-18 PROCEDURE — 3288F FALL RISK ASSESSMENT DOCD: CPT | Mod: CPTII,S$GLB,, | Performed by: INTERNAL MEDICINE

## 2021-05-18 PROCEDURE — 3008F BODY MASS INDEX DOCD: CPT | Mod: CPTII,S$GLB,, | Performed by: INTERNAL MEDICINE

## 2021-05-18 PROCEDURE — 3008F PR BODY MASS INDEX (BMI) DOCUMENTED: ICD-10-PCS | Mod: CPTII,S$GLB,, | Performed by: INTERNAL MEDICINE

## 2021-05-18 PROCEDURE — 1126F PR PAIN SEVERITY QUANTIFIED, NO PAIN PRESENT: ICD-10-PCS | Mod: S$GLB,,, | Performed by: INTERNAL MEDICINE

## 2021-05-18 PROCEDURE — 99204 PR OFFICE/OUTPT VISIT, NEW, LEVL IV, 45-59 MIN: ICD-10-PCS | Mod: S$GLB,,, | Performed by: INTERNAL MEDICINE

## 2021-05-18 PROCEDURE — 1159F MED LIST DOCD IN RCRD: CPT | Mod: S$GLB,,, | Performed by: INTERNAL MEDICINE

## 2021-05-18 PROCEDURE — 1126F AMNT PAIN NOTED NONE PRSNT: CPT | Mod: S$GLB,,, | Performed by: INTERNAL MEDICINE

## 2021-05-18 PROCEDURE — 99204 OFFICE O/P NEW MOD 45 MIN: CPT | Mod: S$GLB,,, | Performed by: INTERNAL MEDICINE

## 2021-05-18 PROCEDURE — 1101F PT FALLS ASSESS-DOCD LE1/YR: CPT | Mod: CPTII,S$GLB,, | Performed by: INTERNAL MEDICINE

## 2021-05-18 PROCEDURE — 1159F PR MEDICATION LIST DOCUMENTED IN MEDICAL RECORD: ICD-10-PCS | Mod: S$GLB,,, | Performed by: INTERNAL MEDICINE

## 2021-05-18 PROCEDURE — 99999 PR PBB SHADOW E&M-EST. PATIENT-LVL IV: CPT | Mod: PBBFAC,,, | Performed by: INTERNAL MEDICINE

## 2021-05-18 PROCEDURE — 1101F PR PT FALLS ASSESS DOC 0-1 FALLS W/OUT INJ PAST YR: ICD-10-PCS | Mod: CPTII,S$GLB,, | Performed by: INTERNAL MEDICINE

## 2021-05-20 ENCOUNTER — PATIENT MESSAGE (OUTPATIENT)
Dept: INTERNAL MEDICINE | Facility: CLINIC | Age: 66
End: 2021-05-20

## 2021-05-20 DIAGNOSIS — I10 ESSENTIAL HYPERTENSION: ICD-10-CM

## 2021-05-20 RX ORDER — LISINOPRIL 10 MG/1
10 TABLET ORAL 2 TIMES DAILY
Qty: 180 TABLET | Refills: 3 | Status: SHIPPED | OUTPATIENT
Start: 2021-05-20 | End: 2021-05-26

## 2021-05-20 RX ORDER — OXYBUTYNIN CHLORIDE 5 MG/1
5 TABLET, EXTENDED RELEASE ORAL DAILY
Qty: 30 TABLET | Refills: 11 | Status: SHIPPED | OUTPATIENT
Start: 2021-05-20 | End: 2022-05-17

## 2021-05-25 ENCOUNTER — PATIENT MESSAGE (OUTPATIENT)
Dept: INTERNAL MEDICINE | Facility: CLINIC | Age: 66
End: 2021-05-25

## 2021-05-26 ENCOUNTER — PATIENT MESSAGE (OUTPATIENT)
Dept: INTERNAL MEDICINE | Facility: CLINIC | Age: 66
End: 2021-05-26

## 2021-05-26 RX ORDER — VALSARTAN 80 MG/1
80 TABLET ORAL 2 TIMES DAILY
Qty: 60 TABLET | Refills: 3 | Status: SHIPPED | OUTPATIENT
Start: 2021-05-26 | End: 2021-07-12

## 2021-05-27 ENCOUNTER — PATIENT MESSAGE (OUTPATIENT)
Dept: INTERNAL MEDICINE | Facility: CLINIC | Age: 66
End: 2021-05-27

## 2021-05-28 ENCOUNTER — OFFICE VISIT (OUTPATIENT)
Dept: INTERNAL MEDICINE | Facility: CLINIC | Age: 66
End: 2021-05-28
Payer: COMMERCIAL

## 2021-05-28 VITALS
HEIGHT: 64 IN | OXYGEN SATURATION: 99 % | SYSTOLIC BLOOD PRESSURE: 152 MMHG | WEIGHT: 140 LBS | HEART RATE: 56 BPM | TEMPERATURE: 99 F | BODY MASS INDEX: 23.9 KG/M2 | DIASTOLIC BLOOD PRESSURE: 88 MMHG

## 2021-05-28 DIAGNOSIS — J06.9 URI WITH COUGH AND CONGESTION: Primary | ICD-10-CM

## 2021-05-28 PROCEDURE — 99213 PR OFFICE/OUTPT VISIT, EST, LEVL III, 20-29 MIN: ICD-10-PCS | Mod: S$GLB,,, | Performed by: INTERNAL MEDICINE

## 2021-05-28 PROCEDURE — 1126F PR PAIN SEVERITY QUANTIFIED, NO PAIN PRESENT: ICD-10-PCS | Mod: S$GLB,,, | Performed by: INTERNAL MEDICINE

## 2021-05-28 PROCEDURE — 99213 OFFICE O/P EST LOW 20 MIN: CPT | Mod: S$GLB,,, | Performed by: INTERNAL MEDICINE

## 2021-05-28 PROCEDURE — 3288F FALL RISK ASSESSMENT DOCD: CPT | Mod: CPTII,S$GLB,, | Performed by: INTERNAL MEDICINE

## 2021-05-28 PROCEDURE — 3008F BODY MASS INDEX DOCD: CPT | Mod: CPTII,S$GLB,, | Performed by: INTERNAL MEDICINE

## 2021-05-28 PROCEDURE — 3008F PR BODY MASS INDEX (BMI) DOCUMENTED: ICD-10-PCS | Mod: CPTII,S$GLB,, | Performed by: INTERNAL MEDICINE

## 2021-05-28 PROCEDURE — 1159F MED LIST DOCD IN RCRD: CPT | Mod: S$GLB,,, | Performed by: INTERNAL MEDICINE

## 2021-05-28 PROCEDURE — 99999 PR PBB SHADOW E&M-EST. PATIENT-LVL V: ICD-10-PCS | Mod: PBBFAC,,, | Performed by: INTERNAL MEDICINE

## 2021-05-28 PROCEDURE — 1126F AMNT PAIN NOTED NONE PRSNT: CPT | Mod: S$GLB,,, | Performed by: INTERNAL MEDICINE

## 2021-05-28 PROCEDURE — 3288F PR FALLS RISK ASSESSMENT DOCUMENTED: ICD-10-PCS | Mod: CPTII,S$GLB,, | Performed by: INTERNAL MEDICINE

## 2021-05-28 PROCEDURE — 1101F PT FALLS ASSESS-DOCD LE1/YR: CPT | Mod: CPTII,S$GLB,, | Performed by: INTERNAL MEDICINE

## 2021-05-28 PROCEDURE — 1101F PR PT FALLS ASSESS DOC 0-1 FALLS W/OUT INJ PAST YR: ICD-10-PCS | Mod: CPTII,S$GLB,, | Performed by: INTERNAL MEDICINE

## 2021-05-28 PROCEDURE — 99999 PR PBB SHADOW E&M-EST. PATIENT-LVL V: CPT | Mod: PBBFAC,,, | Performed by: INTERNAL MEDICINE

## 2021-05-28 PROCEDURE — 1159F PR MEDICATION LIST DOCUMENTED IN MEDICAL RECORD: ICD-10-PCS | Mod: S$GLB,,, | Performed by: INTERNAL MEDICINE

## 2021-05-28 RX ORDER — PROMETHAZINE HYDROCHLORIDE AND DEXTROMETHORPHAN HYDROBROMIDE 6.25; 15 MG/5ML; MG/5ML
5 SYRUP ORAL EVERY 4 HOURS PRN
Qty: 240 ML | Refills: 1 | Status: SHIPPED | OUTPATIENT
Start: 2021-05-28 | End: 2021-06-07

## 2021-05-31 ENCOUNTER — PATIENT MESSAGE (OUTPATIENT)
Dept: INTERNAL MEDICINE | Facility: CLINIC | Age: 66
End: 2021-05-31

## 2021-06-06 ENCOUNTER — PATIENT MESSAGE (OUTPATIENT)
Dept: INTERNAL MEDICINE | Facility: CLINIC | Age: 66
End: 2021-06-06

## 2021-06-11 ENCOUNTER — PATIENT MESSAGE (OUTPATIENT)
Dept: INTERNAL MEDICINE | Facility: CLINIC | Age: 66
End: 2021-06-11

## 2021-06-11 DIAGNOSIS — I10 ESSENTIAL HYPERTENSION: Primary | ICD-10-CM

## 2021-06-16 ENCOUNTER — PATIENT MESSAGE (OUTPATIENT)
Dept: ADMINISTRATIVE | Facility: OTHER | Age: 66
End: 2021-06-16

## 2021-06-16 ENCOUNTER — PATIENT MESSAGE (OUTPATIENT)
Dept: INTERNAL MEDICINE | Facility: CLINIC | Age: 66
End: 2021-06-16

## 2021-06-18 ENCOUNTER — NURSE TRIAGE (OUTPATIENT)
Dept: ADMINISTRATIVE | Facility: CLINIC | Age: 66
End: 2021-06-18

## 2021-06-18 ENCOUNTER — OFFICE VISIT (OUTPATIENT)
Dept: INTERNAL MEDICINE | Facility: CLINIC | Age: 66
End: 2021-06-18
Payer: COMMERCIAL

## 2021-06-18 VITALS
SYSTOLIC BLOOD PRESSURE: 182 MMHG | OXYGEN SATURATION: 100 % | HEART RATE: 62 BPM | TEMPERATURE: 98 F | DIASTOLIC BLOOD PRESSURE: 84 MMHG | BODY MASS INDEX: 24.39 KG/M2 | HEIGHT: 64 IN | WEIGHT: 142.88 LBS

## 2021-06-18 DIAGNOSIS — I10 ESSENTIAL HYPERTENSION: ICD-10-CM

## 2021-06-18 DIAGNOSIS — R00.2 PALPITATIONS: Primary | ICD-10-CM

## 2021-06-18 PROCEDURE — 93005 ELECTROCARDIOGRAM TRACING: CPT | Mod: S$GLB,,, | Performed by: FAMILY MEDICINE

## 2021-06-18 PROCEDURE — 1125F AMNT PAIN NOTED PAIN PRSNT: CPT | Mod: S$GLB,,, | Performed by: FAMILY MEDICINE

## 2021-06-18 PROCEDURE — 99214 PR OFFICE/OUTPT VISIT, EST, LEVL IV, 30-39 MIN: ICD-10-PCS | Mod: S$GLB,,, | Performed by: FAMILY MEDICINE

## 2021-06-18 PROCEDURE — 3288F FALL RISK ASSESSMENT DOCD: CPT | Mod: CPTII,S$GLB,, | Performed by: FAMILY MEDICINE

## 2021-06-18 PROCEDURE — 93005 EKG 12-LEAD: ICD-10-PCS | Mod: S$GLB,,, | Performed by: FAMILY MEDICINE

## 2021-06-18 PROCEDURE — 3008F PR BODY MASS INDEX (BMI) DOCUMENTED: ICD-10-PCS | Mod: CPTII,S$GLB,, | Performed by: FAMILY MEDICINE

## 2021-06-18 PROCEDURE — 1159F PR MEDICATION LIST DOCUMENTED IN MEDICAL RECORD: ICD-10-PCS | Mod: S$GLB,,, | Performed by: FAMILY MEDICINE

## 2021-06-18 PROCEDURE — 99214 OFFICE O/P EST MOD 30 MIN: CPT | Mod: S$GLB,,, | Performed by: FAMILY MEDICINE

## 2021-06-18 PROCEDURE — 1125F PR PAIN SEVERITY QUANTIFIED, PAIN PRESENT: ICD-10-PCS | Mod: S$GLB,,, | Performed by: FAMILY MEDICINE

## 2021-06-18 PROCEDURE — 93010 EKG 12-LEAD: ICD-10-PCS | Mod: S$GLB,,, | Performed by: INTERNAL MEDICINE

## 2021-06-18 PROCEDURE — 1159F MED LIST DOCD IN RCRD: CPT | Mod: S$GLB,,, | Performed by: FAMILY MEDICINE

## 2021-06-18 PROCEDURE — 93010 ELECTROCARDIOGRAM REPORT: CPT | Mod: S$GLB,,, | Performed by: INTERNAL MEDICINE

## 2021-06-18 PROCEDURE — 3288F PR FALLS RISK ASSESSMENT DOCUMENTED: ICD-10-PCS | Mod: CPTII,S$GLB,, | Performed by: FAMILY MEDICINE

## 2021-06-18 PROCEDURE — 3008F BODY MASS INDEX DOCD: CPT | Mod: CPTII,S$GLB,, | Performed by: FAMILY MEDICINE

## 2021-06-18 PROCEDURE — 99999 PR PBB SHADOW E&M-EST. PATIENT-LVL V: ICD-10-PCS | Mod: PBBFAC,,, | Performed by: FAMILY MEDICINE

## 2021-06-18 PROCEDURE — 1101F PT FALLS ASSESS-DOCD LE1/YR: CPT | Mod: CPTII,S$GLB,, | Performed by: FAMILY MEDICINE

## 2021-06-18 PROCEDURE — 1101F PR PT FALLS ASSESS DOC 0-1 FALLS W/OUT INJ PAST YR: ICD-10-PCS | Mod: CPTII,S$GLB,, | Performed by: FAMILY MEDICINE

## 2021-06-18 PROCEDURE — 99999 PR PBB SHADOW E&M-EST. PATIENT-LVL V: CPT | Mod: PBBFAC,,, | Performed by: FAMILY MEDICINE

## 2021-06-18 RX ORDER — AMLODIPINE BESYLATE 5 MG/1
5 TABLET ORAL DAILY
Qty: 90 TABLET | Refills: 0 | Status: SHIPPED | OUTPATIENT
Start: 2021-06-18 | End: 2021-07-26

## 2021-06-19 ENCOUNTER — NURSE TRIAGE (OUTPATIENT)
Dept: ADMINISTRATIVE | Facility: CLINIC | Age: 66
End: 2021-06-19

## 2021-06-19 ENCOUNTER — HOSPITAL ENCOUNTER (EMERGENCY)
Facility: HOSPITAL | Age: 66
Discharge: HOME | End: 2021-06-20
Attending: EMERGENCY MEDICINE
Payer: COMMERCIAL

## 2021-06-19 DIAGNOSIS — R51.9 HEADACHE: ICD-10-CM

## 2021-06-19 DIAGNOSIS — I10 HYPERTENSION: Primary | ICD-10-CM

## 2021-06-19 LAB
BASOPHILS # BLD AUTO: 0.02 K/UL (ref 0–0.2)
BASOPHILS NFR BLD: 0.2 % (ref 0–1.9)
DIFFERENTIAL METHOD: ABNORMAL
EOSINOPHIL # BLD AUTO: 0.1 K/UL (ref 0–0.5)
EOSINOPHIL NFR BLD: 0.7 % (ref 0–8)
ERYTHROCYTE [DISTWIDTH] IN BLOOD BY AUTOMATED COUNT: 14.2 % (ref 11.5–14.5)
HCT VFR BLD AUTO: 43.4 % (ref 37–48.5)
HGB BLD-MCNC: 13.8 G/DL (ref 12–16)
IMM GRANULOCYTES # BLD AUTO: 0.04 K/UL (ref 0–0.04)
IMM GRANULOCYTES NFR BLD AUTO: 0.4 % (ref 0–0.5)
LYMPHOCYTES # BLD AUTO: 0.5 K/UL (ref 1–4.8)
LYMPHOCYTES NFR BLD: 4.8 % (ref 18–48)
MCH RBC QN AUTO: 24.3 PG (ref 27–31)
MCHC RBC AUTO-ENTMCNC: 31.8 G/DL (ref 32–36)
MCV RBC AUTO: 76 FL (ref 82–98)
MONOCYTES # BLD AUTO: 0.5 K/UL (ref 0.3–1)
MONOCYTES NFR BLD: 4.9 % (ref 4–15)
NEUTROPHILS # BLD AUTO: 9.3 K/UL (ref 1.8–7.7)
NEUTROPHILS NFR BLD: 89 % (ref 38–73)
NRBC BLD-RTO: 0 /100 WBC
PLATELET # BLD AUTO: 233 K/UL (ref 150–450)
PMV BLD AUTO: 10.3 FL (ref 9.2–12.9)
RBC # BLD AUTO: 5.68 M/UL (ref 4–5.4)
WBC # BLD AUTO: 10.42 K/UL (ref 3.9–12.7)

## 2021-06-19 PROCEDURE — 63600175 PHARM REV CODE 636 W HCPCS: Performed by: EMERGENCY MEDICINE

## 2021-06-19 PROCEDURE — 85025 COMPLETE CBC W/AUTO DIFF WBC: CPT | Performed by: EMERGENCY MEDICINE

## 2021-06-19 PROCEDURE — 99285 EMERGENCY DEPT VISIT HI MDM: CPT | Mod: ,,, | Performed by: EMERGENCY MEDICINE

## 2021-06-19 PROCEDURE — 96374 THER/PROPH/DIAG INJ IV PUSH: CPT

## 2021-06-19 PROCEDURE — 86803 HEPATITIS C AB TEST: CPT | Performed by: EMERGENCY MEDICINE

## 2021-06-19 PROCEDURE — 93005 ELECTROCARDIOGRAM TRACING: CPT

## 2021-06-19 PROCEDURE — 93010 EKG 12-LEAD: ICD-10-PCS | Mod: ,,, | Performed by: INTERNAL MEDICINE

## 2021-06-19 PROCEDURE — 96361 HYDRATE IV INFUSION ADD-ON: CPT

## 2021-06-19 PROCEDURE — 93010 ELECTROCARDIOGRAM REPORT: CPT | Mod: ,,, | Performed by: INTERNAL MEDICINE

## 2021-06-19 PROCEDURE — 99285 PR EMERGENCY DEPT VISIT,LEVEL V: ICD-10-PCS | Mod: ,,, | Performed by: EMERGENCY MEDICINE

## 2021-06-19 PROCEDURE — 80053 COMPREHEN METABOLIC PANEL: CPT | Performed by: EMERGENCY MEDICINE

## 2021-06-19 PROCEDURE — 99284 EMERGENCY DEPT VISIT MOD MDM: CPT | Mod: 25

## 2021-06-19 PROCEDURE — 96375 TX/PRO/DX INJ NEW DRUG ADDON: CPT

## 2021-06-19 RX ORDER — DROPERIDOL 2.5 MG/ML
1.25 INJECTION, SOLUTION INTRAMUSCULAR; INTRAVENOUS ONCE
Status: COMPLETED | OUTPATIENT
Start: 2021-06-20 | End: 2021-06-19

## 2021-06-19 RX ORDER — KETOROLAC TROMETHAMINE 30 MG/ML
10 INJECTION, SOLUTION INTRAMUSCULAR; INTRAVENOUS
Status: COMPLETED | OUTPATIENT
Start: 2021-06-19 | End: 2021-06-19

## 2021-06-19 RX ADMIN — KETOROLAC TROMETHAMINE 10 MG: 30 INJECTION, SOLUTION INTRAMUSCULAR; INTRAVENOUS at 11:06

## 2021-06-19 RX ADMIN — DROPERIDOL 1.25 MG: 2.5 INJECTION, SOLUTION INTRAMUSCULAR; INTRAVENOUS at 11:06

## 2021-06-19 RX ADMIN — SODIUM CHLORIDE, SODIUM LACTATE, POTASSIUM CHLORIDE, AND CALCIUM CHLORIDE 500 ML: .6; .31; .03; .02 INJECTION, SOLUTION INTRAVENOUS at 11:06

## 2021-06-20 ENCOUNTER — PATIENT MESSAGE (OUTPATIENT)
Dept: INTERNAL MEDICINE | Facility: CLINIC | Age: 66
End: 2021-06-20

## 2021-06-20 VITALS
TEMPERATURE: 99 F | OXYGEN SATURATION: 99 % | HEART RATE: 84 BPM | WEIGHT: 140 LBS | HEIGHT: 64 IN | RESPIRATION RATE: 19 BRPM | BODY MASS INDEX: 23.9 KG/M2 | DIASTOLIC BLOOD PRESSURE: 92 MMHG | SYSTOLIC BLOOD PRESSURE: 183 MMHG

## 2021-06-20 LAB
ALBUMIN SERPL BCP-MCNC: 4.1 G/DL (ref 3.5–5.2)
ALP SERPL-CCNC: 67 U/L (ref 55–135)
ALT SERPL W/O P-5'-P-CCNC: 11 U/L (ref 10–44)
ANION GAP SERPL CALC-SCNC: 12 MMOL/L (ref 8–16)
AST SERPL-CCNC: 19 U/L (ref 10–40)
BILIRUB SERPL-MCNC: 1.1 MG/DL (ref 0.1–1)
BUN SERPL-MCNC: 13 MG/DL (ref 8–23)
CALCIUM SERPL-MCNC: 9.4 MG/DL (ref 8.7–10.5)
CHLORIDE SERPL-SCNC: 107 MMOL/L (ref 95–110)
CO2 SERPL-SCNC: 22 MMOL/L (ref 23–29)
CREAT SERPL-MCNC: 0.8 MG/DL (ref 0.5–1.4)
EST. GFR  (AFRICAN AMERICAN): >60 ML/MIN/1.73 M^2
EST. GFR  (NON AFRICAN AMERICAN): >60 ML/MIN/1.73 M^2
GLUCOSE SERPL-MCNC: 115 MG/DL (ref 70–110)
POTASSIUM SERPL-SCNC: 3.8 MMOL/L (ref 3.5–5.1)
PROT SERPL-MCNC: 7.3 G/DL (ref 6–8.4)
SODIUM SERPL-SCNC: 141 MMOL/L (ref 136–145)

## 2021-06-20 PROCEDURE — 63600175 PHARM REV CODE 636 W HCPCS: Performed by: EMERGENCY MEDICINE

## 2021-06-20 PROCEDURE — 25000003 PHARM REV CODE 250: Performed by: EMERGENCY MEDICINE

## 2021-06-20 RX ORDER — BUTALBITAL, ACETAMINOPHEN AND CAFFEINE 50; 325; 40 MG/1; MG/1; MG/1
1 TABLET ORAL EVERY 6 HOURS PRN
Qty: 5 TABLET | Refills: 0 | Status: SHIPPED | OUTPATIENT
Start: 2021-06-20 | End: 2021-07-20

## 2021-06-20 RX ORDER — NAPROXEN 375 MG/1
375 TABLET ORAL 2 TIMES DAILY WITH MEALS
Qty: 6 TABLET | Refills: 0 | Status: SHIPPED | OUTPATIENT
Start: 2021-06-20 | End: 2021-06-23

## 2021-06-20 RX ORDER — BUTALBITAL, ACETAMINOPHEN AND CAFFEINE 50; 325; 40 MG/1; MG/1; MG/1
1 TABLET ORAL
Status: COMPLETED | OUTPATIENT
Start: 2021-06-20 | End: 2021-06-20

## 2021-06-20 RX ADMIN — BUTALBITAL, ACETAMINOPHEN, AND CAFFEINE 1 TABLET: 50; 325; 40 TABLET ORAL at 01:06

## 2021-06-21 ENCOUNTER — PES CALL (OUTPATIENT)
Dept: ADMINISTRATIVE | Facility: CLINIC | Age: 66
End: 2021-06-21

## 2021-06-21 ENCOUNTER — TELEPHONE (OUTPATIENT)
Dept: EMERGENCY MEDICINE | Facility: HOSPITAL | Age: 66
End: 2021-06-21

## 2021-06-21 LAB — HCV AB SERPL QL IA: NEGATIVE

## 2021-06-22 ENCOUNTER — PATIENT MESSAGE (OUTPATIENT)
Dept: CARDIOLOGY | Facility: CLINIC | Age: 66
End: 2021-06-22

## 2021-06-22 ENCOUNTER — HOSPITAL ENCOUNTER (OUTPATIENT)
Dept: CARDIOLOGY | Facility: HOSPITAL | Age: 66
Discharge: HOME | End: 2021-06-22
Attending: INTERNAL MEDICINE
Payer: COMMERCIAL

## 2021-06-22 ENCOUNTER — OFFICE VISIT (OUTPATIENT)
Dept: INTERNAL MEDICINE | Facility: CLINIC | Age: 66
End: 2021-06-22
Payer: COMMERCIAL

## 2021-06-22 ENCOUNTER — OFFICE VISIT (OUTPATIENT)
Dept: CARDIOLOGY | Facility: CLINIC | Age: 66
End: 2021-06-22
Payer: COMMERCIAL

## 2021-06-22 VITALS
HEIGHT: 64 IN | BODY MASS INDEX: 23.78 KG/M2 | HEART RATE: 62 BPM | SYSTOLIC BLOOD PRESSURE: 132 MMHG | DIASTOLIC BLOOD PRESSURE: 85 MMHG | OXYGEN SATURATION: 98 % | WEIGHT: 139.31 LBS

## 2021-06-22 VITALS
HEIGHT: 64 IN | HEART RATE: 72 BPM | DIASTOLIC BLOOD PRESSURE: 84 MMHG | WEIGHT: 139 LBS | BODY MASS INDEX: 23.73 KG/M2 | SYSTOLIC BLOOD PRESSURE: 132 MMHG

## 2021-06-22 VITALS
DIASTOLIC BLOOD PRESSURE: 75 MMHG | HEIGHT: 64 IN | HEART RATE: 61 BPM | BODY MASS INDEX: 23.86 KG/M2 | OXYGEN SATURATION: 100 % | SYSTOLIC BLOOD PRESSURE: 146 MMHG | WEIGHT: 139.75 LBS

## 2021-06-22 DIAGNOSIS — I10 ESSENTIAL HYPERTENSION: Primary | ICD-10-CM

## 2021-06-22 DIAGNOSIS — I10 ESSENTIAL HYPERTENSION: ICD-10-CM

## 2021-06-22 DIAGNOSIS — R00.2 PALPITATIONS: ICD-10-CM

## 2021-06-22 DIAGNOSIS — E78.5 HYPERLIPIDEMIA, UNSPECIFIED HYPERLIPIDEMIA TYPE: ICD-10-CM

## 2021-06-22 DIAGNOSIS — K21.9 GASTROESOPHAGEAL REFLUX DISEASE WITHOUT ESOPHAGITIS: ICD-10-CM

## 2021-06-22 LAB
ASCENDING AORTA: 3.47 CM
AV INDEX (PROSTH): 0.99
AV MEAN GRADIENT: 4 MMHG
AV PEAK GRADIENT: 8 MMHG
AV VALVE AREA: 3.12 CM2
AV VELOCITY RATIO: 1.03
BSA FOR ECHO PROCEDURE: 1.69 M2
CV ECHO LV RWT: 0.44 CM
DOP CALC AO PEAK VEL: 1.45 M/S
DOP CALC AO VTI: 29.58 CM
DOP CALC LVOT AREA: 3.1 CM2
DOP CALC LVOT DIAMETER: 2 CM
DOP CALC LVOT PEAK VEL: 1.5 M/S
DOP CALC LVOT STROKE VOLUME: 92.19 CM3
DOP CALCLVOT PEAK VEL VTI: 29.36 CM
E WAVE DECELERATION TIME: 214.7 MSEC
E/A RATIO: 1.17
E/E' RATIO: 12.83 M/S
ECHO LV POSTERIOR WALL: 0.97 CM (ref 0.6–1.1)
EJECTION FRACTION: 65 %
FRACTIONAL SHORTENING: 29 % (ref 28–44)
INTERVENTRICULAR SEPTUM: 0.93 CM (ref 0.6–1.1)
LA MAJOR: 5.4 CM
LA MINOR: 5.53 CM
LA WIDTH: 4 CM
LEFT ATRIUM SIZE: 3.46 CM
LEFT ATRIUM VOLUME INDEX MOD: 37.6 ML/M2
LEFT ATRIUM VOLUME INDEX: 38.3 ML/M2
LEFT ATRIUM VOLUME MOD: 63.2 CM3
LEFT ATRIUM VOLUME: 64.28 CM3
LEFT INTERNAL DIMENSION IN SYSTOLE: 3.13 CM (ref 2.1–4)
LEFT VENTRICLE DIASTOLIC VOLUME INDEX: 52.18 ML/M2
LEFT VENTRICLE DIASTOLIC VOLUME: 87.66 ML
LEFT VENTRICLE MASS INDEX: 82 G/M2
LEFT VENTRICLE SYSTOLIC VOLUME INDEX: 23 ML/M2
LEFT VENTRICLE SYSTOLIC VOLUME: 38.67 ML
LEFT VENTRICULAR INTERNAL DIMENSION IN DIASTOLE: 4.4 CM (ref 3.5–6)
LEFT VENTRICULAR MASS: 137.77 G
LV LATERAL E/E' RATIO: 11 M/S
LV SEPTAL E/E' RATIO: 15.4 M/S
MV A" WAVE DURATION": 11.42 MSEC
MV PEAK A VEL: 0.66 M/S
MV PEAK E VEL: 0.77 M/S
MV STENOSIS PRESSURE HALF TIME: 62.26 MS
MV VALVE AREA P 1/2 METHOD: 3.53 CM2
PISA TR MAX VEL: 2.3 M/S
PULM VEIN S/D RATIO: 1.19
PV PEAK D VEL: 0.37 M/S
PV PEAK S VEL: 0.44 M/S
RA MAJOR: 4.67 CM
RA PRESSURE: 3 MMHG
RA WIDTH: 2.86 CM
RIGHT VENTRICULAR END-DIASTOLIC DIMENSION: 3.21 CM
SINUS: 3.16 CM
STJ: 2.55 CM
TDI LATERAL: 0.07 M/S
TDI SEPTAL: 0.05 M/S
TDI: 0.06 M/S
TR MAX PG: 21 MMHG
TRICUSPID ANNULAR PLANE SYSTOLIC EXCURSION: 2.2 CM
TV REST PULMONARY ARTERY PRESSURE: 24 MMHG

## 2021-06-22 PROCEDURE — 93306 ECHO (CUPID ONLY): ICD-10-PCS | Mod: 26,,, | Performed by: INTERNAL MEDICINE

## 2021-06-22 PROCEDURE — 99999 PR PBB SHADOW E&M-EST. PATIENT-LVL V: ICD-10-PCS | Mod: PBBFAC,,, | Performed by: INTERNAL MEDICINE

## 2021-06-22 PROCEDURE — 3079F PR MOST RECENT DIASTOLIC BLOOD PRESSURE 80-89 MM HG: ICD-10-PCS | Mod: CPTII,S$GLB,, | Performed by: INTERNAL MEDICINE

## 2021-06-22 PROCEDURE — 3288F PR FALLS RISK ASSESSMENT DOCUMENTED: ICD-10-PCS | Mod: CPTII,S$GLB,, | Performed by: INTERNAL MEDICINE

## 2021-06-22 PROCEDURE — 99214 OFFICE O/P EST MOD 30 MIN: CPT | Mod: S$GLB,,, | Performed by: INTERNAL MEDICINE

## 2021-06-22 PROCEDURE — 3078F PR MOST RECENT DIASTOLIC BLOOD PRESSURE < 80 MM HG: ICD-10-PCS | Mod: CPTII,S$GLB,, | Performed by: INTERNAL MEDICINE

## 2021-06-22 PROCEDURE — 3077F PR MOST RECENT SYSTOLIC BLOOD PRESSURE >= 140 MM HG: ICD-10-PCS | Mod: CPTII,S$GLB,, | Performed by: INTERNAL MEDICINE

## 2021-06-22 PROCEDURE — 3077F SYST BP >= 140 MM HG: CPT | Mod: CPTII,S$GLB,, | Performed by: INTERNAL MEDICINE

## 2021-06-22 PROCEDURE — 3008F PR BODY MASS INDEX (BMI) DOCUMENTED: ICD-10-PCS | Mod: CPTII,S$GLB,, | Performed by: INTERNAL MEDICINE

## 2021-06-22 PROCEDURE — 1101F PR PT FALLS ASSESS DOC 0-1 FALLS W/OUT INJ PAST YR: ICD-10-PCS | Mod: CPTII,S$GLB,, | Performed by: INTERNAL MEDICINE

## 2021-06-22 PROCEDURE — 3288F FALL RISK ASSESSMENT DOCD: CPT | Mod: CPTII,S$GLB,, | Performed by: INTERNAL MEDICINE

## 2021-06-22 PROCEDURE — 3008F BODY MASS INDEX DOCD: CPT | Mod: CPTII,S$GLB,, | Performed by: INTERNAL MEDICINE

## 2021-06-22 PROCEDURE — 1159F MED LIST DOCD IN RCRD: CPT | Mod: S$GLB,,, | Performed by: INTERNAL MEDICINE

## 2021-06-22 PROCEDURE — 1159F PR MEDICATION LIST DOCUMENTED IN MEDICAL RECORD: ICD-10-PCS | Mod: S$GLB,,, | Performed by: INTERNAL MEDICINE

## 2021-06-22 PROCEDURE — 93306 TTE W/DOPPLER COMPLETE: CPT

## 2021-06-22 PROCEDURE — 1126F AMNT PAIN NOTED NONE PRSNT: CPT | Mod: S$GLB,,, | Performed by: INTERNAL MEDICINE

## 2021-06-22 PROCEDURE — 99214 PR OFFICE/OUTPT VISIT, EST, LEVL IV, 30-39 MIN: ICD-10-PCS | Mod: S$GLB,,, | Performed by: INTERNAL MEDICINE

## 2021-06-22 PROCEDURE — 99999 PR PBB SHADOW E&M-EST. PATIENT-LVL V: CPT | Mod: PBBFAC,,, | Performed by: INTERNAL MEDICINE

## 2021-06-22 PROCEDURE — 3078F DIAST BP <80 MM HG: CPT | Mod: CPTII,S$GLB,, | Performed by: INTERNAL MEDICINE

## 2021-06-22 PROCEDURE — 99999 PR PBB SHADOW E&M-EST. PATIENT-LVL IV: ICD-10-PCS | Mod: PBBFAC,,, | Performed by: INTERNAL MEDICINE

## 2021-06-22 PROCEDURE — 1126F PR PAIN SEVERITY QUANTIFIED, NO PAIN PRESENT: ICD-10-PCS | Mod: S$GLB,,, | Performed by: INTERNAL MEDICINE

## 2021-06-22 PROCEDURE — 3075F SYST BP GE 130 - 139MM HG: CPT | Mod: CPTII,S$GLB,, | Performed by: INTERNAL MEDICINE

## 2021-06-22 PROCEDURE — 99204 OFFICE O/P NEW MOD 45 MIN: CPT | Mod: S$GLB,,, | Performed by: INTERNAL MEDICINE

## 2021-06-22 PROCEDURE — 1101F PT FALLS ASSESS-DOCD LE1/YR: CPT | Mod: CPTII,S$GLB,, | Performed by: INTERNAL MEDICINE

## 2021-06-22 PROCEDURE — 99204 PR OFFICE/OUTPT VISIT, NEW, LEVL IV, 45-59 MIN: ICD-10-PCS | Mod: S$GLB,,, | Performed by: INTERNAL MEDICINE

## 2021-06-22 PROCEDURE — 3079F DIAST BP 80-89 MM HG: CPT | Mod: CPTII,S$GLB,, | Performed by: INTERNAL MEDICINE

## 2021-06-22 PROCEDURE — 99999 PR PBB SHADOW E&M-EST. PATIENT-LVL IV: CPT | Mod: PBBFAC,,, | Performed by: INTERNAL MEDICINE

## 2021-06-22 PROCEDURE — 93306 TTE W/DOPPLER COMPLETE: CPT | Mod: 26,,, | Performed by: INTERNAL MEDICINE

## 2021-06-22 PROCEDURE — 3075F PR MOST RECENT SYSTOLIC BLOOD PRESS GE 130-139MM HG: ICD-10-PCS | Mod: CPTII,S$GLB,, | Performed by: INTERNAL MEDICINE

## 2021-06-22 RX ORDER — METHOCARBAMOL 500 MG/1
500 TABLET, FILM COATED ORAL 3 TIMES DAILY PRN
Qty: 40 TABLET | Refills: 0 | Status: SHIPPED | OUTPATIENT
Start: 2021-06-22 | End: 2023-02-08

## 2021-06-22 RX ORDER — PRAVASTATIN SODIUM 40 MG/1
40 TABLET ORAL DAILY
Qty: 90 TABLET | Refills: 3 | Status: SHIPPED | OUTPATIENT
Start: 2021-06-22 | End: 2022-07-08 | Stop reason: SDUPTHER

## 2021-06-28 ENCOUNTER — OFFICE VISIT (OUTPATIENT)
Dept: INTERNAL MEDICINE | Facility: CLINIC | Age: 66
End: 2021-06-28
Payer: COMMERCIAL

## 2021-06-28 VITALS
SYSTOLIC BLOOD PRESSURE: 124 MMHG | DIASTOLIC BLOOD PRESSURE: 72 MMHG | WEIGHT: 140.19 LBS | OXYGEN SATURATION: 97 % | HEIGHT: 64 IN | BODY MASS INDEX: 23.93 KG/M2 | HEART RATE: 55 BPM

## 2021-06-28 DIAGNOSIS — K21.9 GASTROESOPHAGEAL REFLUX DISEASE WITHOUT ESOPHAGITIS: ICD-10-CM

## 2021-06-28 DIAGNOSIS — R06.83 SNORING: ICD-10-CM

## 2021-06-28 DIAGNOSIS — R53.83 FATIGUE, UNSPECIFIED TYPE: ICD-10-CM

## 2021-06-28 DIAGNOSIS — I10 ESSENTIAL HYPERTENSION: Primary | ICD-10-CM

## 2021-06-28 DIAGNOSIS — M47.812 OSTEOARTHRITIS OF CERVICAL SPINE, UNSPECIFIED SPINAL OSTEOARTHRITIS COMPLICATION STATUS: ICD-10-CM

## 2021-06-28 DIAGNOSIS — R19.7 DIARRHEA, UNSPECIFIED TYPE: ICD-10-CM

## 2021-06-28 PROCEDURE — 1159F PR MEDICATION LIST DOCUMENTED IN MEDICAL RECORD: ICD-10-PCS | Mod: S$GLB,,, | Performed by: INTERNAL MEDICINE

## 2021-06-28 PROCEDURE — 3074F SYST BP LT 130 MM HG: CPT | Mod: CPTII,S$GLB,, | Performed by: INTERNAL MEDICINE

## 2021-06-28 PROCEDURE — 1101F PR PT FALLS ASSESS DOC 0-1 FALLS W/OUT INJ PAST YR: ICD-10-PCS | Mod: CPTII,S$GLB,, | Performed by: INTERNAL MEDICINE

## 2021-06-28 PROCEDURE — 99214 OFFICE O/P EST MOD 30 MIN: CPT | Mod: S$GLB,,, | Performed by: INTERNAL MEDICINE

## 2021-06-28 PROCEDURE — 99214 PR OFFICE/OUTPT VISIT, EST, LEVL IV, 30-39 MIN: ICD-10-PCS | Mod: S$GLB,,, | Performed by: INTERNAL MEDICINE

## 2021-06-28 PROCEDURE — 3288F PR FALLS RISK ASSESSMENT DOCUMENTED: ICD-10-PCS | Mod: CPTII,S$GLB,, | Performed by: INTERNAL MEDICINE

## 2021-06-28 PROCEDURE — 99999 PR PBB SHADOW E&M-EST. PATIENT-LVL III: CPT | Mod: PBBFAC,,, | Performed by: INTERNAL MEDICINE

## 2021-06-28 PROCEDURE — 3288F FALL RISK ASSESSMENT DOCD: CPT | Mod: CPTII,S$GLB,, | Performed by: INTERNAL MEDICINE

## 2021-06-28 PROCEDURE — 3078F PR MOST RECENT DIASTOLIC BLOOD PRESSURE < 80 MM HG: ICD-10-PCS | Mod: CPTII,S$GLB,, | Performed by: INTERNAL MEDICINE

## 2021-06-28 PROCEDURE — 1126F PR PAIN SEVERITY QUANTIFIED, NO PAIN PRESENT: ICD-10-PCS | Mod: S$GLB,,, | Performed by: INTERNAL MEDICINE

## 2021-06-28 PROCEDURE — 3008F PR BODY MASS INDEX (BMI) DOCUMENTED: ICD-10-PCS | Mod: CPTII,S$GLB,, | Performed by: INTERNAL MEDICINE

## 2021-06-28 PROCEDURE — 1101F PT FALLS ASSESS-DOCD LE1/YR: CPT | Mod: CPTII,S$GLB,, | Performed by: INTERNAL MEDICINE

## 2021-06-28 PROCEDURE — 3008F BODY MASS INDEX DOCD: CPT | Mod: CPTII,S$GLB,, | Performed by: INTERNAL MEDICINE

## 2021-06-28 PROCEDURE — 1159F MED LIST DOCD IN RCRD: CPT | Mod: S$GLB,,, | Performed by: INTERNAL MEDICINE

## 2021-06-28 PROCEDURE — 1126F AMNT PAIN NOTED NONE PRSNT: CPT | Mod: S$GLB,,, | Performed by: INTERNAL MEDICINE

## 2021-06-28 PROCEDURE — 3074F PR MOST RECENT SYSTOLIC BLOOD PRESSURE < 130 MM HG: ICD-10-PCS | Mod: CPTII,S$GLB,, | Performed by: INTERNAL MEDICINE

## 2021-06-28 PROCEDURE — 99999 PR PBB SHADOW E&M-EST. PATIENT-LVL III: ICD-10-PCS | Mod: PBBFAC,,, | Performed by: INTERNAL MEDICINE

## 2021-06-28 PROCEDURE — 3078F DIAST BP <80 MM HG: CPT | Mod: CPTII,S$GLB,, | Performed by: INTERNAL MEDICINE

## 2021-06-28 RX ORDER — FAMOTIDINE 40 MG/1
40 TABLET, FILM COATED ORAL NIGHTLY
Qty: 90 TABLET | Refills: 3 | Status: SHIPPED | OUTPATIENT
Start: 2021-06-28 | End: 2021-06-30 | Stop reason: SDUPTHER

## 2021-06-30 ENCOUNTER — PATIENT MESSAGE (OUTPATIENT)
Dept: INTERNAL MEDICINE | Facility: CLINIC | Age: 66
End: 2021-06-30

## 2021-06-30 RX ORDER — FAMOTIDINE 40 MG/1
40 TABLET, FILM COATED ORAL NIGHTLY
Qty: 90 TABLET | Refills: 3 | Status: SHIPPED | OUTPATIENT
Start: 2021-06-30 | End: 2022-07-27

## 2021-07-07 ENCOUNTER — PATIENT MESSAGE (OUTPATIENT)
Dept: INTERNAL MEDICINE | Facility: CLINIC | Age: 66
End: 2021-07-07

## 2021-07-15 ENCOUNTER — HOSPITAL ENCOUNTER (OUTPATIENT)
Dept: SLEEP MEDICINE | Facility: HOSPITAL | Age: 66
Discharge: HOME | End: 2021-07-15
Payer: COMMERCIAL

## 2021-07-15 DIAGNOSIS — R53.83 FATIGUE, UNSPECIFIED TYPE: ICD-10-CM

## 2021-07-15 DIAGNOSIS — R06.83 SNORING: ICD-10-CM

## 2021-07-15 DIAGNOSIS — I10 ESSENTIAL HYPERTENSION: ICD-10-CM

## 2021-07-15 PROCEDURE — 95800 SLP STDY UNATTENDED: CPT | Mod: 26,,, | Performed by: INTERNAL MEDICINE

## 2021-07-15 PROCEDURE — 95800 PR SLEEP STUDY, UNATTENDED, RECORD HEART RATE/O2 SAT/RESP ANAL/SLEEP TIME: ICD-10-PCS | Mod: 26,,, | Performed by: INTERNAL MEDICINE

## 2021-07-15 PROCEDURE — 95800 SLP STDY UNATTENDED: CPT

## 2021-07-25 ENCOUNTER — PATIENT MESSAGE (OUTPATIENT)
Dept: SLEEP MEDICINE | Facility: CLINIC | Age: 66
End: 2021-07-25

## 2021-07-25 DIAGNOSIS — G47.33 OSA (OBSTRUCTIVE SLEEP APNEA): Primary | ICD-10-CM

## 2021-07-30 ENCOUNTER — TELEPHONE (OUTPATIENT)
Dept: SLEEP MEDICINE | Facility: CLINIC | Age: 66
End: 2021-07-30

## 2021-08-03 ENCOUNTER — LAB VISIT (OUTPATIENT)
Dept: LAB | Facility: HOSPITAL | Age: 66
End: 2021-08-03
Attending: INTERNAL MEDICINE
Payer: COMMERCIAL

## 2021-08-03 DIAGNOSIS — E78.5 HYPERLIPIDEMIA, UNSPECIFIED HYPERLIPIDEMIA TYPE: ICD-10-CM

## 2021-08-03 LAB
ALBUMIN SERPL BCP-MCNC: 3.9 G/DL (ref 3.5–5.2)
ALP SERPL-CCNC: 57 U/L (ref 55–135)
ALT SERPL W/O P-5'-P-CCNC: 10 U/L (ref 10–44)
ANION GAP SERPL CALC-SCNC: 6 MMOL/L (ref 8–16)
AST SERPL-CCNC: 17 U/L (ref 10–40)
BILIRUB SERPL-MCNC: 0.7 MG/DL (ref 0.1–1)
BUN SERPL-MCNC: 14 MG/DL (ref 8–23)
CALCIUM SERPL-MCNC: 9.6 MG/DL (ref 8.7–10.5)
CHLORIDE SERPL-SCNC: 108 MMOL/L (ref 95–110)
CHOLEST SERPL-MCNC: 188 MG/DL (ref 120–199)
CHOLEST/HDLC SERPL: 3.5 {RATIO} (ref 2–5)
CO2 SERPL-SCNC: 29 MMOL/L (ref 23–29)
CREAT SERPL-MCNC: 0.9 MG/DL (ref 0.5–1.4)
EST. GFR  (AFRICAN AMERICAN): >60 ML/MIN/1.73 M^2
EST. GFR  (NON AFRICAN AMERICAN): >60 ML/MIN/1.73 M^2
GLUCOSE SERPL-MCNC: 94 MG/DL (ref 70–110)
HDLC SERPL-MCNC: 54 MG/DL (ref 40–75)
HDLC SERPL: 28.7 % (ref 20–50)
LDLC SERPL CALC-MCNC: 113.6 MG/DL (ref 63–159)
NONHDLC SERPL-MCNC: 134 MG/DL
POTASSIUM SERPL-SCNC: 4.2 MMOL/L (ref 3.5–5.1)
PROT SERPL-MCNC: 7 G/DL (ref 6–8.4)
SODIUM SERPL-SCNC: 143 MMOL/L (ref 136–145)
TRIGL SERPL-MCNC: 102 MG/DL (ref 30–150)

## 2021-08-03 PROCEDURE — 80053 COMPREHEN METABOLIC PANEL: CPT | Performed by: INTERNAL MEDICINE

## 2021-08-03 PROCEDURE — 80061 LIPID PANEL: CPT | Performed by: INTERNAL MEDICINE

## 2021-08-03 PROCEDURE — 36415 COLL VENOUS BLD VENIPUNCTURE: CPT | Mod: PO | Performed by: INTERNAL MEDICINE

## 2021-08-12 ENCOUNTER — PATIENT MESSAGE (OUTPATIENT)
Dept: CARDIOLOGY | Facility: CLINIC | Age: 66
End: 2021-08-12

## 2021-08-23 ENCOUNTER — PATIENT MESSAGE (OUTPATIENT)
Dept: SLEEP MEDICINE | Facility: CLINIC | Age: 66
End: 2021-08-23

## 2021-08-23 DIAGNOSIS — G47.33 OSA (OBSTRUCTIVE SLEEP APNEA): Primary | ICD-10-CM

## 2021-08-25 ENCOUNTER — OFFICE VISIT (OUTPATIENT)
Dept: INTERNAL MEDICINE | Facility: CLINIC | Age: 66
End: 2021-08-25
Payer: COMMERCIAL

## 2021-08-25 VITALS
SYSTOLIC BLOOD PRESSURE: 126 MMHG | DIASTOLIC BLOOD PRESSURE: 52 MMHG | WEIGHT: 141.13 LBS | HEART RATE: 62 BPM | HEIGHT: 64 IN | OXYGEN SATURATION: 99 % | BODY MASS INDEX: 24.1 KG/M2

## 2021-08-25 DIAGNOSIS — I10 ESSENTIAL HYPERTENSION: Primary | ICD-10-CM

## 2021-08-25 DIAGNOSIS — K21.9 GASTROESOPHAGEAL REFLUX DISEASE WITHOUT ESOPHAGITIS: ICD-10-CM

## 2021-08-25 DIAGNOSIS — E78.5 HYPERLIPIDEMIA, UNSPECIFIED HYPERLIPIDEMIA TYPE: ICD-10-CM

## 2021-08-25 DIAGNOSIS — G47.09 OTHER INSOMNIA: ICD-10-CM

## 2021-08-25 DIAGNOSIS — Z12.31 SCREENING MAMMOGRAM, ENCOUNTER FOR: ICD-10-CM

## 2021-08-25 PROCEDURE — 1101F PR PT FALLS ASSESS DOC 0-1 FALLS W/OUT INJ PAST YR: ICD-10-PCS | Mod: CPTII,S$GLB,, | Performed by: INTERNAL MEDICINE

## 2021-08-25 PROCEDURE — 3008F PR BODY MASS INDEX (BMI) DOCUMENTED: ICD-10-PCS | Mod: CPTII,S$GLB,, | Performed by: INTERNAL MEDICINE

## 2021-08-25 PROCEDURE — 3078F PR MOST RECENT DIASTOLIC BLOOD PRESSURE < 80 MM HG: ICD-10-PCS | Mod: CPTII,S$GLB,, | Performed by: INTERNAL MEDICINE

## 2021-08-25 PROCEDURE — 99214 OFFICE O/P EST MOD 30 MIN: CPT | Mod: S$GLB,,, | Performed by: INTERNAL MEDICINE

## 2021-08-25 PROCEDURE — 3074F PR MOST RECENT SYSTOLIC BLOOD PRESSURE < 130 MM HG: ICD-10-PCS | Mod: CPTII,S$GLB,, | Performed by: INTERNAL MEDICINE

## 2021-08-25 PROCEDURE — 3008F BODY MASS INDEX DOCD: CPT | Mod: CPTII,S$GLB,, | Performed by: INTERNAL MEDICINE

## 2021-08-25 PROCEDURE — 1126F AMNT PAIN NOTED NONE PRSNT: CPT | Mod: CPTII,S$GLB,, | Performed by: INTERNAL MEDICINE

## 2021-08-25 PROCEDURE — 99214 PR OFFICE/OUTPT VISIT, EST, LEVL IV, 30-39 MIN: ICD-10-PCS | Mod: S$GLB,,, | Performed by: INTERNAL MEDICINE

## 2021-08-25 PROCEDURE — 3078F DIAST BP <80 MM HG: CPT | Mod: CPTII,S$GLB,, | Performed by: INTERNAL MEDICINE

## 2021-08-25 PROCEDURE — 99999 PR PBB SHADOW E&M-EST. PATIENT-LVL IV: ICD-10-PCS | Mod: PBBFAC,,, | Performed by: INTERNAL MEDICINE

## 2021-08-25 PROCEDURE — 1159F PR MEDICATION LIST DOCUMENTED IN MEDICAL RECORD: ICD-10-PCS | Mod: CPTII,S$GLB,, | Performed by: INTERNAL MEDICINE

## 2021-08-25 PROCEDURE — 1159F MED LIST DOCD IN RCRD: CPT | Mod: CPTII,S$GLB,, | Performed by: INTERNAL MEDICINE

## 2021-08-25 PROCEDURE — 1101F PT FALLS ASSESS-DOCD LE1/YR: CPT | Mod: CPTII,S$GLB,, | Performed by: INTERNAL MEDICINE

## 2021-08-25 PROCEDURE — 3074F SYST BP LT 130 MM HG: CPT | Mod: CPTII,S$GLB,, | Performed by: INTERNAL MEDICINE

## 2021-08-25 PROCEDURE — 3288F FALL RISK ASSESSMENT DOCD: CPT | Mod: CPTII,S$GLB,, | Performed by: INTERNAL MEDICINE

## 2021-08-25 PROCEDURE — 99999 PR PBB SHADOW E&M-EST. PATIENT-LVL IV: CPT | Mod: PBBFAC,,, | Performed by: INTERNAL MEDICINE

## 2021-08-25 PROCEDURE — 1126F PR PAIN SEVERITY QUANTIFIED, NO PAIN PRESENT: ICD-10-PCS | Mod: CPTII,S$GLB,, | Performed by: INTERNAL MEDICINE

## 2021-08-25 PROCEDURE — 3288F PR FALLS RISK ASSESSMENT DOCUMENTED: ICD-10-PCS | Mod: CPTII,S$GLB,, | Performed by: INTERNAL MEDICINE

## 2021-08-25 RX ORDER — ZOLPIDEM TARTRATE 10 MG/1
10 TABLET ORAL NIGHTLY PRN
Qty: 30 TABLET | Refills: 1 | Status: SHIPPED | OUTPATIENT
Start: 2021-08-25 | End: 2023-01-12 | Stop reason: SDUPTHER

## 2021-09-14 DIAGNOSIS — G47.33 OSA (OBSTRUCTIVE SLEEP APNEA): Primary | ICD-10-CM

## 2021-10-13 ENCOUNTER — OFFICE VISIT (OUTPATIENT)
Dept: SLEEP MEDICINE | Facility: CLINIC | Age: 66
End: 2021-10-13
Payer: COMMERCIAL

## 2021-10-13 ENCOUNTER — PATIENT MESSAGE (OUTPATIENT)
Dept: OTHER | Facility: OTHER | Age: 66
End: 2021-10-13
Payer: COMMERCIAL

## 2021-10-13 VITALS
HEART RATE: 58 BPM | DIASTOLIC BLOOD PRESSURE: 77 MMHG | BODY MASS INDEX: 24.65 KG/M2 | SYSTOLIC BLOOD PRESSURE: 173 MMHG | HEIGHT: 64 IN | WEIGHT: 144.38 LBS

## 2021-10-13 DIAGNOSIS — R35.1 NOCTURIA: ICD-10-CM

## 2021-10-13 DIAGNOSIS — G47.10 PERSISTENT DISORDER OF INITIATING OR MAINTAINING WAKEFULNESS: ICD-10-CM

## 2021-10-13 DIAGNOSIS — G47.33 OSA (OBSTRUCTIVE SLEEP APNEA): Primary | ICD-10-CM

## 2021-10-13 PROCEDURE — 1126F AMNT PAIN NOTED NONE PRSNT: CPT | Mod: CPTII,S$GLB,, | Performed by: INTERNAL MEDICINE

## 2021-10-13 PROCEDURE — 3077F SYST BP >= 140 MM HG: CPT | Mod: CPTII,S$GLB,, | Performed by: INTERNAL MEDICINE

## 2021-10-13 PROCEDURE — 99214 OFFICE O/P EST MOD 30 MIN: CPT | Mod: S$GLB,,, | Performed by: INTERNAL MEDICINE

## 2021-10-13 PROCEDURE — 3078F DIAST BP <80 MM HG: CPT | Mod: CPTII,S$GLB,, | Performed by: INTERNAL MEDICINE

## 2021-10-13 PROCEDURE — 4010F ACE/ARB THERAPY RXD/TAKEN: CPT | Mod: CPTII,S$GLB,, | Performed by: INTERNAL MEDICINE

## 2021-10-13 PROCEDURE — 99999 PR PBB SHADOW E&M-EST. PATIENT-LVL III: CPT | Mod: PBBFAC,,, | Performed by: INTERNAL MEDICINE

## 2021-10-13 PROCEDURE — 3078F PR MOST RECENT DIASTOLIC BLOOD PRESSURE < 80 MM HG: ICD-10-PCS | Mod: CPTII,S$GLB,, | Performed by: INTERNAL MEDICINE

## 2021-10-13 PROCEDURE — 1159F MED LIST DOCD IN RCRD: CPT | Mod: CPTII,S$GLB,, | Performed by: INTERNAL MEDICINE

## 2021-10-13 PROCEDURE — 99214 PR OFFICE/OUTPT VISIT, EST, LEVL IV, 30-39 MIN: ICD-10-PCS | Mod: S$GLB,,, | Performed by: INTERNAL MEDICINE

## 2021-10-13 PROCEDURE — 99999 PR PBB SHADOW E&M-EST. PATIENT-LVL III: ICD-10-PCS | Mod: PBBFAC,,, | Performed by: INTERNAL MEDICINE

## 2021-10-13 PROCEDURE — 3008F BODY MASS INDEX DOCD: CPT | Mod: CPTII,S$GLB,, | Performed by: INTERNAL MEDICINE

## 2021-10-13 PROCEDURE — 3077F PR MOST RECENT SYSTOLIC BLOOD PRESSURE >= 140 MM HG: ICD-10-PCS | Mod: CPTII,S$GLB,, | Performed by: INTERNAL MEDICINE

## 2021-10-13 PROCEDURE — 1101F PT FALLS ASSESS-DOCD LE1/YR: CPT | Mod: CPTII,S$GLB,, | Performed by: INTERNAL MEDICINE

## 2021-10-13 PROCEDURE — 3288F FALL RISK ASSESSMENT DOCD: CPT | Mod: CPTII,S$GLB,, | Performed by: INTERNAL MEDICINE

## 2021-10-13 PROCEDURE — 1101F PR PT FALLS ASSESS DOC 0-1 FALLS W/OUT INJ PAST YR: ICD-10-PCS | Mod: CPTII,S$GLB,, | Performed by: INTERNAL MEDICINE

## 2021-10-13 PROCEDURE — 1126F PR PAIN SEVERITY QUANTIFIED, NO PAIN PRESENT: ICD-10-PCS | Mod: CPTII,S$GLB,, | Performed by: INTERNAL MEDICINE

## 2021-10-13 PROCEDURE — 4010F PR ACE/ARB THEARPY RXD/TAKEN: ICD-10-PCS | Mod: CPTII,S$GLB,, | Performed by: INTERNAL MEDICINE

## 2021-10-13 PROCEDURE — 3288F PR FALLS RISK ASSESSMENT DOCUMENTED: ICD-10-PCS | Mod: CPTII,S$GLB,, | Performed by: INTERNAL MEDICINE

## 2021-10-13 PROCEDURE — 3008F PR BODY MASS INDEX (BMI) DOCUMENTED: ICD-10-PCS | Mod: CPTII,S$GLB,, | Performed by: INTERNAL MEDICINE

## 2021-10-13 PROCEDURE — 1159F PR MEDICATION LIST DOCUMENTED IN MEDICAL RECORD: ICD-10-PCS | Mod: CPTII,S$GLB,, | Performed by: INTERNAL MEDICINE

## 2021-11-02 ENCOUNTER — TELEPHONE (OUTPATIENT)
Dept: INTERNAL MEDICINE | Facility: CLINIC | Age: 66
End: 2021-11-02
Payer: COMMERCIAL

## 2021-11-11 ENCOUNTER — PATIENT OUTREACH (OUTPATIENT)
Dept: ADMINISTRATIVE | Facility: OTHER | Age: 66
End: 2021-11-11
Payer: COMMERCIAL

## 2021-11-12 ENCOUNTER — OFFICE VISIT (OUTPATIENT)
Dept: UROGYNECOLOGY | Facility: CLINIC | Age: 66
End: 2021-11-12
Payer: COMMERCIAL

## 2021-11-12 VITALS
WEIGHT: 145.94 LBS | HEIGHT: 64 IN | DIASTOLIC BLOOD PRESSURE: 80 MMHG | BODY MASS INDEX: 24.92 KG/M2 | SYSTOLIC BLOOD PRESSURE: 140 MMHG

## 2021-11-12 DIAGNOSIS — R82.90 MALODOROUS URINE: ICD-10-CM

## 2021-11-12 DIAGNOSIS — N39.46 MIXED STRESS AND URGE URINARY INCONTINENCE: ICD-10-CM

## 2021-11-12 DIAGNOSIS — Z01.419 WELL WOMAN EXAM: Primary | ICD-10-CM

## 2021-11-12 DIAGNOSIS — N95.2 VAGINAL ATROPHY: ICD-10-CM

## 2021-11-12 PROCEDURE — 99397 PR PREVENTIVE VISIT,EST,65 & OVER: ICD-10-PCS | Mod: S$GLB,,, | Performed by: NURSE PRACTITIONER

## 2021-11-12 PROCEDURE — 99999 PR PBB SHADOW E&M-EST. PATIENT-LVL IV: CPT | Mod: PBBFAC,,, | Performed by: NURSE PRACTITIONER

## 2021-11-12 PROCEDURE — 4010F PR ACE/ARB THEARPY RXD/TAKEN: ICD-10-PCS | Mod: CPTII,S$GLB,, | Performed by: NURSE PRACTITIONER

## 2021-11-12 PROCEDURE — 4010F ACE/ARB THERAPY RXD/TAKEN: CPT | Mod: CPTII,S$GLB,, | Performed by: NURSE PRACTITIONER

## 2021-11-12 PROCEDURE — 99999 PR PBB SHADOW E&M-EST. PATIENT-LVL IV: ICD-10-PCS | Mod: PBBFAC,,, | Performed by: NURSE PRACTITIONER

## 2021-11-12 PROCEDURE — 99397 PER PM REEVAL EST PAT 65+ YR: CPT | Mod: S$GLB,,, | Performed by: NURSE PRACTITIONER

## 2021-11-12 PROCEDURE — 87086 URINE CULTURE/COLONY COUNT: CPT | Performed by: NURSE PRACTITIONER

## 2021-11-12 RX ORDER — ESTRADIOL 0.1 MG/G
1 CREAM VAGINAL
Qty: 42.5 G | Refills: 2 | Status: SHIPPED | OUTPATIENT
Start: 2021-11-15 | End: 2023-01-25 | Stop reason: SDUPTHER

## 2021-11-13 LAB — BACTERIA UR CULT: NORMAL

## 2021-11-15 ENCOUNTER — PATIENT MESSAGE (OUTPATIENT)
Dept: UROGYNECOLOGY | Facility: CLINIC | Age: 66
End: 2021-11-15
Payer: COMMERCIAL

## 2021-11-29 RX ORDER — ERGOCALCIFEROL 1.25 MG/1
CAPSULE ORAL
Qty: 24 CAPSULE | Refills: 2 | Status: SHIPPED | OUTPATIENT
Start: 2021-11-29 | End: 2022-09-21 | Stop reason: SDUPTHER

## 2021-11-30 ENCOUNTER — PATIENT MESSAGE (OUTPATIENT)
Dept: OTHER | Facility: OTHER | Age: 66
End: 2021-11-30
Payer: COMMERCIAL

## 2021-12-10 ENCOUNTER — OFFICE VISIT (OUTPATIENT)
Dept: INTERNAL MEDICINE | Facility: CLINIC | Age: 66
End: 2021-12-10
Payer: COMMERCIAL

## 2021-12-10 VITALS
DIASTOLIC BLOOD PRESSURE: 78 MMHG | BODY MASS INDEX: 24.52 KG/M2 | SYSTOLIC BLOOD PRESSURE: 128 MMHG | HEART RATE: 62 BPM | OXYGEN SATURATION: 99 % | WEIGHT: 143.63 LBS | HEIGHT: 64 IN

## 2021-12-10 DIAGNOSIS — M85.80 OSTEOPENIA, UNSPECIFIED LOCATION: ICD-10-CM

## 2021-12-10 DIAGNOSIS — I10 ESSENTIAL HYPERTENSION: ICD-10-CM

## 2021-12-10 DIAGNOSIS — Z00.00 ROUTINE GENERAL MEDICAL EXAMINATION AT A HEALTH CARE FACILITY: Primary | ICD-10-CM

## 2021-12-10 DIAGNOSIS — R07.9 CHEST PAIN, UNSPECIFIED TYPE: ICD-10-CM

## 2021-12-10 PROCEDURE — 4010F PR ACE/ARB THEARPY RXD/TAKEN: ICD-10-PCS | Mod: CPTII,S$GLB,, | Performed by: INTERNAL MEDICINE

## 2021-12-10 PROCEDURE — 99397 PER PM REEVAL EST PAT 65+ YR: CPT | Mod: S$GLB,,, | Performed by: INTERNAL MEDICINE

## 2021-12-10 PROCEDURE — 4010F ACE/ARB THERAPY RXD/TAKEN: CPT | Mod: CPTII,S$GLB,, | Performed by: INTERNAL MEDICINE

## 2021-12-10 PROCEDURE — 99999 PR PBB SHADOW E&M-EST. PATIENT-LVL III: ICD-10-PCS | Mod: PBBFAC,,, | Performed by: INTERNAL MEDICINE

## 2021-12-10 PROCEDURE — 99397 PR PREVENTIVE VISIT,EST,65 & OVER: ICD-10-PCS | Mod: S$GLB,,, | Performed by: INTERNAL MEDICINE

## 2021-12-10 PROCEDURE — 99999 PR PBB SHADOW E&M-EST. PATIENT-LVL III: CPT | Mod: PBBFAC,,, | Performed by: INTERNAL MEDICINE

## 2021-12-10 RX ORDER — SPIRONOLACTONE 25 MG/1
TABLET ORAL
Qty: 30 TABLET | Refills: 2 | Status: SHIPPED | OUTPATIENT
Start: 2021-12-10 | End: 2022-01-14

## 2021-12-13 ENCOUNTER — HOSPITAL ENCOUNTER (OUTPATIENT)
Dept: CARDIOLOGY | Facility: HOSPITAL | Age: 66
Discharge: HOME | End: 2021-12-13
Attending: INTERNAL MEDICINE
Payer: COMMERCIAL

## 2021-12-13 ENCOUNTER — PATIENT MESSAGE (OUTPATIENT)
Dept: INTERNAL MEDICINE | Facility: CLINIC | Age: 66
End: 2021-12-13
Payer: COMMERCIAL

## 2021-12-13 VITALS — HEIGHT: 64 IN | BODY MASS INDEX: 24.41 KG/M2 | WEIGHT: 143 LBS

## 2021-12-13 DIAGNOSIS — R07.9 CHEST PAIN, UNSPECIFIED TYPE: ICD-10-CM

## 2021-12-13 DIAGNOSIS — I10 ESSENTIAL HYPERTENSION: ICD-10-CM

## 2021-12-13 DIAGNOSIS — I10 ESSENTIAL HYPERTENSION: Primary | ICD-10-CM

## 2021-12-13 LAB
CV STRESS BASE HR: 55 BPM
DIASTOLIC BLOOD PRESSURE: 91 MMHG
OHS CV CPX 1 MINUTE RECOVERY HEART RATE: 116 BPM
OHS CV CPX 85 PERCENT MAX PREDICTED HEART RATE MALE: 124
OHS CV CPX ESTIMATED METS: 15
OHS CV CPX MAX PREDICTED HEART RATE: 146
OHS CV CPX PATIENT IS FEMALE: 1
OHS CV CPX PATIENT IS MALE: 0
OHS CV CPX PEAK DIASTOLIC BLOOD PRESSURE: 90 MMHG
OHS CV CPX PEAK HEAR RATE: 148 BPM
OHS CV CPX PEAK RATE PRESSURE PRODUCT: NORMAL
OHS CV CPX PEAK SYSTOLIC BLOOD PRESSURE: 197 MMHG
OHS CV CPX PERCENT MAX PREDICTED HEART RATE ACHIEVED: 101
OHS CV CPX RATE PRESSURE PRODUCT PRESENTING: NORMAL
STRESS ECHO POST EXERCISE DUR MIN: 8 MINUTES
STRESS ECHO POST EXERCISE DUR SEC: 38 SECONDS
SYSTOLIC BLOOD PRESSURE: 193 MMHG

## 2021-12-13 PROCEDURE — 93016 EXERCISE STRESS - EKG (CUPID ONLY): ICD-10-PCS | Mod: ,,, | Performed by: INTERNAL MEDICINE

## 2021-12-13 PROCEDURE — 93016 CV STRESS TEST SUPVJ ONLY: CPT | Mod: ,,, | Performed by: INTERNAL MEDICINE

## 2021-12-13 PROCEDURE — 93018 EXERCISE STRESS - EKG (CUPID ONLY): ICD-10-PCS | Mod: ,,, | Performed by: INTERNAL MEDICINE

## 2021-12-13 PROCEDURE — 93018 CV STRESS TEST I&R ONLY: CPT | Mod: ,,, | Performed by: INTERNAL MEDICINE

## 2021-12-13 PROCEDURE — 93017 CV STRESS TEST TRACING ONLY: CPT

## 2021-12-14 ENCOUNTER — PATIENT MESSAGE (OUTPATIENT)
Dept: INTERNAL MEDICINE | Facility: CLINIC | Age: 66
End: 2021-12-14
Payer: COMMERCIAL

## 2021-12-27 ENCOUNTER — PATIENT MESSAGE (OUTPATIENT)
Dept: INTERNAL MEDICINE | Facility: CLINIC | Age: 66
End: 2021-12-27
Payer: COMMERCIAL

## 2022-01-04 ENCOUNTER — PATIENT MESSAGE (OUTPATIENT)
Dept: INTERNAL MEDICINE | Facility: CLINIC | Age: 67
End: 2022-01-04
Payer: COMMERCIAL

## 2022-01-06 ENCOUNTER — OFFICE VISIT (OUTPATIENT)
Dept: CARDIOLOGY | Facility: CLINIC | Age: 67
End: 2022-01-06
Payer: COMMERCIAL

## 2022-01-06 VITALS
DIASTOLIC BLOOD PRESSURE: 77 MMHG | SYSTOLIC BLOOD PRESSURE: 139 MMHG | HEIGHT: 64 IN | BODY MASS INDEX: 23.93 KG/M2 | HEART RATE: 59 BPM | WEIGHT: 140.19 LBS

## 2022-01-06 DIAGNOSIS — I10 ESSENTIAL HYPERTENSION: ICD-10-CM

## 2022-01-06 PROCEDURE — 3075F PR MOST RECENT SYSTOLIC BLOOD PRESS GE 130-139MM HG: ICD-10-PCS | Mod: CPTII,S$GLB,, | Performed by: INTERNAL MEDICINE

## 2022-01-06 PROCEDURE — 3078F PR MOST RECENT DIASTOLIC BLOOD PRESSURE < 80 MM HG: ICD-10-PCS | Mod: CPTII,S$GLB,, | Performed by: INTERNAL MEDICINE

## 2022-01-06 PROCEDURE — 3288F FALL RISK ASSESSMENT DOCD: CPT | Mod: CPTII,S$GLB,, | Performed by: INTERNAL MEDICINE

## 2022-01-06 PROCEDURE — 3075F SYST BP GE 130 - 139MM HG: CPT | Mod: CPTII,S$GLB,, | Performed by: INTERNAL MEDICINE

## 2022-01-06 PROCEDURE — 1126F PR PAIN SEVERITY QUANTIFIED, NO PAIN PRESENT: ICD-10-PCS | Mod: CPTII,S$GLB,, | Performed by: INTERNAL MEDICINE

## 2022-01-06 PROCEDURE — 1101F PR PT FALLS ASSESS DOC 0-1 FALLS W/OUT INJ PAST YR: ICD-10-PCS | Mod: CPTII,S$GLB,, | Performed by: INTERNAL MEDICINE

## 2022-01-06 PROCEDURE — 3288F PR FALLS RISK ASSESSMENT DOCUMENTED: ICD-10-PCS | Mod: CPTII,S$GLB,, | Performed by: INTERNAL MEDICINE

## 2022-01-06 PROCEDURE — 99999 PR PBB SHADOW E&M-EST. PATIENT-LVL V: ICD-10-PCS | Mod: PBBFAC,,, | Performed by: INTERNAL MEDICINE

## 2022-01-06 PROCEDURE — 1160F PR REVIEW ALL MEDS BY PRESCRIBER/CLIN PHARMACIST DOCUMENTED: ICD-10-PCS | Mod: CPTII,S$GLB,, | Performed by: INTERNAL MEDICINE

## 2022-01-06 PROCEDURE — 3078F DIAST BP <80 MM HG: CPT | Mod: CPTII,S$GLB,, | Performed by: INTERNAL MEDICINE

## 2022-01-06 PROCEDURE — 1159F PR MEDICATION LIST DOCUMENTED IN MEDICAL RECORD: ICD-10-PCS | Mod: CPTII,S$GLB,, | Performed by: INTERNAL MEDICINE

## 2022-01-06 PROCEDURE — 3008F PR BODY MASS INDEX (BMI) DOCUMENTED: ICD-10-PCS | Mod: CPTII,S$GLB,, | Performed by: INTERNAL MEDICINE

## 2022-01-06 PROCEDURE — 99214 OFFICE O/P EST MOD 30 MIN: CPT | Mod: S$GLB,,, | Performed by: INTERNAL MEDICINE

## 2022-01-06 PROCEDURE — 1126F AMNT PAIN NOTED NONE PRSNT: CPT | Mod: CPTII,S$GLB,, | Performed by: INTERNAL MEDICINE

## 2022-01-06 PROCEDURE — 99999 PR PBB SHADOW E&M-EST. PATIENT-LVL V: CPT | Mod: PBBFAC,,, | Performed by: INTERNAL MEDICINE

## 2022-01-06 PROCEDURE — 3008F BODY MASS INDEX DOCD: CPT | Mod: CPTII,S$GLB,, | Performed by: INTERNAL MEDICINE

## 2022-01-06 PROCEDURE — 1160F RVW MEDS BY RX/DR IN RCRD: CPT | Mod: CPTII,S$GLB,, | Performed by: INTERNAL MEDICINE

## 2022-01-06 PROCEDURE — 1159F MED LIST DOCD IN RCRD: CPT | Mod: CPTII,S$GLB,, | Performed by: INTERNAL MEDICINE

## 2022-01-06 PROCEDURE — 99214 PR OFFICE/OUTPT VISIT, EST, LEVL IV, 30-39 MIN: ICD-10-PCS | Mod: S$GLB,,, | Performed by: INTERNAL MEDICINE

## 2022-01-06 PROCEDURE — 1101F PT FALLS ASSESS-DOCD LE1/YR: CPT | Mod: CPTII,S$GLB,, | Performed by: INTERNAL MEDICINE

## 2022-01-06 NOTE — PROGRESS NOTES
"Subjective:   Patient ID:  Tj Hernandez is a 66 y.o. female who presents for follow-up of Essential hypertension      Assessment/Plan:     1. Essential hypertension      Pt does not have obstructive CAD based on history and stress test.  The ECG has non-specific findings that were present at baseline and augmented at peak. Her baseline findings have been present of ECG since 2012.  Given her exercise capacity, I don't put too much emphasis on the ECG.  Pt is not concerned either.    BPs are just above goal but she has only been checking them at night.  I recommended checking at various times of day and we can consider adding low dose amlodipine if needed.  She will likely tolerate amlodipine now that she is on a diuretic. Attempts at increasing coreg did not impact her BP but she did feel more fatigued. I will communicate with the [facility] team with the plan.     No orders of the defined types were placed in this encounter.          ___________________________________________________________________________________________    HPI:   Pt is here for an abnormal exercise treadmill which was performed "to see what blood pressure does during exercise".  She went 15 METS without CP.  BP remained stable but was ~190/90 the entirety of her study.  ECG findings were abn but non-diagnostic.  She denies any exertional chest discomfort, exertional dyspnea, orthopnea, PND, palpitations, TIA's, syncope or presyncope.  She exercises routinely.    She is enrolled in [facility] but only checks her BPs at night time prior to taking her meds.  Current average is 140/73.    She had NIA with amlodipine 5 but was not taking a diuretic when she had these sxs.         Results for orders placed during the hospital encounter of 06/22/21    Echo    Interpretation Summary  · The left ventricle is normal in size with concentric remodeling and normal systolic function. The estimated ejection fraction is 65%.  · Indeterminate left ventricular " "diastolic function.  · Normal right ventricular size with normal right ventricular systolic function.  · Mild left atrial enlargement.  · Mild tricuspid regurgitation.  · The estimated PA systolic pressure is 24 mmHg.  · Normal central venous pressure (3 mmHg).     No results found for this or any previous visit.        Last 5 Patient Entered Readings                Current 30 Day Average: 140/73  Recent Readings 1/5/2022 1/4/2022 1/3/2022 1/2/2022 1/1/2022   SBP (mmHg) 119 144 150 139 117   DBP (mmHg) 74 76 81 66 65   Pulse 66 60 62 57 65                Vitals:    01/06/22 1100   BP: 139/77   BP Location: Left arm   Patient Position: Sitting   BP Method: Medium (Automatic)   Pulse: (!) 59   Weight: 63.6 kg (140 lb 3.4 oz)   Height: 5' 4" (1.626 m)     Body mass index is 24.07 kg/m².  CrCl cannot be calculated (Patient's most recent lab result is older than the maximum 7 days allowed.).    Lab Results   Component Value Date     08/03/2021    K 4.2 08/03/2021     08/03/2021    CO2 29 08/03/2021    BUN 14 08/03/2021    CREATININE 0.9 08/03/2021    GLU 94 08/03/2021    AST 17 08/03/2021    ALT 10 08/03/2021    ALBUMIN 3.9 08/03/2021    PROT 7.0 08/03/2021    BILITOT 0.7 08/03/2021    WBC 10.42 06/19/2021    HGB 13.8 06/19/2021    HCT 43.4 06/19/2021    MCV 76 (L) 06/19/2021     06/19/2021    TSH 1.466 09/02/2020    CHOL 188 08/03/2021    HDL 54 08/03/2021    LDLCALC 113.6 08/03/2021    TRIG 102 08/03/2021       Current Outpatient Medications   Medication Sig    aspirin (ECOTRIN) 81 MG EC tablet Take 81 mg by mouth once daily.    b complex vitamins tablet Take 1 tablet by mouth once daily.    calcium carbonate (OS-MOSHE) 600 mg (1,500 mg) Tab Take 600 mg by mouth once daily.     carvediloL (COREG) 6.25 MG tablet Take 1 tablet (6.25 mg total) by mouth 2 (two) times daily.    cetirizine (ZYRTEC) 10 MG tablet Take 10 mg by mouth once daily.    ergocalciferol (ERGOCALCIFEROL) 50,000 unit Cap take ONE " CAPSULE BY MOUTH TWICE A WEEK    estradioL (ESTRACE) 0.01 % (0.1 mg/gram) vaginal cream Place 1 g vaginally twice a week.    famotidine (PEPCID) 40 MG tablet Take 1 tablet (40 mg total) by mouth every evening.    methocarbamoL (ROBAXIN) 500 MG Tab Take 1 tablet (500 mg total) by mouth 3 (three) times daily as needed (muscle pain).    multivitamin capsule Take 1 capsule by mouth once daily.    omeprazole (PRILOSEC) 20 MG capsule Take 20 mg by mouth once daily.    oxybutynin (DITROPAN-XL) 5 MG TR24 Take 1 tablet (5 mg total) by mouth once daily.    pravastatin (PRAVACHOL) 40 MG tablet Take 1 tablet (40 mg total) by mouth once daily.    spironolactone (ALDACTONE) 25 MG tablet 1/2-1 tablet daily (Patient taking differently: 25 mg once daily. 1/2-1 tablet daily)    UBIDECARENONE (COENZYME Q10) 100 mg Tab Take 1 tablet (100 mg total) by mouth once daily.    valACYclovir (VALTREX) 1000 MG tablet     valsartan (DIOVAN) 160 MG tablet Take 2 tablets (320 mg total) by mouth once daily.    zolpidem (AMBIEN) 10 mg Tab Take 1 tablet (10 mg total) by mouth nightly as needed.     No current facility-administered medications for this visit.       Review of Systems   Constitutional: Negative for malaise/fatigue.   Eyes: Negative for visual disturbance.   Cardiovascular: Negative for chest pain, claudication, dyspnea on exertion, irregular heartbeat, near-syncope, orthopnea and palpitations.   Respiratory: Negative for shortness of breath and sleep disturbances due to breathing.    Musculoskeletal: Negative for muscle cramps, muscle weakness and myalgias.   Gastrointestinal: Negative for abdominal pain and heartburn.   Genitourinary: Negative for nocturia.   Neurological: Negative for difficulty with concentration, excessive daytime sleepiness, light-headedness, loss of balance, paresthesias and vertigo.       Objective:   Physical Exam  Constitutional:       Appearance: Normal appearance. She is well-developed and  well-nourished.   HENT:      Head: Normocephalic and atraumatic.   Pulmonary:      Effort: Pulmonary effort is normal. No accessory muscle usage or respiratory distress.   Skin:     General: Skin is dry.   Neurological:      Mental Status: She is alert and oriented to person, place, and time.   Psychiatric:         Mood and Affect: Mood and affect normal.         Speech: Speech normal.         Behavior: Behavior normal.         Thought Content: Thought content normal.         Cognition and Memory: Cognition and memory normal.         Judgment: Judgment normal.

## 2022-01-11 ENCOUNTER — HOSPITAL ENCOUNTER (OUTPATIENT)
Dept: RADIOLOGY | Facility: HOSPITAL | Age: 67
Discharge: HOME | End: 2022-01-11
Attending: INTERNAL MEDICINE
Payer: COMMERCIAL

## 2022-01-11 DIAGNOSIS — Z12.31 SCREENING MAMMOGRAM, ENCOUNTER FOR: ICD-10-CM

## 2022-01-11 PROCEDURE — 77067 MAMMO DIGITAL SCREENING BILAT WITH TOMO: ICD-10-PCS | Mod: 26,,, | Performed by: RADIOLOGY

## 2022-01-11 PROCEDURE — 77063 BREAST TOMOSYNTHESIS BI: CPT | Mod: TC

## 2022-01-11 PROCEDURE — 77067 SCR MAMMO BI INCL CAD: CPT | Mod: 26,,, | Performed by: RADIOLOGY

## 2022-01-11 PROCEDURE — 77063 BREAST TOMOSYNTHESIS BI: CPT | Mod: 26,,, | Performed by: RADIOLOGY

## 2022-01-11 PROCEDURE — 77063 MAMMO DIGITAL SCREENING BILAT WITH TOMO: ICD-10-PCS | Mod: 26,,, | Performed by: RADIOLOGY

## 2022-01-12 ENCOUNTER — TELEPHONE (OUTPATIENT)
Dept: RADIOLOGY | Facility: HOSPITAL | Age: 67
End: 2022-01-12
Payer: COMMERCIAL

## 2022-01-12 NOTE — TELEPHONE ENCOUNTER
Spoke with patient and explained mammogram findings.Patient expressed understanding of results. Patient scheduled abnormal mammogram follow up appointment at [facility] on 1/18/2022.

## 2022-01-18 ENCOUNTER — HOSPITAL ENCOUNTER (OUTPATIENT)
Dept: RADIOLOGY | Facility: HOSPITAL | Age: 67
Discharge: HOME | End: 2022-01-18
Attending: INTERNAL MEDICINE
Payer: COMMERCIAL

## 2022-01-18 DIAGNOSIS — R92.8 ABNORMAL FINDING ON BREAST IMAGING: ICD-10-CM

## 2022-01-18 PROCEDURE — 77061 BREAST TOMOSYNTHESIS UNI: CPT | Mod: 26,RT,, | Performed by: RADIOLOGY

## 2022-01-18 PROCEDURE — 77061 MAMMO DIGITAL DIAGNOSTIC RIGHT WITH TOMO: ICD-10-PCS | Mod: 26,RT,, | Performed by: RADIOLOGY

## 2022-01-18 PROCEDURE — 77065 MAMMO DIGITAL DIAGNOSTIC RIGHT WITH TOMO: ICD-10-PCS | Mod: 26,RT,, | Performed by: RADIOLOGY

## 2022-01-18 PROCEDURE — 77065 DX MAMMO INCL CAD UNI: CPT | Mod: TC,RT

## 2022-01-18 PROCEDURE — 77065 DX MAMMO INCL CAD UNI: CPT | Mod: 26,RT,, | Performed by: RADIOLOGY

## 2022-02-09 ENCOUNTER — PATIENT MESSAGE (OUTPATIENT)
Dept: INTERNAL MEDICINE | Facility: CLINIC | Age: 67
End: 2022-02-09
Payer: COMMERCIAL

## 2022-02-21 ENCOUNTER — HOSPITAL ENCOUNTER (OUTPATIENT)
Dept: RADIOLOGY | Facility: CLINIC | Age: 67
Discharge: HOME | End: 2022-02-21
Attending: INTERNAL MEDICINE
Payer: COMMERCIAL

## 2022-02-21 DIAGNOSIS — M85.80 OSTEOPENIA, UNSPECIFIED LOCATION: ICD-10-CM

## 2022-02-21 PROCEDURE — 77080 DXA BONE DENSITY AXIAL: CPT | Mod: 26,,, | Performed by: INTERNAL MEDICINE

## 2022-02-21 PROCEDURE — 77080 DXA BONE DENSITY AXIAL: CPT | Mod: TC

## 2022-02-21 PROCEDURE — 77080 DEXA BONE DENSITY SPINE HIP: ICD-10-PCS | Mod: 26,,, | Performed by: INTERNAL MEDICINE

## 2022-02-27 ENCOUNTER — PATIENT MESSAGE (OUTPATIENT)
Dept: INTERNAL MEDICINE | Facility: CLINIC | Age: 67
End: 2022-02-27
Payer: COMMERCIAL

## 2022-03-05 ENCOUNTER — PATIENT MESSAGE (OUTPATIENT)
Dept: CARDIOLOGY | Facility: CLINIC | Age: 67
End: 2022-03-05
Payer: COMMERCIAL

## 2022-03-06 ENCOUNTER — NURSE TRIAGE (OUTPATIENT)
Dept: ADMINISTRATIVE | Facility: CLINIC | Age: 67
End: 2022-03-06
Payer: COMMERCIAL

## 2022-03-06 NOTE — TELEPHONE ENCOUNTER
"Issues with HBP, lately its been very low and pt confused because asymptomatic but yesterday developed dizziness with increased weakness. Did not take morning and nightly pills yesterday because BP readings were low and pt was not feeling well.     BP still low this morning and unsure what to do. No recent change in BP meds.     3/6 morning /65 @845am prior to any medications. Last night BP was 97/60 and yesterday morning BP was 89/61.      Med regimen:  25 Mg spironolactone about 7am  6.25 Mg Carvedilol at 10am  2-160 Mg valsartan at bedtime  6.25 Mg carvedilol at bedtime     S/w Dr. Blank, if continues to stay low, stop BP medications for today, monitor BP. If /90 then take valsartan (only 1 tablet of 160mg). For symptomatic BP <90/60 go to ED. Updated pt. VU and agreed.     Reason for Disposition   [1] Systolic BP  AND [2] taking blood pressure medications AND [3] dizzy, lightheaded or weak    Additional Information   Negative: Started suddenly after an allergic medicine, an allergic food, or bee sting   Negative: Shock suspected (e.g., cold/pale/clammy skin, too weak to stand, low BP, rapid pulse)   Negative: Difficult to awaken or acting confused (e.g., disoriented, slurred speech)   Negative: Fainted   Negative: [1] Systolic BP < 90 AND [2] dizzy, lightheaded, or weak   Negative: Chest pain   Negative: Bleeding (e.g., vomiting blood, rectal bleeding or tarry stools, severe vaginal bleeding)(Exception: fainted from sight of small amount of blood; small cut or abrasion)   Negative: Extra heart beats or heart is beating fast  (i.e., "palpitations")   Negative: Sounds like a life-threatening emergency to the triager   Negative: [1] Systolic BP < 80 AND [2] NOT dizzy, lightheaded or weak   Negative: Abdominal pain   Negative: Fever > 100.4 F (38.0 C)   Negative: Major surgery in the past month   Negative: [1] Drinking very little AND [2] dehydration suspected (e.g., no urine > 12 " hours, very dry mouth, very lightheaded)   Negative: [1] Fall in systolic BP > 20 mm Hg from normal AND [2] dizzy, lightheaded, or weak   Negative: Patient sounds very sick or weak to the triager   Negative: [1] Systolic BP < 90 AND [2] NOT dizzy, lightheaded or weak    Protocols used: BLOOD PRESSURE - LOW-A-

## 2022-03-23 ENCOUNTER — OFFICE VISIT (OUTPATIENT)
Dept: INTERNAL MEDICINE | Facility: CLINIC | Age: 67
End: 2022-03-23
Payer: COMMERCIAL

## 2022-03-23 VITALS
WEIGHT: 140.88 LBS | DIASTOLIC BLOOD PRESSURE: 98 MMHG | BODY MASS INDEX: 24.05 KG/M2 | SYSTOLIC BLOOD PRESSURE: 142 MMHG | HEART RATE: 57 BPM | OXYGEN SATURATION: 99 % | HEIGHT: 64 IN

## 2022-03-23 DIAGNOSIS — Z00.00 ROUTINE GENERAL MEDICAL EXAMINATION AT A HEALTH CARE FACILITY: Primary | ICD-10-CM

## 2022-03-23 PROCEDURE — 1126F AMNT PAIN NOTED NONE PRSNT: CPT | Mod: CPTII,S$GLB,, | Performed by: INTERNAL MEDICINE

## 2022-03-23 PROCEDURE — 3080F DIAST BP >= 90 MM HG: CPT | Mod: CPTII,S$GLB,, | Performed by: INTERNAL MEDICINE

## 2022-03-23 PROCEDURE — 4010F ACE/ARB THERAPY RXD/TAKEN: CPT | Mod: CPTII,S$GLB,, | Performed by: INTERNAL MEDICINE

## 2022-03-23 PROCEDURE — 99397 PR PREVENTIVE VISIT,EST,65 & OVER: ICD-10-PCS | Mod: S$GLB,,, | Performed by: INTERNAL MEDICINE

## 2022-03-23 PROCEDURE — 3080F PR MOST RECENT DIASTOLIC BLOOD PRESSURE >= 90 MM HG: ICD-10-PCS | Mod: CPTII,S$GLB,, | Performed by: INTERNAL MEDICINE

## 2022-03-23 PROCEDURE — 99999 PR PBB SHADOW E&M-EST. PATIENT-LVL III: ICD-10-PCS | Mod: PBBFAC,,, | Performed by: INTERNAL MEDICINE

## 2022-03-23 PROCEDURE — 1101F PT FALLS ASSESS-DOCD LE1/YR: CPT | Mod: CPTII,S$GLB,, | Performed by: INTERNAL MEDICINE

## 2022-03-23 PROCEDURE — 3288F PR FALLS RISK ASSESSMENT DOCUMENTED: ICD-10-PCS | Mod: CPTII,S$GLB,, | Performed by: INTERNAL MEDICINE

## 2022-03-23 PROCEDURE — 1126F PR PAIN SEVERITY QUANTIFIED, NO PAIN PRESENT: ICD-10-PCS | Mod: CPTII,S$GLB,, | Performed by: INTERNAL MEDICINE

## 2022-03-23 PROCEDURE — 99397 PER PM REEVAL EST PAT 65+ YR: CPT | Mod: S$GLB,,, | Performed by: INTERNAL MEDICINE

## 2022-03-23 PROCEDURE — 4010F PR ACE/ARB THEARPY RXD/TAKEN: ICD-10-PCS | Mod: CPTII,S$GLB,, | Performed by: INTERNAL MEDICINE

## 2022-03-23 PROCEDURE — 3077F PR MOST RECENT SYSTOLIC BLOOD PRESSURE >= 140 MM HG: ICD-10-PCS | Mod: CPTII,S$GLB,, | Performed by: INTERNAL MEDICINE

## 2022-03-23 PROCEDURE — 3077F SYST BP >= 140 MM HG: CPT | Mod: CPTII,S$GLB,, | Performed by: INTERNAL MEDICINE

## 2022-03-23 PROCEDURE — 3288F FALL RISK ASSESSMENT DOCD: CPT | Mod: CPTII,S$GLB,, | Performed by: INTERNAL MEDICINE

## 2022-03-23 PROCEDURE — 1101F PR PT FALLS ASSESS DOC 0-1 FALLS W/OUT INJ PAST YR: ICD-10-PCS | Mod: CPTII,S$GLB,, | Performed by: INTERNAL MEDICINE

## 2022-03-23 PROCEDURE — 3008F PR BODY MASS INDEX (BMI) DOCUMENTED: ICD-10-PCS | Mod: CPTII,S$GLB,, | Performed by: INTERNAL MEDICINE

## 2022-03-23 PROCEDURE — 3008F BODY MASS INDEX DOCD: CPT | Mod: CPTII,S$GLB,, | Performed by: INTERNAL MEDICINE

## 2022-03-23 PROCEDURE — 99999 PR PBB SHADOW E&M-EST. PATIENT-LVL III: CPT | Mod: PBBFAC,,, | Performed by: INTERNAL MEDICINE

## 2022-03-23 NOTE — PROGRESS NOTES
CHIEF COMPLAINT:Annual exam     HISTORY OF PRESENT ILLNESS: 66 year-old woman who presents for follow up of her hypertension.     She had a low blood pressure 2 weeks ago. She felt tired and sluggish with a low blood pressure.  Carvedilol was stopped at that time.  She is now taking metoprolol succinate 25 mg once daily and  valsartan 160 mg 2 tablets at night.  No rapid heart rate. Blood pressure has been higher recently and metoprolol succinate was just added one daily.     She exercises 5 days a week.  She feels good. No headaches.      She now has sleep apnea. She is not wearing CPAP machine. She fiddles with the machine all night long and does not sleep.     Her neck has been very stiff lately. It will freeze at times. Not a lot of neck pain. She has cracking in the neck. She watches her position at night.  She does stretching of the neck which helps. She will take methocarbamol  once which helped her neck.     Right knee is ok..      Reflux is better.  She is taking prilosec 20 mg in am. She rarely needs pepcid in the evening.     She had  bladder lift with sling, ERIN/BSO, umbilical hernia repair on 9/24/20. Procedure went well.  Urinary leakage with coughing and sneezing has resolved. She has urinary urgency that is controlled with oxybutynin XL 5 mg daily. Stream is better on the lower dose of oxybutynin.       No chest pain, shortness of breath    She is off zetia and stools are better. Less diarrhea. Takes metamucil as needed.      She takes  metamucil as needed. No diarrhea     Sinuses are doing well. She will take zyrtec 10 mg daily as needed     She is taking pravastatin 40 mg daily for her hyperlipidemia. (Zetia stopped at visit with DR Rudd) NO muscle spasms or joint pain.. SHe takes co enzyme Q10 200 mg daily.      She took Boniva 150 mg once monthly for her osteoporosis.  She stopped the Boniva in February 2020 due to improvement.      She has seen Evelyn HUNTER int he past for counseling  "which has helped her stress.       PAST MEDICAL HISTORY:   1. Hyperlipidemia.   2. Sleep disorder.   3. Cervical spine arthritis.   4. Episode of hematuria, negative cystoscope with Dr. Roland.     PAST SURGICAL HISTORY:   1. D&C for a miscarriage.   2. Conization due to dysplasia 18 years ago.   3. Tonsillectomy and adenoidectomy at age [age].     SOCIAL HISTORY: She does not smoke. . 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 She owns a business.     FAMILY HISTORY: She is adopted.     REVIEW OF SYSTEMS: She denies fevers, chills, night sweats, hot flashes, visual change. She has some slight hearing loss. She denies sinus congestion, sore throat, chest pain, shortness of breath, palpitations, nausea, vomiting, constipation, diarrhea, dysuria, hematuria, joint pain, muscle pain, rashes, headaches, polydipsia,   polyuria.     SCREENING: Mammogram 1/22, bone density 2/20.Colonoscopy 2/ 2018 - given 10 years    PHYSICAL EXAMINATION:         BP (!) 142/98 (BP Location: Left arm, Patient Position: Sitting, BP Method: Medium (Manual))   Pulse (!) 57   Ht 5' 4" (1.626 m)   Wt 63.9 kg (140 lb 14 oz)   LMP  (LMP Unknown)   SpO2 99%   BMI 24.18 kg/m²   General: Alert, oriented. No apparent distress. Affect within normal   limits.   Conjunctivae anicteric. PERRL. Tympanic membranes clear  . Oropharynx clear.   Neck supple. No cervical lymphadenopathy, no thyroid enlargement.   Respiratory effort normal. Lungs clear to auscultation.   Heart: Regular rate and rhythm without murmurs, gallops or rubs.   No lower extremity edema.        ASSESSMENT AND PLAN:      Annual exam - discussed diet, exercise and safety issues.       1  Hypertension -digital hypertension  2. Neck pain due to OA cervical spine - due to muscle spasm. Try methocarbamol 500 mg three times daily as needed  3. Diarrhea  -stable off metamucil  4.  Hyperlipidemia- stable on meds  6. ALlergic rhinitis - stable.   7. . Osteoporosis - 2/2022 - with increase in bone " density - may continue drug holiday  8. Insomnia - ambien as needed  9. GERD- stable.      She is to return in 6 months, sooner if problems arise

## 2022-05-03 ENCOUNTER — OFFICE VISIT (OUTPATIENT)
Dept: OPTOMETRY | Facility: CLINIC | Age: 67
End: 2022-05-03
Payer: COMMERCIAL

## 2022-05-03 DIAGNOSIS — H52.223 HYPEROPIA OF BOTH EYES WITH REGULAR ASTIGMATISM AND PRESBYOPIA: ICD-10-CM

## 2022-05-03 DIAGNOSIS — H52.03 HYPEROPIA OF BOTH EYES WITH REGULAR ASTIGMATISM AND PRESBYOPIA: ICD-10-CM

## 2022-05-03 DIAGNOSIS — Z01.00 EYE EXAM, ROUTINE: Primary | ICD-10-CM

## 2022-05-03 DIAGNOSIS — H52.4 HYPEROPIA OF BOTH EYES WITH REGULAR ASTIGMATISM AND PRESBYOPIA: ICD-10-CM

## 2022-05-03 PROCEDURE — 99999 PR PBB SHADOW E&M-EST. PATIENT-LVL III: ICD-10-PCS | Mod: PBBFAC,,, | Performed by: OPTOMETRIST

## 2022-05-03 PROCEDURE — 92310 CONTACT LENS FITTING OU: CPT | Mod: CSM,,, | Performed by: OPTOMETRIST

## 2022-05-03 PROCEDURE — 3288F FALL RISK ASSESSMENT DOCD: CPT | Mod: CPTII,S$GLB,, | Performed by: OPTOMETRIST

## 2022-05-03 PROCEDURE — 1159F PR MEDICATION LIST DOCUMENTED IN MEDICAL RECORD: ICD-10-PCS | Mod: CPTII,S$GLB,, | Performed by: OPTOMETRIST

## 2022-05-03 PROCEDURE — 4010F ACE/ARB THERAPY RXD/TAKEN: CPT | Mod: CPTII,S$GLB,, | Performed by: OPTOMETRIST

## 2022-05-03 PROCEDURE — 3288F PR FALLS RISK ASSESSMENT DOCUMENTED: ICD-10-PCS | Mod: CPTII,S$GLB,, | Performed by: OPTOMETRIST

## 2022-05-03 PROCEDURE — 1159F MED LIST DOCD IN RCRD: CPT | Mod: CPTII,S$GLB,, | Performed by: OPTOMETRIST

## 2022-05-03 PROCEDURE — 1101F PR PT FALLS ASSESS DOC 0-1 FALLS W/OUT INJ PAST YR: ICD-10-PCS | Mod: CPTII,S$GLB,, | Performed by: OPTOMETRIST

## 2022-05-03 PROCEDURE — 4010F PR ACE/ARB THEARPY RXD/TAKEN: ICD-10-PCS | Mod: CPTII,S$GLB,, | Performed by: OPTOMETRIST

## 2022-05-03 PROCEDURE — 92004 COMPRE OPH EXAM NEW PT 1/>: CPT | Mod: S$GLB,,, | Performed by: OPTOMETRIST

## 2022-05-03 PROCEDURE — 92015 PR REFRACTION: ICD-10-PCS | Mod: S$GLB,,, | Performed by: OPTOMETRIST

## 2022-05-03 PROCEDURE — 1126F AMNT PAIN NOTED NONE PRSNT: CPT | Mod: CPTII,S$GLB,, | Performed by: OPTOMETRIST

## 2022-05-03 PROCEDURE — 99999 PR PBB SHADOW E&M-EST. PATIENT-LVL III: CPT | Mod: PBBFAC,,, | Performed by: OPTOMETRIST

## 2022-05-03 PROCEDURE — 92004 PR EYE EXAM, NEW PATIENT,COMPREHESV: ICD-10-PCS | Mod: S$GLB,,, | Performed by: OPTOMETRIST

## 2022-05-03 PROCEDURE — 1101F PT FALLS ASSESS-DOCD LE1/YR: CPT | Mod: CPTII,S$GLB,, | Performed by: OPTOMETRIST

## 2022-05-03 PROCEDURE — 92310 PR CONTACT LENS FITTING (NO CHANGE): ICD-10-PCS | Mod: CSM,,, | Performed by: OPTOMETRIST

## 2022-05-03 PROCEDURE — 1126F PR PAIN SEVERITY QUANTIFIED, NO PAIN PRESENT: ICD-10-PCS | Mod: CPTII,S$GLB,, | Performed by: OPTOMETRIST

## 2022-05-03 PROCEDURE — 92015 DETERMINE REFRACTIVE STATE: CPT | Mod: S$GLB,,, | Performed by: OPTOMETRIST

## 2022-05-03 NOTE — PROGRESS NOTES
HPI     Annua eye exam   Contact fitting  EW 1 wk.  Does not use solution, rare rewetting   Just removed lenses after over 1 wk EW   (-)Flashes (-)Floaters  (-)Itch, (-)tear, (-)burn, (-)Dryness. (-) OTC Drops   (-)Photophobia  (-)Glare (--)diplopia (-) headaches      -    Last edited by Ayaz Lorenzo, OD on 5/3/2022 11:55 AM. (History)            Assessment /Plan     For exam results, see Encounter Report.    Eye exam, routine  -Significant CLS overwear causing corneal Edema and neovascularization  -limited VA outcome today OD>OS  -Trials ordered.  DW only. Increase sRx use    Hyperopia of both eyes with regular astigmatism and presbyopia  -hold 2/2 poor refractive outcome 2/2 cls overwear.  -Order trials #3 ok to dispense       RTC 1 wk PHREV

## 2022-05-12 ENCOUNTER — TELEPHONE (OUTPATIENT)
Dept: OPTOMETRY | Facility: CLINIC | Age: 67
End: 2022-05-12
Payer: COMMERCIAL

## 2022-05-12 ENCOUNTER — PATIENT MESSAGE (OUTPATIENT)
Dept: OPTOMETRY | Facility: CLINIC | Age: 67
End: 2022-05-12
Payer: COMMERCIAL

## 2022-05-12 NOTE — TELEPHONE ENCOUNTER
----- Message from SULEMA Barroso sent at 5/11/2022  4:31 PM CDT -----  Regarding: FW: Rx  Contact: Tj Hernandez    ----- Message -----  From: Alysa Nieto  Sent: 5/11/2022   4:04 PM CDT  To: Maura Jacome Staff  Subject: Rx                                               Patient stated she's ok with her Rx and is ready to order. Patient call back number is [phone].

## 2022-05-12 NOTE — TELEPHONE ENCOUNTER
----- Message from SULEMA Barroso sent at 5/11/2022  4:31 PM CDT -----  Regarding: FW: Rx  Contact: Tj Hernandez    ----- Message -----  From: Alysa Nieto  Sent: 5/11/2022   4:04 PM CDT  To: Maura Jacome Staff  Subject: Rx                                               Patient stated she's ok with her Rx and is ready to order. Patient call back number is [phone].

## 2022-05-12 NOTE — TELEPHONE ENCOUNTER
Final  Contact Lens Final Rx     Final Contact Lens Rx       Brand Base Curve Diameter Sphere Cylinder Axis Lens    Right Biotrue Oneday 8.6 14.2 +3.75   near    Left Biotrue Oneday for Astigmatism 8.9 14.5 +2.50 -0.75 080 distance    Expiration Date: 5/12/2023    Replacement: Daily    Solutions: Freya    Wearing Schedule: Daily Wear

## 2022-05-16 ENCOUNTER — TELEPHONE (OUTPATIENT)
Dept: OPTOMETRY | Facility: CLINIC | Age: 67
End: 2022-05-16
Payer: COMMERCIAL

## 2022-05-16 NOTE — TELEPHONE ENCOUNTER
Order new trials in slight distance adjustment OS    .  Contact Lens Current Rx     Current Contact Lens Rx (Trial Lens, Ordered)       Brand Base Curve Diameter Sphere Cylinder Axis Lens    Right Biotrue Oneday 8.6 14.2 +3.75   near    Left Biotrue Oneday for Astigmatism 8.9 14.5 +2.25 -0.75 080 distance

## 2022-05-20 NOTE — ANESTHESIA PREPROCEDURE EVALUATION
11/11/2019  Tj Hernandez is a 64 y.o., female.    Anesthesia Evaluation    I have reviewed the Patient Summary Reports.    I have reviewed the Nursing Notes.   I have reviewed the Medications.     Review of Systems  Anesthesia Hx:  Denies Family Hx of Anesthesia complications.  Personal Hx of Anesthesia complications, Post-Operative Nausea/Vomiting, with every anesthetic, treatment not known   Social:  Non-Smoker    Hematology/Oncology:  Hematology Normal   Oncology Normal     EENT/Dental:   chronic allergic rhinitis   Cardiovascular:   Hypertension hyperlipidemia    Pulmonary:  Pulmonary Normal    Renal/:   renal calculi    Hepatic/GI:   GERD    Musculoskeletal:   Arthritis   Spine Disorders: cervical Degenerative disease    Neurological:  Neurology Normal    Endocrine:  Endocrine Normal    Dermatological:  Skin Normal    Psych:  Psychiatric Normal           Physical Exam  General:  Well nourished    Airway/Jaw/Neck:  Airway Findings: Mouth Opening: Normal Mallampati: II  TM Distance: Normal, at least 6 cm      Dental:  Dental Findings: In tact, Molar caps, Lower front caps, Upper front caps        Mental Status:  Mental Status Findings:  Cooperative, Alert and Oriented         Anesthesia Plan  Type of Anesthesia, risks & benefits discussed:  Anesthesia Type:  general  Patient's Preference:   Intra-op Monitoring Plan: standard ASA monitors  Intra-op Monitoring Plan Comments:   Post Op Pain Control Plan: multimodal analgesia and per primary service following discharge from PACU  Post Op Pain Control Plan Comments:   Induction:   IV  Beta Blocker:         Informed Consent: Patient understands risks and agrees with Anesthesia plan.  Questions answered. Anesthesia consent signed with patient.  ASA Score: 2     Day of Surgery Review of History & Physical:    H&P update referred to the surgeon.     Anesthesia  Plan Notes: Severe hx PONV          Ready For Surgery From Anesthesia Perspective.        1

## 2022-05-23 ENCOUNTER — PATIENT MESSAGE (OUTPATIENT)
Dept: OPTOMETRY | Facility: CLINIC | Age: 67
End: 2022-05-23
Payer: COMMERCIAL

## 2022-05-23 ENCOUNTER — TELEPHONE (OUTPATIENT)
Dept: OPTOMETRY | Facility: CLINIC | Age: 67
End: 2022-05-23
Payer: COMMERCIAL

## 2022-05-23 NOTE — TELEPHONE ENCOUNTER
Final as requested by patient  Contact Lens Final Rx     Final Contact Lens Rx       Brand Base Curve Diameter Sphere Cylinder Axis Lens    Right Biotrue Oneday 8.6 14.2 +3.75   near    Left Biotrue Oneday for Astigmatism 8.9 14.5 +2.00 -0.75 080 distance

## 2022-05-24 ENCOUNTER — PATIENT MESSAGE (OUTPATIENT)
Dept: INTERNAL MEDICINE | Facility: CLINIC | Age: 67
End: 2022-05-24
Payer: COMMERCIAL

## 2022-06-13 ENCOUNTER — PATIENT MESSAGE (OUTPATIENT)
Dept: ADMINISTRATIVE | Facility: OTHER | Age: 67
End: 2022-06-13
Payer: COMMERCIAL

## 2022-07-08 ENCOUNTER — PATIENT MESSAGE (OUTPATIENT)
Dept: CARDIOLOGY | Facility: CLINIC | Age: 67
End: 2022-07-08
Payer: COMMERCIAL

## 2022-07-08 DIAGNOSIS — E78.5 HYPERLIPIDEMIA, UNSPECIFIED HYPERLIPIDEMIA TYPE: ICD-10-CM

## 2022-07-08 RX ORDER — PRAVASTATIN SODIUM 40 MG/1
40 TABLET ORAL DAILY
Qty: 90 TABLET | Refills: 3 | Status: SHIPPED | OUTPATIENT
Start: 2022-07-08 | End: 2022-07-18

## 2022-07-17 ENCOUNTER — PATIENT MESSAGE (OUTPATIENT)
Dept: OTHER | Facility: OTHER | Age: 67
End: 2022-07-17
Payer: COMMERCIAL

## 2022-07-20 ENCOUNTER — TELEPHONE (OUTPATIENT)
Dept: OPTOMETRY | Facility: CLINIC | Age: 67
End: 2022-07-20
Payer: COMMERCIAL

## 2022-07-20 ENCOUNTER — PATIENT MESSAGE (OUTPATIENT)
Dept: OPTOMETRY | Facility: CLINIC | Age: 67
End: 2022-07-20
Payer: COMMERCIAL

## 2022-07-20 NOTE — TELEPHONE ENCOUNTER
Updated Rx as pt requested    Contact Lens Final Rx     Final Contact Lens Rx       Brand Base Curve Diameter Sphere Cylinder Axis Lens    Right Biotrue Oneday 8.6 14.2 +3.75   near    Left Biotrue Oneday for Astigmatism 8.9 14.5 +2.25 -0.75 080 distance

## 2022-07-30 ENCOUNTER — PATIENT MESSAGE (OUTPATIENT)
Dept: OPTOMETRY | Facility: CLINIC | Age: 67
End: 2022-07-30
Payer: COMMERCIAL

## 2022-08-01 ENCOUNTER — PATIENT MESSAGE (OUTPATIENT)
Dept: OPTOMETRY | Facility: CLINIC | Age: 67
End: 2022-08-01
Payer: COMMERCIAL

## 2022-09-11 ENCOUNTER — PATIENT MESSAGE (OUTPATIENT)
Dept: OTHER | Facility: OTHER | Age: 67
End: 2022-09-11
Payer: COMMERCIAL

## 2022-09-14 ENCOUNTER — LAB VISIT (OUTPATIENT)
Dept: LAB | Facility: HOSPITAL | Age: 67
End: 2022-09-14
Attending: INTERNAL MEDICINE
Payer: COMMERCIAL

## 2022-09-14 DIAGNOSIS — Z00.00 ROUTINE GENERAL MEDICAL EXAMINATION AT A HEALTH CARE FACILITY: ICD-10-CM

## 2022-09-14 LAB
25(OH)D3+25(OH)D2 SERPL-MCNC: 39 NG/ML (ref 30–96)
ALBUMIN SERPL BCP-MCNC: 4.1 G/DL (ref 3.5–5.2)
ALP SERPL-CCNC: 53 U/L (ref 55–135)
ALT SERPL W/O P-5'-P-CCNC: 10 U/L (ref 10–44)
ANION GAP SERPL CALC-SCNC: 9 MMOL/L (ref 8–16)
AST SERPL-CCNC: 17 U/L (ref 10–40)
BASOPHILS # BLD AUTO: 0.02 K/UL (ref 0–0.2)
BASOPHILS NFR BLD: 0.3 % (ref 0–1.9)
BILIRUB SERPL-MCNC: 0.6 MG/DL (ref 0.1–1)
BUN SERPL-MCNC: 17 MG/DL (ref 8–23)
CALCIUM SERPL-MCNC: 9.7 MG/DL (ref 8.7–10.5)
CHLORIDE SERPL-SCNC: 104 MMOL/L (ref 95–110)
CHOLEST SERPL-MCNC: 199 MG/DL (ref 120–199)
CHOLEST/HDLC SERPL: 3.6 {RATIO} (ref 2–5)
CO2 SERPL-SCNC: 26 MMOL/L (ref 23–29)
CREAT SERPL-MCNC: 1.1 MG/DL (ref 0.5–1.4)
DIFFERENTIAL METHOD: ABNORMAL
EOSINOPHIL # BLD AUTO: 0.1 K/UL (ref 0–0.5)
EOSINOPHIL NFR BLD: 1.4 % (ref 0–8)
ERYTHROCYTE [DISTWIDTH] IN BLOOD BY AUTOMATED COUNT: 13.5 % (ref 11.5–14.5)
EST. GFR  (NO RACE VARIABLE): 54.4 ML/MIN/1.73 M^2
GLUCOSE SERPL-MCNC: 93 MG/DL (ref 70–110)
HCT VFR BLD AUTO: 39.9 % (ref 37–48.5)
HDLC SERPL-MCNC: 56 MG/DL (ref 40–75)
HDLC SERPL: 28.1 % (ref 20–50)
HGB BLD-MCNC: 12.5 G/DL (ref 12–16)
IMM GRANULOCYTES # BLD AUTO: 0.02 K/UL (ref 0–0.04)
IMM GRANULOCYTES NFR BLD AUTO: 0.3 % (ref 0–0.5)
LDLC SERPL CALC-MCNC: 121.8 MG/DL (ref 63–159)
LYMPHOCYTES # BLD AUTO: 1.4 K/UL (ref 1–4.8)
LYMPHOCYTES NFR BLD: 24.8 % (ref 18–48)
MCH RBC QN AUTO: 25.5 PG (ref 27–31)
MCHC RBC AUTO-ENTMCNC: 31.3 G/DL (ref 32–36)
MCV RBC AUTO: 81 FL (ref 82–98)
MONOCYTES # BLD AUTO: 0.5 K/UL (ref 0.3–1)
MONOCYTES NFR BLD: 9.3 % (ref 4–15)
NEUTROPHILS # BLD AUTO: 3.7 K/UL (ref 1.8–7.7)
NEUTROPHILS NFR BLD: 63.9 % (ref 38–73)
NONHDLC SERPL-MCNC: 143 MG/DL
NRBC BLD-RTO: 0 /100 WBC
PLATELET # BLD AUTO: 265 K/UL (ref 150–450)
PMV BLD AUTO: 11.1 FL (ref 9.2–12.9)
POTASSIUM SERPL-SCNC: 4.3 MMOL/L (ref 3.5–5.1)
PROT SERPL-MCNC: 7.1 G/DL (ref 6–8.4)
RBC # BLD AUTO: 4.91 M/UL (ref 4–5.4)
SODIUM SERPL-SCNC: 139 MMOL/L (ref 136–145)
TRIGL SERPL-MCNC: 106 MG/DL (ref 30–150)
TSH SERPL DL<=0.005 MIU/L-ACNC: 2.06 UIU/ML (ref 0.4–4)
VIT B12 SERPL-MCNC: 406 PG/ML (ref 210–950)
WBC # BLD AUTO: 5.81 K/UL (ref 3.9–12.7)

## 2022-09-14 PROCEDURE — 85025 COMPLETE CBC W/AUTO DIFF WBC: CPT | Performed by: INTERNAL MEDICINE

## 2022-09-14 PROCEDURE — 84443 ASSAY THYROID STIM HORMONE: CPT | Performed by: INTERNAL MEDICINE

## 2022-09-14 PROCEDURE — 36415 COLL VENOUS BLD VENIPUNCTURE: CPT | Mod: PO | Performed by: INTERNAL MEDICINE

## 2022-09-14 PROCEDURE — 82607 VITAMIN B-12: CPT | Performed by: INTERNAL MEDICINE

## 2022-09-14 PROCEDURE — 82306 VITAMIN D 25 HYDROXY: CPT | Performed by: INTERNAL MEDICINE

## 2022-09-14 PROCEDURE — 80061 LIPID PANEL: CPT | Performed by: INTERNAL MEDICINE

## 2022-09-14 PROCEDURE — 80053 COMPREHEN METABOLIC PANEL: CPT | Performed by: INTERNAL MEDICINE

## 2022-09-21 ENCOUNTER — OFFICE VISIT (OUTPATIENT)
Dept: INTERNAL MEDICINE | Facility: CLINIC | Age: 67
End: 2022-09-21
Payer: COMMERCIAL

## 2022-09-21 ENCOUNTER — IMMUNIZATION (OUTPATIENT)
Dept: INTERNAL MEDICINE | Facility: CLINIC | Age: 67
End: 2022-09-21
Payer: COMMERCIAL

## 2022-09-21 VITALS
WEIGHT: 138.56 LBS | DIASTOLIC BLOOD PRESSURE: 74 MMHG | BODY MASS INDEX: 23.66 KG/M2 | SYSTOLIC BLOOD PRESSURE: 112 MMHG | HEART RATE: 82 BPM | HEIGHT: 64 IN | OXYGEN SATURATION: 99 %

## 2022-09-21 DIAGNOSIS — G47.09 OTHER INSOMNIA: ICD-10-CM

## 2022-09-21 DIAGNOSIS — K21.9 GASTROESOPHAGEAL REFLUX DISEASE WITHOUT ESOPHAGITIS: ICD-10-CM

## 2022-09-21 DIAGNOSIS — I10 ESSENTIAL HYPERTENSION: ICD-10-CM

## 2022-09-21 DIAGNOSIS — Z12.31 SCREENING MAMMOGRAM FOR BREAST CANCER: Primary | ICD-10-CM

## 2022-09-21 DIAGNOSIS — E78.5 HYPERLIPIDEMIA, UNSPECIFIED HYPERLIPIDEMIA TYPE: ICD-10-CM

## 2022-09-21 PROBLEM — R15.1 FECAL SMEARING: Status: RESOLVED | Noted: 2020-09-19 | Resolved: 2022-09-21

## 2022-09-21 PROCEDURE — 90694 FLU VACCINE - QUADRIVALENT - ADJUVANTED: ICD-10-PCS | Mod: S$GLB,,, | Performed by: INTERNAL MEDICINE

## 2022-09-21 PROCEDURE — 90471 FLU VACCINE - QUADRIVALENT - ADJUVANTED: ICD-10-PCS | Mod: S$GLB,,, | Performed by: INTERNAL MEDICINE

## 2022-09-21 PROCEDURE — 3288F PR FALLS RISK ASSESSMENT DOCUMENTED: ICD-10-PCS | Mod: CPTII,S$GLB,, | Performed by: INTERNAL MEDICINE

## 2022-09-21 PROCEDURE — 4010F PR ACE/ARB THEARPY RXD/TAKEN: ICD-10-PCS | Mod: CPTII,S$GLB,, | Performed by: INTERNAL MEDICINE

## 2022-09-21 PROCEDURE — 90694 VACC AIIV4 NO PRSRV 0.5ML IM: CPT | Mod: S$GLB,,, | Performed by: INTERNAL MEDICINE

## 2022-09-21 PROCEDURE — 99999 PR PBB SHADOW E&M-EST. PATIENT-LVL IV: CPT | Mod: PBBFAC,,, | Performed by: INTERNAL MEDICINE

## 2022-09-21 PROCEDURE — 3074F PR MOST RECENT SYSTOLIC BLOOD PRESSURE < 130 MM HG: ICD-10-PCS | Mod: CPTII,S$GLB,, | Performed by: INTERNAL MEDICINE

## 2022-09-21 PROCEDURE — 3008F PR BODY MASS INDEX (BMI) DOCUMENTED: ICD-10-PCS | Mod: CPTII,S$GLB,, | Performed by: INTERNAL MEDICINE

## 2022-09-21 PROCEDURE — 3078F DIAST BP <80 MM HG: CPT | Mod: CPTII,S$GLB,, | Performed by: INTERNAL MEDICINE

## 2022-09-21 PROCEDURE — 1101F PT FALLS ASSESS-DOCD LE1/YR: CPT | Mod: CPTII,S$GLB,, | Performed by: INTERNAL MEDICINE

## 2022-09-21 PROCEDURE — 99214 PR OFFICE/OUTPT VISIT, EST, LEVL IV, 30-39 MIN: ICD-10-PCS | Mod: S$GLB,,, | Performed by: INTERNAL MEDICINE

## 2022-09-21 PROCEDURE — 4010F ACE/ARB THERAPY RXD/TAKEN: CPT | Mod: CPTII,S$GLB,, | Performed by: INTERNAL MEDICINE

## 2022-09-21 PROCEDURE — 1126F PR PAIN SEVERITY QUANTIFIED, NO PAIN PRESENT: ICD-10-PCS | Mod: CPTII,S$GLB,, | Performed by: INTERNAL MEDICINE

## 2022-09-21 PROCEDURE — 3078F PR MOST RECENT DIASTOLIC BLOOD PRESSURE < 80 MM HG: ICD-10-PCS | Mod: CPTII,S$GLB,, | Performed by: INTERNAL MEDICINE

## 2022-09-21 PROCEDURE — 90471 IMMUNIZATION ADMIN: CPT | Mod: S$GLB,,, | Performed by: INTERNAL MEDICINE

## 2022-09-21 PROCEDURE — 1126F AMNT PAIN NOTED NONE PRSNT: CPT | Mod: CPTII,S$GLB,, | Performed by: INTERNAL MEDICINE

## 2022-09-21 PROCEDURE — 3074F SYST BP LT 130 MM HG: CPT | Mod: CPTII,S$GLB,, | Performed by: INTERNAL MEDICINE

## 2022-09-21 PROCEDURE — 1101F PR PT FALLS ASSESS DOC 0-1 FALLS W/OUT INJ PAST YR: ICD-10-PCS | Mod: CPTII,S$GLB,, | Performed by: INTERNAL MEDICINE

## 2022-09-21 PROCEDURE — 1159F PR MEDICATION LIST DOCUMENTED IN MEDICAL RECORD: ICD-10-PCS | Mod: CPTII,S$GLB,, | Performed by: INTERNAL MEDICINE

## 2022-09-21 PROCEDURE — 99214 OFFICE O/P EST MOD 30 MIN: CPT | Mod: S$GLB,,, | Performed by: INTERNAL MEDICINE

## 2022-09-21 PROCEDURE — 99999 PR PBB SHADOW E&M-EST. PATIENT-LVL IV: ICD-10-PCS | Mod: PBBFAC,,, | Performed by: INTERNAL MEDICINE

## 2022-09-21 PROCEDURE — 3008F BODY MASS INDEX DOCD: CPT | Mod: CPTII,S$GLB,, | Performed by: INTERNAL MEDICINE

## 2022-09-21 PROCEDURE — 1159F MED LIST DOCD IN RCRD: CPT | Mod: CPTII,S$GLB,, | Performed by: INTERNAL MEDICINE

## 2022-09-21 PROCEDURE — 3288F FALL RISK ASSESSMENT DOCD: CPT | Mod: CPTII,S$GLB,, | Performed by: INTERNAL MEDICINE

## 2022-09-21 RX ORDER — ERGOCALCIFEROL 1.25 MG/1
50000 CAPSULE ORAL
Qty: 24 CAPSULE | Refills: 2 | Status: SHIPPED | OUTPATIENT
Start: 2022-09-22 | End: 2023-09-15

## 2022-09-21 RX ORDER — ALPRAZOLAM 0.25 MG/1
0.25 TABLET ORAL 2 TIMES DAILY PRN
Qty: 15 TABLET | Refills: 0 | Status: SHIPPED | OUTPATIENT
Start: 2022-09-21 | End: 2023-12-06 | Stop reason: SDUPTHER

## 2022-09-21 NOTE — PROGRESS NOTES
CHIEF COMPLAINT:Follow up of medical problems    HISTORY OF PRESENT ILLNESS: 66 year-old woman who presents for follow up of her hypertension.     She is in digital hypertension - she is taking valsartan 320 mg once dialy  and spironolactone 25 mg 1/2 tablet twice daily. Metoprolol made her tired. BP has been doing well     She exercises 5 days a week.  She feels good. No headaches.      She has sleep apnea. She is not wearing CPAP machine. She fiddles with the machine all night long and does not sleep.      Her neck continues to be stiff. It will freeze at times. Not a lot of neck pain. She has cracking in the neck. She watches her position at night.  She does stretching of the neck which helps. She will take methocarbamol once which helped her neck.      Right knee is ok..      Reflux is better.  She is taking prilosec 20 mg in am. She rarely needs pepcid in the evening.     She had  bladder lift with sling, ERIN/BSO, umbilical hernia repair on 9/24/20. Procedure went well.  Urinary leakage with coughing and sneezing has resolved. She has urinary urgency that is controlled with oxybutynin XL 5 mg daily. Stream is better on the lower dose of oxybutynin.  She is using estradiol vaginal cream twice a week      No chest pain, shortness of breath     She takes  metamucil daily.   THe metamucil keeps her stool from being too soft.       Sinuses are doing well. She will take zyrtec 10 mg daily as needed     She is taking pravastatin 40 mg daily for her hyperlipidemia. NO muscle spasms or joint pain.. SHe takes co enzyme Q10 200 mg daily.      She took Boniva 150 mg once monthly for her osteoporosis.  She stopped the Boniva in February 2020 due to improvement.      She has seen Evelyn Cruz MSW int he past for counseling which has helped her stress.       PAST MEDICAL HISTORY:   1. Hyperlipidemia.   2. Sleep disorder.   3. Cervical spine arthritis.   4. Episode of hematuria, negative cystoscope with Dr. Roland.  "    PAST SURGICAL HISTORY:   1. D&C for a miscarriage.   2. Conization due to dysplasia 18 years ago.   3. Tonsillectomy and adenoidectomy at age [age].     SOCIAL HISTORY: She does not smoke. . 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 She owns a business.     FAMILY HISTORY: She is adopted.     REVIEW OF SYSTEMS: She denies fevers, chills, night sweats, hot flashes, visual change. She has some slight hearing loss. She denies sinus congestion, sore throat, chest pain, shortness of breath, palpitations, nausea, vomiting, constipation, diarrhea, dysuria, hematuria, joint pain, muscle pain, rashes, headaches, polydipsia,   polyuria.     SCREENING: Mammogram 1/22, bone density 2/20.Colonoscopy 2/ 2018 - given 10 years    PHYSICAL EXAMINATION:   /74 (BP Location: Right arm, Patient Position: Sitting)   Pulse 82   Ht 5' 4" (1.626 m)   Wt 62.9 kg (138 lb 9 oz)   LMP  (LMP Unknown)   SpO2 99%   BMI 23.78 kg/m²        General: Alert, oriented. No apparent distress. Affect within normal   limits.   Conjunctivae anicteric. PERRL. Tympanic membranes clear  . Oropharynx clear.   Neck supple. No cervical lymphadenopathy, no thyroid enlargement.   Respiratory effort normal. Lungs clear to auscultation.   Heart: Regular rate and rhythm without murmurs, gallops or rubs.   No lower extremity edema.      Labs 9/14/22 reviewed      ASSESSMENT AND PLAN:        1  Hypertension -digital hypertension  2. Neck pain due to OA cervical spine - due to muscle spasm. Try methocarbamol 500 mg three times daily as needed  3. Diarrhea  -stable off metamucil  4.  Hyperlipidemia- stable on meds  6. ALlergic rhinitis - stable.   7. . Osteoporosis - 2/2022 - with increase in bone density - may continue drug holiday  8. Insomnia - ambien as needed  9. GERD- stable.      She is to return in 6 months, sooner if problems arise  "

## 2022-09-28 ENCOUNTER — PATIENT MESSAGE (OUTPATIENT)
Dept: INTERNAL MEDICINE | Facility: CLINIC | Age: 67
End: 2022-09-28
Payer: COMMERCIAL

## 2022-10-07 ENCOUNTER — PATIENT MESSAGE (OUTPATIENT)
Dept: INTERNAL MEDICINE | Facility: CLINIC | Age: 67
End: 2022-10-07
Payer: COMMERCIAL

## 2022-10-07 ENCOUNTER — NURSE TRIAGE (OUTPATIENT)
Dept: ADMINISTRATIVE | Facility: CLINIC | Age: 67
End: 2022-10-07
Payer: COMMERCIAL

## 2022-10-07 RX ORDER — NITROFURANTOIN 25; 75 MG/1; MG/1
100 CAPSULE ORAL 2 TIMES DAILY
Qty: 14 CAPSULE | Status: SHIPPED | OUTPATIENT
Start: 2022-10-07 | End: 2022-10-07 | Stop reason: SDUPTHER

## 2022-10-07 RX ORDER — NITROFURANTOIN 25; 75 MG/1; MG/1
100 CAPSULE ORAL 2 TIMES DAILY
Qty: 14 CAPSULE | Refills: 0 | Status: SHIPPED | OUTPATIENT
Start: 2022-10-07 | End: 2023-01-25 | Stop reason: ALTCHOICE

## 2022-10-07 NOTE — TELEPHONE ENCOUNTER
Pt c/o burning on urination, urinating blood, hx of bladder infection, pt asking for antibiotics. Per care advice, advised pt to see PCP within 24 hours. Advised pt ED/UC also discussed OAC. Pt verbalizes understanding.   Reason for Disposition   Blood in urine (red, pink, or tea-colored)    Additional Information   Negative: Shock suspected (e.g., cold/pale/clammy skin, too weak to stand, low BP, rapid pulse)   Negative: Sounds like a life-threatening emergency to the triager   Negative: [1] Unable to urinate (or only a few drops) > 4 hours AND [2] bladder feels very full (e.g., palpable bladder or strong urge to urinate)   Negative: [1] Decreased urination and [2] drinking very little AND [2] dehydration suspected (e.g., dark urine, no urine > 12 hours, very dry mouth, very lightheaded)   Negative: Patient sounds very sick or weak to the triager   Negative: Fever > 100.4 F (38.0 C)   Negative: Side (flank) or lower back pain present   Negative: [1] Can't control passage of urine (i.e., urinary incontinence) AND [2] new-onset (< 2 weeks) or worsening   Negative: Urinating more frequently than usual (i.e., frequency)   Negative: Bad or foul-smelling urine   Negative: Shock suspected (e.g., cold/pale/clammy skin, too weak to stand, low BP, rapid pulse)   Negative: Sounds like a life-threatening emergency to the triager   [1] Pain or burning with passing urine (urination) AND [2] female   Negative: Shock suspected (e.g., cold/pale/clammy skin, too weak to stand, low BP, rapid pulse)   Negative: Sounds like a life-threatening emergency to the triager   Negative: [1] Unable to urinate (or only a few drops) > 4 hours AND [2] bladder feels very full (e.g., palpable bladder or strong urge to urinate)   Negative: Vomiting   Negative: Patient sounds very sick or weak to the triager   Negative: [1] SEVERE pain with urination (e.g., excruciating) AND [2] not improved after 2 hours of pain medicine and Sitz bath   Negative:  Fever > 100.4 F (38.0 C)   Negative: Side (flank) or lower back pain present   Negative: Diabetes mellitus or weak immune system (e.g., HIV positive, cancer chemo, splenectomy, organ transplant, chronic steroids)   Negative: Bedridden (e.g., nursing home patient, CVA, chronic illness, recovering from surgery)   Negative: Artificial heart valve or artificial joint   Negative: Patient is worried they have a sexually transmitted infection (STI)   Negative: Possibility of pregnancy   Negative: Unusual vaginal discharge (e.g., bad smelling, yellow, green, or foamy-white)    Protocols used: Urinary Symptoms-A-AH, Urine - Blood In-A-AH, Urination Pain - Female-A-AH

## 2022-10-10 ENCOUNTER — PATIENT MESSAGE (OUTPATIENT)
Dept: INTERNAL MEDICINE | Facility: CLINIC | Age: 67
End: 2022-10-10
Payer: COMMERCIAL

## 2022-11-22 RX ORDER — OXYBUTYNIN CHLORIDE 5 MG/1
TABLET, EXTENDED RELEASE ORAL
Qty: 90 TABLET | Refills: 3 | Status: SHIPPED | OUTPATIENT
Start: 2022-11-22 | End: 2023-01-25 | Stop reason: SDUPTHER

## 2022-11-22 NOTE — TELEPHONE ENCOUNTER
No new care gaps identified.  [facility] Embedded Care Gaps. Reference number: [id number]. 11/22/2022   3:58:46 PM CST

## 2022-11-22 NOTE — TELEPHONE ENCOUNTER
Refill Decision Note   Tj David  is requesting a refill authorization.  Brief Assessment and Rationale for Refill:  Approve     Medication Therapy Plan:       Medication Reconciliation Completed: No   Comments:     No Care Gaps recommended.     Note composed:4:07 PM 11/22/2022

## 2022-12-06 ENCOUNTER — TELEPHONE (OUTPATIENT)
Dept: UROGYNECOLOGY | Facility: CLINIC | Age: 67
End: 2022-12-06
Payer: COMMERCIAL

## 2023-01-12 ENCOUNTER — PATIENT MESSAGE (OUTPATIENT)
Dept: INTERNAL MEDICINE | Facility: CLINIC | Age: 68
End: 2023-01-12
Payer: COMMERCIAL

## 2023-01-12 ENCOUNTER — HOSPITAL ENCOUNTER (OUTPATIENT)
Dept: RADIOLOGY | Facility: HOSPITAL | Age: 68
Discharge: HOME | End: 2023-01-12
Attending: INTERNAL MEDICINE
Payer: COMMERCIAL

## 2023-01-12 DIAGNOSIS — G47.09 OTHER INSOMNIA: ICD-10-CM

## 2023-01-12 DIAGNOSIS — Z12.31 SCREENING MAMMOGRAM FOR BREAST CANCER: ICD-10-CM

## 2023-01-12 PROCEDURE — 77063 MAMMO DIGITAL SCREENING BILAT WITH TOMO: ICD-10-PCS | Mod: 26,,, | Performed by: RADIOLOGY

## 2023-01-12 PROCEDURE — 77067 SCR MAMMO BI INCL CAD: CPT | Mod: TC

## 2023-01-12 PROCEDURE — 77067 SCR MAMMO BI INCL CAD: CPT | Mod: 26,,, | Performed by: RADIOLOGY

## 2023-01-12 PROCEDURE — 77063 BREAST TOMOSYNTHESIS BI: CPT | Mod: 26,,, | Performed by: RADIOLOGY

## 2023-01-12 PROCEDURE — 77067 MAMMO DIGITAL SCREENING BILAT WITH TOMO: ICD-10-PCS | Mod: 26,,, | Performed by: RADIOLOGY

## 2023-01-12 RX ORDER — ZOLPIDEM TARTRATE 10 MG/1
10 TABLET ORAL NIGHTLY PRN
Qty: 30 TABLET | Refills: 1 | Status: SHIPPED | OUTPATIENT
Start: 2023-01-12 | End: 2023-12-06 | Stop reason: SDUPTHER

## 2023-01-12 NOTE — TELEPHONE ENCOUNTER
No new care gaps identified.  [facility] Embedded Care Gaps. Reference number: [id number]. 1/12/2023   11:34:56 AM CST

## 2023-01-16 ENCOUNTER — PATIENT MESSAGE (OUTPATIENT)
Dept: OPTOMETRY | Facility: CLINIC | Age: 68
End: 2023-01-16
Payer: COMMERCIAL

## 2023-01-25 ENCOUNTER — OFFICE VISIT (OUTPATIENT)
Dept: UROGYNECOLOGY | Facility: CLINIC | Age: 68
End: 2023-01-25
Payer: COMMERCIAL

## 2023-01-25 VITALS
BODY MASS INDEX: 24.1 KG/M2 | SYSTOLIC BLOOD PRESSURE: 122 MMHG | WEIGHT: 141.13 LBS | HEIGHT: 64 IN | DIASTOLIC BLOOD PRESSURE: 80 MMHG

## 2023-01-25 DIAGNOSIS — K64.9 HEMORRHOIDS, UNSPECIFIED HEMORRHOID TYPE: ICD-10-CM

## 2023-01-25 DIAGNOSIS — R19.8 IRREGULAR BOWEL HABITS: ICD-10-CM

## 2023-01-25 DIAGNOSIS — Z01.419 WELL WOMAN EXAM: Primary | ICD-10-CM

## 2023-01-25 DIAGNOSIS — N95.2 VAGINAL ATROPHY: ICD-10-CM

## 2023-01-25 DIAGNOSIS — N39.46 URINARY INCONTINENCE, MIXED: ICD-10-CM

## 2023-01-25 PROCEDURE — 3008F PR BODY MASS INDEX (BMI) DOCUMENTED: ICD-10-PCS | Mod: CPTII,S$GLB,, | Performed by: NURSE PRACTITIONER

## 2023-01-25 PROCEDURE — 3074F PR MOST RECENT SYSTOLIC BLOOD PRESSURE < 130 MM HG: ICD-10-PCS | Mod: CPTII,S$GLB,, | Performed by: NURSE PRACTITIONER

## 2023-01-25 PROCEDURE — 3074F SYST BP LT 130 MM HG: CPT | Mod: CPTII,S$GLB,, | Performed by: NURSE PRACTITIONER

## 2023-01-25 PROCEDURE — 99999 PR PBB SHADOW E&M-EST. PATIENT-LVL V: CPT | Mod: PBBFAC,,, | Performed by: NURSE PRACTITIONER

## 2023-01-25 PROCEDURE — 1160F PR REVIEW ALL MEDS BY PRESCRIBER/CLIN PHARMACIST DOCUMENTED: ICD-10-PCS | Mod: CPTII,S$GLB,, | Performed by: NURSE PRACTITIONER

## 2023-01-25 PROCEDURE — 1126F PR PAIN SEVERITY QUANTIFIED, NO PAIN PRESENT: ICD-10-PCS | Mod: CPTII,S$GLB,, | Performed by: NURSE PRACTITIONER

## 2023-01-25 PROCEDURE — 3288F FALL RISK ASSESSMENT DOCD: CPT | Mod: CPTII,S$GLB,, | Performed by: NURSE PRACTITIONER

## 2023-01-25 PROCEDURE — 1160F RVW MEDS BY RX/DR IN RCRD: CPT | Mod: CPTII,S$GLB,, | Performed by: NURSE PRACTITIONER

## 2023-01-25 PROCEDURE — 99397 PR PREVENTIVE VISIT,EST,65 & OVER: ICD-10-PCS | Mod: S$GLB,,, | Performed by: NURSE PRACTITIONER

## 2023-01-25 PROCEDURE — 3079F DIAST BP 80-89 MM HG: CPT | Mod: CPTII,S$GLB,, | Performed by: NURSE PRACTITIONER

## 2023-01-25 PROCEDURE — 1101F PT FALLS ASSESS-DOCD LE1/YR: CPT | Mod: CPTII,S$GLB,, | Performed by: NURSE PRACTITIONER

## 2023-01-25 PROCEDURE — 3008F BODY MASS INDEX DOCD: CPT | Mod: CPTII,S$GLB,, | Performed by: NURSE PRACTITIONER

## 2023-01-25 PROCEDURE — 99397 PER PM REEVAL EST PAT 65+ YR: CPT | Mod: S$GLB,,, | Performed by: NURSE PRACTITIONER

## 2023-01-25 PROCEDURE — 1159F PR MEDICATION LIST DOCUMENTED IN MEDICAL RECORD: ICD-10-PCS | Mod: CPTII,S$GLB,, | Performed by: NURSE PRACTITIONER

## 2023-01-25 PROCEDURE — 1126F AMNT PAIN NOTED NONE PRSNT: CPT | Mod: CPTII,S$GLB,, | Performed by: NURSE PRACTITIONER

## 2023-01-25 PROCEDURE — 4010F ACE/ARB THERAPY RXD/TAKEN: CPT | Mod: CPTII,S$GLB,, | Performed by: NURSE PRACTITIONER

## 2023-01-25 PROCEDURE — 3288F PR FALLS RISK ASSESSMENT DOCUMENTED: ICD-10-PCS | Mod: CPTII,S$GLB,, | Performed by: NURSE PRACTITIONER

## 2023-01-25 PROCEDURE — 3079F PR MOST RECENT DIASTOLIC BLOOD PRESSURE 80-89 MM HG: ICD-10-PCS | Mod: CPTII,S$GLB,, | Performed by: NURSE PRACTITIONER

## 2023-01-25 PROCEDURE — 1159F MED LIST DOCD IN RCRD: CPT | Mod: CPTII,S$GLB,, | Performed by: NURSE PRACTITIONER

## 2023-01-25 PROCEDURE — 1101F PR PT FALLS ASSESS DOC 0-1 FALLS W/OUT INJ PAST YR: ICD-10-PCS | Mod: CPTII,S$GLB,, | Performed by: NURSE PRACTITIONER

## 2023-01-25 PROCEDURE — 4010F PR ACE/ARB THEARPY RXD/TAKEN: ICD-10-PCS | Mod: CPTII,S$GLB,, | Performed by: NURSE PRACTITIONER

## 2023-01-25 PROCEDURE — 99999 PR PBB SHADOW E&M-EST. PATIENT-LVL V: ICD-10-PCS | Mod: PBBFAC,,, | Performed by: NURSE PRACTITIONER

## 2023-01-25 RX ORDER — OXYBUTYNIN CHLORIDE 5 MG/1
5 TABLET, EXTENDED RELEASE ORAL DAILY
Qty: 90 TABLET | Refills: 3 | Status: SHIPPED | OUTPATIENT
Start: 2023-01-25 | End: 2023-04-24

## 2023-01-25 RX ORDER — ESTRADIOL 0.1 MG/G
1 CREAM VAGINAL
Qty: 42.5 G | Refills: 2 | Status: SHIPPED | OUTPATIENT
Start: 2023-01-26 | End: 2024-01-23

## 2023-01-25 RX ORDER — METOPROLOL SUCCINATE 25 MG/1
25 TABLET, EXTENDED RELEASE ORAL
COMMUNITY
Start: 2022-07-18 | End: 2023-06-13

## 2023-01-25 NOTE — PATIENT INSTRUCTIONS
1. Well woman  --up to date    2. Vaginal atrophy (dryness):    --Use 1 gram of estrogen cream in vagina  twice a week, use a small amount around vaginal opening  --use astroglide, coconut oil, or olive oil for lubrication    3. Mixed urinary incontinence, urge > stress:    --Empty bladder every 3 hours.  Empty well: wait a minute, lean forward on toilet.    --Avoid dietary irritants (see sheet).  Keep diary x 3-5 days to determine your irritants.  --KEGELS: do 10 in AM and 10 in PM, holding each x 10 seconds.  When you feel urge to go, STOP, KEGEL, and when urge has passed, then go to bathroom.  Consider PT in future.    --URGE: continue oxybutynin xl 5 mg daily.  For dry mouth: get sour, sugar free lozenge or gum.    --STRESS:  Pessary vs. Sling.     5. Irregular bowel habits/ hemorrhoids:  --  Start daily fiber.  Take 1 tsp of fiber powder (psyllium or other sugar-free powder).  Mix in 8 oz of water.  Take x 3-5 days.  Then, increase fiber by 1 tsp every 3-5 days until stool is easy to pass.  Stop and continue at that dose.   Do not exceed 6 tsps/day.  May also use over the counter stool softener 1-2 x/day.  AVOID laxatives.  --use preparation H over the counter twice daily       6.RTC 1 year for annual
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
## 2023-04-24 DIAGNOSIS — I10 ESSENTIAL HYPERTENSION: ICD-10-CM

## 2023-04-24 RX ORDER — SPIRONOLACTONE 25 MG/1
TABLET ORAL
Qty: 90 TABLET | Refills: 1 | Status: SHIPPED | OUTPATIENT
Start: 2023-04-24 | End: 2023-10-23

## 2023-04-24 RX ORDER — OXYBUTYNIN CHLORIDE 5 MG/1
TABLET, EXTENDED RELEASE ORAL
Qty: 90 TABLET | Refills: 1 | Status: SHIPPED | OUTPATIENT
Start: 2023-04-24 | End: 2023-12-06 | Stop reason: SDUPTHER

## 2023-04-24 NOTE — TELEPHONE ENCOUNTER
Refill Decision Note   Tj David  is requesting a refill authorization.  Brief Assessment and Rationale for Refill:  Approve     Medication Therapy Plan:       Medication Reconciliation Completed: No   Comments:     No Care Gaps recommended.     Note composed:6:14 PM 04/24/2023

## 2023-04-24 NOTE — TELEPHONE ENCOUNTER
No new care gaps identified.  [facility] Embedded Care Gaps. Reference number: [id number]. 4/24/2023   6:11:04 PM CDT

## 2023-06-06 ENCOUNTER — PATIENT MESSAGE (OUTPATIENT)
Dept: ADMINISTRATIVE | Facility: OTHER | Age: 68
End: 2023-06-06
Payer: COMMERCIAL

## 2023-06-20 ENCOUNTER — PATIENT MESSAGE (OUTPATIENT)
Dept: INTERNAL MEDICINE | Facility: CLINIC | Age: 68
End: 2023-06-20

## 2023-06-20 ENCOUNTER — OFFICE VISIT (OUTPATIENT)
Dept: INTERNAL MEDICINE | Facility: CLINIC | Age: 68
End: 2023-06-20
Payer: COMMERCIAL

## 2023-06-20 VITALS
WEIGHT: 141 LBS | BODY MASS INDEX: 23.49 KG/M2 | OXYGEN SATURATION: 99 % | HEIGHT: 65 IN | HEART RATE: 66 BPM | SYSTOLIC BLOOD PRESSURE: 116 MMHG | DIASTOLIC BLOOD PRESSURE: 70 MMHG

## 2023-06-20 DIAGNOSIS — I10 ESSENTIAL HYPERTENSION: ICD-10-CM

## 2023-06-20 DIAGNOSIS — M54.50 ACUTE RIGHT-SIDED LOW BACK PAIN WITHOUT SCIATICA: Primary | ICD-10-CM

## 2023-06-20 DIAGNOSIS — K21.9 GASTROESOPHAGEAL REFLUX DISEASE WITHOUT ESOPHAGITIS: ICD-10-CM

## 2023-06-20 PROCEDURE — 4010F ACE/ARB THERAPY RXD/TAKEN: CPT | Mod: CPTII,S$GLB,, | Performed by: INTERNAL MEDICINE

## 2023-06-20 PROCEDURE — 99999 PR PBB SHADOW E&M-EST. PATIENT-LVL V: CPT | Mod: PBBFAC,,, | Performed by: INTERNAL MEDICINE

## 2023-06-20 PROCEDURE — 1125F AMNT PAIN NOTED PAIN PRSNT: CPT | Mod: CPTII,S$GLB,, | Performed by: INTERNAL MEDICINE

## 2023-06-20 PROCEDURE — 1159F MED LIST DOCD IN RCRD: CPT | Mod: CPTII,S$GLB,, | Performed by: INTERNAL MEDICINE

## 2023-06-20 PROCEDURE — 99999 PR PBB SHADOW E&M-EST. PATIENT-LVL V: ICD-10-PCS | Mod: PBBFAC,,, | Performed by: INTERNAL MEDICINE

## 2023-06-20 PROCEDURE — 1101F PR PT FALLS ASSESS DOC 0-1 FALLS W/OUT INJ PAST YR: ICD-10-PCS | Mod: CPTII,S$GLB,, | Performed by: INTERNAL MEDICINE

## 2023-06-20 PROCEDURE — 1159F PR MEDICATION LIST DOCUMENTED IN MEDICAL RECORD: ICD-10-PCS | Mod: CPTII,S$GLB,, | Performed by: INTERNAL MEDICINE

## 2023-06-20 PROCEDURE — 96372 PR INJECTION,THERAP/PROPH/DIAG2ST, IM OR SUBCUT: ICD-10-PCS | Mod: S$GLB,,, | Performed by: INTERNAL MEDICINE

## 2023-06-20 PROCEDURE — 99214 OFFICE O/P EST MOD 30 MIN: CPT | Mod: 25,S$GLB,, | Performed by: INTERNAL MEDICINE

## 2023-06-20 PROCEDURE — 3008F PR BODY MASS INDEX (BMI) DOCUMENTED: ICD-10-PCS | Mod: CPTII,S$GLB,, | Performed by: INTERNAL MEDICINE

## 2023-06-20 PROCEDURE — 4010F PR ACE/ARB THEARPY RXD/TAKEN: ICD-10-PCS | Mod: CPTII,S$GLB,, | Performed by: INTERNAL MEDICINE

## 2023-06-20 PROCEDURE — 3074F SYST BP LT 130 MM HG: CPT | Mod: CPTII,S$GLB,, | Performed by: INTERNAL MEDICINE

## 2023-06-20 PROCEDURE — 96372 THER/PROPH/DIAG INJ SC/IM: CPT | Mod: S$GLB,,, | Performed by: INTERNAL MEDICINE

## 2023-06-20 PROCEDURE — 3078F DIAST BP <80 MM HG: CPT | Mod: CPTII,S$GLB,, | Performed by: INTERNAL MEDICINE

## 2023-06-20 PROCEDURE — 3078F PR MOST RECENT DIASTOLIC BLOOD PRESSURE < 80 MM HG: ICD-10-PCS | Mod: CPTII,S$GLB,, | Performed by: INTERNAL MEDICINE

## 2023-06-20 PROCEDURE — 99214 PR OFFICE/OUTPT VISIT, EST, LEVL IV, 30-39 MIN: ICD-10-PCS | Mod: 25,S$GLB,, | Performed by: INTERNAL MEDICINE

## 2023-06-20 PROCEDURE — 3288F PR FALLS RISK ASSESSMENT DOCUMENTED: ICD-10-PCS | Mod: CPTII,S$GLB,, | Performed by: INTERNAL MEDICINE

## 2023-06-20 PROCEDURE — 1125F PR PAIN SEVERITY QUANTIFIED, PAIN PRESENT: ICD-10-PCS | Mod: CPTII,S$GLB,, | Performed by: INTERNAL MEDICINE

## 2023-06-20 PROCEDURE — 3288F FALL RISK ASSESSMENT DOCD: CPT | Mod: CPTII,S$GLB,, | Performed by: INTERNAL MEDICINE

## 2023-06-20 PROCEDURE — 3074F PR MOST RECENT SYSTOLIC BLOOD PRESSURE < 130 MM HG: ICD-10-PCS | Mod: CPTII,S$GLB,, | Performed by: INTERNAL MEDICINE

## 2023-06-20 PROCEDURE — 1101F PT FALLS ASSESS-DOCD LE1/YR: CPT | Mod: CPTII,S$GLB,, | Performed by: INTERNAL MEDICINE

## 2023-06-20 PROCEDURE — 3008F BODY MASS INDEX DOCD: CPT | Mod: CPTII,S$GLB,, | Performed by: INTERNAL MEDICINE

## 2023-06-20 RX ORDER — MELOXICAM 15 MG/1
15 TABLET ORAL DAILY
Qty: 30 TABLET | Refills: 1 | Status: SHIPPED | OUTPATIENT
Start: 2023-06-20 | End: 2023-08-06

## 2023-06-20 RX ORDER — KETOROLAC TROMETHAMINE 30 MG/ML
60 INJECTION, SOLUTION INTRAMUSCULAR; INTRAVENOUS
Status: COMPLETED | OUTPATIENT
Start: 2023-06-20 | End: 2023-06-20

## 2023-06-20 RX ORDER — TRIAMCINOLONE ACETONIDE 40 MG/ML
40 INJECTION, SUSPENSION INTRA-ARTICULAR; INTRAMUSCULAR
Status: COMPLETED | OUTPATIENT
Start: 2023-06-20 | End: 2023-06-20

## 2023-06-20 RX ORDER — PREDNISONE 10 MG/1
TABLET ORAL
Qty: 10 TABLET | Refills: 0 | Status: SHIPPED | OUTPATIENT
Start: 2023-06-20 | End: 2023-07-14

## 2023-06-20 RX ADMIN — KETOROLAC TROMETHAMINE 60 MG: 30 INJECTION, SOLUTION INTRAMUSCULAR; INTRAVENOUS at 10:06

## 2023-06-20 RX ADMIN — TRIAMCINOLONE ACETONIDE 40 MG: 40 INJECTION, SUSPENSION INTRA-ARTICULAR; INTRAMUSCULAR at 10:06

## 2023-06-29 ENCOUNTER — PATIENT MESSAGE (OUTPATIENT)
Dept: INTERNAL MEDICINE | Facility: CLINIC | Age: 68
End: 2023-06-29
Payer: COMMERCIAL

## 2023-06-30 ENCOUNTER — PATIENT MESSAGE (OUTPATIENT)
Dept: INTERNAL MEDICINE | Facility: CLINIC | Age: 68
End: 2023-06-30

## 2023-06-30 ENCOUNTER — OFFICE VISIT (OUTPATIENT)
Dept: INTERNAL MEDICINE | Facility: CLINIC | Age: 68
End: 2023-06-30
Payer: COMMERCIAL

## 2023-06-30 DIAGNOSIS — E78.5 HYPERLIPIDEMIA, UNSPECIFIED HYPERLIPIDEMIA TYPE: ICD-10-CM

## 2023-06-30 DIAGNOSIS — K21.9 GASTROESOPHAGEAL REFLUX DISEASE WITHOUT ESOPHAGITIS: ICD-10-CM

## 2023-06-30 DIAGNOSIS — U07.1 COVID: Primary | ICD-10-CM

## 2023-06-30 DIAGNOSIS — I10 ESSENTIAL HYPERTENSION: ICD-10-CM

## 2023-06-30 PROCEDURE — 4010F PR ACE/ARB THEARPY RXD/TAKEN: ICD-10-PCS | Mod: CPTII,95,, | Performed by: INTERNAL MEDICINE

## 2023-06-30 PROCEDURE — 4010F ACE/ARB THERAPY RXD/TAKEN: CPT | Mod: CPTII,95,, | Performed by: INTERNAL MEDICINE

## 2023-06-30 PROCEDURE — 99214 PR OFFICE/OUTPT VISIT, EST, LEVL IV, 30-39 MIN: ICD-10-PCS | Mod: 95,,, | Performed by: INTERNAL MEDICINE

## 2023-06-30 PROCEDURE — 99214 OFFICE O/P EST MOD 30 MIN: CPT | Mod: 95,,, | Performed by: INTERNAL MEDICINE

## 2023-06-30 RX ORDER — NIRMATRELVIR AND RITONAVIR 300-100 MG
KIT ORAL
Qty: 30 TABLET | Refills: 0 | Status: SHIPPED | OUTPATIENT
Start: 2023-06-30 | End: 2023-12-06

## 2023-06-30 RX ORDER — PRAVASTATIN SODIUM 40 MG/1
TABLET ORAL
Qty: 90 TABLET | Refills: 3 | Status: SHIPPED | OUTPATIENT
Start: 2023-06-30

## 2023-06-30 RX ORDER — VALSARTAN 320 MG/1
320 TABLET ORAL DAILY
Qty: 90 TABLET | Refills: 1 | Status: SHIPPED | OUTPATIENT
Start: 2023-06-30 | End: 2023-08-15

## 2023-06-30 NOTE — PROGRESS NOTES
The patient location is: home  The chief complaint leading to consultation is: COVID    Visit type: audiovisual    Face to Face time with patient: 16  22 minutes of total time spent on the encounter, which includes face to face time and non-face to face time preparing to see the patient (eg, review of tests), Obtaining and/or reviewing separately obtained history, Documenting clinical information in the electronic or other health record, Independently interpreting results (not separately reported) and communicating results to the patient/family/caregiver, or Care coordination (not separately reported).         Each patient to whom he or she provides medical services by telemedicine is:  (1) informed of the relationship between the physician and patient and the respective role of any other health care provider with respect to management of the patient; and (2) notified that he or she may decline to receive medical services by telemedicine and may withdraw from such care at any time.    Notes:   CHIEF COMPLAINT: COVID    HISTORY OF PRESENT ILLNESS: 68 year-old woman who presents for above    She had a scratchy throat 2 nights ago.  Yesterday, she woke up with congestion and dry cough and body aches. She has been taking Tylenol three times daily for the body aches so she has not had fever. No chest pain, shortness of breath, nausea, vomiting, constipation, diarrhea.   tested positive for COVID 3 days ago.     She has a cough syrup with codeine that she took last night when she had COVID in 2021. She slept well.     Back pain resolved with the prednisone taper.         She is in digital hypertension - she is taking valsartan 320 mg once dialy  and spironolactone 25 mg 1/2 tablet twice daily. Metoprolol made her tired. BP has been doing well     She generally exercises 5 days a week.  She feels good. No headaches.      She has sleep apnea. She is not wearing CPAP machine. She fiddles with the machine all night  long and does not sleep.      Her neck continues to be stiff. It will freeze at times. Not a lot of neck pain. She has cracking in the neck. She watches her position at night.  She does stretching of the neck which helps.          She is taking prilosec 20 mg in am. She rarely needs pepcid in the evening.     She had  bladder lift with sling, ERIN/BSO, umbilical hernia repair on 9/24/20. Procedure went well.  Urinary leakage with coughing and sneezing has resolved. She has urinary urgency that is controlled with oxybutynin XL 5 mg daily. Stream is better on the lower dose of oxybutynin.  She is using estradiol vaginal cream twice a week      No chest pain, shortness of breath     She takes  metamucil daily.   THe metamucil keeps her stool from being too soft.       Sinuses are doing well. She will take zyrtec 10 mg daily as needed     She is taking pravastatin 40 mg daily for her hyperlipidemia. NO muscle spasms or joint pain.. SHe takes co enzyme Q10 200 mg daily.      She took Boniva 150 mg once monthly for her osteoporosis.  She stopped the Boniva in February 2020 due to improvement.        PAST MEDICAL HISTORY:   1. Hyperlipidemia.   2. Sleep disorder.   3. Cervical spine arthritis.   4. Episode of hematuria, negative cystoscope with Dr. Roland.     PAST SURGICAL HISTORY:   1. D&C for a miscarriage.   2. Conization due to dysplasia 18 years ago.   3. Tonsillectomy and adenoidectomy at age [age].     SOCIAL HISTORY: She does not smoke. . 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 She owns a business.     FAMILY HISTORY: She is adopted.     REVIEW OF SYSTEMS: She denies fevers, chills, night sweats, hot flashes, visual change. She has some slight hearing loss. She denies sinus congestion, sore throat, chest pain, shortness of breath, palpitations, nausea, vomiting, constipation, diarrhea, dysuria, hematuria, joint pain, muscle pain, rashes, headaches, polydipsia,   polyuria.     SCREENING: Mammogram 1/12/23, bone density  2/22.Colonoscopy 2/ 2018 - given 10 years    PHYSICAL EXAMINATION:           General: Alert, oriented. No apparent distress. Affect within normal   limits.   Conjunctivae anicteric.   Neck supple.  Respiratory effort normal. Nasal congestion in speech. Dry cough on the video visit.           ASSESSMENT AND PLAN:        1  COVID - Andrewd per protocol. Side effects and rebound discussed.  Drug interactions looked up. M ontior BP and watch for low BP with valsartan.  Push fluids. TYlenol 1000 mg three times daily as needed for fever or body aches     2. Hypertension -digital hypertension. Monitor BP  3. Neck pain due to OA cervical spine - stable  4. Diarrhea  -stable off metamucil  5.  Hyperlipidemia- stable on meds  6. ALlergic rhinitis - stable.   7. . Osteoporosis - 2/2022 - with increase in bone density - may continue drug holiday  8. Insomnia - ambien as needed  9. GERD- stable.   10. Low back pain - resolved     She is to return in 1 months as scheduled sooner if problems ariseAnswers submitted by the patient for this visit:  Review of Systems Questionnaire (Submitted on 6/30/2023)  activity change: No  unexpected weight change: No  neck pain: No  hearing loss: No  rhinorrhea: Yes  trouble swallowing: No  eye discharge: No  visual disturbance: No  chest tightness: No  wheezing: No  chest pain: No  palpitations: No  blood in stool: No  constipation: No  vomiting: No  diarrhea: No  polydipsia: No  polyuria: No  difficulty urinating: No  hematuria: No  menstrual problem: No  dysuria: No  joint swelling: No  arthralgias: No  headaches: Yes  weakness: No  confusion: No  dysphoric mood: No

## 2023-07-14 ENCOUNTER — OFFICE VISIT (OUTPATIENT)
Dept: INTERNAL MEDICINE | Facility: CLINIC | Age: 68
End: 2023-07-14
Payer: COMMERCIAL

## 2023-07-14 VITALS
SYSTOLIC BLOOD PRESSURE: 124 MMHG | HEART RATE: 73 BPM | HEIGHT: 65 IN | OXYGEN SATURATION: 100 % | BODY MASS INDEX: 22.66 KG/M2 | WEIGHT: 136 LBS | DIASTOLIC BLOOD PRESSURE: 68 MMHG

## 2023-07-14 DIAGNOSIS — K21.9 GASTROESOPHAGEAL REFLUX DISEASE WITHOUT ESOPHAGITIS: ICD-10-CM

## 2023-07-14 DIAGNOSIS — E78.5 HYPERLIPIDEMIA, UNSPECIFIED HYPERLIPIDEMIA TYPE: ICD-10-CM

## 2023-07-14 DIAGNOSIS — E53.8 VITAMIN B12 DEFICIENCY: ICD-10-CM

## 2023-07-14 DIAGNOSIS — R39.15 URINARY URGENCY: ICD-10-CM

## 2023-07-14 DIAGNOSIS — E55.9 VITAMIN D DEFICIENCY DISEASE: ICD-10-CM

## 2023-07-14 DIAGNOSIS — K64.9 HEMORRHOIDS, UNSPECIFIED HEMORRHOID TYPE: Primary | ICD-10-CM

## 2023-07-14 DIAGNOSIS — I10 ESSENTIAL HYPERTENSION: ICD-10-CM

## 2023-07-14 PROCEDURE — 4010F PR ACE/ARB THEARPY RXD/TAKEN: ICD-10-PCS | Mod: CPTII,S$GLB,, | Performed by: INTERNAL MEDICINE

## 2023-07-14 PROCEDURE — 1159F MED LIST DOCD IN RCRD: CPT | Mod: CPTII,S$GLB,, | Performed by: INTERNAL MEDICINE

## 2023-07-14 PROCEDURE — 99214 OFFICE O/P EST MOD 30 MIN: CPT | Mod: S$GLB,,, | Performed by: INTERNAL MEDICINE

## 2023-07-14 PROCEDURE — 3074F PR MOST RECENT SYSTOLIC BLOOD PRESSURE < 130 MM HG: ICD-10-PCS | Mod: CPTII,S$GLB,, | Performed by: INTERNAL MEDICINE

## 2023-07-14 PROCEDURE — 1101F PR PT FALLS ASSESS DOC 0-1 FALLS W/OUT INJ PAST YR: ICD-10-PCS | Mod: CPTII,S$GLB,, | Performed by: INTERNAL MEDICINE

## 2023-07-14 PROCEDURE — 3008F PR BODY MASS INDEX (BMI) DOCUMENTED: ICD-10-PCS | Mod: CPTII,S$GLB,, | Performed by: INTERNAL MEDICINE

## 2023-07-14 PROCEDURE — 3074F SYST BP LT 130 MM HG: CPT | Mod: CPTII,S$GLB,, | Performed by: INTERNAL MEDICINE

## 2023-07-14 PROCEDURE — 3008F BODY MASS INDEX DOCD: CPT | Mod: CPTII,S$GLB,, | Performed by: INTERNAL MEDICINE

## 2023-07-14 PROCEDURE — 4010F ACE/ARB THERAPY RXD/TAKEN: CPT | Mod: CPTII,S$GLB,, | Performed by: INTERNAL MEDICINE

## 2023-07-14 PROCEDURE — 3078F DIAST BP <80 MM HG: CPT | Mod: CPTII,S$GLB,, | Performed by: INTERNAL MEDICINE

## 2023-07-14 PROCEDURE — 99214 PR OFFICE/OUTPT VISIT, EST, LEVL IV, 30-39 MIN: ICD-10-PCS | Mod: S$GLB,,, | Performed by: INTERNAL MEDICINE

## 2023-07-14 PROCEDURE — 1126F AMNT PAIN NOTED NONE PRSNT: CPT | Mod: CPTII,S$GLB,, | Performed by: INTERNAL MEDICINE

## 2023-07-14 PROCEDURE — 3288F PR FALLS RISK ASSESSMENT DOCUMENTED: ICD-10-PCS | Mod: CPTII,S$GLB,, | Performed by: INTERNAL MEDICINE

## 2023-07-14 PROCEDURE — 99999 PR PBB SHADOW E&M-EST. PATIENT-LVL V: CPT | Mod: PBBFAC,,, | Performed by: INTERNAL MEDICINE

## 2023-07-14 PROCEDURE — 1159F PR MEDICATION LIST DOCUMENTED IN MEDICAL RECORD: ICD-10-PCS | Mod: CPTII,S$GLB,, | Performed by: INTERNAL MEDICINE

## 2023-07-14 PROCEDURE — 1101F PT FALLS ASSESS-DOCD LE1/YR: CPT | Mod: CPTII,S$GLB,, | Performed by: INTERNAL MEDICINE

## 2023-07-14 PROCEDURE — 1126F PR PAIN SEVERITY QUANTIFIED, NO PAIN PRESENT: ICD-10-PCS | Mod: CPTII,S$GLB,, | Performed by: INTERNAL MEDICINE

## 2023-07-14 PROCEDURE — 3078F PR MOST RECENT DIASTOLIC BLOOD PRESSURE < 80 MM HG: ICD-10-PCS | Mod: CPTII,S$GLB,, | Performed by: INTERNAL MEDICINE

## 2023-07-14 PROCEDURE — 3288F FALL RISK ASSESSMENT DOCD: CPT | Mod: CPTII,S$GLB,, | Performed by: INTERNAL MEDICINE

## 2023-07-14 PROCEDURE — 99999 PR PBB SHADOW E&M-EST. PATIENT-LVL V: ICD-10-PCS | Mod: PBBFAC,,, | Performed by: INTERNAL MEDICINE

## 2023-07-14 RX ORDER — HYDROCORTISONE ACETATE 25 MG/1
25 SUPPOSITORY RECTAL 2 TIMES DAILY
Qty: 10 SUPPOSITORY | Refills: 1 | Status: SHIPPED | OUTPATIENT
Start: 2023-07-14 | End: 2023-07-19

## 2023-07-14 RX ORDER — HYDROCORTISONE 25 MG/G
CREAM TOPICAL 2 TIMES DAILY
Qty: 60 G | Refills: 3 | Status: SHIPPED | OUTPATIENT
Start: 2023-07-14

## 2023-07-14 NOTE — PROGRESS NOTES
CHIEF COMPLAINT:Follow up of medical problems    HISTORY OF PRESENT ILLNESS: 68 year-old woman who presents for above    She had COVID at the end of June - She took Paxlovid - symptoms have improved. She still has a dry cough. No shortness of breath or wheezing.  No fever or chills.  She still has some fatigue.      Back pain resolved with prednisone taper    She feels that she needs to do something about her hemorrhoids. She had trouble controlling her bowels.  She still has some discomfort in the rectal area.      She is in digital hypertension - she is taking valsartan 320 mg once dialy  and spironolactone 25 mg 1/2 tablet twice daily. Metoprolol made her tired. BP has been doing well     She generally exercises 5 days a week.  She is back exercing.      She has sleep apnea. She is not wearing CPAP machine. She fiddles with the machine all night long and does not sleep.      Her neck continues to be stiff. It will freeze at times. Not a lot of neck pain. She has cracking in the neck. She watches her position at night.  She does stretching of the neck which helps.          She is taking prilosec 20 mg in am. She rarely needs pepcid in the evening.     She had  bladder lift with sling, ERIN/BSO, umbilical hernia repair on 9/24/20. Procedure went well.. She has urinary urgency that is controlled with oxybutynin XL 5 mg daily. Stream is better on the lower dose of oxybutynin.  She is using estradiol vaginal cream twice a week      No chest pain, shortness of breath     She takes metamucil daily.  Stopped at COVID.  THe metamucil keeps her stool from being too soft.       Sinuses are doing well. She will take zyrtec 10 mg daily as needed     She is taking pravastatin 40 mg daily for her hyperlipidemia. NO muscle spasms or joint pain.. SHe takes co enzyme Q10 200 mg daily.      She took Boniva 150 mg once monthly for her osteoporosis.  She stopped the Boniva in February 2020 due to improvement.        PAST MEDICAL  "HISTORY:   1. Hyperlipidemia.   2. Sleep disorder.   3. Cervical spine arthritis.   4. Episode of hematuria, negative cystoscope with Dr. Roland.     PAST SURGICAL HISTORY:   1. D&C for a miscarriage.   2. Conization due to dysplasia 18 years ago.   3. Tonsillectomy and adenoidectomy at age [age].     SOCIAL HISTORY: She does not smoke. . 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 She owns a business.     FAMILY HISTORY: She is adopted.     REVIEW OF SYSTEMS: She denies fevers, chills, night sweats, hot flashes, visual change. She has some slight hearing loss. She denies sinus congestion, sore throat, chest pain, shortness of breath, palpitations, nausea, vomiting, constipation, diarrhea, dysuria, hematuria, joint pain, muscle pain, rashes, headaches, polydipsia,   polyuria.     SCREENING: Mammogram 1/12/23, bone density 2/22.Colonoscopy 2/ 2018 - given 10 years    PHYSICAL EXAMINATION:     /68 (BP Location: Right arm, Patient Position: Sitting, BP Method: Large (Manual))   Pulse 73   Ht 5' 5" (1.651 m)   Wt 61.7 kg (136 lb)   LMP  (LMP Unknown)   SpO2 100%   BMI 22.63 kg/m²       General: Alert, oriented. No apparent distress. Affect within normal   limits.   Conjunctivae anicteric. PERRL. Tympanic membranes clear  . Oropharynx clear.   Neck supple. No cervical lymphadenopathy, no thyroid enlargement.   Respiratory effort normal. Lungs clear to auscultation.   Heart: Regular rate and rhythm without murmurs, gallops or rubs.   No lower extremity edema.        ASSESSMENT AND PLAN:        1  COVID- resolved - cough is due to post nasal drip. Rest when tired and gtive more time  2. Hypertension -digital hypertension  3. Neck pain due to OA cervical spine - stable  4. Diarrhea  -stable off metamucil  5.  Hyperlipidemia- stable on meds  6. ALlergic rhinitis - stable.   7. . Osteoporosis - 2/2022 - with increase in bone density - may continue drug holiday  8. Insomnia - ambien as needed  9. GERD- stable.   10. " Hemorrhoids- cream and suppositories and see Dr Fish for eval     She is to return in 4 months as scheduled sooner if problems arise

## 2023-07-18 ENCOUNTER — LAB VISIT (OUTPATIENT)
Dept: LAB | Facility: HOSPITAL | Age: 68
End: 2023-07-18
Attending: INTERNAL MEDICINE
Payer: COMMERCIAL

## 2023-07-18 DIAGNOSIS — E53.8 VITAMIN B12 DEFICIENCY: ICD-10-CM

## 2023-07-18 DIAGNOSIS — E55.9 VITAMIN D DEFICIENCY DISEASE: ICD-10-CM

## 2023-07-18 DIAGNOSIS — E78.5 HYPERLIPIDEMIA, UNSPECIFIED HYPERLIPIDEMIA TYPE: ICD-10-CM

## 2023-07-18 LAB
25(OH)D3+25(OH)D2 SERPL-MCNC: 54 NG/ML (ref 30–96)
ALBUMIN SERPL BCP-MCNC: 3.7 G/DL (ref 3.5–5.2)
ALP SERPL-CCNC: 47 U/L (ref 55–135)
ALT SERPL W/O P-5'-P-CCNC: 12 U/L (ref 10–44)
ANION GAP SERPL CALC-SCNC: 9 MMOL/L (ref 8–16)
AST SERPL-CCNC: 16 U/L (ref 10–40)
BASOPHILS # BLD AUTO: 0.05 K/UL (ref 0–0.2)
BASOPHILS NFR BLD: 0.6 % (ref 0–1.9)
BILIRUB SERPL-MCNC: 0.5 MG/DL (ref 0.1–1)
BUN SERPL-MCNC: 23 MG/DL (ref 8–23)
CALCIUM SERPL-MCNC: 9.2 MG/DL (ref 8.7–10.5)
CHLORIDE SERPL-SCNC: 109 MMOL/L (ref 95–110)
CHOLEST SERPL-MCNC: 203 MG/DL (ref 120–199)
CHOLEST/HDLC SERPL: 3.1 {RATIO} (ref 2–5)
CO2 SERPL-SCNC: 25 MMOL/L (ref 23–29)
CREAT SERPL-MCNC: 1.1 MG/DL (ref 0.5–1.4)
DIFFERENTIAL METHOD: ABNORMAL
EOSINOPHIL # BLD AUTO: 0.1 K/UL (ref 0–0.5)
EOSINOPHIL NFR BLD: 1.8 % (ref 0–8)
ERYTHROCYTE [DISTWIDTH] IN BLOOD BY AUTOMATED COUNT: 14.8 % (ref 11.5–14.5)
EST. GFR  (NO RACE VARIABLE): 54.1 ML/MIN/1.73 M^2
GLUCOSE SERPL-MCNC: 94 MG/DL (ref 70–110)
HCT VFR BLD AUTO: 38.7 % (ref 37–48.5)
HDLC SERPL-MCNC: 65 MG/DL (ref 40–75)
HDLC SERPL: 32 % (ref 20–50)
HGB BLD-MCNC: 12.1 G/DL (ref 12–16)
IMM GRANULOCYTES # BLD AUTO: 0.04 K/UL (ref 0–0.04)
IMM GRANULOCYTES NFR BLD AUTO: 0.5 % (ref 0–0.5)
LDLC SERPL CALC-MCNC: 119.8 MG/DL (ref 63–159)
LYMPHOCYTES # BLD AUTO: 1.8 K/UL (ref 1–4.8)
LYMPHOCYTES NFR BLD: 22.8 % (ref 18–48)
MCH RBC QN AUTO: 25.6 PG (ref 27–31)
MCHC RBC AUTO-ENTMCNC: 31.3 G/DL (ref 32–36)
MCV RBC AUTO: 82 FL (ref 82–98)
MONOCYTES # BLD AUTO: 0.7 K/UL (ref 0.3–1)
MONOCYTES NFR BLD: 8.8 % (ref 4–15)
NEUTROPHILS # BLD AUTO: 5.1 K/UL (ref 1.8–7.7)
NEUTROPHILS NFR BLD: 65.5 % (ref 38–73)
NONHDLC SERPL-MCNC: 138 MG/DL
NRBC BLD-RTO: 0 /100 WBC
PLATELET # BLD AUTO: 289 K/UL (ref 150–450)
PMV BLD AUTO: 10.7 FL (ref 9.2–12.9)
POTASSIUM SERPL-SCNC: 4.7 MMOL/L (ref 3.5–5.1)
PROT SERPL-MCNC: 6.5 G/DL (ref 6–8.4)
RBC # BLD AUTO: 4.73 M/UL (ref 4–5.4)
SODIUM SERPL-SCNC: 143 MMOL/L (ref 136–145)
TRIGL SERPL-MCNC: 91 MG/DL (ref 30–150)
TSH SERPL DL<=0.005 MIU/L-ACNC: 2.09 UIU/ML (ref 0.4–4)
VIT B12 SERPL-MCNC: 380 PG/ML (ref 210–950)
WBC # BLD AUTO: 7.77 K/UL (ref 3.9–12.7)

## 2023-07-18 PROCEDURE — 80053 COMPREHEN METABOLIC PANEL: CPT | Performed by: INTERNAL MEDICINE

## 2023-07-18 PROCEDURE — 82607 VITAMIN B-12: CPT | Performed by: INTERNAL MEDICINE

## 2023-07-18 PROCEDURE — 85025 COMPLETE CBC W/AUTO DIFF WBC: CPT | Performed by: INTERNAL MEDICINE

## 2023-07-18 PROCEDURE — 82306 VITAMIN D 25 HYDROXY: CPT | Performed by: INTERNAL MEDICINE

## 2023-07-18 PROCEDURE — 84443 ASSAY THYROID STIM HORMONE: CPT | Performed by: INTERNAL MEDICINE

## 2023-07-18 PROCEDURE — 36415 COLL VENOUS BLD VENIPUNCTURE: CPT | Mod: PO | Performed by: INTERNAL MEDICINE

## 2023-07-18 PROCEDURE — 80061 LIPID PANEL: CPT | Performed by: INTERNAL MEDICINE

## 2023-07-28 ENCOUNTER — PATIENT MESSAGE (OUTPATIENT)
Dept: OTHER | Facility: OTHER | Age: 68
End: 2023-07-28
Payer: COMMERCIAL

## 2023-08-05 DIAGNOSIS — M54.50 ACUTE RIGHT-SIDED LOW BACK PAIN WITHOUT SCIATICA: ICD-10-CM

## 2023-08-06 RX ORDER — MELOXICAM 15 MG/1
15 TABLET ORAL DAILY
Qty: 30 TABLET | Refills: 1 | Status: SHIPPED | OUTPATIENT
Start: 2023-08-06 | End: 2023-11-02

## 2023-08-15 ENCOUNTER — TELEPHONE (OUTPATIENT)
Dept: SURGERY | Facility: CLINIC | Age: 68
End: 2023-08-15
Payer: COMMERCIAL

## 2023-08-15 DIAGNOSIS — I10 ESSENTIAL HYPERTENSION: ICD-10-CM

## 2023-08-15 RX ORDER — VALSARTAN 320 MG/1
320 TABLET ORAL
Qty: 90 TABLET | Refills: 3 | Status: SHIPPED | OUTPATIENT
Start: 2023-08-15

## 2023-08-15 NOTE — TELEPHONE ENCOUNTER
No care due was identified.  [facility] Embedded Care Due Messages. Reference number: [id number].   8/15/2023 9:18:17 AM CDT

## 2023-08-15 NOTE — TELEPHONE ENCOUNTER
Refill Decision Note   Tj Hernandez  is requesting a refill authorization.  Brief Assessment and Rationale for Refill:  Approve     Medication Therapy Plan:         Comments:     Note composed:9:21 AM 08/15/2023

## 2023-08-16 ENCOUNTER — TELEPHONE (OUTPATIENT)
Dept: ENDOSCOPY | Facility: HOSPITAL | Age: 68
End: 2023-08-16
Payer: COMMERCIAL

## 2023-08-16 ENCOUNTER — OFFICE VISIT (OUTPATIENT)
Dept: SURGERY | Facility: CLINIC | Age: 68
End: 2023-08-16
Attending: COLON & RECTAL SURGERY
Payer: COMMERCIAL

## 2023-08-16 VITALS
SYSTOLIC BLOOD PRESSURE: 137 MMHG | DIASTOLIC BLOOD PRESSURE: 76 MMHG | HEART RATE: 59 BPM | WEIGHT: 141.63 LBS | BODY MASS INDEX: 23.56 KG/M2

## 2023-08-16 DIAGNOSIS — Z12.11 ENCOUNTER FOR SCREENING COLONOSCOPY: Primary | ICD-10-CM

## 2023-08-16 DIAGNOSIS — K64.9 HEMORRHOIDS, UNSPECIFIED HEMORRHOID TYPE: ICD-10-CM

## 2023-08-16 PROCEDURE — 1125F PR PAIN SEVERITY QUANTIFIED, PAIN PRESENT: ICD-10-PCS | Mod: CPTII,S$GLB,, | Performed by: COLON & RECTAL SURGERY

## 2023-08-16 PROCEDURE — 1159F PR MEDICATION LIST DOCUMENTED IN MEDICAL RECORD: ICD-10-PCS | Mod: CPTII,S$GLB,, | Performed by: COLON & RECTAL SURGERY

## 2023-08-16 PROCEDURE — 99203 PR OFFICE/OUTPT VISIT, NEW, LEVL III, 30-44 MIN: ICD-10-PCS | Mod: 25,S$GLB,, | Performed by: COLON & RECTAL SURGERY

## 2023-08-16 PROCEDURE — 3075F PR MOST RECENT SYSTOLIC BLOOD PRESS GE 130-139MM HG: ICD-10-PCS | Mod: CPTII,S$GLB,, | Performed by: COLON & RECTAL SURGERY

## 2023-08-16 PROCEDURE — 99999 PR PBB SHADOW E&M-EST. PATIENT-LVL IV: CPT | Mod: PBBFAC,,, | Performed by: COLON & RECTAL SURGERY

## 2023-08-16 PROCEDURE — 3008F BODY MASS INDEX DOCD: CPT | Mod: CPTII,S$GLB,, | Performed by: COLON & RECTAL SURGERY

## 2023-08-16 PROCEDURE — 3078F DIAST BP <80 MM HG: CPT | Mod: CPTII,S$GLB,, | Performed by: COLON & RECTAL SURGERY

## 2023-08-16 PROCEDURE — 1160F PR REVIEW ALL MEDS BY PRESCRIBER/CLIN PHARMACIST DOCUMENTED: ICD-10-PCS | Mod: CPTII,S$GLB,, | Performed by: COLON & RECTAL SURGERY

## 2023-08-16 PROCEDURE — 46600 DIAGNOSTIC ANOSCOPY SPX: CPT | Mod: S$GLB,,, | Performed by: COLON & RECTAL SURGERY

## 2023-08-16 PROCEDURE — 3078F PR MOST RECENT DIASTOLIC BLOOD PRESSURE < 80 MM HG: ICD-10-PCS | Mod: CPTII,S$GLB,, | Performed by: COLON & RECTAL SURGERY

## 2023-08-16 PROCEDURE — 99203 OFFICE O/P NEW LOW 30 MIN: CPT | Mod: 25,S$GLB,, | Performed by: COLON & RECTAL SURGERY

## 2023-08-16 PROCEDURE — 4010F ACE/ARB THERAPY RXD/TAKEN: CPT | Mod: CPTII,S$GLB,, | Performed by: COLON & RECTAL SURGERY

## 2023-08-16 PROCEDURE — 1160F RVW MEDS BY RX/DR IN RCRD: CPT | Mod: CPTII,S$GLB,, | Performed by: COLON & RECTAL SURGERY

## 2023-08-16 PROCEDURE — 4010F PR ACE/ARB THEARPY RXD/TAKEN: ICD-10-PCS | Mod: CPTII,S$GLB,, | Performed by: COLON & RECTAL SURGERY

## 2023-08-16 PROCEDURE — 3008F PR BODY MASS INDEX (BMI) DOCUMENTED: ICD-10-PCS | Mod: CPTII,S$GLB,, | Performed by: COLON & RECTAL SURGERY

## 2023-08-16 PROCEDURE — 46600 PR DIAG2STIC A2SCOPY: ICD-10-PCS | Mod: S$GLB,,, | Performed by: COLON & RECTAL SURGERY

## 2023-08-16 PROCEDURE — 1159F MED LIST DOCD IN RCRD: CPT | Mod: CPTII,S$GLB,, | Performed by: COLON & RECTAL SURGERY

## 2023-08-16 PROCEDURE — 99999 PR PBB SHADOW E&M-EST. PATIENT-LVL IV: ICD-10-PCS | Mod: PBBFAC,,, | Performed by: COLON & RECTAL SURGERY

## 2023-08-16 PROCEDURE — 1125F AMNT PAIN NOTED PAIN PRSNT: CPT | Mod: CPTII,S$GLB,, | Performed by: COLON & RECTAL SURGERY

## 2023-08-16 PROCEDURE — 3075F SYST BP GE 130 - 139MM HG: CPT | Mod: CPTII,S$GLB,, | Performed by: COLON & RECTAL SURGERY

## 2023-08-16 NOTE — TELEPHONE ENCOUNTER
"----- Message from Colleen Ford sent at 2023  3:36 PM CDT -----    ----- Message -----  From: Imani Elaine MD  Sent: 2023   1:25 PM CDT  To: [facility]    Procedure: Colonoscopy with banding    Diagnosis: Screening colonoscopy    Procedure Timin-12 weeks    #If within 4 weeks selected, please jerry as high priority#    #If greater than 12 weeks, please select "5-12 weeks" and delay sending until 2 months prior to requested date#     Provider: Myself    Location: [address]    Additional Scheduling Information: No scheduling concerns    Prep Specifications:Standard prep    Is the patient taking a GLP-1 Agonist:no    Have you attached a patient to this message: yes        "

## 2023-08-16 NOTE — TELEPHONE ENCOUNTER
Spoke to patient to schedule procedure(s) Colonoscopy       Physician to perform procedure(s) Dr. RIA Elaine  Date of Procedure (s) 11/09/2023  Arrival Time 7:30 am   Time of Procedure(s) 8:30 am   Location of Procedure(s) [facility] [address] Floor  Type of Rx Prep sent to patient: Suprep  Instructions provided to patient via [facility]    Patient was informed on the following information and verbalized understanding. Screening questionnaire reviewed with patient and complete. If procedure requires anesthesia, a responsible adult needs to be present to accompany the patient home, patient cannot drive after receiving anesthesia. Appointment details are tentative, especially check-in time. Patient will receive a prep-op call 4 days prior to confirm check-in time for procedure. If applicable the patient should contact their pharmacy to verify Rx for procedure prep is ready for pick-up. Patient was advised to call the scheduling department at [phone] if pharmacy states no Rx is available. Patient was advised to call the endoscopy scheduling department if any questions or concerns arise.      [facility] Scheduling Department
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
## 2023-08-17 RX ORDER — SODIUM, POTASSIUM,MAG SULFATES 17.5-3.13G
1 SOLUTION, RECONSTITUTED, ORAL ORAL DAILY
Qty: 1 KIT | Refills: 0 | Status: SHIPPED | OUTPATIENT
Start: 2023-08-17 | End: 2023-08-19

## 2023-09-15 RX ORDER — ERGOCALCIFEROL 1.25 MG/1
50000 CAPSULE ORAL
Qty: 24 CAPSULE | Refills: 2 | Status: SHIPPED | OUTPATIENT
Start: 2023-09-18

## 2023-09-28 ENCOUNTER — TELEPHONE (OUTPATIENT)
Dept: OPTOMETRY | Facility: CLINIC | Age: 68
End: 2023-09-28
Payer: COMMERCIAL

## 2023-09-28 ENCOUNTER — PATIENT MESSAGE (OUTPATIENT)
Dept: OPTOMETRY | Facility: CLINIC | Age: 68
End: 2023-09-28
Payer: COMMERCIAL

## 2023-09-28 NOTE — TELEPHONE ENCOUNTER
Extended  Contact Lens Final Rx       Final Contact Lens Rx         Brand Base Curve Diameter Sphere Cylinder Axis Lens    Right Biotrue Oneday 8.6 14.2 +3.75   near    Left Biotrue Oneday for Astigmatism 8.9 14.5 +2.25 -0.75 080 distance      Expiration Date: 9/28/2024    Replacement: Daily    Wearing Schedule: Daily Wear

## 2023-10-06 ENCOUNTER — PATIENT MESSAGE (OUTPATIENT)
Dept: OPTOMETRY | Facility: CLINIC | Age: 68
End: 2023-10-06
Payer: COMMERCIAL

## 2023-10-21 DIAGNOSIS — I10 ESSENTIAL HYPERTENSION: ICD-10-CM

## 2023-10-21 NOTE — TELEPHONE ENCOUNTER
Care Due:                  Date            Visit Type   Department     Provider  --------------------------------------------------------------------------------                                EP -                              PRIMARY      [facility] INTERNAL  Last Visit: 07-      CARE ([facility])   MEDICINE       JOURDAN TINOCO                              EP -                              PRIMARY      [facility] INTERNAL  Next Visit: 12-      CARE ([facility])   MEDICINE       JOURDAN TINOCO                                                            Last  Test          Frequency    Reason                     Performed    Due Date  --------------------------------------------------------------------------------    CMP.........  6 months...  hydrocortisone...........  07- 01-    [facility] Embedded Care Due Messages. Reference number: [id number].   10/21/2023 9:12:49 AM CDT

## 2023-10-23 RX ORDER — SPIRONOLACTONE 25 MG/1
TABLET ORAL
Qty: 90 TABLET | Refills: 2 | Status: SHIPPED | OUTPATIENT
Start: 2023-10-23

## 2023-10-23 NOTE — TELEPHONE ENCOUNTER
Provider Staff:  Action required for this patient     Please see care gap opportunities below in Care Due Message.    Thanks!  [facility]     Appointments      Date Provider   Last Visit   7/14/2023 Bonnie Mathew MD   Next Visit   12/6/2023 Bonnie Mathew MD      Refill Decision Note   Tj Hernandez  is requesting a refill authorization.  Brief Assessment and Rationale for Refill:  Approve     Medication Therapy Plan:         Comments:     Note composed:4:15 AM 10/23/2023            23-Aug-2020 01:46

## 2023-11-02 ENCOUNTER — TELEPHONE (OUTPATIENT)
Dept: ENDOSCOPY | Facility: HOSPITAL | Age: 68
End: 2023-11-02
Payer: COMMERCIAL

## 2023-11-02 DIAGNOSIS — Z12.11 COLON CANCER SCREENING: Primary | ICD-10-CM

## 2023-11-02 DIAGNOSIS — M54.50 ACUTE RIGHT-SIDED LOW BACK PAIN WITHOUT SCIATICA: ICD-10-CM

## 2023-11-02 RX ORDER — SODIUM, POTASSIUM,MAG SULFATES 17.5-3.13G
1 SOLUTION, RECONSTITUTED, ORAL ORAL DAILY
Qty: 1 KIT | Refills: 0 | Status: SHIPPED | OUTPATIENT
Start: 2023-11-02 | End: 2023-11-04

## 2023-11-02 RX ORDER — MELOXICAM 15 MG/1
TABLET ORAL
Qty: 30 TABLET | Refills: 1 | Status: SHIPPED | OUTPATIENT
Start: 2023-11-02 | End: 2024-01-02

## 2023-11-02 NOTE — TELEPHONE ENCOUNTER
Refill Routing Note   Medication(s) are not appropriate for processing by [facility] for the following reason(s):      Medication outside of protocol    [facility] action(s):  Route Care Due:  None identified            Appointments  past 12m or future 3m with PCP    Date Provider   Last Visit   7/14/2023 Bonnie Mathew MD   Next Visit   12/6/2023 Bonnie Mathew MD   ED visits in past 90 days: 0        Note composed:10:41 AM 11/02/2023

## 2023-11-02 NOTE — TELEPHONE ENCOUNTER
No care due was identified.  [facility] Embedded Care Due Messages. Reference number: [id number].   11/02/2023 9:39:17 AM CDT

## 2023-11-09 ENCOUNTER — ANESTHESIA EVENT (OUTPATIENT)
Dept: ENDOSCOPY | Facility: HOSPITAL | Age: 68
End: 2023-11-09
Payer: COMMERCIAL

## 2023-11-09 ENCOUNTER — ANESTHESIA (OUTPATIENT)
Dept: ENDOSCOPY | Facility: HOSPITAL | Age: 68
End: 2023-11-09
Payer: COMMERCIAL

## 2023-11-09 ENCOUNTER — HOSPITAL ENCOUNTER (OUTPATIENT)
Facility: HOSPITAL | Age: 68
Discharge: HOME | End: 2023-11-09
Attending: COLON & RECTAL SURGERY | Admitting: COLON & RECTAL SURGERY
Payer: COMMERCIAL

## 2023-11-09 VITALS
WEIGHT: 135 LBS | SYSTOLIC BLOOD PRESSURE: 134 MMHG | BODY MASS INDEX: 23.05 KG/M2 | HEIGHT: 64 IN | DIASTOLIC BLOOD PRESSURE: 70 MMHG | TEMPERATURE: 98 F | HEART RATE: 69 BPM | OXYGEN SATURATION: 99 % | RESPIRATION RATE: 18 BRPM

## 2023-11-09 DIAGNOSIS — Z12.11 SPECIAL SCREENING FOR MALIGNANT NEOPLASMS, COLON: Primary | ICD-10-CM

## 2023-11-09 DIAGNOSIS — Z12.11 SCREENING FOR MALIGNANT NEOPLASM OF COLON: ICD-10-CM

## 2023-11-09 PROCEDURE — E9220 PRA ENDO ANESTHESIA: ICD-10-PCS | Mod: ,,, | Performed by: NURSE ANESTHETIST, CERTIFIED REGISTERED

## 2023-11-09 PROCEDURE — G0121 COLON CA SCRN NOT HI RSK IND: HCPCS | Performed by: COLON & RECTAL SURGERY

## 2023-11-09 PROCEDURE — G0121 COLON CA SCRN NOT HI RSK IND: ICD-10-PCS | Mod: ,,, | Performed by: COLON & RECTAL SURGERY

## 2023-11-09 PROCEDURE — 25000003 PHARM REV CODE 250: Performed by: NURSE ANESTHETIST, CERTIFIED REGISTERED

## 2023-11-09 PROCEDURE — E9220 PRA ENDO ANESTHESIA: HCPCS | Mod: ,,, | Performed by: NURSE ANESTHETIST, CERTIFIED REGISTERED

## 2023-11-09 PROCEDURE — 37000008 HC ANESTHESIA 1ST 15 MINUTES: Performed by: COLON & RECTAL SURGERY

## 2023-11-09 PROCEDURE — 63600175 PHARM REV CODE 636 W HCPCS: Performed by: NURSE ANESTHETIST, CERTIFIED REGISTERED

## 2023-11-09 PROCEDURE — 25000003 PHARM REV CODE 250: Performed by: COLON & RECTAL SURGERY

## 2023-11-09 PROCEDURE — 37000009 HC ANESTHESIA EA ADD 15 MINS: Performed by: COLON & RECTAL SURGERY

## 2023-11-09 PROCEDURE — G0121 COLON CA SCRN NOT HI RSK IND: HCPCS | Mod: ,,, | Performed by: COLON & RECTAL SURGERY

## 2023-11-09 RX ORDER — PROPOFOL 10 MG/ML
VIAL (ML) INTRAVENOUS
Status: DISCONTINUED | OUTPATIENT
Start: 2023-11-09 | End: 2023-11-09

## 2023-11-09 RX ORDER — LIDOCAINE HYDROCHLORIDE 20 MG/ML
INJECTION INTRAVENOUS
Status: DISCONTINUED | OUTPATIENT
Start: 2023-11-09 | End: 2023-11-09

## 2023-11-09 RX ORDER — PHENYLEPHRINE HYDROCHLORIDE 10 MG/ML
INJECTION INTRAVENOUS
Status: DISCONTINUED | OUTPATIENT
Start: 2023-11-09 | End: 2023-11-09

## 2023-11-09 RX ORDER — FENTANYL CITRATE 50 UG/ML
INJECTION, SOLUTION INTRAMUSCULAR; INTRAVENOUS
Status: DISCONTINUED | OUTPATIENT
Start: 2023-11-09 | End: 2023-11-09

## 2023-11-09 RX ORDER — SODIUM CHLORIDE 9 MG/ML
INJECTION, SOLUTION INTRAVENOUS CONTINUOUS
Status: DISCONTINUED | OUTPATIENT
Start: 2023-11-09 | End: 2023-11-09 | Stop reason: HOSPADM

## 2023-11-09 RX ADMIN — PROPOFOL 60 MG: 10 INJECTION, EMULSION INTRAVENOUS at 08:11

## 2023-11-09 RX ADMIN — FENTANYL CITRATE 25 MCG: 50 INJECTION, SOLUTION INTRAMUSCULAR; INTRAVENOUS at 08:11

## 2023-11-09 RX ADMIN — LIDOCAINE HYDROCHLORIDE 50 MG: 20 INJECTION INTRAVENOUS at 08:11

## 2023-11-09 RX ADMIN — PHENYLEPHRINE HYDROCHLORIDE 100 MCG: 10 INJECTION INTRAVENOUS at 08:11

## 2023-11-09 RX ADMIN — SODIUM CHLORIDE: 0.9 INJECTION, SOLUTION INTRAVENOUS at 08:11

## 2023-11-09 RX ADMIN — FENTANYL CITRATE 25 MCG: 50 INJECTION, SOLUTION INTRAMUSCULAR; INTRAVENOUS at 09:11

## 2023-11-09 NOTE — TRANSFER OF CARE
"Anesthesia Transfer of Care Note    Patient: Tj Hernandez    Procedure(s) Performed: Procedure(s) (LRB):  COLONOSCOPY, WITH HEMORRHOID BANDING (N/A)    Patient location: PACU    Anesthesia Type: general    Transport from OR: Transported from OR on room air with adequate spontaneous ventilation    Post pain: adequate analgesia    Post assessment: no apparent anesthetic complications    Post vital signs: stable    Level of consciousness: awake    Nausea/Vomiting: no nausea/vomiting    Complications: none    Transfer of care protocol was followed      Last vitals: Visit Vitals  BP (!) 96/56 (BP Location: Left arm, Patient Position: Lying)   Pulse 68   Temp 36.6 °C (97.9 °F)   Resp 16   Ht 5' 4" (1.626 m)   Wt 61.2 kg (135 lb)   LMP  (LMP Unknown)   SpO2 99%   BMI 23.17 kg/m²     "
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
## 2023-11-09 NOTE — ANESTHESIA POSTPROCEDURE EVALUATION
Anesthesia Post Evaluation    Patient: Tj Hernandez    Procedure(s) Performed: Procedure(s) (LRB):  COLONOSCOPY, WITH HEMORRHOID BANDING (N/A)    Final Anesthesia Type: general      Patient location during evaluation: GI PACU  Patient participation: Yes- Able to Participate  Level of consciousness: awake and alert  Post-procedure vital signs: reviewed and stable  Pain management: adequate  Airway patency: patent    PONV status at discharge: No PONV  Anesthetic complications: no      Cardiovascular status: blood pressure returned to baseline  Respiratory status: unassisted  Hydration status: euvolemic  Follow-up not needed.          Vitals Value Taken Time   /70 11/09/23 0938   Temp 36.6 °C (97.9 °F) 11/09/23 0918   Pulse 69 11/09/23 0938   Resp 18 11/09/23 0938   SpO2 99 % 11/09/23 0938         Event Time   Out of Recovery 09:49:46         Pain/Alissa Score: Alissa Score: 9 (11/9/2023  9:19 AM)

## 2023-11-09 NOTE — ANESTHESIA PREPROCEDURE EVALUATION
[facility]  Anesthesia Pre-Operative Evaluation       Patient Name: Tj Hernandez  YOB: 1955  MRN: [id number]  [facility]: [id number]      Code Status: Prior   Date of Procedure: 11/9/2023  Anesthesia: Choice Procedure: Procedure(s) (LRB):  COLONOSCOPY, WITH HEMORRHOID BANDING (N/A)  Pre-Operative Diagnosis: Encounter for screening colonoscopy [Z12.11]  Encounter for colonoscopy due to history of colonic polyp [Z12.11, Z86.010]  Proceduralist: Surgeon(s) and Role:     * Imani Elaine MD - Primary        SUBJECTIVE:   Tj Hernandez is a 68 y.o. female who  has a past medical history of Abnormal Pap smear of cervix, Adjustment disorder, Colon polyp, Hormone disorder, Hyperlipidemia, Hypertension, Kidney stone, Neck pain, PONV (postoperative nausea and vomiting), Recurrent UTI (9/29/2014), and Seizures. No notes on file    No current facility-administered medications for this encounter.       she has a current medication list which includes the following long-term medication(s): alprazolam, aspirin, calcium carbonate, cetirizine, estradiol, famotidine, omeprazole, oxybutynin, pravastatin, spironolactone, valacyclovir, valsartan, vitamin d2, and zolpidem.   ALLERGIES:     Review of patient's allergies indicates:   Allergen Reactions    Lisinopril      cough     LDA:          Lines/Drains/Airways     None               MEDICATIONS:     Antibiotics (From admission, onward)    None        VTE Risk Mitigation (From admission, onward)    None        No current facility-administered medications for this encounter.          History:   There are no hospital problems to display for this patient.    Surgical History:    has a past surgical history that includes Tonsillectomy; Dilation and curettage of uterus; Cystoscopy; Colonoscopy (N/A, 2/19/2018); Hysteroscopy with dilation and curettage of uterus (N/A, 11/12/2019); Repair of epigastric hernia (N/A, 9/24/2020); Laparoscopy-assisted  vaginal hysterectomy (N/A, 9/24/2020); Insertion of midurethral sling (N/A, 9/24/2020); and laparoscopic suspension of uterosacral ligament (N/A, 9/24/2020).   Social History:    reports being sexually active and has had partner(s) who are male. She reports using the following method of birth control/protection: Post-menopausal.  reports that she has never smoked. She has never used smokeless tobacco. She reports current alcohol use of about 2.0 standard drinks of alcohol per week. She reports that she does not use drugs.     OBJECTIVE:     Vital Signs (Most Recent):    Vital Signs Range (Last 24H):          There is no height or weight on file to calculate BMI.   Wt Readings from Last 4 Encounters:   08/16/23 64.2 kg (141 lb 9.6 oz)   07/14/23 61.7 kg (136 lb)   06/20/23 64 kg (141 lb)   01/25/23 64 kg (141 lb 1.5 oz)     Significant Labs:  Lab Results   Component Value Date    WBC 7.77 07/18/2023    HGB 12.1 07/18/2023    HCT 38.7 07/18/2023     07/18/2023     07/18/2023    K 4.7 07/18/2023     07/18/2023    CREATININE 1.1 07/18/2023    BUN 23 07/18/2023    CO2 25 07/18/2023    GLU 94 07/18/2023    CALCIUM 9.2 07/18/2023    ALKPHOS 47 (L) 07/18/2023    ALT 12 07/18/2023    AST 16 07/18/2023    ALBUMIN 3.7 07/18/2023     No LMP recorded (lmp unknown). Patient is postmenopausal.  No results found for this or any previous visit (from the past 72 hour(s)).    EKG:   Results for orders placed or performed during the hospital encounter of 06/19/21   Repeat EKG 12-lead    Collection Time: 06/19/21 11:31 PM    Narrative    Test Reason : R51.9,    Vent. Rate : 077 BPM     Atrial Rate : 077 BPM     P-R Int : 136 ms          QRS Dur : 086 ms      QT Int : 378 ms       P-R-T Axes : 073 -05 -10 degrees     QTc Int : 427 ms    Normal sinus rhythm  Nonspecific T wave abnormality  Abnormal ECG  When compared with ECG of 19-JUN-2021 22:01,  No significant change was found  Confirmed by LILA TAVARES, HOMEYAR ([id number])  "on 6/20/2021 9:59:50 AM    Referred By: [email or web address]   SELF           Confirmed By:SONY SHARP MD       TTE:  Results for orders placed or performed during the hospital encounter of 06/22/21   Echo   Result Value Ref Range    BSA 1.69 m2    TDI SEPTAL 0.05 m/s    LV LATERAL E/E' RATIO 11.00 m/s    LV SEPTAL E/E' RATIO 15.40 m/s    LA WIDTH 4.00 cm    TDI LATERAL 0.07 m/s    LVIDd 4.40 3.5 - 6.0 cm    IVS 0.93 0.6 - 1.1 cm    Posterior Wall 0.97 0.6 - 1.1 cm    LVIDs 3.13 2.1 - 4.0 cm    FS 29 28 - 44 %    LA volume 64.28 cm3    Sinus 3.16 cm    STJ 2.55 cm    Ascending aorta 3.47 cm    LV mass 137.77 g    LA size 3.46 cm    RVDD 3.21 cm    TAPSE 2.20 cm    Left Ventricle Relative Wall Thickness 0.44 cm    AV mean gradient 4 mmHg    AV valve area 3.12 cm2    AV Velocity Ratio 1.03     AV index (prosthetic) 0.99     MV valve area p 1/2 method 3.53 cm2    E/A ratio 1.17     Mean e' 0.06 m/s    E wave deceleration time 214.70 msec    MV "A" wave duration 11.42 msec    Pulm vein S/D ratio 1.19     LVOT diameter 2.00 cm    LVOT area 3.1 cm2    LVOT peak fredrick 1.50 m/s    LVOT peak VTI 29.36 cm    Ao peak fredrick 1.45 m/s    Ao VTI 29.58 cm    LVOT stroke volume 92.19 cm3    AV peak gradient 8 mmHg    E/E' ratio 12.83 m/s    MV Peak E Fredrick 0.77 m/s    TR Max Fredrick 2.30 m/s    MV stenosis pressure 1/2 time 62.26 ms    MV Peak A Fredrick 0.66 m/s    PV Peak S Fredrick 0.44 m/s    PV Peak D Fredrick 0.37 m/s    LV Systolic Volume 38.67 mL    LV Systolic Volume Index 23.0 mL/m2    LV Diastolic Volume 87.66 mL    LV Diastolic Volume Index 52.18 mL/m2    LA Volume Index 38.3 mL/m2    LV Mass Index 82 g/m2    RA Major Axis 4.67 cm    Left Atrium Minor Axis 5.53 cm    Left Atrium Major Axis 5.40 cm    Triscuspid Valve Regurgitation Peak Gradient 21 mmHg    LA Volume Index (Mod) 37.6 mL/m2    LA volume (mod) 63.20 cm3    RA Width 2.86 cm    Right Atrial Pressure (from IVC) 3 mmHg    EF 65 %    TV resting pulmonary artery pressure 24 mmHg    Narrative    " "· The left ventricle is normal in size with concentric remodeling and   normal systolic function. The estimated ejection fraction is 65%.  · Indeterminate left ventricular diastolic function.  · Normal right ventricular size with normal right ventricular systolic   function.  · Mild left atrial enlargement.  · Mild tricuspid regurgitation.  · The estimated PA systolic pressure is 24 mmHg.  · Normal central venous pressure (3 mmHg).        EF   Date Value Ref Range Status   06/22/2021 65 % Final      No results found for this or any previous visit.  TRACEY:  6/22/21   The left ventricle is normal in size with concentric remodeling and normal systolic function. The estimated ejection fraction is 65%.   Indeterminate left ventricular diastolic function.   Normal right ventricular size with normal right ventricular systolic function.   Mild left atrial enlargement.   Mild tricuspid regurgitation.   The estimated PA systolic pressure is 24 mmHg.   Normal central venous pressure (3 mmHg).    Stress Test:  Results for orders placed during the hospital encounter of 12/13/21    Exercise Stress - EKG    Interpretation Summary    The EKG portion of this study is abnormal but not diagnostic.    The patient reported no chest pain during the stress test.    The blood pressure response to stress was normal, though baseline blood pressure was quite elevated.    There were no arrhythmias during stress.    The exercise capacity was excellent.    The lack of angina with an exercise capacity that is well above average for age/sex suggests an excellent prognosis.     LHC:  No results found for this or any previous visit.     PFT:  No results found for: "FEV1", "FVC", "EHM3JAK", "TLC", "DLCO"   ASSESSMENT/PLAN:         Pre-op Assessment    I have reviewed the Patient Summary Reports.     I have reviewed the Nursing Notes. I have reviewed the NPO Status.   I have reviewed the Medications.     Review of " Systems  Social:  Non-Smoker, Social Alcohol Use    Cardiovascular:   Hypertension    Pulmonary:  Pulmonary Normal    Hepatic/GI:   GERD        Physical Exam  General: Well nourished, Cooperative, Alert and Oriented    Airway:  Mallampati: II   Mouth Opening: Normal  TM Distance: Normal  Tongue: Normal    Dental:  Intact    Chest/Lungs:  Clear to auscultation, Normal Respiratory Rate    Heart:  Rate: Normal        Anesthesia Plan  Type of Anesthesia, risks & benefits discussed:    Anesthesia Type: Gen Natural Airway  Intra-op Monitoring Plan: Standard ASA Monitors  Induction:  IV  ASA Score: 2    Ready For Surgery From Anesthesia Perspective.     .

## 2023-12-06 ENCOUNTER — OFFICE VISIT (OUTPATIENT)
Dept: INTERNAL MEDICINE | Facility: CLINIC | Age: 68
End: 2023-12-06
Payer: COMMERCIAL

## 2023-12-06 ENCOUNTER — IMMUNIZATION (OUTPATIENT)
Dept: INTERNAL MEDICINE | Facility: CLINIC | Age: 68
End: 2023-12-06
Payer: COMMERCIAL

## 2023-12-06 VITALS
SYSTOLIC BLOOD PRESSURE: 126 MMHG | DIASTOLIC BLOOD PRESSURE: 76 MMHG | BODY MASS INDEX: 24.41 KG/M2 | WEIGHT: 143 LBS | OXYGEN SATURATION: 100 % | HEART RATE: 55 BPM | HEIGHT: 64 IN

## 2023-12-06 DIAGNOSIS — E55.9 VITAMIN D DEFICIENCY DISEASE: ICD-10-CM

## 2023-12-06 DIAGNOSIS — G47.09 OTHER INSOMNIA: ICD-10-CM

## 2023-12-06 DIAGNOSIS — Z12.31 SCREENING MAMMOGRAM FOR BREAST CANCER: ICD-10-CM

## 2023-12-06 DIAGNOSIS — Z00.00 ROUTINE GENERAL MEDICAL EXAMINATION AT A HEALTH CARE FACILITY: Primary | ICD-10-CM

## 2023-12-06 DIAGNOSIS — E78.5 HYPERLIPIDEMIA, UNSPECIFIED HYPERLIPIDEMIA TYPE: ICD-10-CM

## 2023-12-06 DIAGNOSIS — M85.80 OSTEOPENIA, UNSPECIFIED LOCATION: ICD-10-CM

## 2023-12-06 DIAGNOSIS — Z23 NEED FOR VACCINATION: Primary | ICD-10-CM

## 2023-12-06 PROCEDURE — 3008F PR BODY MASS INDEX (BMI) DOCUMENTED: ICD-10-PCS | Mod: CPTII,S$GLB,, | Performed by: INTERNAL MEDICINE

## 2023-12-06 PROCEDURE — G0008 FLU VACCINE - QUADRIVALENT - ADJUVANTED: ICD-10-PCS | Mod: S$GLB,,, | Performed by: INTERNAL MEDICINE

## 2023-12-06 PROCEDURE — 1126F PR PAIN SEVERITY QUANTIFIED, NO PAIN PRESENT: ICD-10-PCS | Mod: CPTII,S$GLB,, | Performed by: INTERNAL MEDICINE

## 2023-12-06 PROCEDURE — 3074F SYST BP LT 130 MM HG: CPT | Mod: CPTII,S$GLB,, | Performed by: INTERNAL MEDICINE

## 2023-12-06 PROCEDURE — 3074F PR MOST RECENT SYSTOLIC BLOOD PRESSURE < 130 MM HG: ICD-10-PCS | Mod: CPTII,S$GLB,, | Performed by: INTERNAL MEDICINE

## 2023-12-06 PROCEDURE — 90694 VACC AIIV4 NO PRSRV 0.5ML IM: CPT | Mod: S$GLB,,, | Performed by: INTERNAL MEDICINE

## 2023-12-06 PROCEDURE — 3288F PR FALLS RISK ASSESSMENT DOCUMENTED: ICD-10-PCS | Mod: CPTII,S$GLB,, | Performed by: INTERNAL MEDICINE

## 2023-12-06 PROCEDURE — 3288F FALL RISK ASSESSMENT DOCD: CPT | Mod: CPTII,S$GLB,, | Performed by: INTERNAL MEDICINE

## 2023-12-06 PROCEDURE — 3078F PR MOST RECENT DIASTOLIC BLOOD PRESSURE < 80 MM HG: ICD-10-PCS | Mod: CPTII,S$GLB,, | Performed by: INTERNAL MEDICINE

## 2023-12-06 PROCEDURE — 99397 PER PM REEVAL EST PAT 65+ YR: CPT | Mod: S$GLB,,, | Performed by: INTERNAL MEDICINE

## 2023-12-06 PROCEDURE — 1126F AMNT PAIN NOTED NONE PRSNT: CPT | Mod: CPTII,S$GLB,, | Performed by: INTERNAL MEDICINE

## 2023-12-06 PROCEDURE — 1159F MED LIST DOCD IN RCRD: CPT | Mod: CPTII,S$GLB,, | Performed by: INTERNAL MEDICINE

## 2023-12-06 PROCEDURE — 99999 PR PBB SHADOW E&M-EST. PATIENT-LVL V: ICD-10-PCS | Mod: PBBFAC,,, | Performed by: INTERNAL MEDICINE

## 2023-12-06 PROCEDURE — 1101F PR PT FALLS ASSESS DOC 0-1 FALLS W/OUT INJ PAST YR: ICD-10-PCS | Mod: CPTII,S$GLB,, | Performed by: INTERNAL MEDICINE

## 2023-12-06 PROCEDURE — 99397 PR PREVENTIVE VISIT,EST,65 & OVER: ICD-10-PCS | Mod: S$GLB,,, | Performed by: INTERNAL MEDICINE

## 2023-12-06 PROCEDURE — 4010F PR ACE/ARB THEARPY RXD/TAKEN: ICD-10-PCS | Mod: CPTII,S$GLB,, | Performed by: INTERNAL MEDICINE

## 2023-12-06 PROCEDURE — 1101F PT FALLS ASSESS-DOCD LE1/YR: CPT | Mod: CPTII,S$GLB,, | Performed by: INTERNAL MEDICINE

## 2023-12-06 PROCEDURE — 1159F PR MEDICATION LIST DOCUMENTED IN MEDICAL RECORD: ICD-10-PCS | Mod: CPTII,S$GLB,, | Performed by: INTERNAL MEDICINE

## 2023-12-06 PROCEDURE — 90694 FLU VACCINE - QUADRIVALENT - ADJUVANTED: ICD-10-PCS | Mod: S$GLB,,, | Performed by: INTERNAL MEDICINE

## 2023-12-06 PROCEDURE — G0008 ADMIN INFLUENZA VIRUS VAC: HCPCS | Mod: S$GLB,,, | Performed by: INTERNAL MEDICINE

## 2023-12-06 PROCEDURE — 3078F DIAST BP <80 MM HG: CPT | Mod: CPTII,S$GLB,, | Performed by: INTERNAL MEDICINE

## 2023-12-06 PROCEDURE — 3008F BODY MASS INDEX DOCD: CPT | Mod: CPTII,S$GLB,, | Performed by: INTERNAL MEDICINE

## 2023-12-06 PROCEDURE — 4010F ACE/ARB THERAPY RXD/TAKEN: CPT | Mod: CPTII,S$GLB,, | Performed by: INTERNAL MEDICINE

## 2023-12-06 PROCEDURE — 99999 PR PBB SHADOW E&M-EST. PATIENT-LVL V: CPT | Mod: PBBFAC,,, | Performed by: INTERNAL MEDICINE

## 2023-12-06 RX ORDER — ZOLPIDEM TARTRATE 10 MG/1
10 TABLET ORAL NIGHTLY PRN
Qty: 30 TABLET | Refills: 0 | Status: SHIPPED | OUTPATIENT
Start: 2023-12-06

## 2023-12-06 RX ORDER — FAMOTIDINE 40 MG/1
40 TABLET, FILM COATED ORAL NIGHTLY
Qty: 90 TABLET | Refills: 0 | Status: SHIPPED | OUTPATIENT
Start: 2023-12-06

## 2023-12-06 RX ORDER — ALPRAZOLAM 0.25 MG/1
0.25 TABLET ORAL 2 TIMES DAILY PRN
Qty: 15 TABLET | Refills: 0 | Status: SHIPPED | OUTPATIENT
Start: 2023-12-06

## 2023-12-06 RX ORDER — OXYBUTYNIN CHLORIDE 5 MG/1
5 TABLET, EXTENDED RELEASE ORAL DAILY
Qty: 90 TABLET | Refills: 1 | Status: SHIPPED | OUTPATIENT
Start: 2023-12-06 | End: 2024-01-31

## 2023-12-06 NOTE — PROGRESS NOTES
CHIEF COMPLAINT:Annual exam and Follow up of medical problems    HISTORY OF PRESENT ILLNESS: 68 year-old woman who presents for above     Still slighltestrellita roth from covid in June 2023     Back is ok. She has some soreness in the morning when she first gets up.  She feels like her muscles are tired.     She had her colonoscopy on 11/9/23 - normal due 10 years. She had her hemorrhoids banded. Hemorrhoids are better.     She is in digital hypertension - she is taking valsartan 320 mg once dialy  and spironolactone 25 mg 1/2 tablet twice daily. Metoprolol made her tired. BP has been doing well     She generally exercises 5 days a week.  She is back exercing.      She has sleep apnea. She is not wearing CPAP machine. She fiddles with the machine all night long and does not sleep.      Her neck continues to be stiff at times. It will freeze at times. Not a lot of neck pain. She has cracking in the neck. She watches her position at night.  She does stretching of the neck which helps. Occasional muscle relaxer helps.        She is taking prilosec 20 mg in am. She rarely needs pepcid in the evening.     She had  bladder lift with sling, ERIN/BSO, umbilical hernia repair on 9/24/20. Procedure went well.. She has urinary urgency that is controlled with oxybutynin XL 5 mg daily. Stream is better on the lower dose of oxybutynin.  She is using estradiol vaginal cream twice a week      No chest pain, shortness of breath     She has been off metamucil daily.     Sinuses are doing well. She will take zyrtec 10 mg daily as needed     She is taking pravastatin 40 mg daily for her hyperlipidemia. NO muscle spasms or joint pain.. SHe takes co enzyme Q10 200 mg daily. Intolerant to Crestor     She took Boniva 150 mg once monthly for her osteoporosis.  She stopped the Boniva in February 2020 due to improvement.     SHe has had some stressors this past year with her daughter.  Granddaughter is doing well in [place] - senior.        PAST  MEDICAL HISTORY:   1. Hyperlipidemia.   2. Sleep disorder.   3. Cervical spine arthritis.   4. Episode of hematuria, negative cystoscope with Dr. Roland.     PAST SURGICAL HISTORY:   1. D&C for a miscarriage.   2. Conization due to dysplasia 18 years ago.   3. Tonsillectomy and adenoidectomy at age [age].     SOCIAL HISTORY: She does not smoke. . 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 She owns a business.     FAMILY HISTORY: She is adopted.     REVIEW OF SYSTEMS: She denies fevers, chills, night sweats, hot flashes, visual change. She has some slight hearing loss. She denies sinus congestion, sore throat, chest pain, shortness of breath, palpitations, nausea, vomiting, constipation, diarrhea, dysuria, hematuria, joint pain, muscle pain, rashes, headaches, polydipsia,   polyuria.     SCREENING: Mammogram 1/12/23, bone density 2/22.Colonoscopy 2/ 2018 - given 10 years    PHYSICAL EXAMINATION:        General: Alert, oriented. No apparent distress. Affect within normal   limits.   Conjunctivae anicteric. PERRL. Tympanic membranes clear  . Oropharynx clear.   Neck supple. No cervical lymphadenopathy, no thyroid enlargement.   Respiratory effort normal. Lungs clear to auscultation.   Heart: Regular rate and rhythm without murmurs, gallops or rubs.   No lower extremity edema.    ABDOMEN: soft, non distended, non tender, bowel sounds present, no hepatosplenomgaly  BREAST: no abn seen, no nodules palpated, no axillary lad          ASSESSMENT AND PLAN:        1  Annual exam - discussed diet, exercise and safety issues.    2. Hypertension -digital hypertension  3. Neck pain due to OA cervical spine - stable  4. Diarrhea  -stable off metamucil  5.  Hyperlipidemia- stable on meds  6. ALlergic rhinitis - stable.   7. . Osteoporosis - 2/2022 - with increase in bone density - may continue drug holiday. BMD 2/2024  8. Insomnia - ambien as needed  9. GERD- stable.   10. Hemorrhoids- cream and suppositories and see Dr Fish for eval      She is to return in 4 months as scheduled sooner if problems arise

## 2023-12-26 ENCOUNTER — HOSPITAL ENCOUNTER (EMERGENCY)
Facility: HOSPITAL | Age: 68
Discharge: HOME | End: 2023-12-26
Attending: EMERGENCY MEDICINE
Payer: COMMERCIAL

## 2023-12-26 ENCOUNTER — NURSE TRIAGE (OUTPATIENT)
Dept: ADMINISTRATIVE | Facility: CLINIC | Age: 68
End: 2023-12-26
Payer: COMMERCIAL

## 2023-12-26 VITALS
WEIGHT: 135 LBS | TEMPERATURE: 99 F | RESPIRATION RATE: 18 BRPM | DIASTOLIC BLOOD PRESSURE: 61 MMHG | HEIGHT: 64 IN | OXYGEN SATURATION: 100 % | BODY MASS INDEX: 23.05 KG/M2 | HEART RATE: 62 BPM | SYSTOLIC BLOOD PRESSURE: 114 MMHG

## 2023-12-26 DIAGNOSIS — R10.32 LLQ ABDOMINAL PAIN: ICD-10-CM

## 2023-12-26 DIAGNOSIS — K57.32 SIGMOID DIVERTICULITIS: Primary | ICD-10-CM

## 2023-12-26 LAB
ALBUMIN SERPL BCP-MCNC: 3.9 G/DL (ref 3.5–5.2)
ALP SERPL-CCNC: 58 U/L (ref 55–135)
ALT SERPL W/O P-5'-P-CCNC: 12 U/L (ref 10–44)
ANION GAP SERPL CALC-SCNC: 8 MMOL/L (ref 8–16)
AST SERPL-CCNC: 20 U/L (ref 10–40)
BASOPHILS # BLD AUTO: 0.03 K/UL (ref 0–0.2)
BASOPHILS NFR BLD: 0.3 % (ref 0–1.9)
BILIRUB SERPL-MCNC: 0.4 MG/DL (ref 0.1–1)
BILIRUB UR QL STRIP: NEGATIVE
BUN SERPL-MCNC: 17 MG/DL (ref 6–30)
BUN SERPL-MCNC: 17 MG/DL (ref 8–23)
CALCIUM SERPL-MCNC: 9.2 MG/DL (ref 8.7–10.5)
CHLORIDE SERPL-SCNC: 108 MMOL/L (ref 95–110)
CHLORIDE SERPL-SCNC: 111 MMOL/L (ref 95–110)
CLARITY UR REFRACT.AUTO: CLEAR
CO2 SERPL-SCNC: 24 MMOL/L (ref 23–29)
COLOR UR AUTO: YELLOW
CREAT SERPL-MCNC: 1 MG/DL (ref 0.5–1.4)
CREAT SERPL-MCNC: 1.1 MG/DL (ref 0.5–1.4)
DIFFERENTIAL METHOD: ABNORMAL
EOSINOPHIL # BLD AUTO: 0.1 K/UL (ref 0–0.5)
EOSINOPHIL NFR BLD: 0.6 % (ref 0–8)
ERYTHROCYTE [DISTWIDTH] IN BLOOD BY AUTOMATED COUNT: 14.1 % (ref 11.5–14.5)
EST. GFR  (NO RACE VARIABLE): >60 ML/MIN/1.73 M^2
GLUCOSE SERPL-MCNC: 106 MG/DL (ref 70–110)
GLUCOSE SERPL-MCNC: 109 MG/DL (ref 70–110)
GLUCOSE UR QL STRIP: NEGATIVE
HCT VFR BLD AUTO: 36.8 % (ref 37–48.5)
HCT VFR BLD CALC: 37 %PCV (ref 36–54)
HCV AB SERPL QL IA: NORMAL
HGB BLD-MCNC: 12.3 G/DL (ref 12–16)
HGB UR QL STRIP: ABNORMAL
HIV 1+2 AB+HIV1 P24 AG SERPL QL IA: NORMAL
IMM GRANULOCYTES # BLD AUTO: 0.03 K/UL (ref 0–0.04)
IMM GRANULOCYTES NFR BLD AUTO: 0.3 % (ref 0–0.5)
KETONES UR QL STRIP: NEGATIVE
LEUKOCYTE ESTERASE UR QL STRIP: ABNORMAL
LIPASE SERPL-CCNC: 25 U/L (ref 4–60)
LYMPHOCYTES # BLD AUTO: 1.3 K/UL (ref 1–4.8)
LYMPHOCYTES NFR BLD: 11.6 % (ref 18–48)
MAGNESIUM SERPL-MCNC: 1.9 MG/DL (ref 1.6–2.6)
MCH RBC QN AUTO: 25.1 PG (ref 27–31)
MCHC RBC AUTO-ENTMCNC: 33.4 G/DL (ref 32–36)
MCV RBC AUTO: 75 FL (ref 82–98)
MICROSCOPIC COMMENT: ABNORMAL
MONOCYTES # BLD AUTO: 0.9 K/UL (ref 0.3–1)
MONOCYTES NFR BLD: 7.9 % (ref 4–15)
NEUTROPHILS # BLD AUTO: 8.6 K/UL (ref 1.8–7.7)
NEUTROPHILS NFR BLD: 79.3 % (ref 38–73)
NITRITE UR QL STRIP: NEGATIVE
NRBC BLD-RTO: 0 /100 WBC
PH UR STRIP: 5 [PH] (ref 5–8)
PLATELET # BLD AUTO: 270 K/UL (ref 150–450)
PMV BLD AUTO: 10.1 FL (ref 9.2–12.9)
POC IONIZED CALCIUM: 1.21 MMOL/L (ref 1.06–1.42)
POC TCO2 (MEASURED): 25 MMOL/L (ref 23–27)
POTASSIUM BLD-SCNC: 3.8 MMOL/L (ref 3.5–5.1)
POTASSIUM SERPL-SCNC: 4 MMOL/L (ref 3.5–5.1)
PROT SERPL-MCNC: 6.8 G/DL (ref 6–8.4)
PROT UR QL STRIP: NEGATIVE
RBC # BLD AUTO: 4.9 M/UL (ref 4–5.4)
RBC #/AREA URNS AUTO: 5 /HPF (ref 0–4)
SAMPLE: NORMAL
SODIUM BLD-SCNC: 141 MMOL/L (ref 136–145)
SODIUM SERPL-SCNC: 143 MMOL/L (ref 136–145)
SP GR UR STRIP: 1.02 (ref 1–1.03)
SQUAMOUS #/AREA URNS AUTO: 1 /HPF
URN SPEC COLLECT METH UR: ABNORMAL
WBC # BLD AUTO: 10.89 K/UL (ref 3.9–12.7)
WBC #/AREA URNS AUTO: 2 /HPF (ref 0–5)

## 2023-12-26 PROCEDURE — 96374 THER/PROPH/DIAG INJ IV PUSH: CPT | Mod: 59

## 2023-12-26 PROCEDURE — 83690 ASSAY OF LIPASE: CPT | Performed by: PHYSICIAN ASSISTANT

## 2023-12-26 PROCEDURE — 25500020 PHARM REV CODE 255: Performed by: EMERGENCY MEDICINE

## 2023-12-26 PROCEDURE — 87389 HIV-1 AG W/HIV-1&-2 AB AG IA: CPT | Performed by: PHYSICIAN ASSISTANT

## 2023-12-26 PROCEDURE — 86803 HEPATITIS C AB TEST: CPT | Performed by: PHYSICIAN ASSISTANT

## 2023-12-26 PROCEDURE — 63600175 PHARM REV CODE 636 W HCPCS: Performed by: PHYSICIAN ASSISTANT

## 2023-12-26 PROCEDURE — 81001 URINALYSIS AUTO W/SCOPE: CPT | Performed by: PHYSICIAN ASSISTANT

## 2023-12-26 PROCEDURE — 85025 COMPLETE CBC W/AUTO DIFF WBC: CPT | Performed by: PHYSICIAN ASSISTANT

## 2023-12-26 PROCEDURE — 80053 COMPREHEN METABOLIC PANEL: CPT | Performed by: PHYSICIAN ASSISTANT

## 2023-12-26 PROCEDURE — 83735 ASSAY OF MAGNESIUM: CPT | Performed by: PHYSICIAN ASSISTANT

## 2023-12-26 PROCEDURE — 99285 EMERGENCY DEPT VISIT HI MDM: CPT | Mod: 25

## 2023-12-26 PROCEDURE — 80048 BASIC METABOLIC PNL TOTAL CA: CPT | Mod: XB

## 2023-12-26 PROCEDURE — 96375 TX/PRO/DX INJ NEW DRUG ADDON: CPT

## 2023-12-26 RX ORDER — KETOROLAC TROMETHAMINE 30 MG/ML
15 INJECTION, SOLUTION INTRAMUSCULAR; INTRAVENOUS
Status: COMPLETED | OUTPATIENT
Start: 2023-12-26 | End: 2023-12-26

## 2023-12-26 RX ORDER — ONDANSETRON 4 MG/1
4 TABLET, ORALLY DISINTEGRATING ORAL EVERY 6 HOURS PRN
Qty: 15 TABLET | Refills: 0 | Status: SHIPPED | OUTPATIENT
Start: 2023-12-26

## 2023-12-26 RX ORDER — ONDANSETRON 2 MG/ML
4 INJECTION INTRAMUSCULAR; INTRAVENOUS
Status: COMPLETED | OUTPATIENT
Start: 2023-12-26 | End: 2023-12-26

## 2023-12-26 RX ORDER — AMOXICILLIN AND CLAVULANATE POTASSIUM 875; 125 MG/1; MG/1
1 TABLET, FILM COATED ORAL 2 TIMES DAILY
Qty: 20 TABLET | Refills: 0 | Status: SHIPPED | OUTPATIENT
Start: 2023-12-26 | End: 2024-01-05

## 2023-12-26 RX ADMIN — IOHEXOL 100 ML: 350 INJECTION, SOLUTION INTRAVENOUS at 05:12

## 2023-12-26 RX ADMIN — ONDANSETRON 4 MG: 2 INJECTION INTRAMUSCULAR; INTRAVENOUS at 03:12

## 2023-12-26 RX ADMIN — KETOROLAC TROMETHAMINE 15 MG: 30 INJECTION, SOLUTION INTRAMUSCULAR; INTRAVENOUS at 03:12

## 2023-12-26 NOTE — ED NOTES
I-STAT Chem-8+ Results:   Value Reference Range   Sodium 141 136-145 mmol/L   Potassium  3.8 3.5-5.1 mmol/L   Chloride 108  mmol/L   Ionized Calcium 1.21 1.06-1.42 mmol/L   CO2 (measured) 25 23-29 mmol/L   Glucose 109  mg/dL   BUN 17 6-30 mg/dL   Creatinine 1.1 0.5-1.4 mg/dL   Hematocrit 37 36-54%

## 2023-12-26 NOTE — TELEPHONE ENCOUNTER
[place]    PCP:  Dr. Bonnie Mathew    C/O severe lower abdominal pain and rectal pain.  She states hurts to take a step or sit down.  She states that she has had a BM and urinating.  Denies blood in urine, fever, back/flank pain, diarrhea, urination pain, vomiting, more in the RLQ, and rebound tenderness in the RLQ.  Per protocol, care advised is go to the ED now.  Pt VU.  Advised to call for worsening/questions/concerns.  VU.    Reason for Disposition   SEVERE abdominal pain (e.g., excruciating)    Additional Information   Negative: Passed out (i.e., fainted, collapsed and was not responding)   Negative: Shock suspected (e.g., cold/pale/clammy skin, too weak to stand, low BP, rapid pulse)   Negative: Sounds like a life-threatening emergency to the triager    Protocols used: Abdominal Pain - Female-A-[place]
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
## 2023-12-27 NOTE — DISCHARGE INSTRUCTIONS
Take the prescribed Augmentin for further management of your diverticulitis  Take Tylenol and Ibuprofen as directed for pain  Hydrate by drinking 8-10 glasses of water per day   Follow-up with colorectal surgery for further evaluation. You may use the below contact information to obtain an appointment.     Return to the emergency room for new, worsening, or concerning symptoms.     Future Appointments   Date Time Provider Department Center   1/16/2024  1:30 PM [facility] OIC-M[facility]MO1 [facility]   2/26/2024  9:20 AM JOSIAH DEXA1 [facility]   2/29/2024  1:00 PM Ayaz Lorenzo OD [facility]   2/29/2024  1:20 PM Ayaz Lorenzo OD [facility]   5/3/2024  9:00 AM LAB, [facility] [place]   5/7/2024 10:00 AM Bonnie Mathew MD [facility]

## 2023-12-28 ENCOUNTER — PATIENT MESSAGE (OUTPATIENT)
Dept: INTERNAL MEDICINE | Facility: CLINIC | Age: 68
End: 2023-12-28
Payer: COMMERCIAL

## 2023-12-29 ENCOUNTER — PATIENT MESSAGE (OUTPATIENT)
Dept: INTERNAL MEDICINE | Facility: CLINIC | Age: 68
End: 2023-12-29
Payer: COMMERCIAL

## 2024-01-02 DIAGNOSIS — M54.50 ACUTE RIGHT-SIDED LOW BACK PAIN WITHOUT SCIATICA: ICD-10-CM

## 2024-01-02 RX ORDER — MELOXICAM 15 MG/1
TABLET ORAL
Qty: 30 TABLET | Refills: 1 | Status: SHIPPED | OUTPATIENT
Start: 2024-01-02

## 2024-01-02 NOTE — TELEPHONE ENCOUNTER
No care due was identified.  [facility] Embedded Care Due Messages. Reference number: [id number].   1/02/2024 2:26:00 PM CST

## 2024-01-05 ENCOUNTER — OFFICE VISIT (OUTPATIENT)
Dept: SURGERY | Facility: CLINIC | Age: 69
End: 2024-01-05
Payer: COMMERCIAL

## 2024-01-05 VITALS
WEIGHT: 144.38 LBS | BODY MASS INDEX: 24.65 KG/M2 | HEART RATE: 60 BPM | HEIGHT: 64 IN | SYSTOLIC BLOOD PRESSURE: 121 MMHG | DIASTOLIC BLOOD PRESSURE: 73 MMHG

## 2024-01-05 DIAGNOSIS — K57.32 SIGMOID DIVERTICULITIS: ICD-10-CM

## 2024-01-05 PROCEDURE — 1126F AMNT PAIN NOTED NONE PRSNT: CPT | Mod: CPTII,S$GLB,, | Performed by: NURSE PRACTITIONER

## 2024-01-05 PROCEDURE — 1101F PT FALLS ASSESS-DOCD LE1/YR: CPT | Mod: CPTII,S$GLB,, | Performed by: NURSE PRACTITIONER

## 2024-01-05 PROCEDURE — 99213 OFFICE O/P EST LOW 20 MIN: CPT | Mod: S$GLB,,, | Performed by: NURSE PRACTITIONER

## 2024-01-05 PROCEDURE — 99999 PR PBB SHADOW E&M-EST. PATIENT-LVL V: CPT | Mod: PBBFAC,,, | Performed by: NURSE PRACTITIONER

## 2024-01-05 PROCEDURE — 1159F MED LIST DOCD IN RCRD: CPT | Mod: CPTII,S$GLB,, | Performed by: NURSE PRACTITIONER

## 2024-01-05 PROCEDURE — 3288F FALL RISK ASSESSMENT DOCD: CPT | Mod: CPTII,S$GLB,, | Performed by: NURSE PRACTITIONER

## 2024-01-05 PROCEDURE — 3078F DIAST BP <80 MM HG: CPT | Mod: CPTII,S$GLB,, | Performed by: NURSE PRACTITIONER

## 2024-01-05 PROCEDURE — 1160F RVW MEDS BY RX/DR IN RCRD: CPT | Mod: CPTII,S$GLB,, | Performed by: NURSE PRACTITIONER

## 2024-01-05 PROCEDURE — 3074F SYST BP LT 130 MM HG: CPT | Mod: CPTII,S$GLB,, | Performed by: NURSE PRACTITIONER

## 2024-01-05 PROCEDURE — 3008F BODY MASS INDEX DOCD: CPT | Mod: CPTII,S$GLB,, | Performed by: NURSE PRACTITIONER

## 2024-01-05 PROCEDURE — 4010F ACE/ARB THERAPY RXD/TAKEN: CPT | Mod: CPTII,S$GLB,, | Performed by: NURSE PRACTITIONER

## 2024-01-05 NOTE — PATIENT INSTRUCTIONS
Finish all antibiotics.  Once complete, start a daily fiber supplement like citrucel or fibercon.  Start low and work your way up. Want soft, formed bowel movements.  If pain returns:  Stop fiber  Start clear liquid diet  Message/call me  Will order a lab to check for inflammation  If elevated, will send in antibiotics.
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
## 2024-01-31 RX ORDER — OXYBUTYNIN CHLORIDE 5 MG/1
5 TABLET, EXTENDED RELEASE ORAL
Qty: 90 TABLET | Refills: 1 | Status: SHIPPED | OUTPATIENT
Start: 2024-01-31 | End: 2024-05-22

## 2024-01-31 NOTE — TELEPHONE ENCOUNTER
No care due was identified.  [facility] Embedded Care Due Messages. Reference number: [id number].   1/31/2024 9:14:15 AM CST

## 2024-02-29 ENCOUNTER — OFFICE VISIT (OUTPATIENT)
Dept: OPTOMETRY | Facility: CLINIC | Age: 69
End: 2024-02-29
Payer: COMMERCIAL

## 2024-02-29 DIAGNOSIS — Z97.3 WEARS CONTACT LENSES: ICD-10-CM

## 2024-02-29 DIAGNOSIS — H52.223 HYPEROPIA OF BOTH EYES WITH REGULAR ASTIGMATISM AND PRESBYOPIA: ICD-10-CM

## 2024-02-29 DIAGNOSIS — Z01.00 EYE EXAM, ROUTINE: Primary | ICD-10-CM

## 2024-02-29 DIAGNOSIS — H52.03 HYPEROPIA OF BOTH EYES WITH REGULAR ASTIGMATISM AND PRESBYOPIA: ICD-10-CM

## 2024-02-29 DIAGNOSIS — H52.4 HYPEROPIA OF BOTH EYES WITH REGULAR ASTIGMATISM AND PRESBYOPIA: ICD-10-CM

## 2024-02-29 PROCEDURE — 92014 COMPRE OPH EXAM EST PT 1/>: CPT | Mod: ,,, | Performed by: OPTOMETRIST

## 2024-02-29 PROCEDURE — 92015 DETERMINE REFRACTIVE STATE: CPT | Mod: ,,, | Performed by: OPTOMETRIST

## 2024-02-29 PROCEDURE — 92310 CONTACT LENS FITTING OU: CPT | Mod: CSM,S$GLB,, | Performed by: OPTOMETRIST

## 2024-02-29 PROCEDURE — 99999 PR PBB SHADOW E&M-EST. PATIENT-LVL III: CPT | Mod: PBBFAC,,, | Performed by: OPTOMETRIST

## 2024-03-05 ENCOUNTER — HOSPITAL ENCOUNTER (OUTPATIENT)
Dept: RADIOLOGY | Facility: CLINIC | Age: 69
Discharge: HOME | End: 2024-03-05
Attending: INTERNAL MEDICINE
Payer: COMMERCIAL

## 2024-03-05 DIAGNOSIS — M85.80 OSTEOPENIA, UNSPECIFIED LOCATION: ICD-10-CM

## 2024-03-05 PROCEDURE — 77080 DXA BONE DENSITY AXIAL: CPT | Mod: TC

## 2024-03-05 PROCEDURE — 77080 DXA BONE DENSITY AXIAL: CPT | Mod: 26,,, | Performed by: INTERNAL MEDICINE

## 2024-03-06 ENCOUNTER — PATIENT MESSAGE (OUTPATIENT)
Dept: OPTOMETRY | Facility: CLINIC | Age: 69
End: 2024-03-06
Payer: COMMERCIAL

## 2024-03-07 ENCOUNTER — PATIENT MESSAGE (OUTPATIENT)
Dept: INTERNAL MEDICINE | Facility: CLINIC | Age: 69
End: 2024-03-07
Payer: COMMERCIAL

## 2024-03-10 RX ORDER — IBANDRONATE SODIUM 150 MG/1
150 TABLET, FILM COATED ORAL
Qty: 3 TABLET | Refills: 4 | Status: SHIPPED | OUTPATIENT
Start: 2024-03-10 | End: 2025-03-10

## 2024-03-26 ENCOUNTER — HOSPITAL ENCOUNTER (OUTPATIENT)
Dept: RADIOLOGY | Facility: HOSPITAL | Age: 69
Discharge: HOME | End: 2024-03-26
Attending: INTERNAL MEDICINE
Payer: COMMERCIAL

## 2024-03-26 VITALS — BODY MASS INDEX: 24.03 KG/M2 | WEIGHT: 140 LBS

## 2024-03-26 DIAGNOSIS — Z12.31 SCREENING MAMMOGRAM FOR BREAST CANCER: ICD-10-CM

## 2024-03-26 PROCEDURE — 77067 SCR MAMMO BI INCL CAD: CPT | Mod: TC

## 2024-03-26 PROCEDURE — 77063 BREAST TOMOSYNTHESIS BI: CPT | Mod: 26,,, | Performed by: RADIOLOGY

## 2024-03-26 PROCEDURE — 77067 SCR MAMMO BI INCL CAD: CPT | Mod: 26,,, | Performed by: RADIOLOGY

## 2024-04-18 ENCOUNTER — OFFICE VISIT (OUTPATIENT)
Dept: URGENT CARE | Facility: CLINIC | Age: 69
End: 2024-04-18
Payer: COMMERCIAL

## 2024-04-18 ENCOUNTER — PATIENT MESSAGE (OUTPATIENT)
Dept: UROGYNECOLOGY | Facility: CLINIC | Age: 69
End: 2024-04-18
Payer: COMMERCIAL

## 2024-04-18 VITALS
WEIGHT: 135 LBS | SYSTOLIC BLOOD PRESSURE: 131 MMHG | BODY MASS INDEX: 23.05 KG/M2 | RESPIRATION RATE: 16 BRPM | TEMPERATURE: 99 F | HEIGHT: 64 IN | HEART RATE: 55 BPM | DIASTOLIC BLOOD PRESSURE: 76 MMHG | OXYGEN SATURATION: 97 %

## 2024-04-18 DIAGNOSIS — R30.0 DYSURIA: Primary | ICD-10-CM

## 2024-04-18 DIAGNOSIS — N15.9 KIDNEY INFECTION: ICD-10-CM

## 2024-04-18 LAB
BILIRUB UR QL STRIP: POSITIVE
GLUCOSE UR QL STRIP: POSITIVE
KETONES UR QL STRIP: NEGATIVE
LEUKOCYTE ESTERASE UR QL STRIP: NEGATIVE
PH, POC UA: 5.5
POC BLOOD, URINE: POSITIVE
POC NITRATES, URINE: POSITIVE
PROT UR QL STRIP: POSITIVE
SP GR UR STRIP: 1.02 (ref 1–1.03)
UROBILINOGEN UR STRIP-ACNC: 8 (ref 0.1–1.1)

## 2024-04-18 PROCEDURE — 81003 URINALYSIS AUTO W/O SCOPE: CPT | Mod: QW,S$GLB,, | Performed by: FAMILY MEDICINE

## 2024-04-18 PROCEDURE — 96372 THER/PROPH/DIAG INJ SC/IM: CPT | Mod: S$GLB,,, | Performed by: FAMILY MEDICINE

## 2024-04-18 PROCEDURE — 99213 OFFICE O/P EST LOW 20 MIN: CPT | Mod: 25,S$GLB,, | Performed by: FAMILY MEDICINE

## 2024-04-18 RX ORDER — CIPROFLOXACIN 500 MG/1
500 TABLET ORAL EVERY 12 HOURS
Qty: 14 TABLET | Refills: 0 | Status: SHIPPED | OUTPATIENT
Start: 2024-04-18 | End: 2024-05-07

## 2024-04-18 RX ORDER — PHENAZOPYRIDINE HYDROCHLORIDE 200 MG/1
200 TABLET, FILM COATED ORAL 3 TIMES DAILY PRN
Qty: 15 TABLET | Refills: 0 | Status: SHIPPED | OUTPATIENT
Start: 2024-04-18 | End: 2024-04-28

## 2024-04-18 RX ORDER — CIPROFLOXACIN 500 MG/1
500 TABLET ORAL EVERY 12 HOURS
Qty: 20 TABLET | Refills: 0 | Status: SHIPPED | OUTPATIENT
Start: 2024-04-18 | End: 2024-04-18

## 2024-04-18 RX ORDER — CEFTRIAXONE 250 MG/1
250 INJECTION, POWDER, FOR SOLUTION INTRAMUSCULAR; INTRAVENOUS
Status: COMPLETED | OUTPATIENT
Start: 2024-04-18 | End: 2024-04-18

## 2024-04-18 RX ADMIN — CEFTRIAXONE 250 MG: 250 INJECTION, POWDER, FOR SOLUTION INTRAMUSCULAR; INTRAVENOUS at 05:04

## 2024-04-18 NOTE — PROGRESS NOTES
Subjective:      Patient ID: Tj Hernandez is a 68 y.o. female.    Vitals:  vitals were not taken for this visit.     Chief Complaint: Dysuria    Pt present today for burning and frequent urination that started yesterday. Pt took Azo and nitrofurantion 3pm     Dysuria   This is a new problem. The current episode started yesterday. The problem has been unchanged. The quality of the pain is described as aching. The pain is at a severity of 0/10. The patient is experiencing no pain. There has been no fever. She is Not sexually active. There is No history of pyelonephritis. Associated symptoms include frequency and urgency. The treatment provided no relief. Her past medical history is significant for hypertension and kidney stones.     Genitourinary:  Positive for dysuria, frequency and urgency.    Objective:     Physical Exam   Constitutional: She is oriented to person, place, and time. She appears ill. No distress. normal  HENT:   Head: Normocephalic and atraumatic.   Ears:   Right Ear: External ear normal.   Left Ear: External ear normal.   Nose: No rhinorrhea or congestion.   Mouth/Throat: No oropharyngeal exudate or posterior oropharyngeal erythema.   Eyes: Conjunctivae are normal. Pupils are equal, round, and reactive to light. Extraocular movement intact   Neck: Neck supple. No neck rigidity present.   Pulmonary/Chest: Effort normal. No stridor. No respiratory distress.   Abdominal: Normal appearance.   Musculoskeletal: Normal range of motion.         General: Normal range of motion.   Neurological: no focal deficit. She is alert, oriented to person, place, and time and at baseline.   Skin: Skin is warm and dry. Capillary refill takes less than 2 seconds.   Psychiatric: Her behavior is normal. Mood, judgment and thought content normal.   Nursing note and vitals reviewed.    Assessment:     Plan:   1. Dysuria  - POCT Urinalysis, Dipstick, Automated, W/O Scope  - phenazopyridine (PYRIDIUM) 200 MG tablet; Take 1  tablet (200 mg total) by mouth 3 (three) times daily as needed for Pain.  Dispense: 15 tablet; Refill: 0    2. Kidney infection  - ciprofloxacin HCl (CIPRO) 500 MG tablet; Take 1 tablet (500 mg total) by mouth every 12 (twelve) hours.  Dispense: 20 tablet; Refill: 0  - cefTRIAXone injection 250 mg  - ciprofloxacin HCl (CIPRO) 500 MG tablet; Take 1 tablet (500 mg total) by mouth every 12 (twelve) hours.  Dispense: 14 tablet; Refill: 0   All results discussed with pt prior to discharge from clinic

## 2024-04-29 ENCOUNTER — PATIENT MESSAGE (OUTPATIENT)
Dept: INTERNAL MEDICINE | Facility: CLINIC | Age: 69
End: 2024-04-29
Payer: COMMERCIAL

## 2024-04-30 NOTE — [employer] PATIENT INSTRUCTIONS
April 30, 2024     Patient: Marialuisa Sorenson   YOB: 1965   Date of Visit: 4/30/2024       To Whom it May Concern:    Marialuisa Sorenson was seen in my clinic on 4/30/2024 at 9:00 am.    Please allow Ms. Marialuisa Soernson to reduce her hours to 2PM end time from Mon-Thursday to allow her to attend physical therapy sessions as well as for adequate healing. Please allow her to do this until physical therapy evaluation to better determine the duration of reduced hours.     Sincerely,         Jason Thorne MD    Medical information is confidential and cannot be disclosed without the written consent of the patient or her representative.       Discharge Summary/Instructions after an Endoscopic Procedure  Patient Name: Tj Hernandez  Patient MRN: [id number]  Patient YOB: 1955 Thursday, November 9, 2023  Imani Elaine MD  Dear patient,  As a result of recent federal legislation (The Federal Cures Act), you may   receive lab or pathology results from your procedure in your [facility]   account before your physician is able to contact you. Your physician or   their representative will relay the results to you with their   recommendations at their soonest availability.  Thank you,  RESTRICTIONS:  During your procedure today, you received medications for sedation.  These   medications may affect your judgment, balance and coordination.  Therefore,   for 24 hours, you have the following restrictions:   - DO NOT drive a car, operate machinery, make legal/financial decisions,   sign important papers or drink alcohol.    ACTIVITY:  Today: no heavy lifting, straining or running due to procedural   sedation/anesthesia.  The following day: return to full activity including work.  DIET:  Eat and drink normally unless instructed otherwise.     TREATMENT FOR COMMON SIDE EFFECTS:  - Mild abdominal pain, nausea, belching, bloating or excessive gas:  rest,   eat lightly and use a heating pad.  - Sore Throat: treat with throat lozenges and/or gargle with warm salt   water.  - Because air was used during the procedure, expelling large amounts of air   from your rectum or belching is normal.  - If a bowel prep was taken, you may not have a bowel movement for 1-3 days.    This is normal.  SYMPTOMS TO WATCH FOR AND REPORT TO YOUR PHYSICIAN:  1. Abdominal pain or bloating, other than gas cramps.  2. Chest pain.  3. Back pain.  4. Signs of infection such as: chills or fever occurring within 24 hours   after the procedure.  5. Rectal bleeding, which would show as bright red, maroon, or black stools.   (A tablespoon of blood from the rectum is not serious, especially  if   hemorrhoids are present.)  6. Vomiting.  7. Weakness or dizziness.  GO DIRECTLY TO THE NEAREST EMERGENCY ROOM IF YOU HAVE ANY OF THE FOLLOWING:      Difficulty breathing              Chills and/or fever over 101 F   Persistent vomiting and/or vomiting blood   Severe abdominal pain   Severe chest pain   Black, tarry stools   Bleeding- more than one tablespoon   Any other symptom or condition that you feel may need urgent attention  Your doctor recommends these additional instructions:  If any biopsies were taken, your doctors clinic will contact you in 1 to 2   weeks with any results.  - Discharge patient to home.   - Resume previous diet.   - Continue present medications.   - Repeat colonoscopy in 10 years for screening purposes.   - Written discharge instructions were provided to the patient.   - The signs and symptoms of potential delayed complications were discussed   with the patient.   - Patient has a contact number available for emergencies.   - Return to normal activities tomorrow.  For questions, problems or results please call your physician - Imani Elaine MD at Work:  [phone].  [facility], EMERGENCY ROOM PHONE NUMBER: [phone]  IF A COMPLICATION OR EMERGENCY SITUATION ARISES AND YOU ARE UNABLE TO REACH   YOUR PHYSICIAN - GO DIRECTLY TO THE EMERGENCY ROOM.  Imani Elaine MD  11/9/2023 9:15:48 AM  This report has been verified and signed electronically.  Dear patient,  As a result of recent federal legislation (The Federal Cures Act), you may   receive lab or pathology results from your procedure in your [facility]   account before your physician is able to contact you. Your physician or   their representative will relay the results to you with their   recommendations at their soonest availability.  Thank you,  [employer]

## 2024-05-01 ENCOUNTER — OFFICE VISIT (OUTPATIENT)
Dept: UROGYNECOLOGY | Facility: CLINIC | Age: 69
End: 2024-05-01
Payer: COMMERCIAL

## 2024-05-01 VITALS
DIASTOLIC BLOOD PRESSURE: 67 MMHG | WEIGHT: 144.63 LBS | SYSTOLIC BLOOD PRESSURE: 126 MMHG | BODY MASS INDEX: 24.69 KG/M2 | HEART RATE: 54 BPM | HEIGHT: 64 IN

## 2024-05-01 DIAGNOSIS — Z01.419 WELL WOMAN EXAM: Primary | ICD-10-CM

## 2024-05-01 DIAGNOSIS — N95.2 VAGINAL ATROPHY: ICD-10-CM

## 2024-05-01 DIAGNOSIS — N81.11 MIDLINE CYSTOCELE: ICD-10-CM

## 2024-05-01 DIAGNOSIS — N39.46 URINARY INCONTINENCE, MIXED: ICD-10-CM

## 2024-05-01 PROCEDURE — 1159F MED LIST DOCD IN RCRD: CPT | Mod: CPTII,S$GLB,, | Performed by: NURSE PRACTITIONER

## 2024-05-01 PROCEDURE — 99397 PER PM REEVAL EST PAT 65+ YR: CPT | Mod: S$GLB,,, | Performed by: NURSE PRACTITIONER

## 2024-05-01 PROCEDURE — 1101F PT FALLS ASSESS-DOCD LE1/YR: CPT | Mod: CPTII,S$GLB,, | Performed by: NURSE PRACTITIONER

## 2024-05-01 PROCEDURE — 3008F BODY MASS INDEX DOCD: CPT | Mod: CPTII,S$GLB,, | Performed by: NURSE PRACTITIONER

## 2024-05-01 PROCEDURE — 99999 PR PBB SHADOW E&M-EST. PATIENT-LVL V: CPT | Mod: PBBFAC,,, | Performed by: NURSE PRACTITIONER

## 2024-05-01 PROCEDURE — 3078F DIAST BP <80 MM HG: CPT | Mod: CPTII,S$GLB,, | Performed by: NURSE PRACTITIONER

## 2024-05-01 PROCEDURE — 4010F ACE/ARB THERAPY RXD/TAKEN: CPT | Mod: CPTII,S$GLB,, | Performed by: NURSE PRACTITIONER

## 2024-05-01 PROCEDURE — 3288F FALL RISK ASSESSMENT DOCD: CPT | Mod: CPTII,S$GLB,, | Performed by: NURSE PRACTITIONER

## 2024-05-01 PROCEDURE — 1126F AMNT PAIN NOTED NONE PRSNT: CPT | Mod: CPTII,S$GLB,, | Performed by: NURSE PRACTITIONER

## 2024-05-01 PROCEDURE — 1160F RVW MEDS BY RX/DR IN RCRD: CPT | Mod: CPTII,S$GLB,, | Performed by: NURSE PRACTITIONER

## 2024-05-01 PROCEDURE — 3074F SYST BP LT 130 MM HG: CPT | Mod: CPTII,S$GLB,, | Performed by: NURSE PRACTITIONER

## 2024-05-01 RX ORDER — VIBEGRON 75 MG/1
75 TABLET, FILM COATED ORAL DAILY
Qty: 30 TABLET | Refills: 11 | Status: SHIPPED | OUTPATIENT
Start: 2024-05-01 | End: 2024-05-22
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
## 2024-05-01 NOTE — PATIENT INSTRUCTIONS
1. Well woman  --up to date    2. Vaginal atrophy (dryness):    --Use 1 gram of estrogen cream in vagina  twice a week, use a small amount around vaginal opening  --use astroglide, coconut oil, or olive oil for lubrication    3. Mixed urinary incontinence, urge > stress:    --Empty bladder every 3 hours.  Empty well: wait a minute, lean forward on toilet.    --Avoid dietary irritants (see sheet).  Keep diary x 3-5 days to determine your irritants.  --KEGELS: do 10 in AM and 10 in PM, holding each x 10 seconds.  When you feel urge to go, STOP, KEGEL, and when urge has passed, then go to bathroom.  Consider PT in future.    --URGE: trial gemtesa 75 mg.  For dry mouth: get sour, sugar free lozenge or gum.    --STRESS:  Pessary vs. Sling.     4. Irregular bowel habits/ hemorrhoids:  --continue fiber supplement  --avoid straining       5.cystocele  --stage 2   --continue to monitor    6.RTC 1 year for annual

## 2024-05-02 ENCOUNTER — LAB VISIT (OUTPATIENT)
Dept: LAB | Facility: HOSPITAL | Age: 69
End: 2024-05-02
Attending: INTERNAL MEDICINE
Payer: COMMERCIAL

## 2024-05-02 DIAGNOSIS — E78.5 HYPERLIPIDEMIA, UNSPECIFIED HYPERLIPIDEMIA TYPE: ICD-10-CM

## 2024-05-02 DIAGNOSIS — E55.9 VITAMIN D DEFICIENCY DISEASE: ICD-10-CM

## 2024-05-02 LAB
25(OH)D3+25(OH)D2 SERPL-MCNC: 44 NG/ML (ref 30–96)
ALBUMIN SERPL BCP-MCNC: 4 G/DL (ref 3.5–5.2)
ALP SERPL-CCNC: 45 U/L (ref 55–135)
ALT SERPL W/O P-5'-P-CCNC: 12 U/L (ref 10–44)
ANION GAP SERPL CALC-SCNC: 10 MMOL/L (ref 8–16)
AST SERPL-CCNC: 19 U/L (ref 10–40)
BASOPHILS # BLD AUTO: 0.04 K/UL (ref 0–0.2)
BASOPHILS NFR BLD: 0.6 % (ref 0–1.9)
BILIRUB SERPL-MCNC: 0.7 MG/DL (ref 0.1–1)
BUN SERPL-MCNC: 21 MG/DL (ref 8–23)
CALCIUM SERPL-MCNC: 9.2 MG/DL (ref 8.7–10.5)
CHLORIDE SERPL-SCNC: 108 MMOL/L (ref 95–110)
CHOLEST SERPL-MCNC: 187 MG/DL (ref 120–199)
CHOLEST/HDLC SERPL: 3.4 {RATIO} (ref 2–5)
CO2 SERPL-SCNC: 23 MMOL/L (ref 23–29)
CREAT SERPL-MCNC: 1 MG/DL (ref 0.5–1.4)
DIFFERENTIAL METHOD BLD: ABNORMAL
EOSINOPHIL # BLD AUTO: 0.1 K/UL (ref 0–0.5)
EOSINOPHIL NFR BLD: 1.2 % (ref 0–8)
ERYTHROCYTE [DISTWIDTH] IN BLOOD BY AUTOMATED COUNT: 14.3 % (ref 11.5–14.5)
EST. GFR  (NO RACE VARIABLE): >60 ML/MIN/1.73 M^2
GLUCOSE SERPL-MCNC: 78 MG/DL (ref 70–110)
HCT VFR BLD AUTO: 37.8 % (ref 37–48.5)
HDLC SERPL-MCNC: 55 MG/DL (ref 40–75)
HDLC SERPL: 29.4 % (ref 20–50)
HGB BLD-MCNC: 12.5 G/DL (ref 12–16)
IMM GRANULOCYTES # BLD AUTO: 0.04 K/UL (ref 0–0.04)
IMM GRANULOCYTES NFR BLD AUTO: 0.6 % (ref 0–0.5)
LDLC SERPL CALC-MCNC: 111.4 MG/DL (ref 63–159)
LYMPHOCYTES # BLD AUTO: 1.6 K/UL (ref 1–4.8)
LYMPHOCYTES NFR BLD: 22.2 % (ref 18–48)
MCH RBC QN AUTO: 26.2 PG (ref 27–31)
MCHC RBC AUTO-ENTMCNC: 33.1 G/DL (ref 32–36)
MCV RBC AUTO: 79 FL (ref 82–98)
MONOCYTES # BLD AUTO: 0.6 K/UL (ref 0.3–1)
MONOCYTES NFR BLD: 8.4 % (ref 4–15)
NEUTROPHILS # BLD AUTO: 4.9 K/UL (ref 1.8–7.7)
NEUTROPHILS NFR BLD: 67 % (ref 38–73)
NONHDLC SERPL-MCNC: 132 MG/DL
NRBC BLD-RTO: 0 /100 WBC
PLATELET # BLD AUTO: 252 K/UL (ref 150–450)
PMV BLD AUTO: 10.9 FL (ref 9.2–12.9)
POTASSIUM SERPL-SCNC: 4.2 MMOL/L (ref 3.5–5.1)
PROT SERPL-MCNC: 6.9 G/DL (ref 6–8.4)
RBC # BLD AUTO: 4.77 M/UL (ref 4–5.4)
SODIUM SERPL-SCNC: 141 MMOL/L (ref 136–145)
TRIGL SERPL-MCNC: 103 MG/DL (ref 30–150)
TSH SERPL DL<=0.005 MIU/L-ACNC: 1.05 UIU/ML (ref 0.4–4)
WBC # BLD AUTO: 7.24 K/UL (ref 3.9–12.7)

## 2024-05-02 PROCEDURE — 36415 COLL VENOUS BLD VENIPUNCTURE: CPT | Mod: PO | Performed by: INTERNAL MEDICINE

## 2024-05-02 PROCEDURE — 84443 ASSAY THYROID STIM HORMONE: CPT | Performed by: INTERNAL MEDICINE

## 2024-05-02 PROCEDURE — 85025 COMPLETE CBC W/AUTO DIFF WBC: CPT | Performed by: INTERNAL MEDICINE

## 2024-05-02 PROCEDURE — 80061 LIPID PANEL: CPT | Performed by: INTERNAL MEDICINE

## 2024-05-02 PROCEDURE — 82306 VITAMIN D 25 HYDROXY: CPT | Performed by: INTERNAL MEDICINE

## 2024-05-02 PROCEDURE — 80053 COMPREHEN METABOLIC PANEL: CPT | Performed by: INTERNAL MEDICINE

## 2024-05-07 ENCOUNTER — OFFICE VISIT (OUTPATIENT)
Dept: INTERNAL MEDICINE | Facility: CLINIC | Age: 69
End: 2024-05-07
Payer: COMMERCIAL

## 2024-05-07 VITALS
HEIGHT: 64 IN | OXYGEN SATURATION: 97 % | BODY MASS INDEX: 24.41 KG/M2 | DIASTOLIC BLOOD PRESSURE: 72 MMHG | SYSTOLIC BLOOD PRESSURE: 124 MMHG | HEART RATE: 60 BPM | WEIGHT: 143 LBS

## 2024-05-07 DIAGNOSIS — M81.0 AGE-RELATED OSTEOPOROSIS WITHOUT CURRENT PATHOLOGICAL FRACTURE: ICD-10-CM

## 2024-05-07 DIAGNOSIS — I10 ESSENTIAL HYPERTENSION: ICD-10-CM

## 2024-05-07 DIAGNOSIS — E78.5 HYPERLIPIDEMIA, UNSPECIFIED HYPERLIPIDEMIA TYPE: Primary | ICD-10-CM

## 2024-05-07 DIAGNOSIS — E53.8 VITAMIN B12 DEFICIENCY: ICD-10-CM

## 2024-05-07 DIAGNOSIS — K21.9 GASTROESOPHAGEAL REFLUX DISEASE WITHOUT ESOPHAGITIS: ICD-10-CM

## 2024-05-07 DIAGNOSIS — E55.9 VITAMIN D DEFICIENCY DISEASE: ICD-10-CM

## 2024-05-07 PROCEDURE — 99999 PR PBB SHADOW E&M-EST. PATIENT-LVL V: CPT | Mod: PBBFAC,,, | Performed by: INTERNAL MEDICINE

## 2024-05-07 PROCEDURE — 1159F MED LIST DOCD IN RCRD: CPT | Mod: CPTII,S$GLB,, | Performed by: INTERNAL MEDICINE

## 2024-05-07 PROCEDURE — 3074F SYST BP LT 130 MM HG: CPT | Mod: CPTII,S$GLB,, | Performed by: INTERNAL MEDICINE

## 2024-05-07 PROCEDURE — 3288F FALL RISK ASSESSMENT DOCD: CPT | Mod: CPTII,S$GLB,, | Performed by: INTERNAL MEDICINE

## 2024-05-07 PROCEDURE — 3008F BODY MASS INDEX DOCD: CPT | Mod: CPTII,S$GLB,, | Performed by: INTERNAL MEDICINE

## 2024-05-07 PROCEDURE — 1101F PT FALLS ASSESS-DOCD LE1/YR: CPT | Mod: CPTII,S$GLB,, | Performed by: INTERNAL MEDICINE

## 2024-05-07 PROCEDURE — 3078F DIAST BP <80 MM HG: CPT | Mod: CPTII,S$GLB,, | Performed by: INTERNAL MEDICINE

## 2024-05-07 PROCEDURE — 1126F AMNT PAIN NOTED NONE PRSNT: CPT | Mod: CPTII,S$GLB,, | Performed by: INTERNAL MEDICINE

## 2024-05-07 PROCEDURE — 99214 OFFICE O/P EST MOD 30 MIN: CPT | Mod: S$GLB,,, | Performed by: INTERNAL MEDICINE

## 2024-05-07 PROCEDURE — 4010F ACE/ARB THERAPY RXD/TAKEN: CPT | Mod: CPTII,S$GLB,, | Performed by: INTERNAL MEDICINE

## 2024-05-07 RX ORDER — FAMOTIDINE 40 MG/1
40 TABLET, FILM COATED ORAL NIGHTLY
Qty: 90 TABLET | Refills: 4 | Status: SHIPPED | OUTPATIENT
Start: 2024-05-07

## 2024-05-07 RX ORDER — OMEPRAZOLE 40 MG/1
40 CAPSULE, DELAYED RELEASE ORAL DAILY
Qty: 90 CAPSULE | Refills: 3 | Status: SHIPPED | OUTPATIENT
Start: 2024-05-07 | End: 2025-05-07

## 2024-05-07 NOTE — PROGRESS NOTES
CHIEF COMPLAINT:Annual exam and Follow up of medical problems    HISTORY OF PRESENT ILLNESS: 68 year-old woman who presents for above     She has indigestion after eating - she will have belching. She continues to take prilosec 20 mg in am and is taking pepcid 40 mg 4 out of 7 evenings at night.   She has a dry cough.  She has not lost weight. She is clearing her throat a lot.     Back is ok. She will take meloxicam or methocarbamol as needed - usually 2 nights a week.      She had her colonoscopy on 11/9/23 - normal due 10 years. She had an episode of diverticulitis on 12/26/24 - resolved with antibiotics.  She had her hemorrhoids banded 11/2023 during colonoscopy. Hemorrhoids have returned. Not bleeding as much.   She uses hydrocortisone cream which does help.  She has a BM 3 times every morning.  She has to get up early if she has to go somewhere.      She is in digital hypertension - she is taking valsartan 320 mg once dialy  and spironolactone 25 mg 1/2 tablet twice daily.  BP has been doing well     She generally exercises 5 days a week.  She is back exercing.      She has sleep apnea. She is not wearing CPAP machine. She fiddles with the machine all night long and does not sleep.      Her neck continues to be stiff at times. It will freeze at times. Not a lot of neck pain. She has cracking in the neck. She watches her position at night.  She does stretching of the neck which helps. Occasional muscle relaxer helps.        She had  bladder lift with sling, ERIN/BSO, umbilical hernia repair on 9/24/20. Procedure went well.. She has urinary urgency that is controlled with oxybutynin XL 5 mg daily. Switching to Gemtessa. She is using estradiol vaginal cream twice a week      No chest pain, shortness of breath     She has been off metamucil daily.      Sinuses are doing well. She will take zyrtec 10 mg daily as needed     She is taking pravastatin 40 mg daily for her hyperlipidemia. NO muscle spasms or joint pain..  "SHe takes co enzyme Q10 200 mg daily. Intolerant to Crestor     She took Boniva 150 mg once monthly for her osteoporosis.  She stopped the Boniva in February 2020 due to improvement.           PAST MEDICAL HISTORY:   1. Hyperlipidemia.   2. Sleep disorder.   3. Cervical spine arthritis.   4. Episode of hematuria, negative cystoscope with Dr. Roland.     PAST SURGICAL HISTORY:   1. D&C for a miscarriage.   2. Conization due to dysplasia 18 years ago.   3. Tonsillectomy and adenoidectomy at age [age].     SOCIAL HISTORY: She does not smoke. . 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 She owns a business.     FAMILY HISTORY: She is adopted.     REVIEW OF SYSTEMS: She denies fevers, chills, night sweats, hot flashes, visual change. She has some slight hearing loss. She denies sinus congestion, sore throat, chest pain, shortness of breath, palpitations, nausea, vomiting, constipation, diarrhea, dysuria, hematuria, joint pain, muscle pain, rashes, headaches, polydipsia,   polyuria.     SCREENING: Mammogram 3/2024, bone density 3/2024  .Colonoscopy 2/ 2018 - given 10 years    PHYSICAL EXAMINATION:     /72 (BP Location: Right arm, Patient Position: Sitting, BP Method: Large (Manual))   Pulse 60   Ht 5' 4" (1.626 m)   Wt 64.9 kg (143 lb)   LMP  (LMP Unknown)   SpO2 97%   BMI 24.55 kg/m²      General: Alert, oriented. No apparent distress. Affect within normal   limits.   Conjunctivae anicteric. PERRL. Tympanic membranes clear  . Oropharynx clear.   Neck supple. No cervical lymphadenopathy, no thyroid enlargement.   Respiratory effort normal. Lungs clear to auscultation.   Heart: Regular rate and rhythm without murmurs, gallops or rubs.   No lower extremity edema.    ABDOMEN: soft, non distended, non tender, bowel sounds present, no hepatosplenomgaly         labs 5/2 24 reviewed    ASSESSMENT AND PLAN:        1  GERD- increase omeprazole 40 mg in am and take famotidine 40 mg in the evening   2. Hypertension -digital " hypertension  3. Neck pain due to OA cervical spine - stable  4. Diarrhea  -stable off metamucil  5.  Hyperlipidemia- stable on meds  6. ALlergic rhinitis - stable.   7. . Osteoporosis - 2/2022 - with increase in bone density - may continue drug holiday. BMD 3/24- restarted ibandronate 150 mg once a month 3/2024  8. Insomnia - ambien as needed  9.  Hemorrhoids- cream and suppositories    She is to return in 5-6 months a if problems arise

## 2024-05-16 ENCOUNTER — PATIENT MESSAGE (OUTPATIENT)
Dept: SURGERY | Facility: CLINIC | Age: 69
End: 2024-05-16
Payer: COMMERCIAL

## 2024-05-20 ENCOUNTER — PATIENT MESSAGE (OUTPATIENT)
Dept: SURGERY | Facility: CLINIC | Age: 69
End: 2024-05-20
Payer: COMMERCIAL

## 2024-05-22 ENCOUNTER — PATIENT MESSAGE (OUTPATIENT)
Dept: SURGERY | Facility: CLINIC | Age: 69
End: 2024-05-22
Payer: COMMERCIAL

## 2024-05-22 DIAGNOSIS — N39.46 MIXED INCONTINENCE URGE AND STRESS: Primary | ICD-10-CM

## 2024-05-22 RX ORDER — SOLIFENACIN SUCCINATE 10 MG/1
10 TABLET, FILM COATED ORAL DAILY
Qty: 30 TABLET | Refills: 11 | Status: SHIPPED | OUTPATIENT
Start: 2024-05-22 | End: 2025-05-22

## 2024-05-29 RX ORDER — ERGOCALCIFEROL 1.25 1/1
50000 CAPSULE ORAL
Qty: 24 CAPSULE | Refills: 0 | Status: SHIPPED | OUTPATIENT
Start: 2024-05-30

## 2024-05-29 NOTE — TELEPHONE ENCOUNTER
Refill Routing Note   Medication(s) are not appropriate for processing by [facility] for the following reason(s):        Outside of protocol    [facility] action(s):  Route               Appointments  past 12m or future 3m with PCP    Date Provider   Last Visit   5/7/2024 Bonnie Mathew MD   Next Visit   10/29/2024 Bonnie Mathew MD   ED visits in past 90 days: 0        Note composed:4:06 PM 05/29/2024

## 2024-05-29 NOTE — TELEPHONE ENCOUNTER
No care due was identified.  [facility] Embedded Care Due Messages. Reference number: [id number].   5/29/2024 3:37:48 PM CDT

## 2024-06-10 ENCOUNTER — PATIENT MESSAGE (OUTPATIENT)
Dept: INTERNAL MEDICINE | Facility: CLINIC | Age: 69
End: 2024-06-10
Payer: COMMERCIAL

## 2024-06-21 DIAGNOSIS — I10 ESSENTIAL HYPERTENSION: ICD-10-CM

## 2024-06-21 RX ORDER — SPIRONOLACTONE 25 MG/1
TABLET ORAL
Qty: 90 TABLET | Refills: 3 | Status: SHIPPED | OUTPATIENT
Start: 2024-06-21

## 2024-06-21 NOTE — TELEPHONE ENCOUNTER
Refill Decision Note   Tj Hernandez  is requesting a refill authorization.  Brief Assessment and Rationale for Refill:  Approve     Medication Therapy Plan:         Comments:     Note composed:10:45 AM 06/21/2024

## 2024-06-21 NOTE — TELEPHONE ENCOUNTER
Care Due:                  Date            Visit Type   Department     Provider  --------------------------------------------------------------------------------                                EP -                              PRIMARY      [facility] INTERNAL  Last Visit: 05-      CARE ([facility])   MEDICINE       JOURDAN TINOCO  Next Visit: None Scheduled  None         None Found                                                            Last  Test          Frequency    Reason                     Performed    Due Date  --------------------------------------------------------------------------------    Phosphate...  12 months..  ibandronate..............  Not Found    Overdue    [employer] Embedded Care Due Messages. Reference number: [id number].   6/21/2024 8:58:24 AM CDT

## 2024-06-25 ENCOUNTER — OFFICE VISIT (OUTPATIENT)
Dept: URGENT CARE | Facility: CLINIC | Age: 69
End: 2024-06-25
Payer: COMMERCIAL

## 2024-06-25 VITALS
HEIGHT: 64 IN | DIASTOLIC BLOOD PRESSURE: 67 MMHG | OXYGEN SATURATION: 98 % | TEMPERATURE: 98 F | HEART RATE: 66 BPM | WEIGHT: 143 LBS | SYSTOLIC BLOOD PRESSURE: 127 MMHG | BODY MASS INDEX: 24.41 KG/M2 | RESPIRATION RATE: 18 BRPM

## 2024-06-25 DIAGNOSIS — R35.0 FREQUENT URINATION: Primary | ICD-10-CM

## 2024-06-25 DIAGNOSIS — R30.0 DYSURIA: ICD-10-CM

## 2024-06-25 LAB
BILIRUB UR QL STRIP: NEGATIVE
GLUCOSE UR QL STRIP: NEGATIVE
KETONES UR QL STRIP: NEGATIVE
LEUKOCYTE ESTERASE UR QL STRIP: POSITIVE
PH, POC UA: 6
POC BLOOD, URINE: POSITIVE
POC NITRATES, URINE: NEGATIVE
PROT UR QL STRIP: POSITIVE
SP GR UR STRIP: 1.01 (ref 1–1.03)
UROBILINOGEN UR STRIP-ACNC: 0.2 (ref 0.1–1.1)

## 2024-06-25 PROCEDURE — 87088 URINE BACTERIA CULTURE: CPT | Performed by: FAMILY MEDICINE

## 2024-06-25 PROCEDURE — 99213 OFFICE O/P EST LOW 20 MIN: CPT | Mod: S$GLB,,, | Performed by: FAMILY MEDICINE

## 2024-06-25 PROCEDURE — 87086 URINE CULTURE/COLONY COUNT: CPT | Performed by: FAMILY MEDICINE

## 2024-06-25 PROCEDURE — 87186 SC STD MICRODIL/AGAR DIL: CPT | Performed by: FAMILY MEDICINE

## 2024-06-25 PROCEDURE — 81003 URINALYSIS AUTO W/O SCOPE: CPT | Mod: QW,S$GLB,, | Performed by: FAMILY MEDICINE

## 2024-06-25 PROCEDURE — 87077 CULTURE AEROBIC IDENTIFY: CPT | Performed by: FAMILY MEDICINE

## 2024-06-25 RX ORDER — SULFAMETHOXAZOLE AND TRIMETHOPRIM 800; 160 MG/1; MG/1
1 TABLET ORAL 2 TIMES DAILY
Qty: 14 TABLET | Refills: 0 | Status: SHIPPED | OUTPATIENT
Start: 2024-06-25 | End: 2024-07-02

## 2024-06-25 RX ORDER — PHENAZOPYRIDINE HYDROCHLORIDE 200 MG/1
200 TABLET, FILM COATED ORAL 3 TIMES DAILY PRN
Qty: 15 TABLET | Refills: 0 | Status: SHIPPED | OUTPATIENT
Start: 2024-06-25 | End: 2025-06-25

## 2024-06-25 NOTE — PROGRESS NOTES
"Subjective:      Patient ID: Tj Hernandez is a 69 y.o. female.    Vitals:  height is 5' 4" (1.626 m) and weight is 64.9 kg (143 lb). Her oral temperature is 98.1 °F (36.7 °C). Her blood pressure is 127/67 and her pulse is 66. Her respiration is 18 and oxygen saturation is 98%.     Chief Complaint: Urinary Tract Infection    Pt states that she is having frequent , urgency and burning that started on 6/24.  Denies fever or flank pain  Does have hx of renal colic many years ago  Last uti 3 months ago tx with cipro, no cx done    Urinary Tract Infection   This is a new problem. The current episode started yesterday. The problem occurs every urination. The problem has been unchanged. The quality of the pain is described as burning. The pain is at a severity of 0/10. The patient is experiencing no pain. There has been no fever. Associated symptoms include frequency and urgency. She has tried nothing for the symptoms.       Genitourinary:  Positive for dysuria, frequency and urgency.      Objective:     Physical Exam   Constitutional: She is oriented to person, place, and time. She appears well-developed. She is cooperative.  Non-toxic appearance. She does not appear ill. No distress.   HENT:   Head: Normocephalic and atraumatic.   Ears:   Right Ear: Hearing, tympanic membrane, external ear and ear canal normal.   Left Ear: Hearing, tympanic membrane, external ear and ear canal normal.   Nose: Nose normal. No mucosal edema, rhinorrhea or nasal deformity. No epistaxis. Right sinus exhibits no maxillary sinus tenderness and no frontal sinus tenderness. Left sinus exhibits no maxillary sinus tenderness and no frontal sinus tenderness.   Mouth/Throat: Uvula is midline, oropharynx is clear and moist and mucous membranes are normal. Mucous membranes are moist. No trismus in the jaw. Normal dentition. No uvula swelling. No oropharyngeal exudate or posterior oropharyngeal erythema. Oropharynx is clear.   Eyes: Conjunctivae and " lids are normal. Right eye exhibits no discharge. Left eye exhibits no discharge. No scleral icterus.   Neck: Trachea normal and phonation normal. Neck supple.   Cardiovascular: Normal rate, regular rhythm, normal heart sounds and normal pulses.   Pulmonary/Chest: Effort normal and breath sounds normal. No respiratory distress.   Abdominal: Normal appearance and bowel sounds are normal. She exhibits no distension and no mass. Soft. There is no abdominal tenderness. There is no rebound, no guarding, no left CVA tenderness and no right CVA tenderness. No hernia.   Musculoskeletal: Normal range of motion.         General: No deformity. Normal range of motion.   Neurological: She is alert and oriented to person, place, and time. She exhibits normal muscle tone. Coordination normal.   Skin: Skin is warm, dry, intact, not diaphoretic and not pale.   Psychiatric: Her speech is normal and behavior is normal. Judgment and thought content normal.   Nursing note and vitals reviewed.      Assessment:     1. Frequent urination    2. Dysuria        Plan:       Frequent urination  -     POCT Urinalysis, Dipstick, Automated, W/O Scope    Dysuria    Other orders  -     sulfamethoxazole-trimethoprim 800-160mg (BACTRIM DS) 800-160 mg Tab; Take 1 tablet by mouth 2 (two) times daily. for 7 days  Dispense: 14 tablet; Refill: 0  -     phenazopyridine (PYRIDIUM) 200 MG tablet; Take 1 tablet (200 mg total) by mouth 3 (three) times daily as needed for Pain.  Dispense: 15 tablet; Refill: 0      Results for orders placed or performed in visit on 06/25/24   POCT Urinalysis, Dipstick, Automated, W/O Scope   Result Value Ref Range    POC Blood, Urine Positive (A) Negative    POC Bilirubin, Urine Negative Negative    POC Urobilinogen, Urine 0.2 0.1 - 1.1    POC Ketones, Urine Negative Negative    POC Protein, Urine Positive (A) Negative    POC Nitrates, Urine Negative Negative    POC Glucose, Urine Negative Negative    pH, UA 6.0     POC Specific  Gravity, Urine 1.015 1.003 - 1.029    POC Leukocytes, Urine Positive (A) Negative          Force fluids    Will change abx since recurring uti

## 2024-06-27 DIAGNOSIS — N95.2 VAGINAL ATROPHY: ICD-10-CM

## 2024-06-27 RX ORDER — ESTRADIOL 0.1 MG/G
1 CREAM VAGINAL
Qty: 42.5 G | Refills: 2 | Status: SHIPPED | OUTPATIENT
Start: 2024-06-27 | End: 2025-06-24

## 2024-06-28 ENCOUNTER — PATIENT MESSAGE (OUTPATIENT)
Dept: INTERNAL MEDICINE | Facility: CLINIC | Age: 69
End: 2024-06-28
Payer: COMMERCIAL

## 2024-06-28 LAB — BACTERIA UR CULT: ABNORMAL

## 2024-07-22 PROBLEM — N15.9 KIDNEY INFECTION: Status: RESOLVED | Noted: 2024-04-18 | Resolved: 2024-07-22

## 2024-08-15 ENCOUNTER — PATIENT MESSAGE (OUTPATIENT)
Dept: ADMINISTRATIVE | Facility: OTHER | Age: 69
End: 2024-08-15
Payer: COMMERCIAL

## 2024-09-03 DIAGNOSIS — I10 ESSENTIAL HYPERTENSION: ICD-10-CM

## 2024-09-03 RX ORDER — VALSARTAN 320 MG/1
320 TABLET ORAL
Qty: 90 TABLET | Refills: 2 | Status: SHIPPED | OUTPATIENT
Start: 2024-09-03

## 2024-09-03 NOTE — TELEPHONE ENCOUNTER
Care Due:                  Date            Visit Type   Department     Provider  --------------------------------------------------------------------------------                                EP -                              PRIMARY      [facility] INTERNAL  Last Visit: 05-      CARE ([facility])   MEDICINE       JOURDAN TINOCO                              EP -                              PRIMARY      [facility] INTERNAL  Next Visit: 10-      CARE ([facility])   MEDICINE       JOURDAN TINOCO                                                            Last  Test          Frequency    Reason                     Performed    Due Date  --------------------------------------------------------------------------------    CMP.........  6 months...  hydrocortisone...........  05-   10-    Phosphate...  12 months..  ibandronate..............  Not Found    Overdue    [employer] Embedded Care Due Messages. Reference number: [id number].   9/03/2024 8:43:01 AM CDT

## 2024-09-04 NOTE — TELEPHONE ENCOUNTER
Provider Staff:  Action required for this patient    Requires labs      Please see care gap opportunities below in Care Due Message.    Thanks!  [facility]     Appointments      Date Provider   Last Visit   5/7/2024 Bonnie Mathew MD   Next Visit   10/29/2024 Bonnie Mathew MD     Refill Decision Note   Tj Hernandez  is requesting a refill authorization.  Brief Assessment and Rationale for Refill:  Approve     Medication Therapy Plan:  FOVS      Comments:     Note composed:11:39 PM 09/03/2024

## 2024-09-16 ENCOUNTER — PATIENT MESSAGE (OUTPATIENT)
Dept: UROGYNECOLOGY | Facility: CLINIC | Age: 69
End: 2024-09-16
Payer: COMMERCIAL

## 2024-09-16 DIAGNOSIS — N39.46 URINARY INCONTINENCE, MIXED: Primary | ICD-10-CM

## 2024-09-16 RX ORDER — SOLIFENACIN SUCCINATE 5 MG/1
5 TABLET, FILM COATED ORAL DAILY
Qty: 30 TABLET | Refills: 11 | Status: SHIPPED | OUTPATIENT
Start: 2024-09-16 | End: 2025-09-16

## 2024-09-23 ENCOUNTER — PATIENT MESSAGE (OUTPATIENT)
Dept: OTHER | Facility: OTHER | Age: 69
End: 2024-09-23
Payer: COMMERCIAL

## 2024-09-30 ENCOUNTER — OFFICE VISIT (OUTPATIENT)
Dept: SPORTS MEDICINE | Facility: CLINIC | Age: 69
End: 2024-09-30
Payer: COMMERCIAL

## 2024-09-30 ENCOUNTER — HOSPITAL ENCOUNTER (OUTPATIENT)
Dept: RADIOLOGY | Facility: HOSPITAL | Age: 69
Discharge: HOME | End: 2024-09-30
Attending: ORTHOPAEDIC SURGERY
Payer: COMMERCIAL

## 2024-09-30 VITALS
DIASTOLIC BLOOD PRESSURE: 73 MMHG | BODY MASS INDEX: 24.42 KG/M2 | WEIGHT: 143.06 LBS | SYSTOLIC BLOOD PRESSURE: 117 MMHG | HEIGHT: 64 IN | HEART RATE: 62 BPM

## 2024-09-30 DIAGNOSIS — M19.011 PRIMARY OSTEOARTHRITIS OF RIGHT SHOULDER: ICD-10-CM

## 2024-09-30 DIAGNOSIS — M25.511 RIGHT SHOULDER PAIN, UNSPECIFIED CHRONICITY: ICD-10-CM

## 2024-09-30 DIAGNOSIS — G89.29 CHRONIC RIGHT SHOULDER PAIN: Primary | ICD-10-CM

## 2024-09-30 DIAGNOSIS — M25.511 CHRONIC RIGHT SHOULDER PAIN: Primary | ICD-10-CM

## 2024-09-30 PROCEDURE — 99999 PR PBB SHADOW E&M-EST. PATIENT-LVL IV: CPT | Mod: PBBFAC,,, | Performed by: ORTHOPAEDIC SURGERY

## 2024-09-30 PROCEDURE — 3008F BODY MASS INDEX DOCD: CPT | Mod: CPTII,S$GLB,, | Performed by: ORTHOPAEDIC SURGERY

## 2024-09-30 PROCEDURE — 1125F AMNT PAIN NOTED PAIN PRSNT: CPT | Mod: CPTII,S$GLB,, | Performed by: ORTHOPAEDIC SURGERY

## 2024-09-30 PROCEDURE — 1101F PT FALLS ASSESS-DOCD LE1/YR: CPT | Mod: CPTII,S$GLB,, | Performed by: ORTHOPAEDIC SURGERY

## 2024-09-30 PROCEDURE — 4010F ACE/ARB THERAPY RXD/TAKEN: CPT | Mod: CPTII,S$GLB,, | Performed by: ORTHOPAEDIC SURGERY

## 2024-09-30 PROCEDURE — 3078F DIAST BP <80 MM HG: CPT | Mod: CPTII,S$GLB,, | Performed by: ORTHOPAEDIC SURGERY

## 2024-09-30 PROCEDURE — 99204 OFFICE O/P NEW MOD 45 MIN: CPT | Mod: S$GLB,,, | Performed by: ORTHOPAEDIC SURGERY

## 2024-09-30 PROCEDURE — 3288F FALL RISK ASSESSMENT DOCD: CPT | Mod: CPTII,S$GLB,, | Performed by: ORTHOPAEDIC SURGERY

## 2024-09-30 PROCEDURE — 73030 X-RAY EXAM OF SHOULDER: CPT | Mod: TC,RT

## 2024-09-30 PROCEDURE — 73030 X-RAY EXAM OF SHOULDER: CPT | Mod: 26,RT,, | Performed by: RADIOLOGY

## 2024-09-30 PROCEDURE — 3074F SYST BP LT 130 MM HG: CPT | Mod: CPTII,S$GLB,, | Performed by: ORTHOPAEDIC SURGERY
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
## 2024-10-03 ENCOUNTER — PATIENT MESSAGE (OUTPATIENT)
Dept: SPORTS MEDICINE | Facility: CLINIC | Age: 69
End: 2024-10-03
Payer: COMMERCIAL

## 2024-10-08 ENCOUNTER — OFFICE VISIT (OUTPATIENT)
Dept: SPORTS MEDICINE | Facility: CLINIC | Age: 69
End: 2024-10-08
Payer: COMMERCIAL

## 2024-10-08 VITALS — SYSTOLIC BLOOD PRESSURE: 116 MMHG | DIASTOLIC BLOOD PRESSURE: 75 MMHG | WEIGHT: 143.31 LBS | BODY MASS INDEX: 24.6 KG/M2

## 2024-10-08 DIAGNOSIS — M25.511 CHRONIC RIGHT SHOULDER PAIN: Primary | ICD-10-CM

## 2024-10-08 DIAGNOSIS — M19.011 PRIMARY OSTEOARTHRITIS OF RIGHT SHOULDER: ICD-10-CM

## 2024-10-08 DIAGNOSIS — G89.29 CHRONIC RIGHT SHOULDER PAIN: Primary | ICD-10-CM

## 2024-10-08 PROCEDURE — 99499 UNLISTED E&M SERVICE: CPT | Mod: S$GLB,,, | Performed by: STUDENT IN AN ORGANIZED HEALTH CARE EDUCATION/TRAINING PROGRAM

## 2024-10-08 PROCEDURE — 20611 DRAIN/INJ JOINT/BURSA W/US: CPT | Mod: RT,S$GLB,, | Performed by: STUDENT IN AN ORGANIZED HEALTH CARE EDUCATION/TRAINING PROGRAM

## 2024-10-08 PROCEDURE — 99999 PR PBB SHADOW E&M-EST. PATIENT-LVL IV: CPT | Mod: PBBFAC,,, | Performed by: STUDENT IN AN ORGANIZED HEALTH CARE EDUCATION/TRAINING PROGRAM

## 2024-10-08 RX ORDER — TRIAMCINOLONE ACETONIDE 40 MG/ML
40 INJECTION, SUSPENSION INTRA-ARTICULAR; INTRAMUSCULAR
Status: DISCONTINUED | OUTPATIENT
Start: 2024-10-08 | End: 2024-10-08 | Stop reason: HOSPADM

## 2024-10-08 RX ADMIN — TRIAMCINOLONE ACETONIDE 40 MG: 40 INJECTION, SUSPENSION INTRA-ARTICULAR; INTRAMUSCULAR at 10:10
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
## 2024-10-08 NOTE — PATIENT INSTRUCTIONS
You had a cortisone (steroid) injection today. There are two medicines in this injection, a numbing medicine (local anesthetic) and a steroid. The numbing medication component usually takes effect within a few minutes and wears off after a few hours, after which your pain may return. The steroid medication typically takes effect in 3-7 days, although some people have benefits sooner.    There are risks with this procedure.   Any time the skin is punctured with a needle, there is a small risk of infection. With these injections that risk is less than 1 and 14,000. Corticosteroid injections can raise your blood pressure and blood sugar over the next few days.  Less than 1% of people experience of pain flare with the steroid, which usually goes away in 2-3 days.  2-3% of people developed a dimple or pale spot at the injection site, which is strictly cosmetic.

## 2024-10-08 NOTE — Clinical Note
Good morning,  Just wanted to let you know that I use a posterior approach for glenohumeral joint injections.  This patient was under the impression that I would go anteriorly and I know some providers do; but I use a posterior approach.   Thank you for the referral!

## 2024-10-08 NOTE — PROCEDURES
Large Joint Aspiration/Injection: R glenohumeral    Date/Time: 10/8/2024 10:15 AM    Performed by: Marjorie Garcia MD  Authorized by: Marjorie Garcia MD    Consent Done?:  Yes (Verbal)  Indications:  Arthritis and pain  Site marked: the procedure site was marked    Timeout: prior to procedure the correct patient, procedure, and site was verified    Prep: patient was prepped and draped in usual sterile fashion      Local anesthesia used?: Yes    Anesthesia:  Local infiltration  Local anesthetic:  Co-phenylcaine spray    Details:  Needle Size:  22 G  Ultrasonic Guidance for needle placement?: Yes (Ultrasound guidance used to avoid neurovascular injury.)    Images are saved and documented.  Approach:  Posterior  Location:  Shoulder  Site:  R glenohumeral  Medications:  40 mg triamcinolone acetonide 40 mg/mL  Medications comment:  Ropivacaine 0.2% 2mL  Patient tolerance:  Patient tolerated the procedure well with no immediate complications     TECHNIQUE: Real time ultrasound examination of the right glenohumeral joint(s) was performed with SonoSite Edge 2, C1-5 MHz probe(s). Ultrasound guidance was used for needle localization. Images were saved and stored for documentation.  Dynamic visualization of the needle was continuous throughout the procedures and maintained in good position.

## 2024-10-15 ENCOUNTER — PATIENT OUTREACH (OUTPATIENT)
Dept: ADMINISTRATIVE | Facility: HOSPITAL | Age: 69
End: 2024-10-15
Payer: COMMERCIAL

## 2024-10-15 NOTE — PROGRESS NOTES
Health Maintenance Due   Topic Date Due    Shingles Vaccine (1 of 2) Never done    RSV Vaccine (Age 60+ and Pregnant patients) (1 - Risk 60-74 years 1-dose series) Never done    Influenza Vaccine (1) 09/01/2024    COVID-19 Vaccine (4 - 2024-25 season) 09/01/2024       Chart reviewed and updated. Reconciled immunizations.  Stefany Mccloud LPN   Clinical Care Coordinator  [facility]

## 2024-10-25 ENCOUNTER — PATIENT MESSAGE (OUTPATIENT)
Dept: INTERNAL MEDICINE | Facility: CLINIC | Age: 69
End: 2024-10-25
Payer: COMMERCIAL

## 2024-10-25 DIAGNOSIS — R25.2 MUSCLE CRAMPING: Primary | ICD-10-CM

## 2024-10-28 ENCOUNTER — LAB VISIT (OUTPATIENT)
Dept: LAB | Facility: HOSPITAL | Age: 69
End: 2024-10-28
Attending: INTERNAL MEDICINE
Payer: COMMERCIAL

## 2024-10-28 DIAGNOSIS — R25.2 MUSCLE CRAMPING: ICD-10-CM

## 2024-10-28 LAB
ALBUMIN SERPL BCP-MCNC: 4.1 G/DL (ref 3.5–5.2)
ALP SERPL-CCNC: 46 U/L (ref 40–150)
ALT SERPL W/O P-5'-P-CCNC: 14 U/L (ref 10–44)
ANION GAP SERPL CALC-SCNC: 8 MMOL/L (ref 8–16)
AST SERPL-CCNC: 21 U/L (ref 10–40)
BASOPHILS # BLD AUTO: 0.03 K/UL (ref 0–0.2)
BASOPHILS NFR BLD: 0.4 % (ref 0–1.9)
BILIRUB SERPL-MCNC: 0.5 MG/DL (ref 0.1–1)
BUN SERPL-MCNC: 19 MG/DL (ref 8–23)
CALCIUM SERPL-MCNC: 9.3 MG/DL (ref 8.7–10.5)
CHLORIDE SERPL-SCNC: 108 MMOL/L (ref 95–110)
CK SERPL-CCNC: 124 U/L (ref 20–180)
CO2 SERPL-SCNC: 23 MMOL/L (ref 23–29)
CREAT SERPL-MCNC: 1.3 MG/DL (ref 0.5–1.4)
DIFFERENTIAL METHOD BLD: ABNORMAL
EOSINOPHIL # BLD AUTO: 0.1 K/UL (ref 0–0.5)
EOSINOPHIL NFR BLD: 0.7 % (ref 0–8)
ERYTHROCYTE [DISTWIDTH] IN BLOOD BY AUTOMATED COUNT: 14 % (ref 11.5–14.5)
EST. GFR  (NO RACE VARIABLE): 44 ML/MIN/1.73 M^2
GLUCOSE SERPL-MCNC: 123 MG/DL (ref 70–110)
HCT VFR BLD AUTO: 38.4 % (ref 37–48.5)
HGB BLD-MCNC: 12.1 G/DL (ref 12–16)
IMM GRANULOCYTES # BLD AUTO: 0.03 K/UL (ref 0–0.04)
IMM GRANULOCYTES NFR BLD AUTO: 0.4 % (ref 0–0.5)
LYMPHOCYTES # BLD AUTO: 1.4 K/UL (ref 1–4.8)
LYMPHOCYTES NFR BLD: 18.4 % (ref 18–48)
MAGNESIUM SERPL-MCNC: 2.2 MG/DL (ref 1.6–2.6)
MCH RBC QN AUTO: 25.8 PG (ref 27–31)
MCHC RBC AUTO-ENTMCNC: 31.5 G/DL (ref 32–36)
MCV RBC AUTO: 82 FL (ref 82–98)
MONOCYTES # BLD AUTO: 0.5 K/UL (ref 0.3–1)
MONOCYTES NFR BLD: 7 % (ref 4–15)
NEUTROPHILS # BLD AUTO: 5.5 K/UL (ref 1.8–7.7)
NEUTROPHILS NFR BLD: 73.1 % (ref 38–73)
NRBC BLD-RTO: 0 /100 WBC
PLATELET # BLD AUTO: 246 K/UL (ref 150–450)
PMV BLD AUTO: 11.1 FL (ref 9.2–12.9)
POTASSIUM SERPL-SCNC: 4.5 MMOL/L (ref 3.5–5.1)
PROT SERPL-MCNC: 6.8 G/DL (ref 6–8.4)
RBC # BLD AUTO: 4.69 M/UL (ref 4–5.4)
SODIUM SERPL-SCNC: 139 MMOL/L (ref 136–145)
TSH SERPL DL<=0.005 MIU/L-ACNC: 1.03 UIU/ML (ref 0.4–4)
WBC # BLD AUTO: 7.48 K/UL (ref 3.9–12.7)

## 2024-10-28 PROCEDURE — 36415 COLL VENOUS BLD VENIPUNCTURE: CPT | Mod: PO | Performed by: INTERNAL MEDICINE

## 2024-10-28 PROCEDURE — 85025 COMPLETE CBC W/AUTO DIFF WBC: CPT | Performed by: INTERNAL MEDICINE

## 2024-10-28 PROCEDURE — 84443 ASSAY THYROID STIM HORMONE: CPT | Performed by: INTERNAL MEDICINE

## 2024-10-28 PROCEDURE — 83735 ASSAY OF MAGNESIUM: CPT | Performed by: INTERNAL MEDICINE

## 2024-10-28 PROCEDURE — 82550 ASSAY OF CK (CPK): CPT | Performed by: INTERNAL MEDICINE

## 2024-10-28 PROCEDURE — 80053 COMPREHEN METABOLIC PANEL: CPT | Performed by: INTERNAL MEDICINE

## 2024-11-08 ENCOUNTER — PATIENT MESSAGE (OUTPATIENT)
Dept: INTERNAL MEDICINE | Facility: CLINIC | Age: 69
End: 2024-11-08
Payer: COMMERCIAL

## 2024-11-08 ENCOUNTER — PATIENT MESSAGE (OUTPATIENT)
Dept: UROGYNECOLOGY | Facility: CLINIC | Age: 69
End: 2024-11-08
Payer: COMMERCIAL

## 2024-11-08 DIAGNOSIS — N39.46 URINARY INCONTINENCE, MIXED: Primary | ICD-10-CM

## 2024-11-08 DIAGNOSIS — G47.09 OTHER INSOMNIA: ICD-10-CM

## 2024-11-08 RX ORDER — ZOLPIDEM TARTRATE 10 MG/1
10 TABLET ORAL NIGHTLY PRN
Qty: 30 TABLET | Refills: 0 | Status: SHIPPED | OUTPATIENT
Start: 2024-11-08

## 2024-11-13 RX ORDER — VIBEGRON 75 MG/1
75 TABLET, FILM COATED ORAL DAILY
Qty: 30 TABLET | Refills: 11 | Status: SHIPPED | OUTPATIENT
Start: 2024-11-13
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
## 2024-11-19 ENCOUNTER — OFFICE VISIT (OUTPATIENT)
Dept: SPORTS MEDICINE | Facility: CLINIC | Age: 69
End: 2024-11-19
Payer: COMMERCIAL

## 2024-11-19 ENCOUNTER — TELEPHONE (OUTPATIENT)
Dept: SPORTS MEDICINE | Facility: CLINIC | Age: 69
End: 2024-11-19

## 2024-11-19 DIAGNOSIS — M19.011 PRIMARY OSTEOARTHRITIS OF RIGHT SHOULDER: Primary | ICD-10-CM

## 2024-11-19 PROCEDURE — 99213 OFFICE O/P EST LOW 20 MIN: CPT | Mod: 95,,, | Performed by: STUDENT IN AN ORGANIZED HEALTH CARE EDUCATION/TRAINING PROGRAM

## 2024-11-19 PROCEDURE — 1159F MED LIST DOCD IN RCRD: CPT | Mod: CPTII,95,, | Performed by: STUDENT IN AN ORGANIZED HEALTH CARE EDUCATION/TRAINING PROGRAM

## 2024-11-19 PROCEDURE — 4010F ACE/ARB THERAPY RXD/TAKEN: CPT | Mod: CPTII,95,, | Performed by: STUDENT IN AN ORGANIZED HEALTH CARE EDUCATION/TRAINING PROGRAM

## 2024-11-19 PROCEDURE — 1160F RVW MEDS BY RX/DR IN RCRD: CPT | Mod: CPTII,95,, | Performed by: STUDENT IN AN ORGANIZED HEALTH CARE EDUCATION/TRAINING PROGRAM

## 2024-11-19 NOTE — TELEPHONE ENCOUNTER
----- Message from Marjorie Garcia MD sent at 11/19/2024  7:38 AM CST -----  Please get patient scheduled for on or after 01/08/2025 for repeat right glenohumeral joint injection.

## 2024-11-19 NOTE — Clinical Note
Please get patient scheduled for on or after 01/08/2025 for repeat right glenohumeral joint injection.

## 2024-11-26 ENCOUNTER — PATIENT MESSAGE (OUTPATIENT)
Dept: INTERNAL MEDICINE | Facility: CLINIC | Age: 69
End: 2024-11-26
Payer: COMMERCIAL

## 2024-11-26 ENCOUNTER — LAB VISIT (OUTPATIENT)
Dept: LAB | Facility: HOSPITAL | Age: 69
End: 2024-11-26
Attending: INTERNAL MEDICINE
Payer: COMMERCIAL

## 2024-11-26 DIAGNOSIS — I10 ESSENTIAL HYPERTENSION: ICD-10-CM

## 2024-11-26 LAB
ANION GAP SERPL CALC-SCNC: 10 MMOL/L (ref 8–16)
BUN SERPL-MCNC: 18 MG/DL (ref 8–23)
CALCIUM SERPL-MCNC: 9.3 MG/DL (ref 8.7–10.5)
CHLORIDE SERPL-SCNC: 105 MMOL/L (ref 95–110)
CO2 SERPL-SCNC: 25 MMOL/L (ref 23–29)
CREAT SERPL-MCNC: 1.1 MG/DL (ref 0.5–1.4)
EST. GFR  (NO RACE VARIABLE): 53.7 ML/MIN/1.73 M^2
GLUCOSE SERPL-MCNC: 80 MG/DL (ref 70–110)
POTASSIUM SERPL-SCNC: 4.8 MMOL/L (ref 3.5–5.1)
SODIUM SERPL-SCNC: 140 MMOL/L (ref 136–145)

## 2024-11-26 PROCEDURE — 80048 BASIC METABOLIC PNL TOTAL CA: CPT | Performed by: INTERNAL MEDICINE

## 2024-11-26 PROCEDURE — 36415 COLL VENOUS BLD VENIPUNCTURE: CPT | Mod: PO | Performed by: INTERNAL MEDICINE

## 2024-11-27 ENCOUNTER — OFFICE VISIT (OUTPATIENT)
Dept: INTERNAL MEDICINE | Facility: CLINIC | Age: 69
End: 2024-11-27
Payer: COMMERCIAL

## 2024-11-27 VITALS
OXYGEN SATURATION: 97 % | HEART RATE: 74 BPM | DIASTOLIC BLOOD PRESSURE: 70 MMHG | BODY MASS INDEX: 23.24 KG/M2 | HEIGHT: 64 IN | SYSTOLIC BLOOD PRESSURE: 128 MMHG | WEIGHT: 136.13 LBS

## 2024-11-27 DIAGNOSIS — I10 ESSENTIAL HYPERTENSION: ICD-10-CM

## 2024-11-27 DIAGNOSIS — Z00.00 ROUTINE GENERAL MEDICAL EXAMINATION AT A HEALTH CARE FACILITY: Primary | ICD-10-CM

## 2024-11-27 PROCEDURE — 1126F AMNT PAIN NOTED NONE PRSNT: CPT | Mod: CPTII,S$GLB,, | Performed by: INTERNAL MEDICINE

## 2024-11-27 PROCEDURE — 3288F FALL RISK ASSESSMENT DOCD: CPT | Mod: CPTII,S$GLB,, | Performed by: INTERNAL MEDICINE

## 2024-11-27 PROCEDURE — 99397 PER PM REEVAL EST PAT 65+ YR: CPT | Mod: S$GLB,,, | Performed by: INTERNAL MEDICINE

## 2024-11-27 PROCEDURE — 3074F SYST BP LT 130 MM HG: CPT | Mod: CPTII,S$GLB,, | Performed by: INTERNAL MEDICINE

## 2024-11-27 PROCEDURE — 4010F ACE/ARB THERAPY RXD/TAKEN: CPT | Mod: CPTII,S$GLB,, | Performed by: INTERNAL MEDICINE

## 2024-11-27 PROCEDURE — 3008F BODY MASS INDEX DOCD: CPT | Mod: CPTII,S$GLB,, | Performed by: INTERNAL MEDICINE

## 2024-11-27 PROCEDURE — 3078F DIAST BP <80 MM HG: CPT | Mod: CPTII,S$GLB,, | Performed by: INTERNAL MEDICINE

## 2024-11-27 PROCEDURE — 99999 PR PBB SHADOW E&M-EST. PATIENT-LVL V: CPT | Mod: PBBFAC,,, | Performed by: INTERNAL MEDICINE

## 2024-11-27 PROCEDURE — 1101F PT FALLS ASSESS-DOCD LE1/YR: CPT | Mod: CPTII,S$GLB,, | Performed by: INTERNAL MEDICINE

## 2024-11-27 PROCEDURE — 1159F MED LIST DOCD IN RCRD: CPT | Mod: CPTII,S$GLB,, | Performed by: INTERNAL MEDICINE

## 2024-11-27 NOTE — PROGRESS NOTES
CHIEF COMPLAINT:Annual exam and Follow up of medical problems    HISTORY OF PRESENT ILLNESS: 68 year-old woman who presents for above    She has been off pravastatin for the last 19 days.  She has had muscle spasms since off pravastatin - occurred in both legs -  when it occurs -  it happens the whole night. She has to get up and walk around.   Stretching and provoke a spasm. Never had leg cramps during the day.  Never leg cramps with exercise.  She continues to take co enzyme Q 200 mg daily.     She thinks that pravastatin gave her loose stools. BOwels are better off pravastatin    She is off solfenacin due to constipation.  S he has not started on Gemtesa.      She has indigestion after eating - she will have belching. She continues to take prilosec 40 mg in am and is taking pepcid 40 mg 4 out of 7 evenings at night.   She has a dry cough.  She has not lost weight. She is clearing her throat a lot.     Back is ok. She will take meloxicam or methocarbamol as needed - usually 2 nights a week.      She had her colonoscopy on 11/9/23 - normal due 10 years. She had an episode of diverticulitis on 12/26/24 - resolved with antibiotics.  She had her hemorrhoids banded 11/2023 during colonoscopy. Hemorrhoids have returned. Not bleeding as much.   She uses hydrocortisone cream which does help.  She has a BM 3 times every morning.  She has to get up early if she has to go somewhere.      She is in digital hypertension - she is taking valsartan 320 mg once dialy  and spironolactone 25 mg 1/2 tablet twice daily.  BP has been doing well. BP is low at times and she is lightheaded at times.      She generally exercises 5 days a week.       She has sleep apnea. She is not wearing CPAP machine. She fiddles with the machine all night long and does not sleep.      Her neck continues to be stiff at times. It will freeze at times. Not a lot of neck pain. She has cracking in the neck. She watches her position at night.  She does  "stretching of the neck which helps. Occasional muscle relaxer helps.         She had  bladder lift with sling, ERIN/BSO, umbilical hernia repair on 9/24/20. Procedure went well.. She has urinary urgency that is controlled with oxybutynin XL 5 mg daily. Switching to Gemtessa. She is using estradiol vaginal cream twice a week      No chest pain, shortness of breath     She has been off metamucil daily.      Sinuses are doing well. She will take zyrtec 10 mg daily as needed     She is taking pravastatin 40 mg daily for her hyperlipidemia. NO muscle spasms or joint pain.. SHe takes co enzyme Q10 200 mg daily. Intolerant to Crestor     She took Boniva 150 mg once monthly for her osteoporosis.  She stopped the Boniva in February 2020 due to improvement.           PAST MEDICAL HISTORY:   1. Hyperlipidemia.   2. Sleep disorder.   3. Cervical spine arthritis.   4. Episode of hematuria, negative cystoscope with Dr. Roland.     PAST SURGICAL HISTORY:   1. D&C for a miscarriage.   2. Conization due to dysplasia 18 years ago.   3. Tonsillectomy and adenoidectomy at age [age].     SOCIAL HISTORY: She does not smoke. . 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 She owns a business.     FAMILY HISTORY: She is adopted.     REVIEW OF SYSTEMS: She denies fevers, chills, night sweats, hot flashes, visual change. She has some slight hearing loss. She denies sinus congestion, sore throat, chest pain, shortness of breath, palpitations, nausea, vomiting, constipation, diarrhea, dysuria, hematuria, joint pain, muscle pain, rashes, headaches, polydipsia,   polyuria.     SCREENING: Mammogram 3/2024, bone density 3/2024  .Colonoscopy 2/ 2018 - given 10 years    PHYSICAL EXAMINATION:      /70   Pulse 74   Ht 5' 4" (1.626 m)   Wt 61.7 kg (136 lb 2.1 oz)   LMP  (LMP Unknown)   SpO2 97%   BMI 23.37 kg/m²      General: Alert, oriented. No apparent distress. Affect within normal   limits.   Conjunctivae anicteric. PERRL. Tympanic membranes " clear  . Oropharynx clear.   Neck supple. No cervical lymphadenopathy, no thyroid enlargement.   Respiratory effort normal. Lungs clear to auscultation.   Heart: Regular rate and rhythm without murmurs, gallops or rubs.   No lower extremity edema.    2+  posterior tibial and doraslis pedis pulses bilaterally    Approx 0.5 cm x 0.5 cm soft free mobile lymph node on the back of the left neck.- posterior cervical lymph node.          ASSESSMENT AND PLAN:      Annual exam - discussed diet, exercise and safety issues.   Leg cramps at night - suspect hydration as an issue - decrease spironolactone to 25 gm 1/2 tablet once daily.  Stay off pravastatin for now. Let me know how doing in the next month. Push fluids.  GERD- increase omeprazole 40 mg in am and take famotidine 40 mg in the evening as needed  Hypertension -digital hypertension.   Decrease spironolactone to 25 mg 1/2 tablet once daily  Neck pain due to OA cervical spine - stable  Diarrhea  -stable off metamucil  Hyperlipidemia- stable on meds   ALlergic rhinitis - stable.    Osteoporosis - 2/2022 - with increase in bone density - may continue drug holiday. BMD 3/24- restarted ibandronate 150 mg once a month 3/2024  Insomnia - ambien as needed  Hemorrhoids- cream and suppositories  - stable  Left posterior cervical lymph node- reassured. Will montior  She is to return in 3 months a if problems arise

## 2024-11-27 NOTE — PATIENT INSTRUCTIONS
Decrease spironolactone to 25 mg 1/2 tablet once daily in the morning.     Continue to monitor blood pressure.     Stay off pravastatin.    Let me know how your symptoms are in the next month.

## 2024-12-31 ENCOUNTER — PATIENT MESSAGE (OUTPATIENT)
Dept: INTERNAL MEDICINE | Facility: CLINIC | Age: 69
End: 2024-12-31
Payer: COMMERCIAL

## 2025-01-01 RX ORDER — AMLODIPINE BESYLATE 2.5 MG/1
2.5 TABLET ORAL NIGHTLY
Qty: 90 TABLET | Refills: 3 | Status: SHIPPED | OUTPATIENT
Start: 2025-01-01 | End: 2026-01-01

## 2025-01-07 ENCOUNTER — TELEPHONE (OUTPATIENT)
Dept: ORTHOPEDICS | Facility: CLINIC | Age: 70
End: 2025-01-07
Payer: COMMERCIAL

## 2025-01-07 NOTE — TELEPHONE ENCOUNTER
I spoke with pt,schedule appointment. Pt,verbalized understanding        ----- Message from Inez sent at 2025 12:37 PM CST -----  Regarding: no availability  Contact: Pt @ [phone]  Pt called in to schedule an appt; no available appts in Epic. Pt is asking for a call back soon to schedule. Thanks.              Patient's DX: left foot pain         Patient requesting call back or [facility] [phone]

## 2025-01-10 ENCOUNTER — HOSPITAL ENCOUNTER (OUTPATIENT)
Dept: RADIOLOGY | Facility: HOSPITAL | Age: 70
Discharge: HOME | End: 2025-01-10
Attending: ORTHOPAEDIC SURGERY
Payer: COMMERCIAL

## 2025-01-10 ENCOUNTER — OFFICE VISIT (OUTPATIENT)
Dept: ORTHOPEDICS | Facility: CLINIC | Age: 70
End: 2025-01-10
Payer: COMMERCIAL

## 2025-01-10 VITALS — BODY MASS INDEX: 23.22 KG/M2 | HEIGHT: 64 IN | WEIGHT: 136 LBS

## 2025-01-10 DIAGNOSIS — R52 PAIN: ICD-10-CM

## 2025-01-10 DIAGNOSIS — M65.979 SYNOVITIS OF TOE: Primary | ICD-10-CM

## 2025-01-10 DIAGNOSIS — M77.42 METATARSALGIA OF LEFT FOOT: ICD-10-CM

## 2025-01-10 PROCEDURE — 1160F RVW MEDS BY RX/DR IN RCRD: CPT | Mod: CPTII,S$GLB,, | Performed by: ORTHOPAEDIC SURGERY

## 2025-01-10 PROCEDURE — 99999 PR PBB SHADOW E&M-EST. PATIENT-LVL IV: CPT | Mod: PBBFAC,,, | Performed by: ORTHOPAEDIC SURGERY

## 2025-01-10 PROCEDURE — 3008F BODY MASS INDEX DOCD: CPT | Mod: CPTII,S$GLB,, | Performed by: ORTHOPAEDIC SURGERY

## 2025-01-10 PROCEDURE — 99214 OFFICE O/P EST MOD 30 MIN: CPT | Mod: S$GLB,,, | Performed by: ORTHOPAEDIC SURGERY

## 2025-01-10 PROCEDURE — 1125F AMNT PAIN NOTED PAIN PRSNT: CPT | Mod: CPTII,S$GLB,, | Performed by: ORTHOPAEDIC SURGERY

## 2025-01-10 PROCEDURE — 73630 X-RAY EXAM OF FOOT: CPT | Mod: 26,LT,, | Performed by: RADIOLOGY

## 2025-01-10 PROCEDURE — 3288F FALL RISK ASSESSMENT DOCD: CPT | Mod: CPTII,S$GLB,, | Performed by: ORTHOPAEDIC SURGERY

## 2025-01-10 PROCEDURE — 1101F PT FALLS ASSESS-DOCD LE1/YR: CPT | Mod: CPTII,S$GLB,, | Performed by: ORTHOPAEDIC SURGERY

## 2025-01-10 PROCEDURE — 1159F MED LIST DOCD IN RCRD: CPT | Mod: CPTII,S$GLB,, | Performed by: ORTHOPAEDIC SURGERY

## 2025-01-10 PROCEDURE — 73630 X-RAY EXAM OF FOOT: CPT | Mod: TC,LT

## 2025-01-10 RX ORDER — METHYLPREDNISOLONE 4 MG/1
TABLET ORAL
Qty: 1 EACH | Refills: 0 | Status: SHIPPED | OUTPATIENT
Start: 2025-01-10
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
## 2025-01-20 ENCOUNTER — PATIENT MESSAGE (OUTPATIENT)
Dept: ORTHOPEDICS | Facility: CLINIC | Age: 70
End: 2025-01-20
Payer: COMMERCIAL

## 2025-01-25 ENCOUNTER — HOSPITAL ENCOUNTER (OUTPATIENT)
Dept: RADIOLOGY | Facility: HOSPITAL | Age: 70
Discharge: HOME | End: 2025-01-25
Attending: ORTHOPAEDIC SURGERY
Payer: COMMERCIAL

## 2025-01-25 DIAGNOSIS — M65.979 SYNOVITIS OF TOE: ICD-10-CM

## 2025-01-25 DIAGNOSIS — M77.42 METATARSALGIA OF LEFT FOOT: ICD-10-CM

## 2025-01-25 PROCEDURE — 73718 MRI LOWER EXTREMITY W/O DYE: CPT | Mod: TC,LT

## 2025-01-25 PROCEDURE — 73718 MRI LOWER EXTREMITY W/O DYE: CPT | Mod: 26,LT,, | Performed by: RADIOLOGY

## 2025-01-27 ENCOUNTER — PATIENT MESSAGE (OUTPATIENT)
Dept: INTERNAL MEDICINE | Facility: CLINIC | Age: 70
End: 2025-01-27
Payer: COMMERCIAL

## 2025-01-28 ENCOUNTER — OFFICE VISIT (OUTPATIENT)
Dept: ORTHOPEDICS | Facility: CLINIC | Age: 70
End: 2025-01-28
Payer: COMMERCIAL

## 2025-01-28 DIAGNOSIS — S99.922D INJURY OF PLANTAR PLATE OF LEFT FOOT, SUBSEQUENT ENCOUNTER: ICD-10-CM

## 2025-01-28 DIAGNOSIS — M65.979 SYNOVITIS OF TOE: Primary | ICD-10-CM

## 2025-01-30 RX ORDER — OMEPRAZOLE 40 MG/1
40 CAPSULE, DELAYED RELEASE ORAL
Qty: 90 CAPSULE | Refills: 3 | Status: SHIPPED | OUTPATIENT
Start: 2025-01-30

## 2025-01-30 NOTE — TELEPHONE ENCOUNTER
Care Due:                  Date            Visit Type   Department     Provider  --------------------------------------------------------------------------------                                EP -                              PRIMARY      [facility] INTERNAL  Last Visit: 11-      CARE ([facility])   MEDICINE       JOURDAN TINOCO                              MYCHART                              FOLLOWUP/OF  [facility] INTERNAL  Next Visit: 02-      FICE VISIT   MEDICINE       JOURDAN TINOCO                                                            Last  Test          Frequency    Reason                     Performed    Due Date  --------------------------------------------------------------------------------    Lipid Panel.  12 months..  pravastatin..............  05- 04-    Phosphate...  12 months..  ibandronate..............  Not Found    Overdue    Vitamin D...  12 months..  ergocalciferol...........  05- 04-    [facility] Embedded Care Due Messages. Reference number: [id number].   1/30/2025 8:52:55 AM CST

## 2025-01-30 NOTE — TELEPHONE ENCOUNTER
Provider Staff:  Action required for this patient    Requires labs      Please see care gap opportunities below in Care Due Message.    Thanks!  [facility]     Appointments      Date Provider   Last Visit   11/27/2024 Bonnie Mathew MD   Next Visit   2/18/2025 Bonnie Mathew MD     Refill Decision Note   Tj Hernandez  is requesting a refill authorization.  Brief Assessment and Rationale for Refill:  Approve     Medication Therapy Plan:  FOVS      Alert overridden per protocol: Yes   Comments:     Note composed:12:22 PM 01/30/2025

## 2025-02-03 RX ORDER — METHOCARBAMOL 500 MG/1
500 TABLET, FILM COATED ORAL 4 TIMES DAILY
Qty: 40 TABLET | Refills: 4 | Status: SHIPPED | OUTPATIENT
Start: 2025-02-03

## 2025-02-04 ENCOUNTER — OFFICE VISIT (OUTPATIENT)
Dept: ORTHOPEDICS | Facility: CLINIC | Age: 70
End: 2025-02-04
Payer: COMMERCIAL

## 2025-02-04 VITALS — WEIGHT: 136 LBS | HEIGHT: 64 IN | BODY MASS INDEX: 23.22 KG/M2

## 2025-02-04 DIAGNOSIS — M65.979 SYNOVITIS OF TOE: ICD-10-CM

## 2025-02-04 DIAGNOSIS — G57.62 MORTON'S NEUROMA OF LEFT FOOT: Primary | ICD-10-CM

## 2025-02-04 PROCEDURE — 3008F BODY MASS INDEX DOCD: CPT | Mod: CPTII,S$GLB,, | Performed by: ORTHOPAEDIC SURGERY

## 2025-02-04 PROCEDURE — 4010F ACE/ARB THERAPY RXD/TAKEN: CPT | Mod: CPTII,S$GLB,, | Performed by: ORTHOPAEDIC SURGERY

## 2025-02-04 PROCEDURE — 64455 NJX AA&/STRD PLTR COM DG NRV: CPT | Mod: LT,S$GLB,, | Performed by: ORTHOPAEDIC SURGERY

## 2025-02-04 PROCEDURE — 99212 OFFICE O/P EST SF 10 MIN: CPT | Mod: 25,S$GLB,, | Performed by: ORTHOPAEDIC SURGERY

## 2025-02-04 PROCEDURE — 3288F FALL RISK ASSESSMENT DOCD: CPT | Mod: CPTII,S$GLB,, | Performed by: ORTHOPAEDIC SURGERY

## 2025-02-04 PROCEDURE — 99999 PR PBB SHADOW E&M-EST. PATIENT-LVL IV: CPT | Mod: PBBFAC,,, | Performed by: ORTHOPAEDIC SURGERY

## 2025-02-04 PROCEDURE — 1101F PT FALLS ASSESS-DOCD LE1/YR: CPT | Mod: CPTII,S$GLB,, | Performed by: ORTHOPAEDIC SURGERY

## 2025-02-04 PROCEDURE — 1159F MED LIST DOCD IN RCRD: CPT | Mod: CPTII,S$GLB,, | Performed by: ORTHOPAEDIC SURGERY

## 2025-02-04 PROCEDURE — 1125F AMNT PAIN NOTED PAIN PRSNT: CPT | Mod: CPTII,S$GLB,, | Performed by: ORTHOPAEDIC SURGERY

## 2025-02-04 RX ORDER — METHYLPREDNISOLONE ACETATE 40 MG/ML
40 INJECTION, SUSPENSION INTRA-ARTICULAR; INTRALESIONAL; INTRAMUSCULAR; SOFT TISSUE
Status: COMPLETED | OUTPATIENT
Start: 2025-02-04 | End: 2025-02-04

## 2025-02-04 RX ADMIN — METHYLPREDNISOLONE ACETATE 40 MG: 40 INJECTION, SUSPENSION INTRA-ARTICULAR; INTRALESIONAL; INTRAMUSCULAR; SOFT TISSUE at 01:02

## 2025-02-04 NOTE — PROGRESS NOTES
Tj Hernandez  Returns today for follow-up.  This is a 69-year-old female who presented to me on 01/10/2025 with hallux valgus and pain around the 4th metatarsal head and the 4th MTP joint that had begun about two months prior and was progressively worsening.  X-rays did not reveal any obvious abnormalities around the 4th MTP joint, so I ordered an MRI scan.  MRI of the left forefoot on 01/25/2025 revealed disruption of the plantar plate in the lateral collateral ligament of the 4th MTP joint with medial subluxation.  She was also noted to have advanced degenerative changes of the big toe and arthritis of the midfoot on MRI.  I discussed the results with her and explained to her that repair of the plantar plate in the ligaments around the MTP joint can be difficult and unpredictable.  I suggested that we try a diagnostic/therapeutic block of the interdigital nerve to see if this would offer some relief and possibly a simpler solution to relief of her pain if she gets relief from the injection.    Examination: She walks in today with a normal gait.  She has tenderness mostly in the 3rd intermetatarsal space as well as around the 4th MTP joint.    Impression:  1. 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 Synovitis of toe, left 4th MTP          Recommendation:  After verbal consent and sterile prep I injected the left 4th intermetatarsal space with 2 cc lidocaine and 40 mg of methylprednisolone.    If she has good short-term relief then she might be a candidate for a Samaniego's neurectomy.    Follow-up in four weeks

## 2025-02-04 NOTE — TELEPHONE ENCOUNTER
No care due was identified.  [facility] Embedded Care Due Messages. Reference number: [id number].   2/03/2025 9:12:30 PM CST

## 2025-02-17 ENCOUNTER — LAB VISIT (OUTPATIENT)
Dept: LAB | Facility: HOSPITAL | Age: 70
End: 2025-02-17
Attending: INTERNAL MEDICINE
Payer: COMMERCIAL

## 2025-02-17 DIAGNOSIS — I10 ESSENTIAL HYPERTENSION: ICD-10-CM

## 2025-02-17 LAB
ALBUMIN SERPL BCP-MCNC: 3.9 G/DL (ref 3.5–5.2)
ALP SERPL-CCNC: 50 U/L (ref 40–150)
ALT SERPL W/O P-5'-P-CCNC: 13 U/L (ref 10–44)
ANION GAP SERPL CALC-SCNC: 9 MMOL/L (ref 8–16)
AST SERPL-CCNC: 23 U/L (ref 10–40)
BASOPHILS # BLD AUTO: 0.05 K/UL (ref 0–0.2)
BASOPHILS NFR BLD: 0.7 % (ref 0–1.9)
BILIRUB SERPL-MCNC: 0.5 MG/DL (ref 0.1–1)
BUN SERPL-MCNC: 14 MG/DL (ref 8–23)
CALCIUM SERPL-MCNC: 9 MG/DL (ref 8.7–10.5)
CHLORIDE SERPL-SCNC: 104 MMOL/L (ref 95–110)
CO2 SERPL-SCNC: 26 MMOL/L (ref 23–29)
CREAT SERPL-MCNC: 1 MG/DL (ref 0.5–1.4)
DIFFERENTIAL METHOD BLD: ABNORMAL
EOSINOPHIL # BLD AUTO: 0.1 K/UL (ref 0–0.5)
EOSINOPHIL NFR BLD: 1.6 % (ref 0–8)
ERYTHROCYTE [DISTWIDTH] IN BLOOD BY AUTOMATED COUNT: 13.6 % (ref 11.5–14.5)
EST. GFR  (NO RACE VARIABLE): >60 ML/MIN/1.73 M^2
GLUCOSE SERPL-MCNC: 96 MG/DL (ref 70–110)
HCT VFR BLD AUTO: 41.7 % (ref 37–48.5)
HGB BLD-MCNC: 13 G/DL (ref 12–16)
IMM GRANULOCYTES # BLD AUTO: 0.02 K/UL (ref 0–0.04)
IMM GRANULOCYTES NFR BLD AUTO: 0.3 % (ref 0–0.5)
LYMPHOCYTES # BLD AUTO: 1.6 K/UL (ref 1–4.8)
LYMPHOCYTES NFR BLD: 22.7 % (ref 18–48)
MCH RBC QN AUTO: 25.5 PG (ref 27–31)
MCHC RBC AUTO-ENTMCNC: 31.2 G/DL (ref 32–36)
MCV RBC AUTO: 82 FL (ref 82–98)
MONOCYTES # BLD AUTO: 0.6 K/UL (ref 0.3–1)
MONOCYTES NFR BLD: 8.5 % (ref 4–15)
NEUTROPHILS # BLD AUTO: 4.6 K/UL (ref 1.8–7.7)
NEUTROPHILS NFR BLD: 66.2 % (ref 38–73)
NRBC BLD-RTO: 0 /100 WBC
PLATELET # BLD AUTO: 276 K/UL (ref 150–450)
PMV BLD AUTO: 10.6 FL (ref 9.2–12.9)
POTASSIUM SERPL-SCNC: 4.1 MMOL/L (ref 3.5–5.1)
PROT SERPL-MCNC: 7 G/DL (ref 6–8.4)
RBC # BLD AUTO: 5.09 M/UL (ref 4–5.4)
SODIUM SERPL-SCNC: 139 MMOL/L (ref 136–145)
WBC # BLD AUTO: 6.97 K/UL (ref 3.9–12.7)

## 2025-02-17 PROCEDURE — 36415 COLL VENOUS BLD VENIPUNCTURE: CPT | Mod: PO | Performed by: INTERNAL MEDICINE

## 2025-02-17 PROCEDURE — 85025 COMPLETE CBC W/AUTO DIFF WBC: CPT | Performed by: INTERNAL MEDICINE

## 2025-02-17 PROCEDURE — 80053 COMPREHEN METABOLIC PANEL: CPT | Performed by: INTERNAL MEDICINE

## 2025-02-18 ENCOUNTER — OFFICE VISIT (OUTPATIENT)
Dept: INTERNAL MEDICINE | Facility: CLINIC | Age: 70
End: 2025-02-18
Payer: COMMERCIAL

## 2025-02-18 VITALS
HEART RATE: 67 BPM | SYSTOLIC BLOOD PRESSURE: 126 MMHG | DIASTOLIC BLOOD PRESSURE: 74 MMHG | WEIGHT: 136.25 LBS | HEIGHT: 64 IN | BODY MASS INDEX: 23.26 KG/M2 | OXYGEN SATURATION: 98 %

## 2025-02-18 DIAGNOSIS — J06.9 UPPER RESPIRATORY TRACT INFECTION, UNSPECIFIED TYPE: ICD-10-CM

## 2025-02-18 DIAGNOSIS — K21.9 GASTROESOPHAGEAL REFLUX DISEASE WITHOUT ESOPHAGITIS: ICD-10-CM

## 2025-02-18 DIAGNOSIS — E55.9 VITAMIN D DEFICIENCY DISEASE: ICD-10-CM

## 2025-02-18 DIAGNOSIS — E78.5 HYPERLIPIDEMIA, UNSPECIFIED HYPERLIPIDEMIA TYPE: ICD-10-CM

## 2025-02-18 DIAGNOSIS — M81.0 AGE-RELATED OSTEOPOROSIS WITHOUT CURRENT PATHOLOGICAL FRACTURE: ICD-10-CM

## 2025-02-18 DIAGNOSIS — I10 ESSENTIAL HYPERTENSION: ICD-10-CM

## 2025-02-18 DIAGNOSIS — Z13.6 ENCOUNTER FOR SCREENING FOR CARDIOVASCULAR DISORDERS: ICD-10-CM

## 2025-02-18 DIAGNOSIS — Z12.31 SCREENING MAMMOGRAM FOR BREAST CANCER: Primary | ICD-10-CM

## 2025-02-18 PROBLEM — S52.125A CLOSED NONDISPLACED FRACTURE OF HEAD OF LEFT RADIUS: Status: RESOLVED | Noted: 2018-05-01 | Resolved: 2025-02-18

## 2025-02-18 RX ORDER — AZITHROMYCIN 250 MG/1
TABLET, FILM COATED ORAL
Qty: 6 TABLET | Refills: 0 | Status: SHIPPED | OUTPATIENT
Start: 2025-02-18

## 2025-02-18 RX ORDER — IBANDRONATE SODIUM 150 MG/1
150 TABLET, FILM COATED ORAL
Qty: 3 TABLET | Refills: 4 | Status: SHIPPED | OUTPATIENT
Start: 2025-02-18 | End: 2025-02-21

## 2025-02-18 NOTE — PROGRESS NOTES
CHIEF COMPLAINT:Follow up of medical problems    HISTORY OF PRESENT ILLNESS: 69 year-old woman who presents for above    She is back on pravastatin 40 mg once daily - only had leg cramps once - slight.  Leg cramps improved since she has been off spironolactone.  She takes valsartan 320 mg once daily and amlodipine 2.5 mg at night.  BP has been doing ok.   She continues to take co enzyme Q 200 mg daily.      She is off solfenacin due to constipation.  She has not started on Gemtesa.      Heartburn is controlled.  She continues to take prilosec 40 mg in am and is taking pepcid 40 mg at night as needed (not as often)    Back flared again - she took meloxicam or methocarbamol for 3-4 days which helped.      She had her colonoscopy on 11/9/23 - normal due 10 years. She had an episode of diverticulitis on 12/26/24 - resolved with antibiotics.  She had her hemorrhoids banded 11/2023 during colonoscopy. Hemorrhoids have returned. Not bleeding as much.   She uses hydrocortisone cream which does help.  She has a BM 3 times every morning.  She has to get up early if she has to go somewhere.      She is in digital hypertension - she is taking valsartan 320 mg once dialy  and spironolactone 25 mg 1/2 tablet twice daily.  BP has been doing well. BP is low at times and she is lightheaded at times.      She generally exercises 5 days a week.       She has sleep apnea. She is not wearing CPAP machine. She fiddles with the machine all night long and does not sleep.      Her neck continues to be stiff at times. It will freeze at times. Not a lot of neck pain. She has cracking in the neck. She watches her position at night.  She does stretching of the neck which helps. Occasional muscle relaxer helps.         She had  bladder lift with sling, ERIN/BSO, umbilical hernia repair on 9/24/20. Procedure went well.. She has urinary urgency that is controlled with oxybutynin XL 5 mg daily. Switching to Gemtessa. She is using estradiol vaginal  "cream twice a week      No chest pain, shortness of breath     She has been off metamucil daily.    She has left foot pain - saw Dr Swenson 1/10/25- had a MRI 1/25/25- has arthritis and a ligament issue.   She had an injection in the foot whcheikh helped.   She took a medrol dose pack    She has right carpal tunnel syndrome - had an injection with dr cazares which helped.       She is taking pravastatin 40 mg daily for her hyperlipidemia. NO muscle spasms or joint pain.. SHe takes co enzyme Q10 200 mg daily. Intolerant to Crestor     She took Boniva 150 mg once monthly for her osteoporosis.  She stopped the Boniva in February 2020 due to improvement.           PAST MEDICAL HISTORY:   1. Hyperlipidemia.   2. Sleep disorder.   3. Cervical spine arthritis.   4. Episode of hematuria, negative cystoscope with Dr. Roland.     PAST SURGICAL HISTORY:   1. D&C for a miscarriage.   2. Conization due to dysplasia 18 years ago.   3. Tonsillectomy and adenoidectomy at age [age].     SOCIAL HISTORY: She does not smoke. . 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 She owns a business.     FAMILY HISTORY: She is adopted.     REVIEW OF SYSTEMS: She denies fevers, chills, night sweats, hot flashes, visual change. She has some slight hearing loss. She denies sinus congestion, sore throat, chest pain, shortness of breath, palpitations, nausea, vomiting, constipation, diarrhea, dysuria, hematuria, joint pain, muscle pain, rashes, headaches, polydipsia,   polyuria.     SCREENING: Mammogram 3/2024, bone density 3/2024  .Colonoscopy 2/ 2018 - given 10 years    PHYSICAL EXAMINATION:       /74 (BP Location: Right arm, Patient Position: Sitting)   Pulse 67   Ht 5' 4" (1.626 m)   Wt 61.8 kg (136 lb 3.9 oz)   LMP  (LMP Unknown)   SpO2 98%   BMI 23.39 kg/m²      General: Alert, oriented. No apparent distress. Affect within normal   limits.   Conjunctivae anicteric. PERRL. Tympanic membranes clear  . Oropharynx clear.   Neck supple. No " cervical lymphadenopathy, no thyroid enlargement.   Respiratory effort normal. Lungs clear to auscultation.   Heart: Regular rate and rhythm without murmurs, gallops or rubs.   No lower extremity edema.         Approx 0.5 cm x 0.5 cm soft free mobile lymph node on the back of the left neck.- posterior cervical lymph node.          ASSESSMENT AND PLAN:    .   Leg cramps at night -better off spironolactone to 25 mg   GERD- stable on  omeprazole 40 mg in am and  famotidine 40 mg in the evening as needed  Hypertension -digital hypertension.   Controlled  Neck pain due to OA cervical spine - stable  Diarrhea  -stable off metamucil- currently has diarrhea from viral uri.   Hyperlipidemia- stable on meds   ALlergic rhinitis - stable.    Osteoporosis - 2/2022 - with increase in bone density - may continue drug holiday. BMD 3/24- restarted ibandronate 150 mg once a month 3/2024- BMD due 3/2026  Insomnia - ambien as needed  Hemorrhoids- cream and suppositories  - stable  Left posterior cervical lymph node- reassured. Will montior  Viral URI - managing - if gets a bacterial superinfection in several days, she is to start on a Z pack  She is to return in 6 months a if problems arise    Answers submitted by the patient for this visit:  Review of Systems Questionnaire (Submitted on 2/13/2025)  activity change: No  unexpected weight change: No  neck pain: No  hearing loss: No  rhinorrhea: No  trouble swallowing: No  eye discharge: No  visual disturbance: No  chest tightness: No  wheezing: No  chest pain: No  palpitations: No  blood in stool: No  constipation: No  vomiting: No  diarrhea: No  polydipsia: No  polyuria: No  difficulty urinating: No  hematuria: No  menstrual problem: No  dysuria: No  joint swelling: No  arthralgias: No  headaches: No  weakness: No  confusion: No  dysphoric mood: No

## 2025-02-21 DIAGNOSIS — M81.0 AGE-RELATED OSTEOPOROSIS WITHOUT CURRENT PATHOLOGICAL FRACTURE: ICD-10-CM

## 2025-02-21 RX ORDER — IBANDRONATE SODIUM 150 MG/1
150 TABLET, FILM COATED ORAL
Qty: 3 TABLET | Refills: 4 | Status: SHIPPED | OUTPATIENT
Start: 2025-02-21

## 2025-02-21 NOTE — TELEPHONE ENCOUNTER
No care due was identified.  [facility] Embedded Care Due Messages. Reference number: [id number].   2/21/2025 9:09:03 AM CST

## 2025-02-21 NOTE — TELEPHONE ENCOUNTER
Refill Routing Note   Medication(s) are not appropriate for processing by [facility] for the following reason(s):        Outside of protocol    [facility] action(s):  Route             Appointments  past 12m or future 3m with PCP    Date Provider   Last Visit   2/18/2025 Bonnie Mathew MD   Next Visit   8/5/2025 Bonnie Mathew MD   ED visits in past 90 days: 0        Note composed:11:11 AM 02/21/2025

## 2025-03-07 ENCOUNTER — OFFICE VISIT (OUTPATIENT)
Dept: ORTHOPEDICS | Facility: CLINIC | Age: 70
End: 2025-03-07
Payer: COMMERCIAL

## 2025-03-07 VITALS — BODY MASS INDEX: 23.26 KG/M2 | WEIGHT: 136.25 LBS | HEIGHT: 64 IN

## 2025-03-07 DIAGNOSIS — G57.62 MORTON'S NEUROMA OF LEFT FOOT: Primary | ICD-10-CM

## 2025-03-07 DIAGNOSIS — S99.922D INJURY OF PLANTAR PLATE OF LEFT FOOT, SUBSEQUENT ENCOUNTER: ICD-10-CM

## 2025-03-07 DIAGNOSIS — M65.979 SYNOVITIS OF TOE: ICD-10-CM

## 2025-03-07 PROCEDURE — 99999 PR PBB SHADOW E&M-EST. PATIENT-LVL IV: CPT | Mod: PBBFAC,,, | Performed by: ORTHOPAEDIC SURGERY

## 2025-03-07 NOTE — PROGRESS NOTES
Tj Hernandez  Returns today for follow-up.  This is a 69-year-old female who presented to me on 01/10/2025 with a history of left hallux valgus but her main pain was related to the ball of her foot around the base of the 4th toe.  An MRI of the left forefoot on 01/25/2025 revealed disruption of the plantar plate and the lateral collateral ligament of the 4th MTP joint with some medial subluxation.  I explained to her that surgery for degenerative tears of the plantar plate can be unpredictable and I suggested perform a diagnostic/therapeutic block of the nerve of the 3rd intermetatarsal space.  On her last visit on 02/04/2025 I performed a diagnostic the left 3rd intermetatarsal space with lidocaine and steroid and she returns today and reports reports that she had 2 hours relief while the lidocaine was in effect but did not have any significant long-term relief once the lidocaine wore off.    Examination:  She has continued tenderness in the 3rd intermetatarsal space and around the 4th MTP joint.  She does not have significant pain with motion of the 4th toe.    Impression:  1. 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 Synovitis of toe, left 4th MTP        3. Plantar plate disruption left 4th toe MTP joint          Recommendation:  Based on the short-term response to the lidocaine, I think she would be a candidate for a neurectomy of the 3rd intermetatarsal space.  She thinks she would like to proceed with the surgery and will call let us know when she would like to have it done.

## 2025-03-10 ENCOUNTER — PATIENT MESSAGE (OUTPATIENT)
Dept: OPTOMETRY | Facility: CLINIC | Age: 70
End: 2025-03-10
Payer: COMMERCIAL

## 2025-03-11 ENCOUNTER — TELEPHONE (OUTPATIENT)
Dept: OPTOMETRY | Facility: CLINIC | Age: 70
End: 2025-03-11
Payer: COMMERCIAL

## 2025-03-11 NOTE — TELEPHONE ENCOUNTER
Extended     Contact Lens Final Rx       Final Contact Lens Rx         Brand Base Curve Diameter Sphere Cylinder Axis Lens    Right Biotrue Oneday 8.6 14.2 +3.75 Sphere  near    Left Biotrue Oneday for Astigmatism 8.9 14.5 +2.25 -0.75 080 distance      Expiration Date: 3/11/2026    Replacement: Daily    Wearing Schedule: Daily Wear

## 2025-03-16 ENCOUNTER — PATIENT MESSAGE (OUTPATIENT)
Dept: OPTOMETRY | Facility: CLINIC | Age: 70
End: 2025-03-16
Payer: COMMERCIAL

## 2025-03-26 ENCOUNTER — PATIENT MESSAGE (OUTPATIENT)
Dept: INTERNAL MEDICINE | Facility: CLINIC | Age: 70
End: 2025-03-26
Payer: COMMERCIAL

## 2025-03-27 ENCOUNTER — E-VISIT (OUTPATIENT)
Dept: INTERNAL MEDICINE | Facility: CLINIC | Age: 70
End: 2025-03-27
Payer: COMMERCIAL

## 2025-03-27 ENCOUNTER — HOSPITAL ENCOUNTER (OUTPATIENT)
Dept: RADIOLOGY | Facility: HOSPITAL | Age: 70
Discharge: HOME | End: 2025-03-27
Attending: INTERNAL MEDICINE
Payer: COMMERCIAL

## 2025-03-27 DIAGNOSIS — Z12.31 SCREENING MAMMOGRAM FOR BREAST CANCER: ICD-10-CM

## 2025-03-27 DIAGNOSIS — I10 HYPERTENSION, UNSPECIFIED TYPE: Primary | ICD-10-CM

## 2025-03-27 DIAGNOSIS — Z13.6 ENCOUNTER FOR SCREENING FOR CARDIOVASCULAR DISORDERS: ICD-10-CM

## 2025-03-27 DIAGNOSIS — I10 ESSENTIAL HYPERTENSION: ICD-10-CM

## 2025-03-27 PROCEDURE — 75571 CT HRT W/O DYE W/CA TEST: CPT | Mod: TC

## 2025-03-27 PROCEDURE — 77067 SCR MAMMO BI INCL CAD: CPT | Mod: 26,,, | Performed by: RADIOLOGY

## 2025-03-27 PROCEDURE — 77063 BREAST TOMOSYNTHESIS BI: CPT | Mod: 26,,, | Performed by: RADIOLOGY

## 2025-03-27 PROCEDURE — 75571 CT HRT W/O DYE W/CA TEST: CPT | Mod: 26,,, | Performed by: RADIOLOGY

## 2025-03-27 PROCEDURE — 77067 SCR MAMMO BI INCL CAD: CPT | Mod: TC

## 2025-03-28 RX ORDER — ERGOCALCIFEROL 1.25 MG/1
50000 CAPSULE ORAL
Qty: 24 CAPSULE | Refills: 0 | Status: SHIPPED | OUTPATIENT
Start: 2025-03-31

## 2025-03-28 NOTE — TELEPHONE ENCOUNTER
No care due was identified.  [facility] Embedded Care Due Messages. Reference number: [id number].   3/28/2025 8:26:25 AM CDT

## 2025-03-28 NOTE — PROGRESS NOTES
Patient ID: Tj Hernandez is a 69 y.o. female.    Chief Complaint: General Illness (Entered automatically based on patient selection in [employer].)    The patient initiated a request through [employer] on 3/27/2025 for evaluation and management with a chief complaint of General Illness (Entered automatically based on patient selection in [employer].)     I evaluated the questionnaire submission on 3/28/25.    [facility] E[facility]-Medication    3/27/2025 10:57 PM CDT - Filed by Patient   What do you need help with? Medication Management   Do you agree to participate in an E-Visit? Yes   If you have any of the following symptoms, please present to your local emergency room or call 911:  I acknowledge   Medication requests for narcotics will not be addressed via an E-Visit.  Please schedule an appointment. I acknowledge   Do you want to address a new or existing medication? I would like to address a medication I currently take   What is the main issue you would like addressed today? Swelling of my ankles   Would you like to change or continue your medication? Change medication   What medication would you like changed?  Amlodipine besylate   What is your current dose? 2.5mG   How often do you take your medication? 1 time daily   Why do you need the medication changed? Side effects   List the side effects you had with the medication: Swollen ankles   Have you stopped taking the medicine? No   Are you still having side effects? Yes   Do you need treatment for your side effects? No    What medical condition is the  medication intended to treat? High blood pressure   Are you able to take your vital signs? Yes         Encounter Diagnosis   Name Primary?    Hypertension, unspecified type Yes        No orders of the defined types were placed in this encounter.           No follow-ups on file.      E-Visit Time Trackin minutes

## 2025-03-28 NOTE — TELEPHONE ENCOUNTER
Refill Routing Note   Medication(s) are not appropriate for processing by [facility] for the following reason(s):        Outside of protocol    [facility] action(s):  Route               Appointments  past 12m or future 3m with PCP    Date Provider   Last Visit   2/18/2025 Bonnie Mathew MD   Next Visit   8/5/2025 Bonnie Mathew MD   ED visits in past 90 days: 0        Note composed:9:38 AM 03/28/2025

## 2025-04-27 ENCOUNTER — PATIENT MESSAGE (OUTPATIENT)
Dept: INTERNAL MEDICINE | Facility: CLINIC | Age: 70
End: 2025-04-27
Payer: COMMERCIAL

## 2025-04-30 ENCOUNTER — PATIENT MESSAGE (OUTPATIENT)
Dept: INTERNAL MEDICINE | Facility: CLINIC | Age: 70
End: 2025-04-30
Payer: COMMERCIAL

## 2025-04-30 DIAGNOSIS — I10 ESSENTIAL HYPERTENSION: ICD-10-CM

## 2025-04-30 DIAGNOSIS — E78.5 HYPERLIPIDEMIA, UNSPECIFIED HYPERLIPIDEMIA TYPE: ICD-10-CM

## 2025-04-30 RX ORDER — PRAVASTATIN SODIUM 40 MG/1
TABLET ORAL
Qty: 90 TABLET | Refills: 2 | Status: SHIPPED | OUTPATIENT
Start: 2025-04-30

## 2025-04-30 RX ORDER — MINOXIDIL 2.5 MG/1
2.5 TABLET ORAL NIGHTLY
Qty: 30 TABLET | Refills: 11 | Status: SHIPPED | OUTPATIENT
Start: 2025-04-30 | End: 2026-04-30

## 2025-04-30 RX ORDER — VALSARTAN 320 MG/1
320 TABLET ORAL
Qty: 90 TABLET | Refills: 3 | Status: SHIPPED | OUTPATIENT
Start: 2025-04-30

## 2025-04-30 NOTE — TELEPHONE ENCOUNTER
Refill Routing Note   Medication(s) are not appropriate for processing by [facility] for the following reason(s):        Required labs outdated    [facility] action(s):  Approve  Defer        Medication Therapy Plan: FLOS      Appointments  past 12m or future 3m with PCP    Date Provider   Last Visit   3/27/2025 Bonnie Mathew MD   Next Visit   8/5/2025 Bonine Mathew MD   ED visits in past 90 days: 0        Note composed:10:07 AM 04/30/2025

## 2025-04-30 NOTE — TELEPHONE ENCOUNTER
Care Due:                  Date            Visit Type   Department     Provider  --------------------------------------------------------------------------------                                MYCHART                              FOLLOWUP/OF  [facility] INTERNAL  Last Visit: 02-      FICE VISIT   MEDICINE       Bonnie Mathew                              EP -                              PRIMARY      [facility] INTERNAL  Next Visit: 08-      CARE ([facility])   MEDICINE       Bonnie Mathew                                                            Last  Test          Frequency    Reason                     Performed    Due Date  --------------------------------------------------------------------------------    Lipid Panel.  12 months..  pravastatin..............  05- 04-    Phosphate...  12 months..  ibandronate..............  Not Found    Overdue    Vitamin D...  12 months..  VITAMIN..................  05- 04-    [facility] Embedded Care Due Messages. Reference number: [id number].   4/30/2025 8:31:57 AM CDT

## 2025-05-07 ENCOUNTER — PATIENT MESSAGE (OUTPATIENT)
Dept: INTERNAL MEDICINE | Facility: CLINIC | Age: 70
End: 2025-05-07
Payer: COMMERCIAL

## 2025-05-23 ENCOUNTER — PATIENT MESSAGE (OUTPATIENT)
Dept: INTERNAL MEDICINE | Facility: CLINIC | Age: 70
End: 2025-05-23
Payer: COMMERCIAL

## 2025-06-02 ENCOUNTER — PATIENT MESSAGE (OUTPATIENT)
Dept: ADMINISTRATIVE | Facility: OTHER | Age: 70
End: 2025-06-02
Payer: COMMERCIAL

## 2025-07-18 RX ORDER — ERGOCALCIFEROL 1.25 MG/1
50000 CAPSULE ORAL
Qty: 24 CAPSULE | Refills: 0 | Status: SHIPPED | OUTPATIENT
Start: 2025-07-21

## 2025-07-18 NOTE — TELEPHONE ENCOUNTER
Care Due:                  Date            Visit Type   Department     Provider  --------------------------------------------------------------------------------                                MYCHART                              FOLLOWUP/OF  [facility] INTERNAL  Last Visit: 02-      FICE VISIT   MEDICINE       Bonnie Mathew                              EP -                              PRIMARY      [facility] INTERNAL  Next Visit: 08-      CARE ([facility])   MEDICINE       Bonnie Mathew                                                            Last  Test          Frequency    Reason                     Performed    Due Date  --------------------------------------------------------------------------------    Lipid Panel.  12 months..  pravastatin..............  05- 04-    Phosphate...  12 months..  ibandronate..............  Not Found    Overdue    Vitamin D...  12 months..  VITAMIN..................  05- 04-    [employer] Embedded Care Due Messages. Reference number: [id number].   7/18/2025 8:41:15 AM CDT

## 2025-07-18 NOTE — TELEPHONE ENCOUNTER
Refill Routing Note   Medication(s) are not appropriate for processing by [facility] for the following reason(s):        Outside of protocol    [facility] action(s):  Route        Medication Therapy Plan: FLOS      Appointments  past 12m or future 3m with PCP    Date Provider   Last Visit   3/27/2025 Bonnie Mathew MD   Next Visit   8/5/2025 Bonnie Mathew MD   ED visits in past 90 days: 0        Note composed:10:52 AM 07/18/2025

## 2025-07-22 ENCOUNTER — OFFICE VISIT (OUTPATIENT)
Dept: ORTHOPEDICS | Facility: CLINIC | Age: 70
End: 2025-07-22
Payer: COMMERCIAL

## 2025-07-22 VITALS — HEIGHT: 64 IN | BODY MASS INDEX: 23.26 KG/M2 | WEIGHT: 136.25 LBS

## 2025-07-22 DIAGNOSIS — M19.072 ARTHRITIS OF LEFT MIDFOOT: Primary | ICD-10-CM

## 2025-07-22 PROCEDURE — 3008F BODY MASS INDEX DOCD: CPT | Mod: CPTII,S$GLB,, | Performed by: ORTHOPAEDIC SURGERY

## 2025-07-22 PROCEDURE — 1159F MED LIST DOCD IN RCRD: CPT | Mod: CPTII,S$GLB,, | Performed by: ORTHOPAEDIC SURGERY

## 2025-07-22 PROCEDURE — 99213 OFFICE O/P EST LOW 20 MIN: CPT | Mod: S$GLB,,, | Performed by: ORTHOPAEDIC SURGERY

## 2025-07-22 PROCEDURE — 1101F PT FALLS ASSESS-DOCD LE1/YR: CPT | Mod: CPTII,S$GLB,, | Performed by: ORTHOPAEDIC SURGERY

## 2025-07-22 PROCEDURE — 99999 PR PBB SHADOW E&M-EST. PATIENT-LVL IV: CPT | Mod: PBBFAC,,, | Performed by: ORTHOPAEDIC SURGERY

## 2025-07-22 PROCEDURE — 3288F FALL RISK ASSESSMENT DOCD: CPT | Mod: CPTII,S$GLB,, | Performed by: ORTHOPAEDIC SURGERY

## 2025-07-22 PROCEDURE — 4010F ACE/ARB THERAPY RXD/TAKEN: CPT | Mod: CPTII,S$GLB,, | Performed by: ORTHOPAEDIC SURGERY

## 2025-07-22 PROCEDURE — 1125F AMNT PAIN NOTED PAIN PRSNT: CPT | Mod: CPTII,S$GLB,, | Performed by: ORTHOPAEDIC SURGERY

## 2025-07-24 ENCOUNTER — TELEPHONE (OUTPATIENT)
Dept: INTERVENTIONAL RADIOLOGY/VASCULAR | Facility: CLINIC | Age: 70
End: 2025-07-24
Payer: COMMERCIAL

## 2025-07-28 ENCOUNTER — PATIENT MESSAGE (OUTPATIENT)
Dept: INTERVENTIONAL RADIOLOGY/VASCULAR | Facility: HOSPITAL | Age: 70
End: 2025-07-28
Payer: COMMERCIAL

## 2025-07-31 ENCOUNTER — LAB VISIT (OUTPATIENT)
Dept: LAB | Facility: HOSPITAL | Age: 70
End: 2025-07-31
Attending: INTERNAL MEDICINE
Payer: COMMERCIAL

## 2025-07-31 DIAGNOSIS — I10 ESSENTIAL HYPERTENSION: ICD-10-CM

## 2025-07-31 DIAGNOSIS — E55.9 VITAMIN D DEFICIENCY DISEASE: ICD-10-CM

## 2025-07-31 LAB
25(OH)D3+25(OH)D2 SERPL-MCNC: 56 NG/ML (ref 30–96)
ABSOLUTE EOSINOPHIL (OHS): 0.06 K/UL
ABSOLUTE MONOCYTE (OHS): 0.6 K/UL (ref 0.3–1)
ABSOLUTE NEUTROPHIL COUNT (OHS): 4.89 K/UL (ref 1.8–7.7)
ALBUMIN SERPL BCP-MCNC: 4.4 G/DL (ref 3.5–5.2)
ALP SERPL-CCNC: 49 UNIT/L (ref 40–150)
ALT SERPL W/O P-5'-P-CCNC: 14 UNIT/L (ref 0–55)
ANION GAP (OHS): 7 MMOL/L (ref 8–16)
AST SERPL-CCNC: 25 UNIT/L (ref 0–50)
BASOPHILS # BLD AUTO: 0.05 K/UL
BASOPHILS NFR BLD AUTO: 0.7 %
BILIRUB SERPL-MCNC: 0.6 MG/DL (ref 0.1–1)
BUN SERPL-MCNC: 28 MG/DL (ref 8–23)
CALCIUM SERPL-MCNC: 9.4 MG/DL (ref 8.7–10.5)
CHLORIDE SERPL-SCNC: 107 MMOL/L (ref 95–110)
CHOLEST SERPL-MCNC: 242 MG/DL (ref 120–199)
CHOLEST/HDLC SERPL: 4 {RATIO} (ref 2–5)
CO2 SERPL-SCNC: 27 MMOL/L (ref 23–29)
CREAT SERPL-MCNC: 1.2 MG/DL (ref 0.5–1.4)
ERYTHROCYTE [DISTWIDTH] IN BLOOD BY AUTOMATED COUNT: 14.1 % (ref 11.5–14.5)
GFR SERPLBLD CREATININE-BSD FMLA CKD-EPI: 48 ML/MIN/1.73/M2
GLUCOSE SERPL-MCNC: 95 MG/DL (ref 70–110)
HCT VFR BLD AUTO: 41.2 % (ref 37–48.5)
HDLC SERPL-MCNC: 60 MG/DL (ref 40–75)
HDLC SERPL: 24.8 % (ref 20–50)
HGB BLD-MCNC: 13 GM/DL (ref 12–16)
IMM GRANULOCYTES # BLD AUTO: 0.03 K/UL (ref 0–0.04)
IMM GRANULOCYTES NFR BLD AUTO: 0.4 % (ref 0–0.5)
LDLC SERPL CALC-MCNC: 156 MG/DL (ref 63–159)
LYMPHOCYTES # BLD AUTO: 1.78 K/UL (ref 1–4.8)
MCH RBC QN AUTO: 25.1 PG (ref 27–31)
MCHC RBC AUTO-ENTMCNC: 31.6 G/DL (ref 32–36)
MCV RBC AUTO: 80 FL (ref 82–98)
NONHDLC SERPL-MCNC: 182 MG/DL
NUCLEATED RBC (/100WBC) (OHS): 0 /100 WBC
PLATELET # BLD AUTO: 238 K/UL (ref 150–450)
PMV BLD AUTO: 10.8 FL (ref 9.2–12.9)
POTASSIUM SERPL-SCNC: 4.9 MMOL/L (ref 3.5–5.1)
PROT SERPL-MCNC: 7.4 GM/DL (ref 6–8.4)
RBC # BLD AUTO: 5.18 M/UL (ref 4–5.4)
RELATIVE EOSINOPHIL (OHS): 0.8 %
RELATIVE LYMPHOCYTE (OHS): 24 % (ref 18–48)
RELATIVE MONOCYTE (OHS): 8.1 % (ref 4–15)
RELATIVE NEUTROPHIL (OHS): 66 % (ref 38–73)
SODIUM SERPL-SCNC: 141 MMOL/L (ref 136–145)
TRIGL SERPL-MCNC: 130 MG/DL (ref 30–150)
TSH SERPL-ACNC: 1.5 UIU/ML (ref 0.4–4)
WBC # BLD AUTO: 7.41 K/UL (ref 3.9–12.7)

## 2025-07-31 PROCEDURE — 85025 COMPLETE CBC W/AUTO DIFF WBC: CPT

## 2025-07-31 PROCEDURE — 82465 ASSAY BLD/SERUM CHOLESTEROL: CPT

## 2025-07-31 PROCEDURE — 36415 COLL VENOUS BLD VENIPUNCTURE: CPT | Mod: PO

## 2025-07-31 PROCEDURE — 82306 VITAMIN D 25 HYDROXY: CPT

## 2025-07-31 PROCEDURE — 84443 ASSAY THYROID STIM HORMONE: CPT

## 2025-07-31 PROCEDURE — 82374 ASSAY BLOOD CARBON DIOXIDE: CPT

## 2025-08-05 ENCOUNTER — OFFICE VISIT (OUTPATIENT)
Dept: INTERNAL MEDICINE | Facility: CLINIC | Age: 70
End: 2025-08-05
Payer: COMMERCIAL

## 2025-08-05 ENCOUNTER — TELEPHONE (OUTPATIENT)
Dept: SURGERY | Facility: CLINIC | Age: 70
End: 2025-08-05
Payer: COMMERCIAL

## 2025-08-05 VITALS
BODY MASS INDEX: 23.64 KG/M2 | DIASTOLIC BLOOD PRESSURE: 70 MMHG | HEART RATE: 75 BPM | SYSTOLIC BLOOD PRESSURE: 118 MMHG | OXYGEN SATURATION: 95 % | WEIGHT: 138.44 LBS | HEIGHT: 64 IN

## 2025-08-05 DIAGNOSIS — F41.9 ANXIETY: ICD-10-CM

## 2025-08-05 DIAGNOSIS — K64.9 HEMORRHOIDS, UNSPECIFIED HEMORRHOID TYPE: ICD-10-CM

## 2025-08-05 DIAGNOSIS — Z00.00 ROUTINE GENERAL MEDICAL EXAMINATION AT A HEALTH CARE FACILITY: Primary | ICD-10-CM

## 2025-08-05 DIAGNOSIS — I10 HYPERTENSION, UNSPECIFIED TYPE: ICD-10-CM

## 2025-08-05 DIAGNOSIS — G47.09 OTHER INSOMNIA: ICD-10-CM

## 2025-08-05 PROCEDURE — 99999 PR PBB SHADOW E&M-EST. PATIENT-LVL III: CPT | Mod: PBBFAC,,, | Performed by: INTERNAL MEDICINE

## 2025-08-05 PROCEDURE — 3074F SYST BP LT 130 MM HG: CPT | Mod: CPTII,S$GLB,, | Performed by: INTERNAL MEDICINE

## 2025-08-05 PROCEDURE — 4010F ACE/ARB THERAPY RXD/TAKEN: CPT | Mod: CPTII,S$GLB,, | Performed by: INTERNAL MEDICINE

## 2025-08-05 PROCEDURE — 99397 PER PM REEVAL EST PAT 65+ YR: CPT | Mod: S$GLB,,, | Performed by: INTERNAL MEDICINE

## 2025-08-05 PROCEDURE — 3008F BODY MASS INDEX DOCD: CPT | Mod: CPTII,S$GLB,, | Performed by: INTERNAL MEDICINE

## 2025-08-05 PROCEDURE — 3078F DIAST BP <80 MM HG: CPT | Mod: CPTII,S$GLB,, | Performed by: INTERNAL MEDICINE

## 2025-08-05 RX ORDER — ZOLPIDEM TARTRATE 10 MG/1
10 TABLET ORAL NIGHTLY PRN
Qty: 30 TABLET | Refills: 0 | Status: SHIPPED | OUTPATIENT
Start: 2025-08-05

## 2025-08-05 RX ORDER — ALPRAZOLAM 0.25 MG/1
0.25 TABLET ORAL 2 TIMES DAILY PRN
Qty: 15 TABLET | Refills: 0 | Status: SHIPPED | OUTPATIENT
Start: 2025-08-05

## 2025-08-05 NOTE — Clinical Note
Seeing patient today. She needs to see you for hemorrhoid surgery. You did her colonoscopy and hemorrhoid banding 11/2023. She continues to have hemorrhoid issues.  She had pelvic floor surgery and bladder lift with Knoepp 9/2020.  Can you assist in getting her an apt with you Bonnie Mathew M.D.

## 2025-08-05 NOTE — PROGRESS NOTES
CHIEF COMPLAINT:Annual exam and Follow up of medical problems    HISTORY OF PRESENT ILLNESS: 70 year-old woman who presents for above    She is currently taking valsartan 320 mg once daily and spironolactone 25 mg daily. She had swelling with minoxidil and amlodipine. She does not drink enough fluids.  Seh will have some mild cramping in the morning with adding back the spironolactone. No swelling.      She is taking pravastatin 20 mg once daily  and co enzyme Q10 200 mg daily. Intolerant to Crestor    She is off Gemtessa - overall urination is doing better.  She feels that she does not need medication for her urination     She has had more heartburn. She is managing the heartburn with taking pepcid 40 mg as needed.   She knows what foods give her heartburn.   Raw onions or raw garlic with give her heartburn.    Oil in corn chips will give her heartburn.   She continues to take prilosec 40 mg in am.      Back has been ok.       She had her colonoscopy on 11/9/23 - normal due 10 years. She had an episode of diverticulitis on 12/26/24 - resolved with antibiotics.  She had her hemorrhoids banded 11/2023 during colonoscopy.     She still has hemorrhoid issues.  Bleeds once a week.  She uses hydrocortisone cream which does help.  She has a BM 3 times every morning.  She has to get up early if she has to go somewhere. She had  bladder lift with sling, ERIN/BSO, umbilical hernia repair on 9/24/20 with DR Pineda. Procedure went well.. She is using estradiol vaginal cream twice a week      She generally exercises 5 days a week.       She has sleep apnea. She is not wearing CPAP machine. She fiddles with the machine all night long and does not sleep.      Her neck continues to be stiff at times. It will freeze at times. Not a lot of neck pain. She has cracking in the neck. She watches her position at night.  She does stretching of the neck which helps. Occasional muscle relaxer helps.       She has left foot pain - saw   "Treuting 1/10/25- had a MRI 1/25/25- has arthritis and a ligament issue.   She had an injection in the foot whcheikh helped.   She will have an injection in the foot tomorrow    She had an injection with DR Graff in her right wrist for right carpal tunnel syndrome - last week. Will eventually need surgery          She took Boniva 150 mg once monthly for her osteoporosis.  She stopped the Boniva in February 2020 due to improvement.           PAST MEDICAL HISTORY:   1. Hyperlipidemia.   2. Sleep disorder.   3. Cervical spine arthritis.   4. Episode of hematuria, negative cystoscope with Dr. Roland.     PAST SURGICAL HISTORY:   1. D&C for a miscarriage.   2. Conization due to dysplasia 18 years ago.   3. Tonsillectomy and adenoidectomy at age [age].     SOCIAL HISTORY: She does not smoke. . 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 She owns a business.     FAMILY HISTORY: She is adopted.       PHYSICAL EXAMINATION:       /70   Pulse 75   Ht 5' 4" (1.626 m)   Wt 62.8 kg (138 lb 7.2 oz)   LMP  (LMP Unknown)   SpO2 95%   BMI 23.76 kg/m²      General: Alert, oriented. No apparent distress. Affect within normal   limits.   Conjunctivae anicteric. PERRL. Tympanic membranes clear  . Oropharynx clear.   Neck supple. No cervical lymphadenopathy, no thyroid enlargement.   Respiratory effort normal. Lungs clear to auscultation.   Heart: Regular rate and rhythm without murmurs, gallops or rubs.   No lower extremity edema.          Approx 0.5 cm x 0.5 cm soft free mobile lymph node on the back of the left neck.- posterior cervical lymph node. Seems softer.      Labs 7/31/25 reviewed      ASSESSMENT AND PLAN:      Annual exam - discussed diet, exercise and safety issues.    .  Hypertension -digital hypertension.   Controlled  GERD- watch diet.   Continue omeprazole 40 mg in am and  famotidine 40 mg in the evening as needed  Neck pain due to OA cervical spine - stable  Diarrhea  -managing.   Hyperlipidemia- ct coronary calcium " score of 14 on 3/27/25 -ok to continue lower dose pravastatin 20 mg daily.    ALlergic rhinitis - stable.    Osteoporosis - 2/2022 - with increase in bone density - with recommendation to continue drug holiday then  BMD 3/24 lower- restarted ibandronate 150 mg once a month 3/2024- BMD due 3/2026  Insomnia - ambien as needed  Hemorrhoids- To colon and rectal for discussion for hemorrhoidectomy  Left posterior cervical lymph node- continue to monitor- seems softer  MMG 3/27/25    She is to return in 6 months a if problems arise

## 2025-08-05 NOTE — TELEPHONE ENCOUNTER
Placed call to schedule CRS f/u appt at e request of PCP.    LVM w/ reason for calling, clinic number & request for callback if needing assistance w/ appt scheduling.

## 2025-08-06 ENCOUNTER — TELEPHONE (OUTPATIENT)
Dept: PAIN MEDICINE | Facility: HOSPITAL | Age: 70
End: 2025-08-06
Payer: COMMERCIAL

## 2025-08-06 ENCOUNTER — HOSPITAL ENCOUNTER (OUTPATIENT)
Dept: INTERVENTIONAL RADIOLOGY/VASCULAR | Facility: HOSPITAL | Age: 70
Discharge: HOME | End: 2025-08-06
Attending: ORTHOPAEDIC SURGERY | Admitting: RADIOLOGY
Payer: COMMERCIAL

## 2025-08-06 VITALS
DIASTOLIC BLOOD PRESSURE: 71 MMHG | OXYGEN SATURATION: 100 % | HEART RATE: 56 BPM | SYSTOLIC BLOOD PRESSURE: 158 MMHG | RESPIRATION RATE: 16 BRPM | TEMPERATURE: 99 F

## 2025-08-06 DIAGNOSIS — M19.072 ARTHRITIS OF LEFT MIDFOOT: ICD-10-CM

## 2025-08-06 PROCEDURE — 94760 N-INVAS EAR/PLS OXIMETRY 1: CPT

## 2025-08-06 PROCEDURE — 63600175 PHARM REV CODE 636 W HCPCS: Performed by: RADIOLOGY

## 2025-08-06 PROCEDURE — 25500020 PHARM REV CODE 255: Performed by: RADIOLOGY

## 2025-08-06 PROCEDURE — 99900035 HC TECH TIME PER 15 MIN (STAT)

## 2025-08-06 RX ORDER — LIDOCAINE HYDROCHLORIDE 20 MG/ML
INJECTION, SOLUTION INFILTRATION; PERINEURAL
Status: COMPLETED | OUTPATIENT
Start: 2025-08-06 | End: 2025-08-06

## 2025-08-06 RX ORDER — SPIRONOLACTONE 25 MG/1
12.5 TABLET ORAL 2 TIMES DAILY
COMMUNITY
Start: 2025-05-20

## 2025-08-06 RX ORDER — METHYLPREDNISOLONE ACETATE 40 MG/ML
INJECTION, SUSPENSION INTRA-ARTICULAR; INTRALESIONAL; INTRAMUSCULAR; SOFT TISSUE
Status: COMPLETED | OUTPATIENT
Start: 2025-08-06 | End: 2025-08-06

## 2025-08-06 RX ORDER — BUPIVACAINE HYDROCHLORIDE 2.5 MG/ML
INJECTION, SOLUTION EPIDURAL; INFILTRATION; INTRACAUDAL; PERINEURAL
Status: COMPLETED | OUTPATIENT
Start: 2025-08-06 | End: 2025-08-06

## 2025-08-06 RX ADMIN — METHYLPREDNISOLONE ACETATE 40 MG: 40 INJECTION, SUSPENSION INTRA-ARTICULAR; INTRALESIONAL; INTRAMUSCULAR; SOFT TISSUE at 09:08

## 2025-08-06 RX ADMIN — LIDOCAINE HYDROCHLORIDE 20 ML: 20 INJECTION, SOLUTION INFILTRATION; PERINEURAL at 09:08

## 2025-08-06 RX ADMIN — IOHEXOL 10 ML: 350 INJECTION, SOLUTION INTRAVENOUS at 09:08

## 2025-08-06 RX ADMIN — BUPIVACAINE HYDROCHLORIDE 10 ML: 2.5 INJECTION, SOLUTION EPIDURAL; INFILTRATION; INTRACAUDAL; PERINEURAL at 09:08

## 2025-08-06 NOTE — DISCHARGE SUMMARY
Radiology Discharge Summary      Hospital Course: No complications    Admit Date: 8/6/2025  Discharge Date: 08/06/2025     Instructions Given to Patient: Yes  Diet: Resume prior diet  Activity: activity as tolerated    Description of Condition on Discharge: Stable  Vital Signs (Most Recent): Temp: 98.6 °F (37 °C) (08/06/25 0858)  Pulse: (!) 58 (08/06/25 1010)  Resp: 16 (08/06/25 1010)  BP: (!) 158/71 (08/06/25 1010)  SpO2: 100 % (08/06/25 1010)    Discharge Disposition: Home    Discharge Diagnosis: Left foot pain s/p joint injection     Follow-up: LOUIE Brooks MD  Interventional Radiologist  Department of Radiology

## 2025-08-06 NOTE — Clinical Note
A pre-sedation assessment was completed by the physician immediately prior to sedation start. 
Anesthesia Type: Local Only
Left: Foot.   Scrubbed with ChloroPrep With Tint.   Painted with None.  Hair: Clipped.  Skin prep dry before draping.
oriented to person, place, time and situation

## 2025-08-06 NOTE — PROCEDURES
Radiology Post-Procedure Note    Pre Op Diagnosis: Left foot pain  Post Op Diagnosis: Same    Procedure: L 2/3 TMT joint injection    Procedure performed by: Hernan Brooks MD    Written Informed Consent Obtained: Yes  Specimen Removed: NO  Estimated Blood Loss: Minimal    Findings:   Successful left 2/3 TMT joint injection.    The patient tolerated procedure well and there were no immediate complications. Please see procedure report under Imaging for further details.    Hernan Brooks MD  Interventional Radiologist  Department of Radiology

## 2025-08-06 NOTE — PLAN OF CARE
Pt in preop bay 39, VSS taken. Pt denies any open wounds on body or the use of any weight loss injections. Pt needs procedural consents signed, site marked, H&P, and H&P updated, otherwise ready to roll.
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
## 2025-08-06 NOTE — PLAN OF CARE
Discharge instructions provided to patient and spouse. Patient and spouse verbalized understanding of the instructions. VSS. No concerns voiced. Patient ambulated with no problem with  to vehicle.

## 2025-08-26 DIAGNOSIS — I10 ESSENTIAL (PRIMARY) HYPERTENSION: ICD-10-CM

## 2025-08-26 RX ORDER — SPIRONOLACTONE 25 MG/1
12.5 TABLET ORAL 2 TIMES DAILY
Qty: 90 TABLET | Refills: 3 | Status: SHIPPED | OUTPATIENT
Start: 2025-08-26

## (undated) DEVICE — TIP RUMI WHITE DISP UMW676

## (undated) DEVICE — SUT VICRYL PLUS 3-0 18IN

## (undated) DEVICE — SPONGE COTTON TRAY 4X4IN

## (undated) DEVICE — TROCAR ENDOPATH XCEL 5X100MM

## (undated) DEVICE — GLOVE BIOGEL SKINSENSE PI 7.0

## (undated) DEVICE — SOL CLEARIFY VISUALIZATION LAP

## (undated) DEVICE — SEE MEDLINE ITEM 157117

## (undated) DEVICE — TROCAR ENDOPATH XCEL 11X100MM

## (undated) DEVICE — GLOVE BIOGEL SKINSENSE PI 7.5

## (undated) DEVICE — Device

## (undated) DEVICE — SUT PROLENE 0 MO6 30IN BLUE

## (undated) DEVICE — DRESSING GAUZE CISION 1X8IN

## (undated) DEVICE — ADHESIVE DERMABOND ADVANCED

## (undated) DEVICE — SUT PDS II 2-0 CT-2 VIL

## (undated) DEVICE — SOL ELECTROLUBE ANTI-STIC

## (undated) DEVICE — SOL NS 1000CC

## (undated) DEVICE — BLADE SURG STAINLESS STEEL #11

## (undated) DEVICE — GLOVE BIOGEL SKINSENSE PI 6.5

## (undated) DEVICE — SUT VICRYL CTD 2-0 GI 27 SH

## (undated) DEVICE — SOL 9P NACL IRR PIC IL

## (undated) DEVICE — SET BASIN 48X48IN 6000ML RING

## (undated) DEVICE — OCCLUDER COLPO-PNEUMO STERILE

## (undated) DEVICE — TROCAR ENDOPATH XCEL 11MM 10CM

## (undated) DEVICE — SEE MEDLINE ITEM 157131

## (undated) DEVICE — SEE MEDLINE ITEM 146296

## (undated) DEVICE — DRAPE STERI INSTRUMENT 1018

## (undated) DEVICE — ELECTRODE BLD EXT INSUL 1

## (undated) DEVICE — NDL HYPO REG 25G X 1 1/2

## (undated) DEVICE — SPONGE LAP 18X18 PREWASHED

## (undated) DEVICE — SYR 10CC LUER LOCK

## (undated) DEVICE — SUT 2/0 36IN ETHIBOND EXCE

## (undated) DEVICE — SEE MEDLINE ITEM 157110

## (undated) DEVICE — CLOSURE SKIN STERI STRIP 1/2X4

## (undated) DEVICE — GAUZE PACKING VAG XRAY 2X6

## (undated) DEVICE — SET CYSTO IRRIGATION UNIV SPIK

## (undated) DEVICE — ELECTRODE NEEDLE 1IN

## (undated) DEVICE — SUT MCRYL PLUS 4-0 PS2 27IN

## (undated) DEVICE — SUT VICRYL 0 27 CT-2

## (undated) DEVICE — SUT VICRYL+ 27 UR-6 VIOL

## (undated) DEVICE — IRRIGATOR ENDOSCOPY DISP.

## (undated) DEVICE — SEE MEDLINE ITEM 146313

## (undated) DEVICE — UNDERGLOVES BIOGEL PI SZ 7 LF

## (undated) DEVICE — SEE MEDLINE ITEM 157148

## (undated) DEVICE — SOL STRL WATER INJ 1000ML BG

## (undated) DEVICE — SEE MEDLINE ITEM 156930

## (undated) DEVICE — PAD PERI POST REPLACEMNT

## (undated) DEVICE — PAD PREP 50/CA

## (undated) DEVICE — SEE MEDLINE ITEM 146347

## (undated) DEVICE — BANDAGE ADHESIVE

## (undated) DEVICE — KIT WING PAD POSITIONING

## (undated) DEVICE — SUT PDS II 0 CT VIL MONO 36

## (undated) DEVICE — SUT 0 8-27IN VICRYL PL CT-1

## (undated) DEVICE — ELECTRODE REM PLYHSV RETURN 9

## (undated) DEVICE — HYSTEROSCOPE TRUCLEAR ELITE

## (undated) DEVICE — GLOVE BIOGEL SKINSENSE PI 8.0

## (undated) DEVICE — PACK LAPAROSCOPY BAPTIST

## (undated) DEVICE — KIT PROCEDURE STER INLET CLOSU

## (undated) DEVICE — SUT VICRYL 3-0 27 SH

## (undated) DEVICE — SEALER LIGASURE LAP 37CM 5MM

## (undated) DEVICE — TIP RUMI MANIPULATOR LAVENDER

## (undated) DEVICE — SOL IRR NACL .9% 3000ML

## (undated) DEVICE — DRESSING TEGADERM IV 3.5 X 4.5

## (undated) DEVICE — JELLY SURGILUBE 5GR

## (undated) DEVICE — BLADE SURG STAINLESS STEEL #15

## (undated) DEVICE — DRAPE INCISE IOBAN 2 23X17IN

## (undated) DEVICE — GOWN SMART IMP BREATHABLE XXLG

## (undated) DEVICE — APPLICATOR CHLORAPREP ORN 26ML

## (undated) DEVICE — DRAPE STERI LONG

## (undated) DEVICE — SEE MEDLINE ITEM 152622

## (undated) DEVICE — CONTAINER SPECIMEN STRL 4OZ

## (undated) DEVICE — SUT VICRYL 4-0 27 PS-1 27IN